# Patient Record
Sex: MALE | Race: WHITE | NOT HISPANIC OR LATINO | Employment: FULL TIME | URBAN - METROPOLITAN AREA
[De-identification: names, ages, dates, MRNs, and addresses within clinical notes are randomized per-mention and may not be internally consistent; named-entity substitution may affect disease eponyms.]

---

## 2017-04-07 ENCOUNTER — VIRTUAL VISIT (OUTPATIENT)
Dept: FAMILY MEDICINE | Facility: OTHER | Age: 47
End: 2017-04-07

## 2017-04-08 NOTE — PROGRESS NOTES
Date:   Clinician: Rogelio Nicole  Clinician NPI: [id number]  Patient: Lalito Weathers  Patient : 1970  Patient Address: [address] [place] [id number]  Patient Phone: [phone]  Visit Protocol: URI  Patient Summary:  Lalito is a 46 year old ( : 1970 ) male who initiated a Zip for suspected Strep throat.      His symptoms started gradually 3-6 days ago and consist of rhinitis, post-nasal drainage, sore throat, nasal congestion, and malaise.   He denies itchy eyes, cough, hoarse voice, myalgias, dysphagia, dyspnea, vomiting, nausea, chest pain, ear pain, petechial or purpuric rash, chills, loss of appetite, and fever. He denies a history of facial surgery.   His minimal nasal secretions are clear. His moderate facial pain or pressure feels worse when bending over or leaning forward and is located on both sides of his head. The facial pain or pressure started after the onset of other URI symptoms.  Lalito has a mild headache. The headache did not start before his other symptoms and is located on both sides of his head.   He has a mildly painful sore throat. The patient denies having white spots on the tonsils similar to a sample strep throat image provided. He has not been exposed to Strep. When asked to feel his neck he denied feeling enlarged lymph nodes. He denies axillary lymphadenopathy.    Lalito denies having COPD or other chronic lung disease.   Pulse: self-reported pulse rate as: 10 beats in 10 seconds.    Weight (in lbs): 270   Lalito does not smoke or use smokeless tobacco.   Additional information as reported by the patient (free text): My throat is soar but it does not hurt to swallow. I do have tonisl stones, I have a headache, sometime my nose is pluged and sometimes its not. I ahve been using cough drops and Ibuprofen... caough drops seem to help clear out my nose.   MEDICATIONS:  Lisinopril and Verapamil  , ALLERGIES:    Patient free text response:   Statin     Clinician Response:  Dear Lalito,  Your [facility] lab test results show that fortunately you DO NOT have a Strep Throat infection. This means that your condition should resolve within a few days. Try the following to help with your pain and discomfort:     Use throat lozenges    Gargle with warm salt water (1/4 teaspoon of salt per 8 ounce glass of water)    Suck on frozen items such as Popsicles or ice cubes    Take ibuprofen (such as Advil or store brand) or acetaminophen (Tylenol or store brand) for discomfort or fever     Follow up with your primary care clinician if your symptoms are not improving in 3-4 days.   I am prescribing fluticasone propionate nasal (Flonase) 50 mcg/spray. Take one or two inhalations in each nostril one time a day. There are no refills with this prescription.   Unless your are allergic to the over-the-counter medication(s) below, I recommend using:   Ibuprofen. Take 1-3 200mg tablets (200-600mg) every 8 hours to help with the discomfort. Make sure to take the ibuprofen with food. Do not exceed 2400mg in 24 hours. This is an over-the-counter medication that does not require a prescription.   Diagnosis: [facility] TICKET STREP  Diagnosis ICD: J02.9  [facility] Results: Strep Test - Negative: no group a streptococcal antigen detected by immunoassay, await culture result  [facility] Secondary Results: Strep Culture - No beta streptococcus isolated  Prescription: fluticasone propionate (Flonase) 50mcg nasal spray 16 gm, 30 days supply. Take one or two inhalations in each nostril one time a day. Refills: 0, Refill as needed: no, Allow substitutions: yes

## 2018-12-18 ENCOUNTER — OFFICE VISIT (OUTPATIENT)
Dept: FAMILY MEDICINE | Facility: CLINIC | Age: 48
End: 2018-12-18
Payer: COMMERCIAL

## 2018-12-18 ENCOUNTER — ANCILLARY PROCEDURE (OUTPATIENT)
Dept: GENERAL RADIOLOGY | Facility: CLINIC | Age: 48
End: 2018-12-18
Attending: NURSE PRACTITIONER
Payer: COMMERCIAL

## 2018-12-18 VITALS
HEART RATE: 82 BPM | BODY MASS INDEX: 38.27 KG/M2 | DIASTOLIC BLOOD PRESSURE: 72 MMHG | HEIGHT: 69 IN | RESPIRATION RATE: 16 BRPM | SYSTOLIC BLOOD PRESSURE: 156 MMHG | OXYGEN SATURATION: 96 % | TEMPERATURE: 101.1 F | WEIGHT: 258.4 LBS

## 2018-12-18 DIAGNOSIS — R50.9 ACUTE FEBRILE ILLNESS: ICD-10-CM

## 2018-12-18 DIAGNOSIS — J18.9 PNEUMONIA OF RIGHT LOWER LOBE DUE TO INFECTIOUS ORGANISM: Primary | ICD-10-CM

## 2018-12-18 LAB
FLUAV+FLUBV AG SPEC QL: NEGATIVE
FLUAV+FLUBV AG SPEC QL: NEGATIVE
SPECIMEN SOURCE: NORMAL

## 2018-12-18 PROCEDURE — 99203 OFFICE O/P NEW LOW 30 MIN: CPT | Performed by: NURSE PRACTITIONER

## 2018-12-18 PROCEDURE — 87804 INFLUENZA ASSAY W/OPTIC: CPT | Performed by: NURSE PRACTITIONER

## 2018-12-18 PROCEDURE — 71046 X-RAY EXAM CHEST 2 VIEWS: CPT | Mod: FY

## 2018-12-18 RX ORDER — DOXYCYCLINE HYCLATE 100 MG
100 TABLET ORAL 2 TIMES DAILY
Qty: 20 TABLET | Refills: 0 | Status: SHIPPED | OUTPATIENT
Start: 2018-12-18 | End: 2018-12-28

## 2018-12-18 RX ORDER — CODEINE PHOSPHATE AND GUAIFENESIN 10; 100 MG/5ML; MG/5ML
1-2 SOLUTION ORAL EVERY 4 HOURS PRN
Qty: 180 ML | Refills: 0 | Status: SHIPPED | OUTPATIENT
Start: 2018-12-18 | End: 2019-07-09

## 2018-12-18 RX ORDER — ASPIRIN 81 MG/1
TABLET ORAL
COMMUNITY
Start: 2008-10-02

## 2018-12-18 RX ORDER — TRIAMTERENE AND HYDROCHLOROTHIAZIDE 37.5; 25 MG/1; MG/1
CAPSULE ORAL
COMMUNITY
Start: 2018-10-08 | End: 2019-08-08 | Stop reason: ALTCHOICE

## 2018-12-18 RX ORDER — VERAPAMIL HYDROCHLORIDE 180 MG/1
180 CAPSULE, EXTENDED RELEASE ORAL
COMMUNITY
Start: 2018-09-28 | End: 2019-08-09

## 2018-12-18 RX ORDER — LISINOPRIL 40 MG/1
40 TABLET ORAL
COMMUNITY
Start: 2018-09-28 | End: 2020-01-07

## 2018-12-18 ASSESSMENT — MIFFLIN-ST. JEOR: SCORE: 2026.43

## 2018-12-18 NOTE — PROGRESS NOTES
"  SUBJECTIVE:   Lalito Weathers is a 48 year old male who presents to clinic today for the following health issues:      ENT Symptoms             Symptoms: cc Present Absent Comment   Fever/Chills  x  Current temp is 101.1, chills, shakes   Fatigue  x  Unable to sleep due to plugged nose, unable to sleep more than 2 hrs at a time   Muscle Aches  x     Eye Irritation   x    Sneezing  x  mild   Nasal Fadi/Drg  x  Uses cpap and it forces drainage down throat keeping him up coughing at night   Sinus Pressure/Pain  x     Loss of smell   x    Dental pain  x     Sore Throat   x    Swollen Glands  x     Ear Pain/Fullness   x    Cough x x  Dry cough    Wheeze  x     Chest Pain  x  mild   Shortness of breath   x    Rash   x    Other         Symptom duration:  4 days    Symptom severity:  severe    Treatments tried:  nyquil, advil-last dose an hour prior to OV (400 mg)   Contacts:  gym instructors out of work       Problem list and histories reviewed & adjusted, as indicated.  Additional history: as documented    There is no problem list on file for this patient.    History reviewed. No pertinent surgical history.    Social History     Tobacco Use     Smoking status: Former Smoker     Smokeless tobacco: Former User   Substance Use Topics     Alcohol use: Yes     Comment: social     History reviewed. No pertinent family history.        Reviewed and updated as needed this visit by clinical staff       Reviewed and updated as needed this visit by Provider         ROS:  Constitutional, HEENT, cardiovascular, pulmonary, gi and gu systems are negative, except as otherwise noted.    OBJECTIVE:     /72   Pulse 82   Temp 101.1  F (38.4  C) (Tympanic)   Resp 16   Ht 1.759 m (5' 9.25\")   Wt 117.2 kg (258 lb 6.4 oz)   SpO2 96%   BMI 37.88 kg/m    Body mass index is 37.88 kg/m .  GENERAL: alert, no distress and fatigued  EYES: Eyes grossly normal to inspection, PERRL and conjunctivae and sclerae normal  HENT: ear canals and " "TM's normal, nose and mouth without ulcers or lesions  NECK: no adenopathy, no asymmetry, masses, or scars and thyroid normal to palpation  RESP: no rales , no rhonchi and expiratory wheezes R lower posterior  CV: regular rates and rhythm, normal S1 S2, no S3 or S4, no murmur, click or rub.  SKIN: no suspicious lesions or rashes  NEURO: Normal strength and tone, mentation intact and speech normal    Diagnostic Test Results:  Results for orders placed or performed in visit on 12/18/18 (from the past 24 hour(s))   Influenza A/B antigen   Result Value Ref Range    Influenza A/B Agn Specimen Nasal     Influenza A Negative NEG^Negative    Influenza B Negative NEG^Negative     CXR - per my read infiltrate to RLL, pending radiology review.    CHEST TWO VIEWS December 18, 2018 10:23 AM      HISTORY: Acute febrile illness.     COMPARISON: None.                                                                      IMPRESSION: Patchy consolidation is present within the right lower  lobe consistent with pneumonia. Lungs are otherwise clear. No evidence  of pleural effusion. Heart size is normal.     LEDA STAUFFER MD    ASSESSMENT/PLAN:     BP Screening:   Last 3 BP Readings:    BP Readings from Last 3 Encounters:   12/18/18 156/72       The following was recommended to the patient:  Referral to alternative/primary care provider  BMI:   Estimated body mass index is 37.88 kg/m  as calculated from the following:    Height as of this encounter: 1.759 m (5' 9.25\").    Weight as of this encounter: 117.2 kg (258 lb 6.4 oz).   Weight management plan: Patient was referred to their PCP to discuss a diet and exercise plan.        ICD-10-CM    1. Pneumonia of right lower lobe due to infectious organism (H) J18.1 doxycycline hyclate (VIBRA-TABS) 100 MG tablet     guaiFENesin-codeine (ROBITUSSIN AC) 100-10 MG/5ML solution   2. Acute febrile illness R50.9 Influenza A/B antigen     XR Chest 2 Views       FUTURE APPOINTMENTS:       - Follow up " in 3 days for symptoms that are not improving, sooner for new or worsening sx.     Patient Instructions     Patient Education     What is Pneumonia?    Pneumonia is a serious lung infection. Many cases of pneumonia are caused by bacteria or viruses. Fungi may also cause pneumonia, but this is less common.  You may also get pneumonia after another illness, such as a cold, flu, or bronchitis. Those most at risk include older adults, smokers, and people with long-term (chronic) health problems or weak immune systems.  Healthy lungs    Air travels in and out of the lungs through tubes called airways.    The tubes branch into smaller passages called bronchioles. These end in tiny sacs called alveoli.    Blood vessels surrounding the alveoli take oxygen into the bloodstream. At the same time, the alveoli remove carbon dioxide (a waste gas) from the blood. The carbon dioxide is then exhaled.  When you have pneumonia    Pneumonia causes the bronchioles and the alveoli to fill with excess mucus and become inflamed.    Your body s response may be to cough. This can help clear out the fluid.    The fluid (or mucus) you cough up may appear green or dark yellow.    The excess mucus may make you feel short of breath.    The inflammation and infection may give you a fever.  What are the symptoms?  Symptoms of pneumonia can come without warning. At first, you may think you have a cold or flu. But symptoms may get worse quickly, turning into pneumonia. Symptoms can be different for bacterial and viral pneumonia. Common symptoms may include the following:    Severe cough with green or yellow mucus that doesn't improve or that gets worse    Fever and chills    Nausea, vomiting or diarrhea    Shortness of breath with normal daily activities    Increased heart rate    Chest pain or discomfort when breathing in or coughing    Headache    Excessive sweating and clammy skin  Date Last Reviewed: 12/1/2016 2000-2018 [employer]  [employer]. [address] [place] [address]. All rights reserved. This information is not intended as a substitute for professional medical care. Always follow your healthcare professional's instructions.               TERESA Henderson [facility]

## 2018-12-18 NOTE — PATIENT INSTRUCTIONS
Patient Education     What is Pneumonia?    Pneumonia is a serious lung infection. Many cases of pneumonia are caused by bacteria or viruses. Fungi may also cause pneumonia, but this is less common.  You may also get pneumonia after another illness, such as a cold, flu, or bronchitis. Those most at risk include older adults, smokers, and people with long-term (chronic) health problems or weak immune systems.  Healthy lungs    Air travels in and out of the lungs through tubes called airways.    The tubes branch into smaller passages called bronchioles. These end in tiny sacs called alveoli.    Blood vessels surrounding the alveoli take oxygen into the bloodstream. At the same time, the alveoli remove carbon dioxide (a waste gas) from the blood. The carbon dioxide is then exhaled.  When you have pneumonia    Pneumonia causes the bronchioles and the alveoli to fill with excess mucus and become inflamed.    Your body s response may be to cough. This can help clear out the fluid.    The fluid (or mucus) you cough up may appear green or dark yellow.    The excess mucus may make you feel short of breath.    The inflammation and infection may give you a fever.  What are the symptoms?  Symptoms of pneumonia can come without warning. At first, you may think you have a cold or flu. But symptoms may get worse quickly, turning into pneumonia. Symptoms can be different for bacterial and viral pneumonia. Common symptoms may include the following:    Severe cough with green or yellow mucus that doesn't improve or that gets worse    Fever and chills    Nausea, vomiting or diarrhea    Shortness of breath with normal daily activities    Increased heart rate    Chest pain or discomfort when breathing in or coughing    Headache    Excessive sweating and clammy skin  Date Last Reviewed: 12/1/2016 2000-2018 [employer]. [address] [place] [address], [place] [address]. All rights reserved. This information is not intended as a  substitute for professional medical care. Always follow your healthcare professional's instructions.

## 2018-12-24 ENCOUNTER — NURSE TRIAGE (OUTPATIENT)
Dept: NURSING | Facility: CLINIC | Age: 48
End: 2018-12-24

## 2018-12-24 NOTE — TELEPHONE ENCOUNTER
"Has some questions.He said he is feeling much improved, no fever, voice is still hoarse, he still coughs but not as bad. He is wondering if he should be \"coughing up\" something from his lungs or if he is not should be going back in to the doctor?  IMPRESSION: Patchy consolidation is present within the right lower  lobe consistent with pneumonia. Lungs are otherwise clear. No evidence  of pleural effusion. Heart size is normal.     LEDA STAUFFER MD     ASSESSMENT/PLAN:      BP Screening:   Last 3 BP Readings:        BP Readings from Last 3 Encounters:   12/18/18 156/72         The following was recommended to the patient:  Referral to alternative/primary care provider  BMI:   Estimated body mass index is 37.88 kg/m  as calculated from the following:    Height as of this encounter: 1.759 m (5' 9.25\").    Weight as of this encounter: 117.2 kg (258 lb 6.4 oz).   Weight management plan: Patient was referred to their PCP to discuss a diet and exercise plan.            ICD-10-CM     1. Pneumonia of right lower lobe due to infectious organism (H) J18.1 doxycycline hyclate (VIBRA-TABS) 100 MG tablet       guaiFENesin-codeine (ROBITUSSIN AC) 100-10 MG/5ML solution   2. Acute febrile illness R50.9 Influenza A/B antigen       XR Chest 2 Views         FUTURE APPOINTMENTS:       - Follow up in 3 days for symptoms that are not improving, sooner for new or worsening sx.      Patient Instructions         Patient Education        What is Pneumonia?    Pneumonia is a serious lung infection. Many cases of pneumonia are caused by bacteria or viruses. Fungi may also cause pneumonia, but this is less common.  You may also get pneumonia after another illness, such as a cold, flu, or bronchitis. Those most at risk include older adults, smokers, and people with long-term (chronic) health problems or weak immune systems.  Healthy lungs    Air travels in and out of the lungs through tubes called airways.    The tubes branch into smaller " "passages called bronchioles. These end in tiny sacs called alveoli.    Blood vessels surrounding the alveoli take oxygen into the bloodstream. At the same time, the alveoli remove carbon dioxide (a waste gas) from the blood. The carbon dioxide is then exhaled.  When you have pneumonia    Pneumonia causes the bronchioles and the alveoli to fill with excess mucus and become inflamed.    Your body s response may be to cough. This can help clear out the fluid.    The fluid (or mucus) you cough up may appear green or dark yellow.    The excess mucus may make you feel short of breath.    The inflammation and infection may give you a fever.  What are the symptoms?  Symptoms of pneumonia can come without warning. At first, you may think you have a cold or flu. But symptoms may get worse quickly, turning into pneumonia. Symptoms can be different for bacterial and viral pneumonia. Common symptoms may include the following:    Severe cough with green or yellow mucus that doesn't improve or that gets worse    Fever and chills    Nausea, vomiting or diarrhea    Shortness of breath with normal daily activities    Increased heart rate    Chest pain or discomfort when breathing in or coughing    Headache    Excessive sweating and clammy skin  Date Last Reviewed: 12/1/2016 2000-2018 [employer]. [address] [place] [address]. All rights reserved. This information is not intended as a substitute for professional medical care. Always follow your healthcare professional's instructions.                      TERESA Henderson [facility]      Instructions     Patient Instructions                After Visit Summary (Printed 12/18/2018)   Additional Documentation     Vitals:    /72    Pulse 82    Temp 101.1  F (38.4  C) (Tympanic)    Resp 16    Ht 1.759 m (5' 9.25\")    Wt 117.2 kg (258 lb 6.4 oz)    SpO2 96%    BMI 37.88 kg/m     BSA 2.39 m          More Vitals  "   Flowsheets:    Vitals Reassessment,    NICU VS,    Anthropometrics       Encounter Info:    Billing Info,    History,    Allergies,    Detailed Report      Reviewed clinic discharge instructions with patient and advised follow up if he feels he not improving.  Kayleen Lamas RN  [facility]

## 2019-01-04 ENCOUNTER — ANCILLARY PROCEDURE (OUTPATIENT)
Dept: GENERAL RADIOLOGY | Facility: CLINIC | Age: 49
End: 2019-01-04
Payer: COMMERCIAL

## 2019-01-04 ENCOUNTER — OFFICE VISIT (OUTPATIENT)
Dept: FAMILY MEDICINE | Facility: CLINIC | Age: 49
End: 2019-01-04
Payer: COMMERCIAL

## 2019-01-04 VITALS
WEIGHT: 262.2 LBS | BODY MASS INDEX: 37.54 KG/M2 | TEMPERATURE: 98.6 F | RESPIRATION RATE: 20 BRPM | HEIGHT: 70 IN | SYSTOLIC BLOOD PRESSURE: 150 MMHG | DIASTOLIC BLOOD PRESSURE: 87 MMHG | OXYGEN SATURATION: 97 % | HEART RATE: 66 BPM

## 2019-01-04 DIAGNOSIS — J18.9 PNEUMONIA OF RIGHT LOWER LOBE DUE TO INFECTIOUS ORGANISM: Primary | ICD-10-CM

## 2019-01-04 DIAGNOSIS — J18.9 PNEUMONIA OF RIGHT LOWER LOBE DUE TO INFECTIOUS ORGANISM: ICD-10-CM

## 2019-01-04 PROCEDURE — 71046 X-RAY EXAM CHEST 2 VIEWS: CPT

## 2019-01-04 PROCEDURE — 99213 OFFICE O/P EST LOW 20 MIN: CPT | Performed by: FAMILY MEDICINE

## 2019-01-04 RX ORDER — CHOLECALCIFEROL (VITAMIN D3) 1250 MCG
50000 CAPSULE ORAL
COMMUNITY
End: 2019-06-19

## 2019-01-04 RX ORDER — CHLORAL HYDRATE 500 MG
1 CAPSULE ORAL DAILY
COMMUNITY

## 2019-01-04 RX ORDER — MULTIPLE VITAMINS W/ MINERALS TAB 9MG-400MCG
1 TAB ORAL DAILY
COMMUNITY

## 2019-01-04 RX ORDER — MULTIVIT WITH MINERALS/LUTEIN
1000 TABLET ORAL DAILY
COMMUNITY

## 2019-01-04 ASSESSMENT — PAIN SCALES - GENERAL: PAINLEVEL: NO PAIN (0)

## 2019-01-04 ASSESSMENT — MIFFLIN-ST. JEOR: SCORE: 2051.61

## 2019-01-04 NOTE — NURSING NOTE
"Chief Complaint   Patient presents with     Cough     Seen 12/18/2018 for pneumonia. Still have some chest congestion.       Initial There were no vitals taken for this visit. Estimated body mass index is 37.88 kg/m  as calculated from the following:    Height as of 12/18/18: 1.759 m (5' 9.25\").    Weight as of 12/18/18: 117.2 kg (258 lb 6.4 oz).    Patient presents to the clinic using No DME    Health Maintenance that is potentially due pending provider review:  Colonoscopy/FIT    Pt will schedule future appt.    Is there anyone who you would like to be able to receive your results? Yes Trang Weathers  If yes have patient fill out CHATO  Helen Bryant CMA    "

## 2019-01-04 NOTE — PROGRESS NOTES
SUBJECTIVE:   Lalito Weathers is a 48 year old male who presents to clinic today for the following health issues:      RESPIRATORY SYMPTOMS  Still coughing       Duration: pneumonia diagnosed 12/18/18- started getting ill 12/15/2018    Description  cough, chills, fatigue/malaise and hoarse voice    Severity: mild to moderate    Accompanying signs and symptoms: None    History (predisposing factors):  none    Precipitating or alleviating factors: None    Therapies tried and outcome:  Finished Doxy          Problem list and histories reviewed & adjusted, as indicated.  Additional history: as documented    There is no problem list on file for this patient.    History reviewed. No pertinent surgical history.    Social History     Tobacco Use     Smoking status: Former Smoker     Smokeless tobacco: Former User   Substance Use Topics     Alcohol use: Yes     Comment: social     Family History   Problem Relation Age of Onset     Unknown/Adopted Mother      Unknown/Adopted Father          Current Outpatient Medications   Medication Sig Dispense Refill     aspirin 81 MG EC tablet        cholecalciferol (VITAMIN D3) 5000 units TABS tablet Take by mouth daily       cholecalciferol (VITAMIN D3) 59895 units (1250 mcg) capsule Take 50,000 Units by mouth every 7 days       fish oil-omega-3 fatty acids 1000 MG capsule Take 1 g by mouth daily       guaiFENesin-codeine (ROBITUSSIN AC) 100-10 MG/5ML solution Take 5-10 mLs by mouth every 4 hours as needed for cough 180 mL 0     lisinopril (PRINIVIL/ZESTRIL) 40 MG tablet Take 40 mg by mouth       Misc Natural Products (TART CHERRY ADVANCED PO) Take 1 tablet by mouth daily       multivitamin w/minerals (MULTI-VITAMIN) tablet Take 1 tablet by mouth daily       triamterene-HCTZ (DYAZIDE) 37.5-25 MG capsule        verapamil ER (VERELAN) 180 MG 24 hr capsule Take 180 mg by mouth       vitamin C (ASCORBIC ACID) 1000 MG TABS Take 1,000 mg by mouth daily       Allergies   Allergen Reactions  "    [place] Flavor        Reviewed and updated as needed this visit by clinical staff  Tobacco  Allergies  Meds  Med Hx  Surg Hx  Fam Hx  Soc Hx      Reviewed and updated as needed this visit by Provider         ROS:  CONSTITUTIONAL: NEGATIVE for fever, chills, change in weight  ENT/MOUTH: NEGATIVE for ear, mouth and throat problems  RESP: NEGATIVE for significant cough or SOB  CV: NEGATIVE for chest pain, palpitations or peripheral edema    OBJECTIVE:     /87 (BP Location: Right arm, Patient Position: Chair, Cuff Size: Adult Large)   Pulse 66   Temp 98.6  F (37  C)   Resp 20   Ht 1.772 m (5' 9.75\")   Wt 118.9 kg (262 lb 3.2 oz)   SpO2 97%   BMI 37.89 kg/m    Body mass index is 37.89 kg/m .  GENERAL: healthy, alert and no distress  NECK: no adenopathy, no asymmetry, masses, or scars and thyroid normal to palpation  RESP: lungs clear to auscultation - no rales, rhonchi or wheezes  CV: regular rate and rhythm, normal S1 S2, no S3 or S4, no murmur, click or rub, no peripheral edema and peripheral pulses strong  ABDOMEN: soft, nontender, no hepatosplenomegaly, no masses and bowel sounds normal  MS: no gross musculoskeletal defects noted, no edema        ASSESSMENT/PLAN:             1. Pneumonia of right lower lobe due to infectious organism (H)  Clearing cxr.    - XR Chest 2 Views; Future    ASSESSMENT/PLAN:no further antibiotics but will consider in one week if not better.       ICD-10-CM    1. Pneumonia of right lower lobe due to infectious organism (H) J18.1 XR Chest 2 Views       There are no Patient Instructions on file for this visit.        Michele Banuelos MD  [facility]  "

## 2019-05-06 ENCOUNTER — TELEPHONE (OUTPATIENT)
Dept: FAMILY MEDICINE | Facility: CLINIC | Age: 49
End: 2019-05-06

## 2019-05-06 DIAGNOSIS — Z12.11 SPECIAL SCREENING FOR MALIGNANT NEOPLASMS, COLON: Primary | ICD-10-CM

## 2019-05-06 NOTE — TELEPHONE ENCOUNTER
Pt returned call decided to have the Procedure done. Please place order  Chel at [facility] scheduling is aware and waiting for order.  Floresita [place] Sec

## 2019-05-06 NOTE — TELEPHONE ENCOUNTER
Panel Management Review      Patient has the following on his problem list: None      Composite cancer screening  Chart review shows that this patient is due/due soon for the following Colonoscopy  Summary:    Patient is due/failing the following:   COLONOSCOPY    Action needed:   Patient needs office visit for a colonoscopy.    Type of outreach:    Phone, left message for patient to call back.     Questions for provider review:    None                                                                                                                                    Archana Clay MA      Chart routed to Care Team .

## 2019-06-13 ENCOUNTER — TELEPHONE (OUTPATIENT)
Dept: FAMILY MEDICINE | Facility: CLINIC | Age: 49
End: 2019-06-13

## 2019-06-13 NOTE — TELEPHONE ENCOUNTER
Reason for Call:  Other     Detailed comments: Pt is having a colonoscopy on 6/21 and want to know what medications he should or should not be doing.    Please advise    Phone Number Patient can be reached at: Cell number on file:    No relevant phone numbers on file.       Best Time: any    Can we leave a detailed message on this number? YES    Call taken on 6/13/2019 at 8:40 AM by Gina Cummings

## 2019-06-14 NOTE — TELEPHONE ENCOUNTER
Message below relayed to pt. He took his ASA this morning so advised him to go off it now and have provider doing colonoscopy advise on restart once procedure complete.    Lizet,   Please clarify meaning: Inform patient feeling medication    Kelsy HERNANDEZ RN

## 2019-06-14 NOTE — TELEPHONE ENCOUNTER
Inform patient feeling medication he should need to hold is his aspirin but hold it 5 to 7 days prior to the colonoscopy.    Lizet Berry CNP

## 2019-06-18 ENCOUNTER — ANESTHESIA EVENT (OUTPATIENT)
Dept: GASTROENTEROLOGY | Facility: CLINIC | Age: 49
End: 2019-06-18
Payer: COMMERCIAL

## 2019-06-18 ASSESSMENT — LIFESTYLE VARIABLES: TOBACCO_USE: 1

## 2019-06-18 NOTE — ANESTHESIA PREPROCEDURE EVALUATION
"Anesthesia Pre-Procedure Evaluation    Patient: Lalito Weathers   MRN: [id number] : 1970          Preoperative Diagnosis: screening    Procedure(s):  COLONOSCOPY    No past medical history on file.  No past surgical history on file.    Anesthesia Evaluation     . Pt has had prior anesthetic.     No history of anesthetic complications          ROS/MED HX    ENT/Pulmonary:     (+)GRACY risk factors obese, tobacco use, Past use , . .    Neurologic:  - neg neurologic ROS     Cardiovascular:     (+) hypertension----. : . . . :. .       METS/Exercise Tolerance:  >4 METS   Hematologic:  - neg hematologic  ROS       Musculoskeletal:  - neg musculoskeletal ROS       GI/Hepatic:  - neg GI/hepatic ROS       Renal/Genitourinary:  - ROS Renal section negative       Endo:     (+) Obesity, .      Psychiatric:  - neg psychiatric ROS       Infectious Disease:  - neg infectious disease ROS       Malignancy:      - no malignancy   Other:    - neg other ROS                      Physical Exam  Normal systems: cardiovascular, pulmonary and dental    Airway   Mallampati: II  TM distance: >3 FB  Neck ROM: full    Dental     Cardiovascular       Pulmonary             No results found for: WBC, HGB, HCT, PLT, CRP, SED, NA, POTASSIUM, CHLORIDE, CO2, BUN, CR, GLC, HERMELINDA, PHOS, MAG, ALBUMIN, PROTTOTAL, ALT, AST, GGT, ALKPHOS, BILITOTAL, BILIDIRECT, LIPASE, AMYLASE, SARTHAK, PTT, INR, FIBR, TSH, T4, T3, HCG, HCGS, CKTOTAL, CKMB, TROPN    Preop Vitals  BP Readings from Last 3 Encounters:   19 150/87   18 156/72    Pulse Readings from Last 3 Encounters:   19 66   18 82      Resp Readings from Last 3 Encounters:   19 20   18 16    SpO2 Readings from Last 3 Encounters:   19 97%   18 96%      Temp Readings from Last 1 Encounters:   19 37  C (98.6  F)    Ht Readings from Last 1 Encounters:   19 1.772 m (5' 9.75\")      Wt Readings from Last 1 Encounters:   19 118.9 kg (262 lb 3.2 oz) " "   Estimated body mass index is 37.89 kg/m  as calculated from the following:    Height as of 1/4/19: 1.772 m (5' 9.75\").    Weight as of 1/4/19: 118.9 kg (262 lb 3.2 oz).       Anesthesia Plan      History & Physical Review  History and physical reviewed and following examination; no interval change.    ASA Status:  3 .    NPO Status:  > 8 hours    Plan for General and MAC with Propofol and Intravenous induction. Maintenance will be TIVA.  Reason for MAC:  Deep or markedly invasive procedure (G8)  PONV prophylaxis:  Ondansetron (or other 5HT-3) and Dexamethasone or Solumedrol       Postoperative Care  Postoperative pain management:  IV analgesics and Oral pain medications.      Consents  Anesthetic plan, risks, benefits and alternatives discussed with:  Patient..                 TERESA Gordon CRNA  "

## 2019-06-21 ENCOUNTER — ANESTHESIA (OUTPATIENT)
Dept: GASTROENTEROLOGY | Facility: CLINIC | Age: 49
End: 2019-06-21
Payer: COMMERCIAL

## 2019-06-21 ENCOUNTER — HOSPITAL ENCOUNTER (OUTPATIENT)
Facility: CLINIC | Age: 49
Discharge: HOME | End: 2019-06-21
Attending: SURGERY | Admitting: SURGERY
Payer: COMMERCIAL

## 2019-06-21 VITALS
WEIGHT: 250 LBS | HEART RATE: 63 BPM | OXYGEN SATURATION: 97 % | BODY MASS INDEX: 37.03 KG/M2 | TEMPERATURE: 98.7 F | SYSTOLIC BLOOD PRESSURE: 142 MMHG | RESPIRATION RATE: 16 BRPM | HEIGHT: 69 IN | DIASTOLIC BLOOD PRESSURE: 71 MMHG

## 2019-06-21 LAB — COLONOSCOPY: NORMAL

## 2019-06-21 PROCEDURE — 25000125 ZZHC RX 250: Performed by: SURGERY

## 2019-06-21 PROCEDURE — 25800030 ZZH RX IP 258 OP 636: Performed by: SURGERY

## 2019-06-21 PROCEDURE — 25000128 H RX IP 250 OP 636: Performed by: NURSE ANESTHETIST, CERTIFIED REGISTERED

## 2019-06-21 PROCEDURE — 37000008 ZZH ANESTHESIA TECHNICAL FEE, 1ST 30 MIN: Performed by: SURGERY

## 2019-06-21 PROCEDURE — 45380 COLONOSCOPY AND BIOPSY: CPT | Performed by: SURGERY

## 2019-06-21 PROCEDURE — 25000125 ZZHC RX 250: Performed by: NURSE ANESTHETIST, CERTIFIED REGISTERED

## 2019-06-21 PROCEDURE — 45385 COLONOSCOPY W/LESION REMOVAL: CPT | Mod: PT | Performed by: SURGERY

## 2019-06-21 PROCEDURE — 88305 TISSUE EXAM BY PATHOLOGIST: CPT | Performed by: SURGERY

## 2019-06-21 PROCEDURE — 88305 TISSUE EXAM BY PATHOLOGIST: CPT | Mod: 26 | Performed by: SURGERY

## 2019-06-21 RX ORDER — PROPOFOL 10 MG/ML
INJECTION, EMULSION INTRAVENOUS PRN
Status: DISCONTINUED | OUTPATIENT
Start: 2019-06-21 | End: 2019-06-21

## 2019-06-21 RX ORDER — LIDOCAINE HYDROCHLORIDE 10 MG/ML
INJECTION, SOLUTION INFILTRATION; PERINEURAL PRN
Status: DISCONTINUED | OUTPATIENT
Start: 2019-06-21 | End: 2019-06-21

## 2019-06-21 RX ORDER — PROPOFOL 10 MG/ML
INJECTION, EMULSION INTRAVENOUS CONTINUOUS PRN
Status: DISCONTINUED | OUTPATIENT
Start: 2019-06-21 | End: 2019-06-21

## 2019-06-21 RX ORDER — SODIUM CHLORIDE, SODIUM LACTATE, POTASSIUM CHLORIDE, CALCIUM CHLORIDE 600; 310; 30; 20 MG/100ML; MG/100ML; MG/100ML; MG/100ML
INJECTION, SOLUTION INTRAVENOUS CONTINUOUS
Status: DISCONTINUED | OUTPATIENT
Start: 2019-06-21 | End: 2019-06-21 | Stop reason: HOSPADM

## 2019-06-21 RX ORDER — LIDOCAINE 40 MG/G
CREAM TOPICAL
Status: DISCONTINUED | OUTPATIENT
Start: 2019-06-21 | End: 2019-06-21 | Stop reason: HOSPADM

## 2019-06-21 RX ORDER — ONDANSETRON 2 MG/ML
4 INJECTION INTRAMUSCULAR; INTRAVENOUS
Status: DISCONTINUED | OUTPATIENT
Start: 2019-06-21 | End: 2019-06-21 | Stop reason: HOSPADM

## 2019-06-21 RX ADMIN — SODIUM CHLORIDE, POTASSIUM CHLORIDE, SODIUM LACTATE AND CALCIUM CHLORIDE: 600; 310; 30; 20 INJECTION, SOLUTION INTRAVENOUS at 06:53

## 2019-06-21 RX ADMIN — PROPOFOL 100 MG: 10 INJECTION, EMULSION INTRAVENOUS at 07:33

## 2019-06-21 RX ADMIN — LIDOCAINE HYDROCHLORIDE 50 MG: 10 INJECTION, SOLUTION INFILTRATION; PERINEURAL at 07:33

## 2019-06-21 RX ADMIN — LIDOCAINE HYDROCHLORIDE 1 ML: 10 INJECTION, SOLUTION EPIDURAL; INFILTRATION; INTRACAUDAL; PERINEURAL at 06:53

## 2019-06-21 RX ADMIN — PROPOFOL 200 MCG/KG/MIN: 10 INJECTION, EMULSION INTRAVENOUS at 07:33

## 2019-06-21 ASSESSMENT — MIFFLIN-ST. JEOR: SCORE: 1984.37

## 2019-06-21 NOTE — ANESTHESIA POSTPROCEDURE EVALUATION
Patient: Lalito Weathers    Procedure(s):  COLONOSCOPY, WITH POLYPECTOMY AND BIOPSY    Diagnosis:screening  Diagnosis Additional Information: No value filed.    Anesthesia Type:  No value filed.    Note:  Anesthesia Post Evaluation    Patient location during evaluation: Bedside  Patient participation: Able to fully participate in evaluation  Level of consciousness: awake  Pain management: adequate  Airway patency: patent  Cardiovascular status: stable  Respiratory status: nasal cannula  Hydration status: stable  PONV: none     Anesthetic complications: None          Last vitals:  Vitals:    06/21/19 0633   BP: 149/88   Resp: 18   Temp: 37.1  C (98.7  F)   SpO2: 96%         Electronically Signed By: TERESA Gruber CRNA  June 21, 2019  8:06 AM

## 2019-06-21 NOTE — H&P
"48 year old year old male here for colonoscopy for screening.    There is no problem list on file for this patient.      No past medical history on file.    No past surgical history on file.    @[facility]@    No current outpatient medications on file.       Allergies   Allergen Reactions     Hmg-Coa-R Inhibitors      Vasquez Flavor        Pt reports that he has quit smoking. He has quit using smokeless tobacco. He reports that he drinks alcohol. He reports that he does not use drugs.    Exam:  /88   Temp 98.7  F (37.1  C) (Oral)   Resp 18   Ht 1.753 m (5' 9\")   Wt 113.4 kg (250 lb)   SpO2 96%   BMI 36.92 kg/m      Awake, Alert OX3  Lungs - CTA bilaterally  CV - RRR, no murmurs, distal pulses intact  Abd - soft, non-distended, non-tender, +BS  Extr - No cyanosis or edema    A/P 48 year old year old male in need of colonoscopy for screening. Risks, benefits, alternatives, and complications were discussed including the possibility of perforation and the patient agreed to proceed    Herman Champagne MD   "

## 2019-06-21 NOTE — BRIEF OP NOTE
[facility]   Brief Operative Note    Pre-operative diagnosis: screening   Post-operative diagnosis single polyp     Procedure: Procedure(s):  COLONOSCOPY, WITH POLYPECTOMY AND BIOPSY   Surgeon(s): Surgeon(s) and Role:     * Herman Champagne MD - Primary   Estimated blood loss: * No values recorded between 6/21/2019 12:00 AM and 6/21/2019  7:55 AM *    Specimens: ID Type Source Tests Collected by Time Destination   A : 10cm polyp Polyp Colon SURGICAL PATHOLOGY EXAM Herman Champagne MD 6/21/2019  7:51 AM       Findings: 1. 4 mm polyp at 10 cm - resected  2. Scattered diverticulosis (minimal)  3. Colon otherwise normal

## 2019-06-26 LAB — COPATH REPORT: NORMAL

## 2019-06-28 PROBLEM — D12.6 TUBULAR ADENOMA OF COLON: Status: ACTIVE | Noted: 2019-06-28

## 2019-07-04 ENCOUNTER — HOSPITAL ENCOUNTER (EMERGENCY)
Facility: HOSPITAL | Age: 49
Discharge: HOME | End: 2019-07-04
Attending: PHYSICIAN ASSISTANT | Admitting: PHYSICIAN ASSISTANT
Payer: COMMERCIAL

## 2019-07-04 VITALS
HEART RATE: 56 BPM | RESPIRATION RATE: 14 BRPM | OXYGEN SATURATION: 98 % | TEMPERATURE: 97.3 F | DIASTOLIC BLOOD PRESSURE: 87 MMHG | SYSTOLIC BLOOD PRESSURE: 147 MMHG

## 2019-07-04 DIAGNOSIS — R25.1 SHAKINESS: ICD-10-CM

## 2019-07-04 DIAGNOSIS — R07.89 CHEST DISCOMFORT: ICD-10-CM

## 2019-07-04 LAB
ALBUMIN SERPL-MCNC: 4.4 G/DL (ref 3.4–5)
ALP SERPL-CCNC: 71 U/L (ref 40–150)
ALT SERPL W P-5'-P-CCNC: 45 U/L (ref 0–70)
ANION GAP SERPL CALCULATED.3IONS-SCNC: 4 MMOL/L (ref 3–14)
AST SERPL W P-5'-P-CCNC: 17 U/L (ref 0–45)
BASOPHILS # BLD AUTO: 0.1 10E9/L (ref 0–0.2)
BASOPHILS NFR BLD AUTO: 0.6 %
BILIRUB SERPL-MCNC: 0.6 MG/DL (ref 0.2–1.3)
BUN SERPL-MCNC: 20 MG/DL (ref 7–30)
CALCIUM SERPL-MCNC: 9.4 MG/DL (ref 8.5–10.1)
CHLORIDE SERPL-SCNC: 107 MMOL/L (ref 94–109)
CO2 SERPL-SCNC: 28 MMOL/L (ref 20–32)
CREAT SERPL-MCNC: 0.96 MG/DL (ref 0.66–1.25)
DIFFERENTIAL METHOD BLD: NORMAL
EOSINOPHIL # BLD AUTO: 0 10E9/L (ref 0–0.7)
EOSINOPHIL NFR BLD AUTO: 0.5 %
ERYTHROCYTE [DISTWIDTH] IN BLOOD BY AUTOMATED COUNT: 12.9 % (ref 10–15)
GFR SERPL CREATININE-BSD FRML MDRD: >90 ML/MIN/{1.73_M2}
GLUCOSE BLDC GLUCOMTR-MCNC: 86 MG/DL (ref 70–99)
GLUCOSE SERPL-MCNC: 92 MG/DL (ref 70–99)
HCT VFR BLD AUTO: 41.9 % (ref 40–53)
HGB BLD-MCNC: 14.2 G/DL (ref 13.3–17.7)
IMM GRANULOCYTES # BLD: 0 10E9/L (ref 0–0.4)
IMM GRANULOCYTES NFR BLD: 0.5 %
LYMPHOCYTES # BLD AUTO: 1.8 10E9/L (ref 0.8–5.3)
LYMPHOCYTES NFR BLD AUTO: 22.7 %
MCH RBC QN AUTO: 30 PG (ref 26.5–33)
MCHC RBC AUTO-ENTMCNC: 33.9 G/DL (ref 31.5–36.5)
MCV RBC AUTO: 88 FL (ref 78–100)
MONOCYTES # BLD AUTO: 0.6 10E9/L (ref 0–1.3)
MONOCYTES NFR BLD AUTO: 7.5 %
NEUTROPHILS # BLD AUTO: 5.5 10E9/L (ref 1.6–8.3)
NEUTROPHILS NFR BLD AUTO: 68.2 %
NRBC # BLD AUTO: 0 10*3/UL
NRBC BLD AUTO-RTO: 0 /100
PLATELET # BLD AUTO: 237 10E9/L (ref 150–450)
POTASSIUM SERPL-SCNC: 4 MMOL/L (ref 3.4–5.3)
PROT SERPL-MCNC: 7.9 G/DL (ref 6.8–8.8)
RBC # BLD AUTO: 4.74 10E12/L (ref 4.4–5.9)
SODIUM SERPL-SCNC: 139 MMOL/L (ref 133–144)
TROPONIN I SERPL-MCNC: <0.015 UG/L (ref 0–0.04)
TSH SERPL DL<=0.005 MIU/L-ACNC: 0.99 MU/L (ref 0.4–4)
WBC # BLD AUTO: 8.1 10E9/L (ref 4–11)

## 2019-07-04 PROCEDURE — 85025 COMPLETE CBC W/AUTO DIFF WBC: CPT | Performed by: FAMILY MEDICINE

## 2019-07-04 PROCEDURE — 99284 EMERGENCY DEPT VISIT MOD MDM: CPT

## 2019-07-04 PROCEDURE — 00000146 ZZHCL STATISTIC GLUCOSE BY METER IP

## 2019-07-04 PROCEDURE — 84443 ASSAY THYROID STIM HORMONE: CPT | Performed by: PHYSICIAN ASSISTANT

## 2019-07-04 PROCEDURE — 84484 ASSAY OF TROPONIN QUANT: CPT | Performed by: PHYSICIAN ASSISTANT

## 2019-07-04 PROCEDURE — 93005 ELECTROCARDIOGRAM TRACING: CPT

## 2019-07-04 PROCEDURE — 36415 COLL VENOUS BLD VENIPUNCTURE: CPT | Performed by: FAMILY MEDICINE

## 2019-07-04 PROCEDURE — 80053 COMPREHEN METABOLIC PANEL: CPT | Performed by: FAMILY MEDICINE

## 2019-07-04 PROCEDURE — 99284 EMERGENCY DEPT VISIT MOD MDM: CPT | Mod: Z6 | Performed by: PHYSICIAN ASSISTANT

## 2019-07-04 ASSESSMENT — ENCOUNTER SYMPTOMS
ABDOMINAL PAIN: 0
MUSCULOSKELETAL NEGATIVE: 1
BLOOD IN STOOL: 0
NEUROLOGICAL NEGATIVE: 1
NAUSEA: 0
CHILLS: 0
SHORTNESS OF BREATH: 0
FEVER: 0
PALPITATIONS: 0
HEMATOLOGIC/LYMPHATIC NEGATIVE: 1

## 2019-07-04 NOTE — ED PROVIDER NOTES
"  History     Chief Complaint   Patient presents with     shakey     c/o whole body feels shakey off and on since monday. notes constant feeling this am. notes pre diabetic and blood sugars 98 and 102. notes \"I worked out monday and tuesday\". denies anxiety issues. notes cough and muscular chest soreness.     HPI  Lalito Weathers is a 48 year old male who presents emergency department with complaints of intermittent shakiness for the last several days with a complaint of mid chest dull sternal ache since yesterday.  He denies lightheadedness, shortness of breath.  Patient had diagnosis of prediabetes 2 years ago but has been working very diligently to decrease his weight his last hemoglobin A1c was 5.5.  Patient had blood glucose checked today prior to arrival and both were around 100.  Patient had a colonoscopy on June 21 with removal of noncancerous polyp.  No subsequent problems with that.  Last normal bowel movement was today.  Patient denies any abdominal pain, bloody stools.  He reports episodes of tingling and sweet sensation in the tongue today and tingling sensation in the tops of the feet, both of which have resolved today.  Patient is very active, lifting weights frequently in order to stay in shape.  Does not smoke.      Allergies:  Allergies   Allergen Reactions     Hmg-Coa-R Inhibitors      Vasquez Flavor        Problem List:    Patient Active Problem List    Diagnosis Date Noted     Tubular adenoma of colon 06/28/2019     Priority: Medium        Past Medical History:    No past medical history on file.    Past Surgical History:    Past Surgical History:   Procedure Laterality Date     COLONOSCOPY N/A 6/21/2019    Procedure: COLONOSCOPY, WITH POLYPECTOMY AND BIOPSY;  Surgeon: Herman Champagne MD;  Location: [facility]       Family History:    Family History   Problem Relation Age of Onset     Unknown/Adopted Mother      Unknown/Adopted Father        Social History:  Marital Status:   [2]  Social History "     Tobacco Use     Smoking status: Former Smoker     Smokeless tobacco: Former User   Substance Use Topics     Alcohol use: Yes     Comment: social     Drug use: No        Medications:      aspirin 81 MG EC tablet   cholecalciferol (VITAMIN D3) 5000 units TABS tablet   fish oil-omega-3 fatty acids 1000 MG capsule   lisinopril (PRINIVIL/ZESTRIL) 40 MG tablet   Misc Natural Products (TART CHERRY ADVANCED PO)   multivitamin w/minerals (MULTI-VITAMIN) tablet   triamterene-HCTZ (DYAZIDE) 37.5-25 MG capsule   verapamil ER (VERELAN) 180 MG 24 hr capsule   vitamin C (ASCORBIC ACID) 1000 MG TABS         Review of Systems   Constitutional: Negative for chills and fever.   Respiratory: Negative for shortness of breath.    Cardiovascular: Positive for chest pain. Negative for palpitations and leg swelling.   Gastrointestinal: Negative for abdominal pain, blood in stool and nausea.   Genitourinary: Negative.    Musculoskeletal: Negative.    Skin: Negative.    Neurological: Negative.    Hematological: Negative.        Physical Exam   BP: 170/93  Heart Rate: 68  Temp: 97.3  F (36.3  C)  Resp: 16  SpO2: 99 %      Physical Exam   Constitutional: He is oriented to person, place, and time. No distress.   HENT:   Head: Atraumatic.   Mouth/Throat: Oropharynx is clear and moist.   Eyes: Pupils are equal, round, and reactive to light. Conjunctivae and EOM are normal. No scleral icterus.   Neck: Normal range of motion.   Cardiovascular: Normal heart sounds and intact distal pulses.   Pulmonary/Chest: Breath sounds normal. No respiratory distress.   Abdominal: Soft. Bowel sounds are normal. There is no tenderness.   Musculoskeletal: He exhibits no edema or tenderness.   Neurological: He is alert and oriented to person, place, and time.   Skin: Skin is warm. No rash noted. He is not diaphoretic.       ED Course        Procedures  Shakiness of uncertain cause.  Labs unremarkable with normal glucose, electrolytes, LFTs, TSH.  Chest pain is  not significantly concerning for MI given normal troponin > 6 hours from onset of pain.  ECG is not remarkable.               EKG Interpretation:      Interpreted by Jakub Carlin  Time reviewed: 1155  Symptoms at time of EKG: chest discomfort   Rhythm: normal sinus   Rate: normal  Axis: normal  Ectopy: none  Conduction: normal  ST Segments/ T Waves: Minimal ST segment elevation in isolated V2 and V6 leads.  Q Waves: none  Comparison to prior: No old EKG available    Clinical Impression: normal EKG           Results for orders placed or performed during the hospital encounter of 07/04/19 (from the past 24 hour(s))   Glucose by meter   Result Value Ref Range    Glucose 86 70 - 99 mg/dL   CBC with platelets differential   Result Value Ref Range    WBC 8.1 4.0 - 11.0 10e9/L    RBC Count 4.74 4.4 - 5.9 10e12/L    Hemoglobin 14.2 13.3 - 17.7 g/dL    Hematocrit 41.9 40.0 - 53.0 %    MCV 88 78 - 100 fl    MCH 30.0 26.5 - 33.0 pg    MCHC 33.9 31.5 - 36.5 g/dL    RDW 12.9 10.0 - 15.0 %    Platelet Count 237 150 - 450 10e9/L    Diff Method Automated Method     % Neutrophils 68.2 %    % Lymphocytes 22.7 %    % Monocytes 7.5 %    % Eosinophils 0.5 %    % Basophils 0.6 %    % Immature Granulocytes 0.5 %    Nucleated RBCs 0 0 /100    Absolute Neutrophil 5.5 1.6 - 8.3 10e9/L    Absolute Lymphocytes 1.8 0.8 - 5.3 10e9/L    Absolute Monocytes 0.6 0.0 - 1.3 10e9/L    Absolute Eosinophils 0.0 0.0 - 0.7 10e9/L    Absolute Basophils 0.1 0.0 - 0.2 10e9/L    Abs Immature Granulocytes 0.0 0 - 0.4 10e9/L    Absolute Nucleated RBC 0.0    Comprehensive metabolic panel   Result Value Ref Range    Sodium 139 133 - 144 mmol/L    Potassium 4.0 3.4 - 5.3 mmol/L    Chloride 107 94 - 109 mmol/L    Carbon Dioxide 28 20 - 32 mmol/L    Anion Gap 4 3 - 14 mmol/L    Glucose 92 70 - 99 mg/dL    Urea Nitrogen 20 7 - 30 mg/dL    Creatinine 0.96 0.66 - 1.25 mg/dL    GFR Estimate >90 >60 mL/min/[1.73_m2]    GFR Estimate If Black >90 >60 mL/min/[1.73_m2]     Calcium 9.4 8.5 - 10.1 mg/dL    Bilirubin Total 0.6 0.2 - 1.3 mg/dL    Albumin 4.4 3.4 - 5.0 g/dL    Protein Total 7.9 6.8 - 8.8 g/dL    Alkaline Phosphatase 71 40 - 150 U/L    ALT 45 0 - 70 U/L    AST 17 0 - 45 U/L   TSH with free T4 reflex   Result Value Ref Range    TSH 0.99 0.40 - 4.00 mU/L   Troponin I   Result Value Ref Range    Troponin I ES <0.015 0.000 - 0.045 ug/L       Medications - No data to display    Assessments & Plan (with Medical Decision Making)     I have reviewed the nursing notes.    I have reviewed the findings, diagnosis, plan and need for follow up with the patient.  Recommended patient f/u with PCP in the next 7-14 days.  Return to ED if significantly worsening symptoms.         Medication List      There are no discharge medications for this visit.         Final diagnoses:   Shakiness   Chest discomfort     FRANCISCO Rojas on 7/4/2019 at 1:41 PM   7/4/2019   [facility]     Jakub Carlin PA  07/04/19 7452

## 2019-07-04 NOTE — ED NOTES
"Pt states he has had intermittent shakiness since Monday. Notes that today he feels shaky constantly today. States he has checked his blood sugar and it has been 95 - 105. Was diagnosed with \"prediabetes\" and lost 70#'s and works out at the gym  Regularly. Notes sensation intermittently of heavy feeling mid chest.   "

## 2019-07-04 NOTE — ED AVS SNAPSHOT
[facility]  [address] [place] [id number]  Phone:  [phone]                                    Lalito Weathers   MRN: [id number]    Department:  [facility]   Date of Visit:  7/4/2019           After Visit Summary Signature Page    I have received my discharge instructions, and my questions have been answered. I have discussed any challenges I see with this plan with the nurse or doctor.    ..........................................................................................................................................  Patient/Patient Representative Signature      ..........................................................................................................................................  Patient Representative Print Name and Relationship to Patient    ..................................................               ................................................  Date                                   Time    ..........................................................................................................................................  Reviewed by Signature/Title    ...................................................              ..............................................  Date                                               Time          22EPIC Rev 08/18

## 2019-07-08 ENCOUNTER — NURSE TRIAGE (OUTPATIENT)
Dept: FAMILY MEDICINE | Facility: CLINIC | Age: 49
End: 2019-07-08

## 2019-07-08 NOTE — TELEPHONE ENCOUNTER
Additional Information    Negative: Bluish (or gray) lips or face    Negative: Severe difficulty breathing (e.g., struggling for each breath, speaks in single words)    Negative: Rapid onset of cough and has hives    Negative: Coughing started suddenly after medicine, an allergic food or bee sting    Negative: Difficulty breathing after exposure to flames, smoke, or fumes    Negative: Sounds like a life-threatening emergency to the triager    Negative: Previous asthma attacks and this feels like asthma attack    Negative: Chest pain present when not coughing    Negative: Difficulty breathing    Negative: Passed out (i.e., fainted, collapsed and was not responding)    Negative: Patient sounds very sick or weak to the triager    Negative: Coughed up > 1 tablespoon (15 ml) blood (Exception: blood-tinged sputum)    Negative: Fever > 103 F (39.4 C)    Negative: Fever > 101 F (38.3 C) and over 60 years of age    Negative: Fever > 100.0 F (37.8 C) and has diabetes mellitus or a weak immune system (e.g., HIV positive, cancer chemotherapy, organ transplant, splenectomy, chronic steroids)    Negative: Fever > 100.0 F (37.8 C) and bedridden (e.g., nursing home patient, stroke, chronic illness, recovering from surgery)    Negative: Increasing ankle swelling    Negative: Wheezing is present    Continuous (nonstop) coughing interferes with work or school and no improvement using cough treatment per Care Advice    Negative: SEVERE coughing spells (e.g., whooping sound after coughing, vomiting after coughing)    Negative: Coughing up brooke-colored (reddish-brown) or blood-tinged sputum    Negative: Fever present > 3 days (72 hours)    Negative: Fever returns after gone for over 24 hours and symptoms worse or not improved    Negative: Using nasal washes and pain medicine > 24 hours and sinus pain persists    Negative: Known COPD or other severe lung disease (i.e., bronchiectasis, cystic fibrosis, lung surgery) and worsening  "symptoms (i.e., increased sputum purulence or amount, increased breathing difficulty)    Answer Assessment - Initial Assessment Questions  1. ONSET: \"When did the cough begin?\"       Past week  2. SEVERITY: \"How bad is the cough today?\"       Not severe, states coughing up clear phlegm in small amounts  3. RESPIRATORY DISTRESS: \"Describe your breathing.\"       Breathing fine  4. FEVER: \"Do you have a fever?\" If so, ask: \"What is your temperature, how was it measured, and when did it start?\"      denies  5. HEMOPTYSIS: \"Are you coughing up any blood?\" If so ask: \"How much?\" (flecks, streaks, tablespoons, etc.)      denies  6. TREATMENT: \"What have you done so far to treat the cough?\" (e.g., meds, fluids, humidifier)      fluids  7. CARDIAC HISTORY: \"Do you have any history of heart disease?\" (e.g., heart attack, congestive heart failure)       Recently went in to ER on 7-4-19 for chest pain, negative for cardiac complications  8. LUNG HISTORY: \"Do you have any history of lung disease?\"  (e.g., pulmonary embolus, asthma, emphysema)      History of pneumonia last winter  9. PE RISK FACTORS: \"Do you have a history of blood clots?\" (or: recent major surgery, recent prolonged travel, bedridden )      Did not report  10. OTHER SYMPTOMS: \"Do you have any other symptoms? (e.g., runny nose, wheezing, chest pain)        denies  11. PREGNANCY: \"Is there any chance you are pregnant?\" \"When was your last menstrual period?\"        na  12. TRAVEL: \"Have you traveled out of the country in the last month?\" (e.g., travel history, exposures)        Did not report this    Protocols used: COUGH-A-OH      Patient states that he still feels shaky inside, no chest pain or shortness of breath, no wheezing, no fever, only states that he is coughing small amounts of clear secretions this past week. Patient seen in ER on 7-4-19, ruled out cardiac complications.Pt told to monitor symptoms, if develops chest pain or shortness of breath or " shakiness worsens and feels can not function then needs to be seen in ER again, otherwise patient told to be seen today, no appointments available, not able to work into PCP schedule, so placed in same day provider, patient verbalizes understanding that if condition worsens to be seen in ER. Patient ok with appointment tomorrow.    TONYA Arauz

## 2019-07-08 NOTE — TELEPHONE ENCOUNTER
Reason for call:  Patient reporting a symptom    Symptom or request: Feels Shaky, inside not visual. Coughing up some phlegm  Pt went the ER 7/4/19 did Cardiac Work up. Pt continues to feel this way. They told him to F/U in a week. Pt is wondering if he should be seen sooner. This started a 7/1/19.      Phone Number patient can be reached at:  Home number on file [phone] (home)    Best Time:  Any Time      Can we leave a detailed message on this number:  YES    Call taken on 7/8/2019 at 10:18 AM by Floresita Sneed

## 2019-07-09 ENCOUNTER — ANCILLARY PROCEDURE (OUTPATIENT)
Dept: GENERAL RADIOLOGY | Facility: CLINIC | Age: 49
End: 2019-07-09
Attending: PHYSICIAN ASSISTANT
Payer: COMMERCIAL

## 2019-07-09 ENCOUNTER — OFFICE VISIT (OUTPATIENT)
Dept: FAMILY MEDICINE | Facility: CLINIC | Age: 49
End: 2019-07-09
Payer: COMMERCIAL

## 2019-07-09 VITALS
WEIGHT: 262 LBS | RESPIRATION RATE: 17 BRPM | DIASTOLIC BLOOD PRESSURE: 86 MMHG | OXYGEN SATURATION: 98 % | HEIGHT: 69 IN | SYSTOLIC BLOOD PRESSURE: 166 MMHG | BODY MASS INDEX: 38.8 KG/M2 | TEMPERATURE: 98.3 F | HEART RATE: 60 BPM

## 2019-07-09 DIAGNOSIS — R07.9 ACUTE CHEST PAIN: ICD-10-CM

## 2019-07-09 DIAGNOSIS — R07.9 ACUTE CHEST PAIN: Primary | ICD-10-CM

## 2019-07-09 DIAGNOSIS — R10.13 EPIGASTRIC PAIN: ICD-10-CM

## 2019-07-09 PROCEDURE — 99214 OFFICE O/P EST MOD 30 MIN: CPT | Performed by: PHYSICIAN ASSISTANT

## 2019-07-09 PROCEDURE — 71046 X-RAY EXAM CHEST 2 VIEWS: CPT

## 2019-07-09 PROCEDURE — 93000 ELECTROCARDIOGRAM COMPLETE: CPT | Performed by: PHYSICIAN ASSISTANT

## 2019-07-09 RX ORDER — OMEPRAZOLE 20 MG/1
20 TABLET, DELAYED RELEASE ORAL DAILY
Qty: 90 TABLET | Refills: 0 | Status: SHIPPED | OUTPATIENT
Start: 2019-07-09 | End: 2019-08-09

## 2019-07-09 ASSESSMENT — MIFFLIN-ST. JEOR: SCORE: 2038.8

## 2019-07-09 ASSESSMENT — ENCOUNTER SYMPTOMS
COUGH: 0
WHEEZING: 0
ABDOMINAL PAIN: 1
BLURRED VISION: 0
HEADACHES: 0
EYE REDNESS: 0
VOMITING: 0
CHILLS: 0
FEVER: 0
DIARRHEA: 0
SHORTNESS OF BREATH: 0
PALPITATIONS: 0
SORE THROAT: 0
EYE DISCHARGE: 0
MYALGIAS: 0
NAUSEA: 0

## 2019-07-09 ASSESSMENT — PAIN SCALES - GENERAL: PAINLEVEL: MILD PAIN (3)

## 2019-07-09 NOTE — PROGRESS NOTES
Subjective     Lalito Weathers is a 48 year old male who presents to clinic today for the following health issues:    HPI   ED/UC Followup:    Facility:  [facility]  Date of visit: 7/4/2019  Reason for visit: chest pain  Current Status: still having some chest discomfort     Having chest pain at base of sternum. Patient reports that he had similar pain several years ago, had a GI cocktail and pain resolved. Does not have regular heartburn symptoms. Chest pain not affected by exertion. No shortness of breath. History of hypertension. Has seen cardiology in the past for PVC's, has had 2 normal stress tests in the past. No other significant cardiac history.       Patient Active Problem List   Diagnosis     Tubular adenoma of colon     Past Surgical History:   Procedure Laterality Date     COLONOSCOPY N/A 6/21/2019    Procedure: COLONOSCOPY, WITH POLYPECTOMY AND BIOPSY;  Surgeon: Herman Champagne MD;  [facility]       Social History     Tobacco Use     Smoking status: Former Smoker     Smokeless tobacco: Former User   Substance Use Topics     Alcohol use: Yes     Comment: social     Family History   Problem Relation Age of Onset     Unknown/Adopted Mother      Unknown/Adopted Father          Current Outpatient Medications   Medication Sig Dispense Refill     aspirin 81 MG EC tablet        cholecalciferol (VITAMIN D3) 5000 units TABS tablet Take by mouth daily       fish oil-omega-3 fatty acids 1000 MG capsule Take 1 g by mouth daily       lisinopril (PRINIVIL/ZESTRIL) 40 MG tablet Take 40 mg by mouth       Misc Natural Products (TART CHERRY ADVANCED PO) Take 1 tablet by mouth daily       multivitamin w/minerals (MULTI-VITAMIN) tablet Take 1 tablet by mouth daily       omeprazole (PRILOSEC OTC) 20 MG EC tablet Take 1 tablet (20 mg) by mouth daily 90 tablet 0     triamterene-HCTZ (DYAZIDE) 37.5-25 MG capsule        verapamil ER (VERELAN) 180 MG 24 hr capsule Take 180 mg by mouth       vitamin C (ASCORBIC ACID) 1000 MG TABS  "Take 1,000 mg by mouth daily       Allergies   Allergen Reactions     Hmg-Coa-R Inhibitors      Vasquez Flavor      BP Readings from Last 3 Encounters:   07/09/19 166/86   07/04/19 147/87   06/21/19 142/71    Wt Readings from Last 3 Encounters:   07/09/19 118.8 kg (262 lb)   06/21/19 113.4 kg (250 lb)   01/04/19 118.9 kg (262 lb 3.2 oz)              Reviewed and updated as needed this visit by Provider  Tobacco  Allergies  Meds  Problems  Med Hx  Surg Hx  Fam Hx       Review of Systems   Constitutional: Negative for chills, fever and malaise/fatigue.   HENT: Negative for congestion, ear pain and sore throat.    Eyes: Negative for blurred vision, discharge and redness.   Respiratory: Negative for cough, shortness of breath and wheezing.    Cardiovascular: Positive for chest pain. Negative for palpitations.   Gastrointestinal: Positive for abdominal pain. Negative for diarrhea, nausea and vomiting.   Musculoskeletal: Negative for joint pain and myalgias.   Skin: Negative for rash.   Neurological: Negative for headaches.           Objective    /86   Pulse 60   Temp 98.3  F (36.8  C) (Tympanic)   Resp 17   Ht 1.753 m (5' 9\")   Wt 118.8 kg (262 lb)   SpO2 98%   BMI 38.69 kg/m    Body mass index is 38.69 kg/m .    Physical Exam   Constitutional: He appears well-developed and well-nourished. No distress.   HENT:   Head: Normocephalic and atraumatic.   Right Ear: Tympanic membrane and ear canal normal.   Left Ear: Tympanic membrane and ear canal normal.   Mouth/Throat: Oropharynx is clear and moist.   Eyes: Pupils are equal, round, and reactive to light. Conjunctivae are normal.   Cardiovascular: Normal rate and regular rhythm.   Pulmonary/Chest: Effort normal and breath sounds normal.   No reproducible chest wall pain with palpation.    Abdominal: Soft. Normal appearance and bowel sounds are normal. There is tenderness in the epigastric area. There is no rigidity, no rebound and no guarding.   Skin: Skin " is warm and dry. No rash noted.   Psychiatric: He has a normal mood and affect. His behavior is normal.         Diagnostic Test Results:  Xray - no acute findings   EKG - sinus bradycardia, no acute findings         Assessment & Plan     1. Acute chest pain  No acute findings on EKG or Chest x-ray. Patient had significant improvement in symptoms following GI cocktail given in clinic. Discussed in detail symptoms that would warrant emergent evaluation in the ED. Patient agrees with plan and will follow up as needed.      - EKG 12-lead complete w/read - Clinics  - XR Chest 2 Views; Future  - lidocaine HCl (XYLOCAINE) 2 % 15 mL, alum & mag hydroxide-simethicone (MYLANTA ES/MAALOX  ES) 15 mL GI Cocktail    2. Epigastric pain  Will start omeprazole 20mg. Can take twice daily x 1 week then once daily x 4-6 weeks. Discussed lifestyle factors that can worsen GERD symptoms including large portion sizes, eating 1-3 hours before bed, and certain foods such as caffeine, alcohol, fatty foods, and peppermint. Follow up with PCP in 4-6 weeks or sooner if needed.      - omeprazole (PRILOSEC OTC) 20 MG EC tablet; Take 1 tablet (20 mg) by mouth daily  Dispense: 90 tablet; Refill: 0       Aura Serna PA-C  [facility]

## 2019-07-09 NOTE — NURSING NOTE
"Chief Complaint   Patient presents with     ER F/U     [place] 7/4/2019       Initial /88 (BP Location: Right arm, Patient Position: Chair, Cuff Size: Adult Large)   Pulse 60   Temp 98.3  F (36.8  C) (Tympanic)   Resp 17   Ht 1.753 m (5' 9\")   Wt 118.8 kg (262 lb)   SpO2 98%   BMI 38.69 kg/m   Estimated body mass index is 38.69 kg/m  as calculated from the following:    Height as of this encounter: 1.753 m (5' 9\").    Weight as of this encounter: 118.8 kg (262 lb).    Patient presents to the clinic using No DME    Health Maintenance that is potentially due pending provider review:  NONE    n/a    Is there anyone who you would like to be able to receive your results? No  If yes have patient fill out [facility]  Helen Bryant CMA    "

## 2019-08-01 ENCOUNTER — OFFICE VISIT (OUTPATIENT)
Dept: FAMILY MEDICINE | Facility: CLINIC | Age: 49
End: 2019-08-01
Payer: COMMERCIAL

## 2019-08-01 ENCOUNTER — TRANSFERRED RECORDS (OUTPATIENT)
Dept: HEALTH INFORMATION MANAGEMENT | Facility: CLINIC | Age: 49
End: 2019-08-01

## 2019-08-01 VITALS
DIASTOLIC BLOOD PRESSURE: 86 MMHG | TEMPERATURE: 97.2 F | BODY MASS INDEX: 38.21 KG/M2 | HEIGHT: 69 IN | SYSTOLIC BLOOD PRESSURE: 164 MMHG | WEIGHT: 258 LBS | OXYGEN SATURATION: 100 % | RESPIRATION RATE: 20 BRPM | HEART RATE: 64 BPM

## 2019-08-01 DIAGNOSIS — R10.13 ABDOMINAL PAIN, EPIGASTRIC: Primary | ICD-10-CM

## 2019-08-01 DIAGNOSIS — K30 INDIGESTION: ICD-10-CM

## 2019-08-01 PROCEDURE — 99213 OFFICE O/P EST LOW 20 MIN: CPT | Performed by: NURSE PRACTITIONER

## 2019-08-01 ASSESSMENT — MIFFLIN-ST. JEOR: SCORE: 2020.66

## 2019-08-01 NOTE — PATIENT INSTRUCTIONS
Stop Prilosec at this time and start taking the Zantac.    Return stool sample as soon as you can and we will see if you are positive for H. Pylori    Follow-up with your primary care provider next week and as needed.    Indications for emergent return to emergency department discussed with patient, who verbalized good understanding and agreement.  Patient understands the limitations of today's evaluation.         Patient Education     GERD (Adult)    The esophagus is a tube that carries food from the mouth to the stomach. A valve (the LES, lower esophageal sphincter) at the lower end of the esophagus prevents stomach acid from flowing upward. When this valve doesn't work properly, stomach contents may repeatedly flow back up (reflux) into the esophagus. This is called gastroesophageal reflux disease (GERD). GERD can irritate the esophagus. It can cause problems with pain, swallowing or breathing. In severe cases, GERD can cause recurrent pneumonia (from aspiration or breathing in particles) or other serious problems.  Symptoms of reflux include burning, pressure or sharp pain in the upper abdomen or mid to lower chest. The pain can spread to the neck, back, or shoulder. There may be belching, an acid taste in the back of the throat, chronic cough, or sore throat, or hoarseness. GERD symptoms often occur during the day after a big meal. They can also occur at night when lying down.   Home care  Lifestyle changes can help reduce symptoms. If needed, your healthcare provider may prescribe medicines. Symptoms often improve with treatment, but if treatment is stopped, the symptoms often return after a few months. So most persons with GERD will need to continue treatment or get treatment on and off.  Lifestyle changes    Limit or avoid fatty, fried, and spicy foods, as well as coffee, chocolate, mint, and foods with high acid content such as tomatoes and citrus fruit and juices (orange, grapefruit, lemon).    Don t eat  "large meals, especially at night. Frequent, smaller meals are best. Don't lie down right after eating. And don t eat anything 3 hours before going to bed.    Don't drink alcohol or smoke. As much as possible, stay away from second hand smoke.    If you are overweight, losing weight will reduce symptoms.     Don't wear tight clothing around your stomach area.    If your symptoms occur during sleep, use a foam wedge to elevate your upper body (not just your head.) Or, place 4\" blocks under the head of your bed. Or use 2 bed risers under your bedframe.  Medicines  If needed, medicines can help relieve the symptoms of GERD and prevent damage to the esophagus. Discuss a medicine plan with your healthcare provider. This may include one or more of the following medicines:    Antacids to help neutralize the normal acids in your stomach.    Acid blockers (Histamine or H2 blockers) to decrease acid production.    Acid inhibitors (proton pump inhibitors PPIs) to decrease acid production in a different way than the blockers. They may work better, but can take a little longer to take effect.  Take an antacid 30 to 60 minutes after eating and at bedtime, but not at the same time as an acid blocker.Try not to take medicines such as ibuprofen and aspirin. If you are taking aspirin for your heart or other medical reasons, talk to your healthcare provider about stopping it.  Follow-up care  Follow up with your healthcare provider or as advised by our staff.  When to seek medical advice  Call your healthcare provider if any of the following occur:    Stomach pain gets worse or moves to the lower right abdomen (appendix area)    Chest pain appears or gets worse, or spreads to the back, neck, shoulder, or arm    An over-the-counter trial of medicine doesn't relieve your symptoms    Weight loss that can't be explained    Trouble or pain swallowing    Frequent vomiting (can t keep down liquids)    Blood in the stool or vomit (red or black " in color)    Feeling weak or dizzy    Fever of 100.4 F (38 C) or higher, or as directed by your healthcare provider  Date Last Reviewed: 3/1/2018    9032-6103 [employer]. [address] [place] [address], [place] [address]. All rights reserved. This information is not intended as a substitute for professional medical care. Always follow your healthcare professional's instructions.           Patient Education     What Is GERD?     With GERD, the weak LES allows food and fluids to travel back, or reflux, into the esophagus.      If you often have a painful burning feeling in your chest after you eat, you may have gastroesophageal reflux disease (GERD). Heartburn that keeps coming back is a classic symptom of GERD. But you may have other symptoms as well. A GERD diagnosis is made only after a complete evaluation by your healthcare provider.  Note: Chest pain may also be caused by heart problems. Be sure to have all chest pain evaluated by a healthcare provider.   When you have a reflux problem  After you eat, food travels from your mouth down the esophagus to your stomach. Along the way, food passes through a one-way valve called the lower esophageal sphincter (LES). The LES sits at the opening to your stomach. Normally the LES opens when you swallow. It lets food enter the stomach, then closes quickly. With GERD, the LES doesn t work normally. It lets food and stomach acid flow back (reflux) into the esophagus.  Some common symptoms    Frequent heartburn or burping    Sour-tasting fluid backing up into your mouth    Symptoms that get worse after you eat, bend over, or lie down    Trouble swallowing or pain when swallowing    A dry, long-term (chronic) cough    Upset stomach (nausea) or vomiting  Relieving your discomfort  You and your healthcare provider can work together to find the treatment options that best ease your symptoms. These may include lifestyle changes, medicine, and possibly surgery.  Many people  find their GERD symptoms decrease when they eat small frequent meals instead of 3 large ones. Reducing the amount of fatty foods in your diet will also help.   The following foods tend to cause problems for people diagnosed with GERD:    Tomatoes and tomato products    Alcohol    Coffee    Peppermint    Greasy or spicy foods  Talk with your provider if you don t understand how to make the dietary changes needed to control your GERD symptoms. Your provider can refer you to a nutritionist.  Date Last Reviewed: 7/1/2016 2000-2018 [employer]. [address] [place] [id number]. All rights reserved. This information is not intended as a substitute for professional medical care. Always follow your healthcare professional's instructions.           Patient Education     Lifestyle Changes for Controlling GERD  When you have GERD, stomach acid feels as if it s backing up toward your mouth. Whether or not you take medicine to control your GERD, your symptoms can often be improved with lifestyle changes. Talk to your healthcare provider about the following suggestions. These suggestions may help you get relief from your symptoms.      Raise your head  Reflux is more likely to strike when you re lying down flat, because stomach fluid can flow backward more easily. Raising the head of your bed 4 to 6 inches can help. To do this:    Slide blocks or books under the legs at the head of your bed. Or, place a wedge under the mattress. Many [employer] can make a suitable wedge for you. The wedge should run from your waist to the top of your head.    Don t just prop your head on several pillows. This increases pressure on your stomach. It can make GERD worse.  Watch your eating habits  Certain foods may increase the acid in your stomach or relax the lower esophageal sphincter. This makes GERD more likely. It s best to avoid the following if they cause you symptoms:    Coffee, tea, and carbonated drinks (with  and without caffeine)    Fatty, fried, or spicy food    Mint, chocolate, onions, and tomatoes    Peppermint    Any other foods that seem to irritate your stomach or cause you pain  Relieve the pressure  Tips include the following:    Eat smaller meals, even if you have to eat more often.    Don t lie down right after you eat. Wait a few hours for your stomach to empty.    Avoid tight belts and tight-fitting clothes.    Lose excess weight.  Tobacco and alcohol  Avoid smoking tobacco and drinking alcohol. They can make GERD symptoms worse.  Date Last Reviewed: 7/1/2016 2000-2018 [employer]. [address] [place] [address], [place] [address]. All rights reserved. This information is not intended as a substitute for professional medical care. Always follow your healthcare professional's instructions.           Patient Education     H. Pylori Infection with Peptic Ulcer    A peptic ulcer is an open sore in the lining of the stomach. It may also form in the lining of the first part of the small intestine (duodenum). Symptoms of a peptic ulcer include stomach pain and upset. Nausea, vomiting, bloating, or bleeding may sometimes occur. In many cases, bacteria called H. pylori are thought to be involved in the development of a peptic ulcer.  Many people have H. pylori in their bodies. Most of the time, it causes no problems. In some people, though, the H. pylori infection causes irritation of the stomach lining. This may make the lining more likely to be damaged by normal stomach acids. H. pylori may also increase the amount of acid in the stomach. It is not clear why this infection leads to problems in some people and not in others.  Tests may be done to check for H. pylori infection. These include a blood test, a breath test, and a stool test. In some cases, a test called endoscopy may be done. During this test, a thin, lighted tube is put into the mouth and down the throat. The healthcare provider can look at the  esophagus, stomach, and duodenum through this tube. During this test, a tiny sample of stomach lining (biopsy) may be taken and tested for H. pylori.  Home care    Medicines are used to treat H. pylori infection. Two or more medicines are usually taken together for about 2 weeks.    Take all prescribed medicines as directed. Take all of the medicines until they are gone or you are told to stop. This is very important. If you do not finish the medicines, the infection may remain and may be harder to treat.    Ask your healthcare provider what side effects the medicines might cause. These can include stomach cramps, diarrhea, or constipation.    After the medicine is finished, you may have another test to see if H. pylori infection is still present.    Avoid alcohol during treatment.  Follow-up care  Follow up with your healthcare provider as directed. Be sure to return to be retested for H. pylori after treatment.  When to seek medical advice  Call your healthcare provider for any of the following:    Stomach pain that worsens or moves to the right lower part of the abdomen    Chest pain appears or worsens, or spreads to the back, neck, shoulder, or arm    Vomiting    Blood in stool or vomit    Feeling weak or dizzy  Date Last Reviewed: 2/1/2018 2000-2018 [employer]. [address] [place] [address], [place] [address]. All rights reserved. This information is not intended as a substitute for professional medical care. Always follow your healthcare professional's instructions.

## 2019-08-01 NOTE — PROGRESS NOTES
Subjective     Lalito Weathers is a 48 year old male who presents to clinic today for the following health issues:    HPI   Epigastric pain       Duration: over 1 month ago     Description (location/character/radiation): Epigastric pain- off and on     Intensity:  moderate    Accompanying signs and symptoms: epigastric pain, dull pain, no gas or bloating, no nausea or vomiting, no other abdominal pain, no shortness of breath.     History (similar episodes/previous evaluation): seen in ED on 7/4/19 then in clinic on 7/9/19. Had 2 ekgs completed and blood work done.     Precipitating or alleviating factors: None    Therapies tried and outcome: omeprazole          Patient Active Problem List   Diagnosis     Tubular adenoma of colon     Past Surgical History:   Procedure Laterality Date     COLONOSCOPY N/A 6/21/2019    Procedure: COLONOSCOPY, WITH POLYPECTOMY AND BIOPSY;  Surgeon: Herman Champagne MD;  Location: [facility]       Social History     Tobacco Use     Smoking status: Former Smoker     Smokeless tobacco: Former User   Substance Use Topics     Alcohol use: Yes     Comment: social     Family History   Problem Relation Age of Onset     Unknown/Adopted Mother      Unknown/Adopted Father          Current Outpatient Medications   Medication Sig Dispense Refill     aspirin 81 MG EC tablet        cholecalciferol (VITAMIN D3) 5000 units TABS tablet Take by mouth daily       fish oil-omega-3 fatty acids 1000 MG capsule Take 1 g by mouth daily       lisinopril (PRINIVIL/ZESTRIL) 40 MG tablet Take 40 mg by mouth       Misc Natural Products (TART CHERRY ADVANCED PO) Take 1 tablet by mouth daily       multivitamin w/minerals (MULTI-VITAMIN) tablet Take 1 tablet by mouth daily       omeprazole (PRILOSEC OTC) 20 MG EC tablet Take 1 tablet (20 mg) by mouth daily 90 tablet 0     ranitidine (ZANTAC) 150 MG capsule Take 1 capsule (150 mg) by mouth 2 times daily 60 capsule 1     triamterene-HCTZ (DYAZIDE) 37.5-25 MG capsule         "verapamil ER (VERELAN) 180 MG 24 hr capsule Take 180 mg by mouth       vitamin C (ASCORBIC ACID) 1000 MG TABS Take 1,000 mg by mouth daily       Allergies   Allergen Reactions     Hmg-Coa-R Inhibitors      Vasquez Flavor          Reviewed and updated as needed this visit by Provider  Tobacco  Allergies  Meds  Problems  Med Hx  Surg Hx  Fam Hx         Review of Systems   ROS COMP: Constitutional, HEENT, cardiovascular, pulmonary, GI, , musculoskeletal, neuro, skin, endocrine and psych systems are negative, except as otherwise noted.      Objective    BP (!) 164/86   Pulse 64   Temp 97.2  F (36.2  C) (Tympanic)   Resp 20   Ht 1.753 m (5' 9\")   Wt 117 kg (258 lb)   SpO2 100%   BMI 38.10 kg/m    Body mass index is 38.1 kg/m .  Physical Exam   GENERAL: healthy, alert and no distress, nontoxic in appearance  EYES: Eyes grossly normal to inspection, PERRL and conjunctivae and sclerae normal  HENT: ear canals and TM's normal, nose and mouth without ulcers or lesions  NECK: no adenopathy, supple with full ROM  RESP: lungs clear to auscultation - no rales, rhonchi or wheezes  CV: regular rate and rhythm, normal S1 S2, no S3 or S4, no murmur, click or rub, no peripheral edema   ABDOMEN: soft, nontender except for mild epigastric pain with palpation, no hepatosplenomegaly, no masses and bowel sounds normal  MS: no gross musculoskeletal defects noted, no edema  No rash    Diagnostic Test Results:  Labs reviewed in Epic  No results found for this or any previous visit (from the past 24 hour(s)).        Assessment & Plan   Problem List Items Addressed This Visit     None      Visit Diagnoses     Abdominal pain, epigastric    -  Primary    Relevant Medications    ranitidine (ZANTAC) 150 MG capsule    Other Relevant Orders    H Pylori antigen stool    Indigestion        Relevant Medications    ranitidine (ZANTAC) 150 MG capsule    Other Relevant Orders    H Pylori antigen stool             BMI:   Estimated body mass " "index is 38.1 kg/m  as calculated from the following:    Height as of this encounter: 1.753 m (5' 9\").    Weight as of this encounter: 117 kg (258 lb).   Weight management plan: Patient was referred to their PCP to discuss a diet and exercise plan.        Patient Instructions     Stop Prilosec at this time and start taking the Zantac.    Return stool sample as soon as you can and we will see if you are positive for H. Pylori    Follow-up with your primary care provider next week and as needed.    Indications for emergent return to emergency department discussed with patient, who verbalized good understanding and agreement.  Patient understands the limitations of today's evaluation.         Patient Education     GERD (Adult)    The esophagus is a tube that carries food from the mouth to the stomach. A valve (the LES, lower esophageal sphincter) at the lower end of the esophagus prevents stomach acid from flowing upward. When this valve doesn't work properly, stomach contents may repeatedly flow back up (reflux) into the esophagus. This is called gastroesophageal reflux disease (GERD). GERD can irritate the esophagus. It can cause problems with pain, swallowing or breathing. In severe cases, GERD can cause recurrent pneumonia (from aspiration or breathing in particles) or other serious problems.  Symptoms of reflux include burning, pressure or sharp pain in the upper abdomen or mid to lower chest. The pain can spread to the neck, back, or shoulder. There may be belching, an acid taste in the back of the throat, chronic cough, or sore throat, or hoarseness. GERD symptoms often occur during the day after a big meal. They can also occur at night when lying down.   Home care  Lifestyle changes can help reduce symptoms. If needed, your healthcare provider may prescribe medicines. Symptoms often improve with treatment, but if treatment is stopped, the symptoms often return after a few months. So most persons with GERD will " "need to continue treatment or get treatment on and off.  Lifestyle changes    Limit or avoid fatty, fried, and spicy foods, as well as coffee, chocolate, mint, and foods with high acid content such as tomatoes and citrus fruit and juices (orange, grapefruit, lemon).    Don t eat large meals, especially at night. Frequent, smaller meals are best. Don't lie down right after eating. And don t eat anything 3 hours before going to bed.    Don't drink alcohol or smoke. As much as possible, stay away from second hand smoke.    If you are overweight, losing weight will reduce symptoms.     Don't wear tight clothing around your stomach area.    If your symptoms occur during sleep, use a foam wedge to elevate your upper body (not just your head.) Or, place 4\" blocks under the head of your bed. Or use 2 bed risers under your bedframe.  Medicines  If needed, medicines can help relieve the symptoms of GERD and prevent damage to the esophagus. Discuss a medicine plan with your healthcare provider. This may include one or more of the following medicines:    Antacids to help neutralize the normal acids in your stomach.    Acid blockers (Histamine or H2 blockers) to decrease acid production.    Acid inhibitors (proton pump inhibitors PPIs) to decrease acid production in a different way than the blockers. They may work better, but can take a little longer to take effect.  Take an antacid 30 to 60 minutes after eating and at bedtime, but not at the same time as an acid blocker.Try not to take medicines such as ibuprofen and aspirin. If you are taking aspirin for your heart or other medical reasons, talk to your healthcare provider about stopping it.  Follow-up care  Follow up with your healthcare provider or as advised by our staff.  When to seek medical advice  Call your healthcare provider if any of the following occur:    Stomach pain gets worse or moves to the lower right abdomen (appendix area)    Chest pain appears or gets " worse, or spreads to the back, neck, shoulder, or arm    An over-the-counter trial of medicine doesn't relieve your symptoms    Weight loss that can't be explained    Trouble or pain swallowing    Frequent vomiting (can t keep down liquids)    Blood in the stool or vomit (red or black in color)    Feeling weak or dizzy    Fever of 100.4 F (38 C) or higher, or as directed by your healthcare provider  Date Last Reviewed: 3/1/2018    2656-0626 [employer]. [address] [place] [address]. All rights reserved. This information is not intended as a substitute for professional medical care. Always follow your healthcare professional's instructions.           Patient Education     What Is GERD?     With GERD, the weak LES allows food and fluids to travel back, or reflux, into the esophagus.      If you often have a painful burning feeling in your chest after you eat, you may have gastroesophageal reflux disease (GERD). Heartburn that keeps coming back is a classic symptom of GERD. But you may have other symptoms as well. A GERD diagnosis is made only after a complete evaluation by your healthcare provider.  Note: Chest pain may also be caused by heart problems. Be sure to have all chest pain evaluated by a healthcare provider.   When you have a reflux problem  After you eat, food travels from your mouth down the esophagus to your stomach. Along the way, food passes through a one-way valve called the lower esophageal sphincter (LES). The LES sits at the opening to your stomach. Normally the LES opens when you swallow. It lets food enter the stomach, then closes quickly. With GERD, the LES doesn t work normally. It lets food and stomach acid flow back (reflux) into the esophagus.  Some common symptoms    Frequent heartburn or burping    Sour-tasting fluid backing up into your mouth    Symptoms that get worse after you eat, bend over, or lie down    Trouble swallowing or pain when swallowing    A dry,  long-term (chronic) cough    Upset stomach (nausea) or vomiting  Relieving your discomfort  You and your healthcare provider can work together to find the treatment options that best ease your symptoms. These may include lifestyle changes, medicine, and possibly surgery.  Many people find their GERD symptoms decrease when they eat small frequent meals instead of 3 large ones. Reducing the amount of fatty foods in your diet will also help.   The following foods tend to cause problems for people diagnosed with GERD:    Tomatoes and tomato products    Alcohol    Coffee    Peppermint    Greasy or spicy foods  Talk with your provider if you don t understand how to make the dietary changes needed to control your GERD symptoms. Your provider can refer you to a nutritionist.  Date Last Reviewed: 7/1/2016 2000-2018 [employer]. [address] [place] [address], [place] [address]. All rights reserved. This information is not intended as a substitute for professional medical care. Always follow your healthcare professional's instructions.           Patient Education     Lifestyle Changes for Controlling GERD  When you have GERD, stomach acid feels as if it s backing up toward your mouth. Whether or not you take medicine to control your GERD, your symptoms can often be improved with lifestyle changes. Talk to your healthcare provider about the following suggestions. These suggestions may help you get relief from your symptoms.      Raise your head  Reflux is more likely to strike when you re lying down flat, because stomach fluid can flow backward more easily. Raising the head of your bed 4 to 6 inches can help. To do this:    Slide blocks or books under the legs at the head of your bed. Or, place a wedge under the mattress. Many [employer] can make a suitable wedge for you. The wedge should run from your waist to the top of your head.    Don t just prop your head on several pillows. This increases pressure on your  stomach. It can make GERD worse.  Watch your eating habits  Certain foods may increase the acid in your stomach or relax the lower esophageal sphincter. This makes GERD more likely. It s best to avoid the following if they cause you symptoms:    Coffee, tea, and carbonated drinks (with and without caffeine)    Fatty, fried, or spicy food    Mint, chocolate, onions, and tomatoes    Peppermint    Any other foods that seem to irritate your stomach or cause you pain  Relieve the pressure  Tips include the following:    Eat smaller meals, even if you have to eat more often.    Don t lie down right after you eat. Wait a few hours for your stomach to empty.    Avoid tight belts and tight-fitting clothes.    Lose excess weight.  Tobacco and alcohol  Avoid smoking tobacco and drinking alcohol. They can make GERD symptoms worse.  Date Last Reviewed: 7/1/2016 2000-2018 [employer]. [address] [place] [address]. All rights reserved. This information is not intended as a substitute for professional medical care. Always follow your healthcare professional's instructions.           Patient Education     H. Pylori Infection with Peptic Ulcer    A peptic ulcer is an open sore in the lining of the stomach. It may also form in the lining of the first part of the small intestine (duodenum). Symptoms of a peptic ulcer include stomach pain and upset. Nausea, vomiting, bloating, or bleeding may sometimes occur. In many cases, bacteria called H. pylori are thought to be involved in the development of a peptic ulcer.  Many people have H. pylori in their bodies. Most of the time, it causes no problems. In some people, though, the H. pylori infection causes irritation of the stomach lining. This may make the lining more likely to be damaged by normal stomach acids. H. pylori may also increase the amount of acid in the stomach. It is not clear why this infection leads to problems in some people and not in  others.  Tests may be done to check for H. pylori infection. These include a blood test, a breath test, and a stool test. In some cases, a test called endoscopy may be done. During this test, a thin, lighted tube is put into the mouth and down the throat. The healthcare provider can look at the esophagus, stomach, and duodenum through this tube. During this test, a tiny sample of stomach lining (biopsy) may be taken and tested for H. pylori.  Home care    Medicines are used to treat H. pylori infection. Two or more medicines are usually taken together for about 2 weeks.    Take all prescribed medicines as directed. Take all of the medicines until they are gone or you are told to stop. This is very important. If you do not finish the medicines, the infection may remain and may be harder to treat.    Ask your healthcare provider what side effects the medicines might cause. These can include stomach cramps, diarrhea, or constipation.    After the medicine is finished, you may have another test to see if H. pylori infection is still present.    Avoid alcohol during treatment.  Follow-up care  Follow up with your healthcare provider as directed. Be sure to return to be retested for H. pylori after treatment.  When to seek medical advice  Call your healthcare provider for any of the following:    Stomach pain that worsens or moves to the right lower part of the abdomen    Chest pain appears or worsens, or spreads to the back, neck, shoulder, or arm    Vomiting    Blood in stool or vomit    Feeling weak or dizzy  Date Last Reviewed: 2/1/2018 2000-2018 [employer]. [address] [place] [address], [place] [address]. All rights reserved. This information is not intended as a substitute for professional medical care. Always follow your healthcare professional's instructions.             Return in about 2 weeks (around 8/15/2019) for Follow up with your primary care provider.    [facility]  [facility]

## 2019-08-02 ENCOUNTER — TRANSFERRED RECORDS (OUTPATIENT)
Dept: HEALTH INFORMATION MANAGEMENT | Facility: CLINIC | Age: 49
End: 2019-08-02

## 2019-08-02 DIAGNOSIS — R10.13 ABDOMINAL PAIN, EPIGASTRIC: ICD-10-CM

## 2019-08-02 DIAGNOSIS — K30 INDIGESTION: ICD-10-CM

## 2019-08-02 LAB
ALT SERPL-CCNC: 36 U/L (ref 0–55)
AST SERPL-CCNC: 23 U/L (ref 10–40)
CREAT SERPL-MCNC: 1.04 MG/DL (ref 0.73–1.18)
GFR SERPL CREATININE-BSD FRML MDRD: >60 ML/MIN/1.73M2
GLUCOSE SERPL-MCNC: 200 MG/DL (ref 70–100)
POTASSIUM SERPL-SCNC: 4.1 MMOL/L (ref 3.5–5.1)

## 2019-08-02 PROCEDURE — 87338 HPYLORI STOOL AG IA: CPT | Performed by: NURSE PRACTITIONER

## 2019-08-03 LAB
H PYLORI AG STL QL IA: NORMAL
SPECIMEN SOURCE: NORMAL

## 2019-08-04 ENCOUNTER — TRANSFERRED RECORDS (OUTPATIENT)
Dept: HEALTH INFORMATION MANAGEMENT | Facility: CLINIC | Age: 49
End: 2019-08-04

## 2019-08-04 LAB
EJECTION FRACTION: 55
EJECTION FRACTION: 55

## 2019-08-07 ENCOUNTER — TELEPHONE (OUTPATIENT)
Dept: FAMILY MEDICINE | Facility: CLINIC | Age: 49
End: 2019-08-07

## 2019-08-07 DIAGNOSIS — I10 BENIGN ESSENTIAL HYPERTENSION: Primary | ICD-10-CM

## 2019-08-07 NOTE — TELEPHONE ENCOUNTER
Reason for Call:  Other    Detailed comments: Renzo says he was at [facility] on Tom[place] 8/3 and had an echocardiogram and strep test and they were suppose to send this to [facility]. I told him I didn't see that it had come yesterday so he is going to call them today and ask them to fax to [facility].     Phone Number Patient can be reached at: Home number on file [phone] (home)    Best Time: anytime    Can we leave a detailed message on this number? YES    Call taken on 8/7/2019 at 8:00 AM by Anali Walden

## 2019-08-08 ENCOUNTER — TELEPHONE (OUTPATIENT)
Dept: FAMILY MEDICINE | Facility: CLINIC | Age: 49
End: 2019-08-08

## 2019-08-08 NOTE — TELEPHONE ENCOUNTER
The 50/25 strength of Triamterene- hydrochlorothiazide is no longer made- please send different medication or different strength    Thank You!  Uyen Oleary  [facility]  P: [phone] F:[phone]

## 2019-08-08 NOTE — TELEPHONE ENCOUNTER
Reviewed stress test with patient of note he did have some hypertension when he was doing his stress test.  In review of his hypertension does does have elevated blood pressures with his current medications that he is on he is on 40 of lisinopril and Dyazide 37.5/ 25.  In review will increase his Dyazide to 50/25.      Patient is having endoscopy done and will have follow-up on Friday with Teresa Berry CNP

## 2019-08-08 NOTE — TELEPHONE ENCOUNTER
Unable to get hold of the patient patient is to call the clinic back please inform patient that I have sent in his increased Dyazide 50/25mg to t the [facility].  Is to start the medication and monitor his blood pressure while on.    [facility] Berry CNP

## 2019-08-09 ENCOUNTER — OFFICE VISIT (OUTPATIENT)
Dept: FAMILY MEDICINE | Facility: CLINIC | Age: 49
End: 2019-08-09
Payer: COMMERCIAL

## 2019-08-09 VITALS
HEIGHT: 69 IN | TEMPERATURE: 97.6 F | BODY MASS INDEX: 37.92 KG/M2 | WEIGHT: 256 LBS | SYSTOLIC BLOOD PRESSURE: 145 MMHG | DIASTOLIC BLOOD PRESSURE: 82 MMHG | HEART RATE: 60 BPM | RESPIRATION RATE: 16 BRPM

## 2019-08-09 DIAGNOSIS — E66.01 MORBID OBESITY (H): ICD-10-CM

## 2019-08-09 DIAGNOSIS — R10.9 ABDOMINAL DISCOMFORT: ICD-10-CM

## 2019-08-09 DIAGNOSIS — I10 ESSENTIAL HYPERTENSION: ICD-10-CM

## 2019-08-09 DIAGNOSIS — R19.06 EPIGASTRIC FULLNESS: Primary | ICD-10-CM

## 2019-08-09 PROCEDURE — 99214 OFFICE O/P EST MOD 30 MIN: CPT | Performed by: PHYSICIAN ASSISTANT

## 2019-08-09 RX ORDER — CETIRIZINE HYDROCHLORIDE 10 MG/1
10 TABLET ORAL DAILY
COMMUNITY
End: 2020-08-05

## 2019-08-09 RX ORDER — TRIAMTERENE AND HYDROCHLOROTHIAZIDE 37.5; 25 MG/1; MG/1
1 CAPSULE ORAL DAILY
Qty: 30 CAPSULE | Refills: 0 | Status: SHIPPED | OUTPATIENT
Start: 2019-08-09 | End: 2019-09-09

## 2019-08-09 RX ORDER — VERAPAMIL HYDROCHLORIDE 240 MG/1
240 CAPSULE, EXTENDED RELEASE ORAL DAILY
Qty: 30 CAPSULE | Refills: 0 | Status: SHIPPED | OUTPATIENT
Start: 2019-08-09 | End: 2019-08-16

## 2019-08-09 RX ORDER — OMEPRAZOLE 20 MG/1
20 TABLET, DELAYED RELEASE ORAL DAILY PRN
Refills: 0 | COMMUNITY
Start: 2019-08-09 | End: 2019-09-09

## 2019-08-09 ASSESSMENT — MIFFLIN-ST. JEOR: SCORE: 2011.59

## 2019-08-09 NOTE — PROGRESS NOTES
"Subjective     Lalito Weathers is a 48 year old male who presents to clinic today for the following health issues:    HPI   ED/UC Followup:    Facility:  [facility]  Date of visit: 8/2/2019  Reason for visit: Epigastric Pain, Chest pain  Current Status: had an upper endoscopy on Wednesday.  The GI specialist took him off all antacids.  Right below his sternum, still feels something is going on.  Does feel confident that it isn't his heart. Has results from his upper GI on his phone.  Wondering about maybe having a hiatal hernia.  Was asked if he feels that food ever becomes stuck, at the spur of the moment said no, but then started thinking about it afterwards-can feel water, oatmeal.  Doesn't actually get stuck, but can feel a difference.  Does work out at the gym a lot.     2 recent ED visits.  7/4 - Intermittently shaky and feeling off, dull chest discomfort   7/9 - started omeprazole  8/1 - started ranitidine    Has had normal scope and H pylori  Tender to push on xiphoid  Working on wt loss, lost 70 pounds few yrs ago.  Works very hard on this.    Hasn't started the new dose of dyazide, pharmacy had to order it and should be in by this afternoon.  Still taking dyazide 37.5-25.  Pharmacy unable to get new dose.    BP Readings from Last 3 Encounters:   08/09/19 (!) 145/82   08/01/19 (!) 164/86   07/09/19 166/86    Wt Readings from Last 3 Encounters:   08/09/19 116.1 kg (256 lb)   08/01/19 117 kg (258 lb)   07/09/19 118.8 kg (262 lb)         Reviewed and updated as needed this visit by Provider  Tobacco  Allergies  Meds  Problems  Med Hx  Surg Hx  Fam Hx         Review of Systems   ROS COMP: Constitutional, HEENT, cardiovascular, pulmonary, GI, , musculoskeletal systems are negative, except as otherwise noted.      Objective    BP (!) 145/82 (BP Location: Right arm, Patient Position: Chair, Cuff Size: Adult Large)   Pulse 60   Temp 97.6  F (36.4  C) (Tympanic)   Resp 16   Ht 1.753 m (5' 9\")   " "Wt 116.1 kg (256 lb)   BMI 37.80 kg/m    Body mass index is 37.8 kg/m .  Physical Exam   GENERAL: healthy, alert and no distress  NECK: no adenopathy, no asymmetry, masses, or scars and thyroid normal to palpation  RESP: lungs clear to auscultation - no rales, rhonchi or wheezes  CV: regular rate and rhythm, normal S1 S2, no S3 or S4, no murmur, click or rub  ABDOMEN: soft, nontender except over xiphoid, no hepatosplenomegaly, no masses and bowel sounds normal          Assessment & Plan       ICD-10-CM    1. Epigastric fullness R19.06 omeprazole (PRILOSEC OTC) 20 MG EC tablet   2. Morbid obesity (H) E66.01    3. Essential hypertension I10 verapamil ER (VERELAN) 240 MG 24 hr capsule     triamterene-HCTZ (DYAZIDE) 37.5-25 MG capsule     order for DME   4. Abdominal discomfort R10.9         BMI:   Estimated body mass index is 37.8 kg/m  as calculated from the following:    Height as of this encounter: 1.753 m (5' 9\").    Weight as of this encounter: 116.1 kg (256 lb).   Weight management plan: Discussed healthy diet and exercise guidelines    Patient Instructions   Feel something in stomach after eating -  Could do swallowing tests if bothers a lot   Weight loss can help    Heartburn -   Omeprazole on as needed basis  Avoiding spicy, fatty, mints, alcohol    Tender around xiphoid -  Stop pushing on this area and see if just improves    BP -  Back to the triamterene-Hydrochlorothiazide dose that pharmacy can get  Increasing verapamil   Home BP cuff ordered  Call or send me a couple BP readings for refills      Weight -   Consider weight specialist or topiramate     MEDICATION STARTED AT THIS APPOINTMENT    Topiramate (Topamax) is a medication that is used most often to treat migraine headaches or for seizures. It has also been found to help with weight loss. Although it's not currently FDA approved for weight loss, it has been used safely for a number of years to help people who are carrying extra weight.      Just how " topiramate helps with weight loss has not been exactly determined. However it seems to work on areas of the brain to quiet down signals related to eating.       Topiramate may make you:                        >feel less interest in eating in between meals                       >think less about food and eating                       >find it easier to push the plate away                       >find giving up pop easier                                    >have an easier time eating less     For some of our patients, the pills work right away. They feel and think quite differently about food. Other patients don't feel much of a change but find in fact they have lost weight! Like all weight loss medications, topiramate works best when you help it work.  This means:                                           1) Have less tempting high calorie (fattening) food around the house or office                        2) Have lower calorie food (fruits, vegetables,low fat meats and dairy) for snacks                                   3) Eat out only one time or less each week.                       4) Eat your meals at a table with the TV or computer off.     Side-effects. Topiramate is generally well tolerated. The main side-effects we see are:                       Tingling in hands,feet, or face (usually not very troublesome)                       Mental confusion and word finding trouble (about 10% of patients have this.)                                          Feeling sleepy or a bit dopey- this goes away very soon after starting.             Return in about 4 weeks (around 9/6/2019) for BP update .    Teresa Catherine PA-C  [facility]

## 2019-08-09 NOTE — PATIENT INSTRUCTIONS
Feel something in stomach after eating -  Could do swallowing tests if bothers a lot   Weight loss can help    Heartburn -   Omeprazole on as needed basis  Avoiding spicy, fatty, mints, alcohol    Tender around xiphoid -  Stop pushing on this area and see if just improves    BP -  Back to the triamterene-Hydrochlorothiazide dose that pharmacy can get  Increasing verapamil   Home BP cuff ordered  Call or send me a couple BP readings for refills      Weight -   Consider weight specialist or topiramate     MEDICATION STARTED AT THIS APPOINTMENT    Topiramate (Topamax) is a medication that is used most often to treat migraine headaches or for seizures. It has also been found to help with weight loss. Although it's not currently FDA approved for weight loss, it has been used safely for a number of years to help people who are carrying extra weight.      Just how topiramate helps with weight loss has not been exactly determined. However it seems to work on areas of the brain to quiet down signals related to eating.       Topiramate may make you:                        >feel less interest in eating in between meals                       >think less about food and eating                       >find it easier to push the plate away                       >find giving up pop easier                                    >have an easier time eating less     For some of our patients, the pills work right away. They feel and think quite differently about food. Other patients don't feel much of a change but find in fact they have lost weight! Like all weight loss medications, topiramate works best when you help it work.  This means:                                           1) Have less tempting high calorie (fattening) food around the house or office                        2) Have lower calorie food (fruits, vegetables,low fat meats and dairy) for snacks                                   3) Eat out only one time or less each week.                        4) Eat your meals at a table with the TV or computer off.     Side-effects. Topiramate is generally well tolerated. The main side-effects we see are:                       Tingling in hands,feet, or face (usually not very troublesome)                       Mental confusion and word finding trouble (about 10% of patients have this.)                                          Feeling sleepy or a bit dopey- this goes away very soon after starting.

## 2019-08-09 NOTE — TELEPHONE ENCOUNTER
It appears that he was not taking this medication when he saw cardiology this week.  He is seeing Teresa today, maybe we can clarify.    Thanks,     TERESA Tee CNP

## 2019-08-09 NOTE — TELEPHONE ENCOUNTER
Michelle Arreola,  Would you please review and advise on this in PCP absence? Thank you.    TONYA Arauz

## 2019-08-09 NOTE — NURSING NOTE
"Chief Complaint   Patient presents with     ER F/U       Initial BP (!) 145/82 (BP Location: Right arm, Patient Position: Chair, Cuff Size: Adult Large)   Pulse 60   Temp 97.6  F (36.4  C) (Tympanic)   Resp 16   Ht 1.753 m (5' 9\")   Wt 116.1 kg (256 lb)   BMI 37.80 kg/m   Estimated body mass index is 37.8 kg/m  as calculated from the following:    Height as of this encounter: 1.753 m (5' 9\").    Weight as of this encounter: 116.1 kg (256 lb).    Patient presents to the clinic using No DME    Health Maintenance that is potentially due pending provider review:  NONE        Is there anyone who you would like to be able to receive your results? No  If yes have patient fill out CHATO      "

## 2019-08-16 ENCOUNTER — OFFICE VISIT (OUTPATIENT)
Dept: FAMILY MEDICINE | Facility: CLINIC | Age: 49
End: 2019-08-16
Payer: COMMERCIAL

## 2019-08-16 VITALS
SYSTOLIC BLOOD PRESSURE: 132 MMHG | OXYGEN SATURATION: 98 % | BODY MASS INDEX: 37.86 KG/M2 | WEIGHT: 255.6 LBS | HEART RATE: 60 BPM | HEIGHT: 69 IN | DIASTOLIC BLOOD PRESSURE: 80 MMHG | TEMPERATURE: 97.8 F | RESPIRATION RATE: 16 BRPM

## 2019-08-16 DIAGNOSIS — R07.89 ATYPICAL CHEST PAIN: Primary | ICD-10-CM

## 2019-08-16 DIAGNOSIS — I10 ESSENTIAL HYPERTENSION: ICD-10-CM

## 2019-08-16 DIAGNOSIS — R05.8 PRODUCTIVE COUGH: ICD-10-CM

## 2019-08-16 PROCEDURE — 99214 OFFICE O/P EST MOD 30 MIN: CPT | Performed by: PHYSICIAN ASSISTANT

## 2019-08-16 RX ORDER — VERAPAMIL HYDROCHLORIDE 360 MG/1
360 CAPSULE, DELAYED RELEASE PELLETS ORAL DAILY
Qty: 30 CAPSULE | Refills: 0 | Status: SHIPPED | OUTPATIENT
Start: 2019-08-16 | End: 2019-09-17

## 2019-08-16 ASSESSMENT — MIFFLIN-ST. JEOR: SCORE: 2009.77

## 2019-08-16 NOTE — PATIENT INSTRUCTIONS
See if lungs continue to stay better  Suspect bit of mucus with coughing is more related to allergies - options of 2nd zyrtec per day, flonase or nasacort allergy spray daily for 2 wks, or trying saline nasal rinses  See if pain in chest resolves - if not resolving, will look into it further    BP -  Goal under 130/80  Increased verapamil today  Update me within the month

## 2019-08-16 NOTE — PROGRESS NOTES
"Subjective     Lalito Weathers is a 48 year old male who presents to clinic today for the following health issues:    HPI   Chief Complaint   Patient presents with     Patient Request     pt. is here today for follow up pt. was seen 8/9/2019 pt. states lungs feel better. pt. brought in some BP readings with to go over.      Lungs hurt on Mon.  Haven't hurt since.  Has been having productive cough when he coughs.  No blood.  Clear mucus.  Mostly in the morning.  Coughs just to clear.  Stuffy nose comes and goes.  Takes zyrtec.    Slight pain in R chest if takes deep breath.  Intermittent and improves after single breath.  Unsure if different if he pushes on chest in that area.    Pain in lower chest and sternum resolved.  No symptoms with exertion.  He is active in gym and has no symptoms during this time.    HTN - he brings in log of BPs running upper 130s/70s for past week.    BP Readings from Last 3 Encounters:   08/16/19 132/80   08/09/19 (!) 145/82   08/01/19 (!) 164/86    Wt Readings from Last 3 Encounters:   08/16/19 115.9 kg (255 lb 9.6 oz)   08/09/19 116.1 kg (256 lb)   08/01/19 117 kg (258 lb)        Reviewed and updated as needed this visit by Provider  Tobacco  Allergies  Meds  Problems  Med Hx  Surg Hx  Fam Hx         Review of Systems   ROS COMP: Constitutional, HEENT, cardiovascular, pulmonary, GI, , musculoskeletal, neuro, skin, endocrine and psych systems are negative, except as otherwise noted.      Objective    /80   Pulse 60   Temp 97.8  F (36.6  C) (Tympanic)   Resp 16   Ht 1.753 m (5' 9\")   Wt 115.9 kg (255 lb 9.6 oz)   SpO2 98%   BMI 37.75 kg/m    Body mass index is 37.75 kg/m .  Physical Exam   GENERAL: healthy, alert and no distress  EYES: Eyes grossly normal to inspection, PERRL and conjunctivae and sclerae normal  HENT: ear canals and TM's normal, nose and mouth without ulcers or lesions  NECK: no adenopathy, no asymmetry, masses, or scars and thyroid normal to " palpation  RESP: lungs clear to auscultation - no rales, rhonchi or wheezes  CV: regular rate and rhythm, normal S1 S2, no S3 or S4, no murmur, click or rub, no peripheral edema and peripheral pulses strong    Diagnostic Test Results:  Labs reviewed in Epic        Assessment & Plan       ICD-10-CM    1. Atypical chest pain R07.89    2. Productive cough R05    3. Essential hypertension I10 verapamil ER (VERELAN) 360 MG 24 hr capsule     Patient Instructions   See if lungs continue to stay better  Suspect bit of mucus with coughing is more related to allergies - options of 2nd zyrtec per day, flonase or nasacort allergy spray daily for 2 wks, or trying saline nasal rinses  See if pain in chest resolves - if not resolving, will look into it further    BP -  Goal under 130/80  Increased verapamil today  Update me within the month       Return in about 2 weeks (around 8/30/2019), or if symptoms worsen or fail to improve.    Teresa Catherine PA-C  [facility]

## 2019-09-09 ENCOUNTER — OFFICE VISIT (OUTPATIENT)
Dept: FAMILY MEDICINE | Facility: CLINIC | Age: 49
End: 2019-09-09
Payer: COMMERCIAL

## 2019-09-09 ENCOUNTER — ANCILLARY PROCEDURE (OUTPATIENT)
Dept: GENERAL RADIOLOGY | Facility: CLINIC | Age: 49
End: 2019-09-09
Attending: NURSE PRACTITIONER
Payer: COMMERCIAL

## 2019-09-09 VITALS
TEMPERATURE: 98.4 F | HEART RATE: 66 BPM | SYSTOLIC BLOOD PRESSURE: 182 MMHG | BODY MASS INDEX: 38.51 KG/M2 | DIASTOLIC BLOOD PRESSURE: 96 MMHG | OXYGEN SATURATION: 99 % | HEIGHT: 69 IN | RESPIRATION RATE: 18 BRPM | WEIGHT: 260 LBS

## 2019-09-09 DIAGNOSIS — R05.9 COUGH: ICD-10-CM

## 2019-09-09 DIAGNOSIS — K21.00 GASTROESOPHAGEAL REFLUX DISEASE WITH ESOPHAGITIS: ICD-10-CM

## 2019-09-09 DIAGNOSIS — R06.02 SOB (SHORTNESS OF BREATH): Primary | ICD-10-CM

## 2019-09-09 DIAGNOSIS — I10 ESSENTIAL HYPERTENSION: ICD-10-CM

## 2019-09-09 PROCEDURE — 71046 X-RAY EXAM CHEST 2 VIEWS: CPT

## 2019-09-09 PROCEDURE — 99214 OFFICE O/P EST MOD 30 MIN: CPT | Performed by: NURSE PRACTITIONER

## 2019-09-09 RX ORDER — PANTOPRAZOLE SODIUM 20 MG/1
40 TABLET, DELAYED RELEASE ORAL DAILY
Qty: 30 TABLET | Refills: 0 | Status: SHIPPED | OUTPATIENT
Start: 2019-09-09 | End: 2019-09-24

## 2019-09-09 RX ORDER — ALBUTEROL SULFATE 90 UG/1
2 AEROSOL, METERED RESPIRATORY (INHALATION) EVERY 6 HOURS PRN
Qty: 6.7 G | Refills: 0 | Status: SHIPPED | OUTPATIENT
Start: 2019-09-09 | End: 2020-08-05

## 2019-09-09 RX ORDER — TRIAMTERENE AND HYDROCHLOROTHIAZIDE 37.5; 25 MG/1; MG/1
2 CAPSULE ORAL DAILY
Qty: 60 CAPSULE | Refills: 0 | Status: SHIPPED | OUTPATIENT
Start: 2019-09-09 | End: 2019-10-21

## 2019-09-09 ASSESSMENT — MIFFLIN-ST. JEOR: SCORE: 2029.73

## 2019-09-09 NOTE — PROGRESS NOTES
Subjective     Lalito Weathers is a 48 year old male who presents to clinic today for the following health issues:    HPI     Patient states that his lungs feel like they are on fire. This has been going on for a couple months. He states that he has had several tests done and it is not his heart. He does have some difficulty breathing. He has also had a dry cough.       Patient Active Problem List   Diagnosis     Tubular adenoma of colon     Essential hypertension     Palpitations     PVCs (premature ventricular contractions)     Vitamin D deficiency     Obesity (BMI 35.0-39.9) with comorbidity (H)     Past Surgical History:   Procedure Laterality Date     COLONOSCOPY N/A 6/21/2019    Procedure: COLONOSCOPY, WITH POLYPECTOMY AND BIOPSY;  Surgeon: Herman Champagne MD;  Location: [facility]       Social History     Tobacco Use     Smoking status: Former Smoker     Smokeless tobacco: Former User   Substance Use Topics     Alcohol use: Yes     Comment: social     Family History   Problem Relation Age of Onset     Unknown/Adopted Mother      Unknown/Adopted Father          Current Outpatient Medications   Medication Sig Dispense Refill     aspirin 81 MG EC tablet        cetirizine (ZYRTEC) 10 MG tablet Take 10 mg by mouth daily       cholecalciferol (VITAMIN D3) 5000 units TABS tablet Take by mouth daily       fish oil-omega-3 fatty acids 1000 MG capsule Take 1 g by mouth daily       lisinopril (PRINIVIL/ZESTRIL) 40 MG tablet Take 40 mg by mouth       Misc Natural Products (TART CHERRY ADVANCED PO) Take 1 tablet by mouth daily       multivitamin w/minerals (MULTI-VITAMIN) tablet Take 1 tablet by mouth daily       triamterene-HCTZ (DYAZIDE) 37.5-25 MG capsule Take 1 capsule by mouth daily 30 capsule 0     verapamil ER (VERELAN) 360 MG 24 hr capsule Take 1 capsule (360 mg) by mouth daily 30 capsule 0     vitamin C (ASCORBIC ACID) 1000 MG TABS Take 1,000 mg by mouth daily       Allergies   Allergen Reactions     Hmg-Coa-R  "Inhibitors      Vasquez Flavor      Recent Labs   Lab Test 08/02/19 07/04/19  1152   ALT 36 45   CR 1.04 0.96   GFRESTIMATED >60 >90   GFRESTBLACK >60 >90   POTASSIUM 4.1 4.0   TSH  --  0.99      BP Readings from Last 3 Encounters:   09/09/19 (!) 182/96   08/16/19 132/80   08/09/19 (!) 145/82    Wt Readings from Last 3 Encounters:   09/09/19 117.9 kg (260 lb)   08/16/19 115.9 kg (255 lb 9.6 oz)   08/09/19 116.1 kg (256 lb)                 Reviewed and updated as needed this visit by Provider         Review of Systems   ROS COMP: Constitutional, HEENT, cardiovascular, pulmonary, gi and gu systems are negative, except as otherwise noted.      Objective    BP (!) 182/96 (BP Location: Right arm, Cuff Size: Adult Large)   Pulse 66   Temp 98.4  F (36.9  C) (Tympanic)   Resp 18   Ht 1.753 m (5' 9\")   Wt 117.9 kg (260 lb)   SpO2 99%   BMI 38.40 kg/m    Body mass index is 38.4 kg/m .  Physical Exam   GENERAL: healthy, alert and no distress  EYES: Eyes grossly normal to inspection, PERRL and conjunctivae and sclerae normal  HENT: ear canals and TM's normal, nose and mouth without ulcers or lesions  NECK: no adenopathy, no asymmetry, masses, or scars and thyroid normal to palpation  RESP: lungs clear to auscultation - no rales, rhonchi or wheezes  CV: regular rate and rhythm, normal S1 S2, no S3 or S4, no murmur, click or rub, no peripheral edema and peripheral pulses strong  ABDOMEN: soft, nontender, no hepatosplenomegaly, no masses and bowel sounds normal  MS: no gross musculoskeletal defects noted, no edema  SKIN: no suspicious lesions or rashes  NEURO: Normal strength and tone, mentation intact and speech normal  PSYCH: mentation appears normal, affect normal/bright          Assessment & Plan     (R06.02) SOB (shortness of breath)  (primary encounter diagnosis)  Comment: Patient concern for respiratory problems have been going on and off for the last few months we will start with an inhaler patient will also pulmonary " "function test  Plan: General PFT Lab (Please always keep checked),         Pulmonary Function Test, albuterol (PROAIR         HFA/PROVENTIL HFA/VENTOLIN HFA) 108 (90 Base)         MCG/ACT inhaler      (K21.0) Gastroesophageal reflux disease with esophagitis  Comment: Concerned that his symptoms could be related acid reflux patient has never had a full trial of medications.  Recommend patient try Protonix for 2 weeks to see if it helps solve some of his symptoms  Plan: pantoprazole (PROTONIX) 20 MG EC tablet      (I10) Essential hypertension  Comment: We will need to increase his medication recent labs within normal limits.  2-4 agents and uncontrolled patient will have screening for renal stenosis patient to continue to monitor blood pressure.  Did recheck in office mild improvement with 170/91.  Patient to monitor remains above 140/80 after starting increased dose of medications patient to contact me.  recommend home monitoring her nurse only visits for blood pressure checks are can be checked at the pharmacy.  Plan: US Renal Complete w Doppler Complete,          triamterene-HCTZ (DYAZIDE) 37.5-25 MG capsule    (R05) Cough  Comment: X-ray within normal limits patient continues to have upper respiratory symptoms such as COPD versus asthma will obtain pulmonary function tests  Plan: XR Chest 2 Views       BMI:   Estimated body mass index is 38.4 kg/m  as calculated from the following:    Height as of this encounter: 1.753 m (5' 9\").    Weight as of this encounter: 117.9 kg (260 lb).   Weight management plan: Discussed healthy diet and exercise guidelines        See Patient Instructions    Return in about 1 week (around 9/16/2019), or if symptoms worsen or fail to improve.    TERESA Romero [facility]        "

## 2019-09-09 NOTE — PATIENT INSTRUCTIONS
Patient Education     How Acid Reflux Affects Your Throat    Do you have to clear your throat or cough often? Are you hoarse? Do you have trouble swallowing? If you have these or other throat symptoms, you may have acid reflux. This occurs when stomach acid flows back up and irritates your throat.  Why you have throat symptoms  There are muscles (esophageal sphincters) at both ends of the tube that carries food to your stomach (the esophagus). These muscles relax to let food pass. Then they tighten to keep stomach acid down. When the lower esophageal sphincter (LES) doesn t tighten enough, acid can flow back (reflux) from your stomach into your esophagus. This may cause heartburn. In some cases the upper esophageal sphincter (UES) also doesn t work well. Then acid can travel higher and enter your throat (pharynx). In many cases, this causes throat symptoms.  Common throat symptoms    Need to clear your throat often    Feeling like you re choking    Long-term (chronic) cough    Hoarseness    Trouble swallowing    Feel like you have a lump in your throat    Sour or acid taste    Sore throat that keeps coming back   Date Last Reviewed: 7/1/2016 2000-2018 [employer]. [address] [place], [place] [address]. All rights reserved. This information is not intended as a substitute for professional medical care. Always follow your healthcare professional's instructions.           Patient Education     Esophagitis     With esophagitis, the lining of the esophagus is inflamed.   Do you often have burning pain in your chest? You may have esophagitis. This is when the lining of the esophagus becomes red and swollen (inflamed). The esophagus is the tube that connects your throat to your stomach. This sheet tells you more about esophagitis. It also explains your treatment options.  Main types of esophagitis  Reflux esophagitis. This is the more common type. It is caused by GERD (gastroesophageal reflux  disease). Stomach contents with stomach acid flow back up into the esophagus. This happens over and over. It leads to inflammation. Risk factors can include:    Being overweight    Asthma    Smoking    Pregnancy    Frequent vomiting    Certain medicines (such as aspirin and other anti-inflammatories)    Hiatal hernia  Infectious esophagitis. This is caused by an infection. You are more at risk for this if you have a weakened immune system and poor nutrition. Antibiotic use can also be a factor. The infection is often due to the following:    A type of fungus (typically candida)    A virus, such as herpes simplex virus 1 (HSV-1) or cytomegalovirus (CMV)  Eosinophilic esophagitis. Foods or other things around you can give you an allergic reaction. This triggers an immune response and leads to esophagitis.  Pill-induced esophagitis. Certain types of medicines can cause inflammation and ulcers in the esophagus. These include doxycycline, aspirin, NSAIDs, alendronate, potassium, quinidine, iron.  Symptoms of esophagitis  The following symptoms can occur with esophagitis:    Pain when swallowing, or trouble swallowing    Pain behind your breastbone (heartburn)    Acid regurgitation    Chronic sore throat    Gum Inflammation    Cavities    Bad breath    Nausea    Pain in your upper belly (abdomen)    Bleeding (indicated by bright red vomit or black, tarry stool)  These symptoms occur more often with reflux esophagitis:    Coughing, wheezing, or asthma    Hoarseness  Diagnosis of esophagitis  Your healthcare provider will ask about your health history and symptoms. You ll also be examined. Sometimes certain tests are needed. These may include:    Upper endoscopy. A thin, flexible tube with a tiny light and camera is used. It is inserted through the mouth down into the esophagus. This lets the provider look for damage. A small sample of tissue (biopsy) may also be removed. The sample is sent to a lab for testing.    Upper GI  X-ray with barium. An X-ray is done after you drink a substance called barium. Barium may make problems in the esophagus easier to see on an x-ray.    Esophageal pH. A soft, thin tube is passed into the esophagus through the nose or mouth for 24 hours. It measures the acid level in the esophagus.    Esophageal manometry. A soft, thin tube is passed into the esophagus through the nose or mouth. It measures muscle contractions in the esophagus.  Treatment of esophagitis  Medicines. Different medicines can help treat esophagitis. The medicine used will depend on the type of esophagitis you have. Talk with your healthcare provider.  Lifestyle changes. Making the following changes can help reduce irritation and ease your symptoms:    Avoid spicy foods (pepper, chili powder, gama). Also avoid hard foods (nuts, crackers, raw vegetables) and acidic foods and drinks (tomatoes, citrus fruits and juices). Other problem foods include chocolate, peppermint, nutmeg, and foods high in fat.    Until you can swallow without pain, follow a combined liquid and soft diet. Try foods such as cooked cereals, mashed potatoes, and soups.    Take small bites and chew your food thoroughly.    Avoid large meals and heavy evening meals. Don't lie down within 2 to 3 hours of eating.    Get to or stay at a healthy weight.    Avoid alcohol, caffeine, and smoking or tobacco products.    Brush and floss your teeth    Raise your upper body by 4 to 6 inches when lying in bed. This can be done using a foam wedge. Or put blocks under the legs at the head of your bed.  Surgery. This may be needed for severe reflux esophagitis. Other noninvasive procedures to treat GERD and esophagitis are being studied. Your provider can tell you more.  Why treatment Is important  Without treatment, esophagitis can get worse. This is especially true with severe reflux esophagitis. For instance, continued symptoms can cause scarring of the esophagus. Over time, this can  cause a narrowing the esophagus (stricture). This can make it hard to pass food down to the stomach. As symptoms go on they can also cause changes in the lining of the esophagus. These changes can put you at a slightly higher risk of cancer of the esophagus.   Date Last Reviewed: 7/1/2016 2000-2018 [employer]. [address] [place] [address], [place] [address]. All rights reserved. This information is not intended as a substitute for professional medical care. Always follow your healthcare professional's instructions.           Patient Education     Uncontrolled High Blood Pressure (Established)    Your blood pressure was unusually high today. This can occur if you ve missed doses of your blood pressure medicine. Or it can happen if you are taking other medicines. These include some asthma inhalers, decongestants, diet pills, and street drugs like cocaine and amphetamine.  Other causes include:    Weight gain    More salt in your diet    Smoking    Caffeine  Your blood pressure can also rise if you are emotionally upset or in intense pain. It may go back to normal after a period of rest.  Blood pressure measurements are given as 2 numbers. Systolic blood pressure is the upper number. This is the pressure when the heart contracts. Diastolic blood pressure is the lower number. This is the pressure when the heart relaxes between beats. You will see your blood pressure readings written together. For example, a person with a systolic pressure of 118 and a diastolic pressure of 78 will have 118/78 written in the medical record. To be high blood pressure, the numbers must be higher when tested over a period of time.  Blood pressure is categorized as normal, elevated, or stage 1 or stage 2 high blood pressure:    Normal blood pressure is systolic of less than 120 and diastolic of less than 80 (120/80)    Elevated blood pressure is systolic of 120 to 129 and diastolic less than 80    Stage 1 high blood pressure is  systolic is 130 to 139 or diastolic between 80 to 89    Stage 2 high blood pressure is when systolic is 140 or higher or the diastolic is 90 or higher  Uncontrolled high blood pressure can cause serious health problems. It raises your risk for heart attack, stroke, and heart failure. In general, if you have high blood pressure, keeping your blood pressure below 130/80 mmHg may help prevent these problems. Your healthcare provider may prescribe medicine to help control blood pressure if lifestyle changes are not enough.  Home care  It s important to take steps to lower your blood pressure. If you are taking blood pressure medicine, the guidelines below may help you need less or no medicines in the future.    Start a weight-loss program if you are overweight.    Cut back on the amount of salt in your diet:  ? Avoid high-salt foods like olives, pickles, smoked meats, and salted potato chips.  ? Don t add salt to your food at the table.  ? Use only small amounts of salt when cooking.    Start an exercise program. Talk with your healthcare provider about what exercise program is best for you. It doesn t have to be difficult. Even brisk walking for 20 minutes 3 times a week is a good form of exercise.    Avoid medicines that stimulates the heart. This includes many over-the-counter cold and sinus decongestant pills and sprays, as well as diet pills. Check the warnings about high blood pressure on the label. Before purchasing any over-the-counter medicines or supplements, always ask the pharmacist about the product's potential interaction with your high blood pressure and your medicines.    Stimulants such as amphetamine or cocaine could be lethal for someone with high blood pressure. Never take these.    Limit how much caffeine you drink. Or switch to noncaffeinated beverages.    Stop smoking. If you are a long-time smoker, this can be hard. Enroll in a stop-smoking program to make it more likely that you will succeed.  Talk with your provider about ways to quit.    Learn how to handle stress better. This is an important part of any program to lower blood pressure. Learn ways to relax. These include meditation, yoga, and biofeedback.    If medicines were prescribed, take them exactly as directed. Missing doses may cause your blood pressure to get out of control.    If you miss a dose or doses of your medicines, check with your healthcare provider or pharmacist about what to do.    Consider buying an automatic blood pressure machine. Your provider may recommend a certain type. You can get one of these at most pharmacies. Measure your blood pressure twice a day, in the morning, and in the late afternoon. Keep a written record of your home blood pressure readings and take the record to your medical appointments.  Here are some additional guidelines on home blood pressure monitoring from the American Heart Association.    Don't smoke or drink coffee for 30 minutes    Go to the bathroom before the test.    Relax for 5 minutes before taking the measurement.    Sit correctly. Be sure your back is supported. Don't sit on a couch or soft chair. Uncross your feet and place them flat on the floor. Place your arm on a solid, flat surface like a table with the upper arm at heart level. Make certain the middle of the cuff is directly above the bend of the elbow. Check the monitor's instruction manual for an illustration.    Take multiple readings. When you measure, take 2 or 3 readings one minute apart and record all of the results.    Take your blood pressure at the same time every day, or as your healthcare provider recommends.    Record the date, time, and blood pressure reading.    Take the record with you to your next appointment. If your blood pressure monitor has a built-in memory, simply take the monitor with you to your next appointment.    Call your provider if you have several high readings. Don't be frightened by a single high  reading, but if you get several high readings, check in with your healthcare provider.    Note: When blood pressure reaches a systolic (top number) of 180 or higher or a diastolic (bottom number) of 110 or higher, emergency medical treatment is required. Call your healthcare provider immediately.  Follow-up care  Regular visits to your own healthcare provider for blood pressure and medicine checks are an important part of your care. Make a follow-up appointment as directed. Bring the record of your home blood pressure readings to the appointment.  When to seek medical advice  Call your healthcare provider right away if any of these occur:    Blood pressure reaches a systolic (top number) of 180 or higher or diastolic (bottom number) of 110 or higher, emergency medical treatment is required.    Chest, arm, shoulder, neck, or upper back pain    Shortness of breath    Severe headache    Throbbing or rushing sound in the ears    Nosebleed    Extreme drowsiness, confusion, or fainting    Dizziness or dizziness with spinning sensation (vertigo)    Weakness in an arm or leg or on one side of the face    Trouble speaking or seeing   Date Last Reviewed: 1/1/2017 2000-2018 [employer]. [address] [place] [address]. All rights reserved. This information is not intended as a substitute for professional medical care. Always follow your healthcare professional's instructions.

## 2019-09-11 ENCOUNTER — HOSPITAL ENCOUNTER (OUTPATIENT)
Dept: RESPIRATORY THERAPY | Facility: CLINIC | Age: 49
End: 2019-09-11
Attending: INTERNAL MEDICINE
Payer: COMMERCIAL

## 2019-09-11 ENCOUNTER — HOSPITAL ENCOUNTER (OUTPATIENT)
Dept: ULTRASOUND IMAGING | Facility: CLINIC | Age: 49
Discharge: HOME | End: 2019-09-11
Attending: NURSE PRACTITIONER | Admitting: NURSE PRACTITIONER
Payer: COMMERCIAL

## 2019-09-11 DIAGNOSIS — I10 ESSENTIAL HYPERTENSION: ICD-10-CM

## 2019-09-11 DIAGNOSIS — R06.02 SOB (SHORTNESS OF BREATH): ICD-10-CM

## 2019-09-11 PROCEDURE — 94060 EVALUATION OF WHEEZING: CPT

## 2019-09-11 PROCEDURE — 93975 VASCULAR STUDY: CPT | Mod: TC

## 2019-09-11 PROCEDURE — 25000125 ZZHC RX 250: Performed by: NURSE PRACTITIONER

## 2019-09-11 PROCEDURE — 94729 DIFFUSING CAPACITY: CPT | Mod: 26 | Performed by: INTERNAL MEDICINE

## 2019-09-11 PROCEDURE — 94060 EVALUATION OF WHEEZING: CPT | Mod: 26 | Performed by: INTERNAL MEDICINE

## 2019-09-11 PROCEDURE — 94726 PLETHYSMOGRAPHY LUNG VOLUMES: CPT | Mod: 26 | Performed by: INTERNAL MEDICINE

## 2019-09-11 PROCEDURE — 94726 PLETHYSMOGRAPHY LUNG VOLUMES: CPT

## 2019-09-11 PROCEDURE — 94729 DIFFUSING CAPACITY: CPT

## 2019-09-11 RX ORDER — ALBUTEROL SULFATE 0.83 MG/ML
2.5 SOLUTION RESPIRATORY (INHALATION) ONCE
Status: COMPLETED | OUTPATIENT
Start: 2019-09-11 | End: 2019-09-11

## 2019-09-11 RX ADMIN — ALBUTEROL SULFATE 2.5 MG: 2.5 SOLUTION RESPIRATORY (INHALATION) at 10:10

## 2019-09-13 ENCOUNTER — TELEPHONE (OUTPATIENT)
Dept: FAMILY MEDICINE | Facility: CLINIC | Age: 49
End: 2019-09-13

## 2019-09-13 DIAGNOSIS — R06.02 SOB (SHORTNESS OF BREATH): Primary | ICD-10-CM

## 2019-09-13 LAB
DLCOUNC-%PRED-PRE: 136 %
DLCOUNC-PRE: 38.35 ML/MIN/MMHG
DLCOUNC-PRED: 28.03 ML/MIN/MMHG
ERV-%PRED-PRE: 97 %
ERV-PRE: 0.67 L
ERV-PRED: 0.69 L
EXPTIME-PRE: 6.97 SEC
FEF2575-%PRED-POST: 120 %
FEF2575-%PRED-PRE: 115 %
FEF2575-POST: 4.16 L/SEC
FEF2575-PRE: 3.97 L/SEC
FEF2575-PRED: 3.45 L/SEC
FEFMAX-%PRED-PRE: 111 %
FEFMAX-PRE: 10.57 L/SEC
FEFMAX-PRED: 9.49 L/SEC
FEV1-%PRED-PRE: 103 %
FEV1-PRE: 3.87 L
FEV1FEV6-PRE: 82 %
FEV1FEV6-PRED: 80 %
FEV1FVC-PRE: 81 %
FEV1FVC-PRED: 79 %
FEV1SVC-PRE: 81 %
FEV1SVC-PRED: 76 %
FIFMAX-PRE: 7.17 L/SEC
FRCPLETH-%PRED-PRE: 102 %
FRCPLETH-PRE: 3.52 L
FRCPLETH-PRED: 3.43 L
FVC-%PRED-PRE: 100 %
FVC-PRE: 4.75 L
FVC-PRED: 4.73 L
IC-%PRED-PRE: 95 %
IC-PRE: 4.06 L
IC-PRED: 4.27 L
RVPLETH-%PRED-PRE: 130 %
RVPLETH-PRE: 2.8 L
RVPLETH-PRED: 2.15 L
TLCPLETH-%PRED-PRE: 111 %
TLCPLETH-PRE: 7.59 L
TLCPLETH-PRED: 6.82 L
VA-%PRED-PRE: 102 %
VA-PRE: 6.47 L
VC-%PRED-PRE: 96 %
VC-PRE: 4.79 L
VC-PRED: 4.96 L

## 2019-09-13 NOTE — TELEPHONE ENCOUNTER
Your labs within normal limits patient feels better after starting acid reflux medication.  If symptoms persist recommend patient get CT of the chest.      Patient will schedule will return if symptoms worsen prior to getting CT.    Lizet Berry CNP

## 2019-09-17 ENCOUNTER — NURSE TRIAGE (OUTPATIENT)
Dept: NURSING | Facility: CLINIC | Age: 49
End: 2019-09-17

## 2019-09-17 ENCOUNTER — TELEPHONE (OUTPATIENT)
Dept: FAMILY MEDICINE | Facility: CLINIC | Age: 49
End: 2019-09-17

## 2019-09-17 DIAGNOSIS — I10 ESSENTIAL HYPERTENSION: ICD-10-CM

## 2019-09-17 NOTE — TELEPHONE ENCOUNTER
"Patient states he was told by Pharmacy that he needs to follow up with his PCP re a medication refill on \"Verapamil HCl 360 mg.\"  States he is leaving town on \"Thursday morning\" (9/19/19) and requests PCP to review and refill medication.    Per Epic chart this RN notes an Open Refill 9/17/19 in progress.  Advised Patient to follow up with PCP 9/18/19.     Protocol-  Medication Request- Adult  Care advice reviewed.   Disposition-  Call PCP within 24 hours.  This RN will send a Telephone message to PCP per Patient request.  Message sent to Pool  .  Caller states understanding of the recommended disposition.   States he has the [facility] number and will call.  Advised to call back if further questions or concerns.     SENIA SmithN RN  [facility]     Reason for Disposition    Caller has NON-URGENT medication question about med that PCP prescribed and triager unable to answer question    Protocols used: MEDICATION QUESTION CALL-A-AH    "

## 2019-09-17 NOTE — TELEPHONE ENCOUNTER
"Clinic Action Needed:  Yes    Reason for Call:  Please call the Patient at [phone].   Patient states it is Okay to leave a message.    Patient states he was told by Pharmacy that he needs to follow up with his PCP re a medication refill on \"Verapamil HCl 360 mg.\"  States he is leaving town on \"Thursday morning\" (9/19/19) and requests PCP to review and refill medication.    Per Epic chart this RN notes an Open Refill 9/17/19 in progress.  Advised Patient to follow up with PCP 9/18/19.     Protocol-  Medication Request- Adult  Care advice reviewed.   Disposition-  Call PCP within 24 hours.  This RN will send a Telephone message to PCP per Patient request.  Caller states understanding of the recommended disposition.   States he has the [facility] number and will call.  Advised to call back if further questions or concerns.     Routed to:  JEROME Cordova  / [facility] /   [place]  .      THERESA Smith RN  [facility]             "

## 2019-09-18 RX ORDER — VERAPAMIL HYDROCHLORIDE 360 MG/1
CAPSULE, DELAYED RELEASE PELLETS ORAL
Qty: 30 CAPSULE | Refills: 0 | Status: SHIPPED | OUTPATIENT
Start: 2019-09-18 | End: 2019-10-18

## 2019-09-18 NOTE — TELEPHONE ENCOUNTER
"Requested Prescriptions   Pending Prescriptions Disp Refills     verapamil ER (VERELAN) 360 MG 24 hr capsule [Pharmacy Med Name: VERAPAMIL HCL ER 360MG CP24] 30 capsule 0     Sig: TAKE ONE CAPSULE BY MOUTH ONCE DAILY       Calcium Channel Blockers Protocol  Failed - 9/17/2019  4:53 PM        Failed - Blood pressure under 140/90 in past 12 months     BP Readings from Last 3 Encounters:   09/09/19 (!) 182/96   08/16/19 132/80   08/09/19 (!) 145/82                 Passed - Normal ALT in past 12 months     Recent Labs   Lab Test 08/02/19   ALT 36             Passed - Recent (12 mo) or future (30 days) visit within the authorizing provider's specialty     Patient had office visit in the last 12 months or has a visit in the next 30 days with authorizing provider or within the authorizing provider's specialty.  See \"Patient Info\" tab in inbasket, or \"Choose Columns\" in Meds & Orders section of the refill encounter.              Passed - Medication is active on med list        Passed - Patient is age 18 or older        Passed - Normal serum creatinine on file in past 12 months     Recent Labs   Lab Test 08/02/19   CR 1.04             Last Written Prescription Date:  8/16/19  Last Fill Quantity: 30,  # refills: 0   Last office visit: 9/9/2019 with prescribing provider:     Future Office Visit:      "

## 2019-09-24 DIAGNOSIS — K21.00 GASTROESOPHAGEAL REFLUX DISEASE WITH ESOPHAGITIS: ICD-10-CM

## 2019-09-25 RX ORDER — PANTOPRAZOLE SODIUM 20 MG/1
TABLET, DELAYED RELEASE ORAL
Qty: 30 TABLET | Refills: 5 | Status: SHIPPED | OUTPATIENT
Start: 2019-09-25 | End: 2020-08-05

## 2019-09-25 NOTE — TELEPHONE ENCOUNTER
"PANTOPRAZOLE SODIUM 20MG TBEC  Last Written Prescription Date:  9/9/2019  Last Fill Quantity: 30,  # refills: 0   Last office visit: 9/9/2019 with prescribing provider:  DOUGLAS   Future Office Visit:    Requested Prescriptions   Pending Prescriptions Disp Refills     pantoprazole (PROTONIX) 20 MG EC tablet [Pharmacy Med Name: PANTOPRAZOLE SODIUM 20MG TBEC] 30 tablet 0     Sig: TAKE TWO TABLETS BY MOUTH ONCE DAILY       PPI Protocol Passed - 9/24/2019  6:53 PM        Passed - Not on Clopidogrel (unless Pantoprazole ordered)        Passed - No diagnosis of osteoporosis on record        Passed - Recent (12 mo) or future (30 days) visit within the authorizing provider's specialty     Patient had office visit in the last 12 months or has a visit in the next 30 days with authorizing provider or within the authorizing provider's specialty.  See \"Patient Info\" tab in inbasket, or \"Choose Columns\" in Meds & Orders section of the refill encounter.              Passed - Medication is active on med list        Passed - Patient is age 18 or older          "

## 2019-10-02 ENCOUNTER — HEALTH MAINTENANCE LETTER (OUTPATIENT)
Age: 49
End: 2019-10-02

## 2019-10-17 ENCOUNTER — HOSPITAL ENCOUNTER (OUTPATIENT)
Dept: CT IMAGING | Facility: CLINIC | Age: 49
Discharge: HOME | End: 2019-10-17
Attending: NURSE PRACTITIONER | Admitting: NURSE PRACTITIONER
Payer: COMMERCIAL

## 2019-10-17 ENCOUNTER — TELEPHONE (OUTPATIENT)
Dept: FAMILY MEDICINE | Facility: CLINIC | Age: 49
End: 2019-10-17

## 2019-10-17 DIAGNOSIS — R06.02 SOB (SHORTNESS OF BREATH): ICD-10-CM

## 2019-10-17 PROCEDURE — 71250 CT THORAX DX C-: CPT

## 2019-10-17 NOTE — TELEPHONE ENCOUNTER
Reason for Call:  Patient was seen by Lizet Berry on 09/09/19.  A CT scan was ordered at that time.  Patient did not get that done because his symptoms went away.  He is now having symptoms again and is asking if he should still get the CT scan done?  He is now having back pain also.  Please call patient and advise.      Phone Number Patient can be reached at: Home number on file [phone] (home)    Best Time: Any    Can we leave a detailed message on this number? YES    Call taken on 10/17/2019 at 8:59 AM by Shruti Harding

## 2019-10-17 NOTE — TELEPHONE ENCOUNTER
Pt called. States he is was given acid reflux medication but then stopped it as he was feeling better. States he was symptoms free then earlier this week he started having sternal pain and now middle back pain. States he is breathing fine but uncomfortable due to pain. States BP is wnl. A CT scan was ordered but pt never set up. States symptoms are the same as they have been before. F/u appointment made with Lizet, Pt advised to make appointment for CT scan. Advised if symptoms worsen at all he should go to the emergency room. Cecilia Anderson RN

## 2019-10-18 ENCOUNTER — TELEPHONE (OUTPATIENT)
Dept: FAMILY MEDICINE | Facility: CLINIC | Age: 49
End: 2019-10-18

## 2019-10-18 DIAGNOSIS — I25.10 CORONARY ARTERY DISEASE DUE TO LIPID RICH PLAQUE: Primary | ICD-10-CM

## 2019-10-18 DIAGNOSIS — I25.83 CORONARY ARTERY DISEASE DUE TO LIPID RICH PLAQUE: Primary | ICD-10-CM

## 2019-10-18 DIAGNOSIS — I10 ESSENTIAL HYPERTENSION: ICD-10-CM

## 2019-10-18 RX ORDER — VERAPAMIL HYDROCHLORIDE 360 MG/1
CAPSULE, DELAYED RELEASE PELLETS ORAL
Qty: 30 CAPSULE | Refills: 0 | Status: SHIPPED | OUTPATIENT
Start: 2019-10-18 | End: 2019-11-18

## 2019-10-18 NOTE — TELEPHONE ENCOUNTER
"Requested Prescriptions   Pending Prescriptions Disp Refills     verapamil ER (VERELAN) 360 MG 24 hr capsule [Pharmacy Med Name: VERAPAMIL HCL ER 360MG CP24] 30 capsule 0     Sig: TAKE ONE CAPSULE BY MOUTH ONCE DAILY   Last Written Prescription Date:  9/18/19  Last Fill Quantity: 30 cap,  # refills: 0   Last office visit: 9/9/2019 with prescribing provider:  Lizet Berry     Future Office Visit:   Next 5 appointments (look out 90 days)    Oct 23, 2019  7:40 AM CDT  Office Visit with TERESA Romero CNP  [facility] [address] [place] [id number]  [phone]             Calcium Channel Blockers Protocol  Failed - 10/18/2019 10:22 AM        Failed - Blood pressure under 140/90 in past 12 months     BP Readings from Last 3 Encounters:   09/09/19 (!) 182/96   08/16/19 132/80   08/09/19 (!) 145/82                 Passed - Normal ALT in past 12 months     Recent Labs   Lab Test 08/02/19   ALT 36             Passed - Recent (12 mo) or future (30 days) visit within the authorizing provider's specialty     Patient has had an office visit with the authorizing provider or a provider within the authorizing providers department within the previous 12 mos or has a future within next 30 days. See \"Patient Info\" tab in inbasket, or \"Choose Columns\" in Meds & Orders section of the refill encounter.              Passed - Medication is active on med list        Passed - Patient is age 18 or older        Passed - Normal serum creatinine on file in past 12 months     Recent Labs   Lab Test 08/02/19   CR 1.04               "

## 2019-10-21 ENCOUNTER — MYC MEDICAL ADVICE (OUTPATIENT)
Dept: FAMILY MEDICINE | Facility: CLINIC | Age: 49
End: 2019-10-21

## 2019-10-21 DIAGNOSIS — E78.5 HYPERLIPIDEMIA LDL GOAL <100: Primary | ICD-10-CM

## 2019-10-21 DIAGNOSIS — I10 ESSENTIAL HYPERTENSION: ICD-10-CM

## 2019-10-21 RX ORDER — TRIAMTERENE AND HYDROCHLOROTHIAZIDE 37.5; 25 MG/1; MG/1
CAPSULE ORAL
Qty: 60 CAPSULE | Refills: 0 | Status: SHIPPED | OUTPATIENT
Start: 2019-10-21 | End: 2019-11-19

## 2019-10-21 NOTE — TELEPHONE ENCOUNTER
"Requested Prescriptions   Pending Prescriptions Disp Refills     triamterene-HCTZ (DYAZIDE) 37.5-25 MG capsule [Pharmacy Med Name: TRIAMTERENE-HCTZ 37.5-25MG CAPS] 60 capsule 0     Sig: TAKE TWO CAPSULES BY MOUTH ONCE DAILY       Diuretics (Including Combos) Protocol Failed - 10/21/2019  9:20 AM        Failed - Blood pressure under 140/90 in past 12 months     BP Readings from Last 3 Encounters:   09/09/19 (!) 182/96   08/16/19 132/80   08/09/19 (!) 145/82                 Passed - Recent (12 mo) or future (30 days) visit within the authorizing provider's specialty     Patient has had an office visit with the authorizing provider or a provider within the authorizing providers department within the previous 12 mos or has a future within next 30 days. See \"Patient Info\" tab in inbasket, or \"Choose Columns\" in Meds & Orders section of the refill encounter.              Passed - Medication is active on med list        Passed - Patient is age 18 or older        Passed - Normal serum creatinine on file in past 12 months     Recent Labs   Lab Test 08/02/19   CR 1.04              Passed - Normal serum potassium on file in past 12 months     Recent Labs   Lab Test 08/02/19   POTASSIUM 4.1                    Passed - Normal serum sodium on file in past 12 months     Recent Labs   Lab Test 07/04/19  1152                 Last Written Prescription Date:  9/9/19  Last Fill Quantity: 60,  # refills: 0   Last office visit: 9/9/2019 with prescribing provider:     Future Office Visit:   Next 5 appointments (look out 90 days)    Oct 23, 2019  7:40 AM CDT  [facility] [address] [place] [id number] [phone]           "

## 2019-10-23 ENCOUNTER — OFFICE VISIT (OUTPATIENT)
Dept: FAMILY MEDICINE | Facility: CLINIC | Age: 49
End: 2019-10-23
Payer: COMMERCIAL

## 2019-10-23 VITALS
WEIGHT: 254 LBS | HEIGHT: 69 IN | SYSTOLIC BLOOD PRESSURE: 128 MMHG | HEART RATE: 78 BPM | DIASTOLIC BLOOD PRESSURE: 78 MMHG | TEMPERATURE: 98.7 F | BODY MASS INDEX: 37.62 KG/M2 | OXYGEN SATURATION: 97 % | RESPIRATION RATE: 16 BRPM

## 2019-10-23 DIAGNOSIS — Z13.6 SCREENING FOR AAA (ABDOMINAL AORTIC ANEURYSM): ICD-10-CM

## 2019-10-23 DIAGNOSIS — I25.83 CORONARY ARTERY DISEASE DUE TO LIPID RICH PLAQUE: Primary | ICD-10-CM

## 2019-10-23 DIAGNOSIS — F43.22 ADJUSTMENT DISORDER WITH ANXIOUS MOOD: ICD-10-CM

## 2019-10-23 DIAGNOSIS — I25.10 CORONARY ARTERY DISEASE DUE TO LIPID RICH PLAQUE: Primary | ICD-10-CM

## 2019-10-23 DIAGNOSIS — Z13.1 SCREENING FOR DIABETES MELLITUS: ICD-10-CM

## 2019-10-23 LAB
ALBUMIN SERPL-MCNC: 4.5 G/DL (ref 3.4–5)
ALP SERPL-CCNC: 72 U/L (ref 40–150)
ALT SERPL W P-5'-P-CCNC: 69 U/L (ref 0–70)
ANION GAP SERPL CALCULATED.3IONS-SCNC: 5 MMOL/L (ref 3–14)
AST SERPL W P-5'-P-CCNC: 24 U/L (ref 0–45)
BILIRUB SERPL-MCNC: 0.6 MG/DL (ref 0.2–1.3)
BUN SERPL-MCNC: 24 MG/DL (ref 7–30)
CALCIUM SERPL-MCNC: 9.2 MG/DL (ref 8.5–10.1)
CHLORIDE SERPL-SCNC: 104 MMOL/L (ref 94–109)
CHOLEST SERPL-MCNC: 208 MG/DL
CO2 SERPL-SCNC: 27 MMOL/L (ref 20–32)
CREAT SERPL-MCNC: 1 MG/DL (ref 0.66–1.25)
GFR SERPL CREATININE-BSD FRML MDRD: 87 ML/MIN/{1.73_M2}
GLUCOSE SERPL-MCNC: 93 MG/DL (ref 70–99)
HBA1C MFR BLD: 5.1 % (ref 0–5.6)
HDLC SERPL-MCNC: 49 MG/DL
LDLC SERPL CALC-MCNC: 128 MG/DL
NONHDLC SERPL-MCNC: 159 MG/DL
POTASSIUM SERPL-SCNC: 3.8 MMOL/L (ref 3.4–5.3)
PROT SERPL-MCNC: 7.9 G/DL (ref 6.8–8.8)
SODIUM SERPL-SCNC: 136 MMOL/L (ref 133–144)
TRIGL SERPL-MCNC: 155 MG/DL

## 2019-10-23 PROCEDURE — 83036 HEMOGLOBIN GLYCOSYLATED A1C: CPT | Performed by: NURSE PRACTITIONER

## 2019-10-23 PROCEDURE — 80061 LIPID PANEL: CPT | Performed by: NURSE PRACTITIONER

## 2019-10-23 PROCEDURE — 80053 COMPREHEN METABOLIC PANEL: CPT | Performed by: NURSE PRACTITIONER

## 2019-10-23 PROCEDURE — 36415 COLL VENOUS BLD VENIPUNCTURE: CPT | Performed by: NURSE PRACTITIONER

## 2019-10-23 PROCEDURE — 99214 OFFICE O/P EST MOD 30 MIN: CPT | Performed by: NURSE PRACTITIONER

## 2019-10-23 RX ORDER — FLUOXETINE 10 MG/1
CAPSULE ORAL
Qty: 53 CAPSULE | Refills: 0 | Status: SHIPPED | OUTPATIENT
Start: 2019-10-23 | End: 2020-08-05

## 2019-10-23 RX ORDER — ROSUVASTATIN CALCIUM 10 MG/1
10 TABLET, COATED ORAL DAILY
Qty: 30 TABLET | Refills: 0 | Status: SHIPPED | OUTPATIENT
Start: 2019-10-23 | End: 2019-11-22

## 2019-10-23 ASSESSMENT — MIFFLIN-ST. JEOR: SCORE: 1997.52

## 2019-10-23 ASSESSMENT — PAIN SCALES - GENERAL: PAINLEVEL: MILD PAIN (2)

## 2019-10-23 NOTE — PATIENT INSTRUCTIONS
Patient Education     Understanding Coronary Artery Disease (CAD)    To understand coronary artery disease (CAD), you need to know how your heart works. Your heart is a muscle that pumps blood throughout your body. To work right, your heart needs a steady supply of oxygen. It gets this oxygen from blood supplied by the coronary arteries.   Healthy artery. When a coronary artery is healthy and has no blockages, blood flows through easily. Healthy arteries can easily supply the oxygen-rich blood your heart needs.       Damaged artery. Coronary artery disease begins when damage to the artery lining leads to the buildup of fat-like substances and cholesterol along the artery wall. This is called plaque. This damage could be caused by things like high blood pressure or smoking. This plaque buildup begins to narrow the arteries carrying blood to the heart. This is called atherosclerosis.    Narrowed artery. As more plaque builds up, your artery has trouble supplying blood to your heart muscle when it needs it most, such as during exercise. You may not feel any symptoms when this happens. Or you may feel angina--pressure, tightness, achiness, or pain in your chest, jaw, neck, back, or arm.       Blocked artery. A piece of plaque may break off and completely block the artery. Or a blood clot may plug the narrowed artery. When this happens, blood flow is blocked from reaching the heart. Without oxygen-rich blood, part of the heart muscle becomes damaged and stops working. You may feel crushing pressure or pain in or around your chest. This is a heart attack (acute myocardial infarction, or AMI) and is a medical emergency.   Date Last Reviewed: 3/28/2016    7602-1122 [employer]. [address] [place] [address], [place] [address]. All rights reserved. This information is not intended as a substitute for professional medical care. Always follow your healthcare professional's instructions.

## 2019-10-23 NOTE — PROGRESS NOTES
Subjective     Lalito Weathers is a 49 year old male who presents to clinic today for the following health issues:    HPI   CHEST PAIN     Onset: x 4 months    Description:   Location:  entire chest - center  Character: dull pain, sometimes it feels hot, hurts to press on it  Radiation: none  Duration: intermittent     Intensity: moderate    Progression of Symptoms:  waxing and waning    Accompanying Signs & Symptoms:  Shortness of breath: no  Sweating: no  Nausea/vomiting: no  Lightheadedness: no  Palpitations: no  Fever/Chills: no  Cough: no  Heartburn: no    History:   Family history of heart disease unknown  Tobacco use: no    Precipitating factors:   Worse with exertion: no  Worse with deep breaths :  no  Related to food: no    Alleviating factors:  unknown       Therapies Tried and outcome:         Patient Active Problem List   Diagnosis     Tubular adenoma of colon     Essential hypertension     Palpitations     PVCs (premature ventricular contractions)     Vitamin D deficiency     Obesity (BMI 35.0-39.9) with comorbidity (H)     Past Surgical History:   Procedure Laterality Date     COLONOSCOPY N/A 6/21/2019    Procedure: COLONOSCOPY, WITH POLYPECTOMY AND BIOPSY;  Surgeon: Herman Champagne MD;  Location: [facility]       Social History     Tobacco Use     Smoking status: Former Smoker     Smokeless tobacco: Former User   Substance Use Topics     Alcohol use: Yes     Comment: social     Family History   Problem Relation Age of Onset     Unknown/Adopted Mother      Unknown/Adopted Father          Current Outpatient Medications   Medication Sig Dispense Refill     albuterol (PROAIR HFA/PROVENTIL HFA/VENTOLIN HFA) 108 (90 Base) MCG/ACT inhaler Inhale 2 puffs into the lungs every 6 hours as needed for shortness of breath / dyspnea or wheezing 6.7 g 0     aspirin 81 MG EC tablet        cetirizine (ZYRTEC) 10 MG tablet Take 10 mg by mouth daily       cholecalciferol (VITAMIN D3) 5000 units TABS tablet Take by mouth daily        fish oil-omega-3 fatty acids 1000 MG capsule Take 1 g by mouth daily       FLUoxetine (PROZAC) 10 MG capsule Take 1 capsule (10 mg) by mouth daily for 7 days, THEN 2 capsules (20 mg) daily for 23 days. 53 capsule 0     lisinopril (PRINIVIL/ZESTRIL) 40 MG tablet Take 40 mg by mouth       multivitamin w/minerals (MULTI-VITAMIN) tablet Take 1 tablet by mouth daily       rosuvastatin (CRESTOR) 10 MG tablet Take 1 tablet (10 mg) by mouth daily 30 tablet 0     triamterene-HCTZ (DYAZIDE) 37.5-25 MG capsule TAKE TWO CAPSULES BY MOUTH ONCE DAILY 60 capsule 0     verapamil ER (VERELAN) 360 MG 24 hr capsule TAKE ONE CAPSULE BY MOUTH ONCE DAILY 30 capsule 0     vitamin C (ASCORBIC ACID) 1000 MG TABS Take 1,000 mg by mouth daily       Misc Natural Products (TART CHERRY ADVANCED PO) Take 1 tablet by mouth daily       pantoprazole (PROTONIX) 20 MG EC tablet TAKE TWO TABLETS BY MOUTH ONCE DAILY (Patient not taking: Reported on 10/23/2019) 30 tablet 5     Allergies   Allergen Reactions     Hmg-Coa-R Inhibitors      [place] Flavor      Zantac [Ranitidine]      Recent Labs   Lab Test 10/23/19  0839 08/02/19 07/04/19  1152   A1C 5.1  --   --    *  --   --    HDL 49  --   --    TRIG 155*  --   --    ALT 69 36 45   CR 1.00 1.04 0.96   GFRESTIMATED 87 >60 >90   GFRESTBLACK >90 >60 >90   POTASSIUM 3.8 4.1 4.0   TSH  --   --  0.99      BP Readings from Last 3 Encounters:   10/23/19 128/78   09/09/19 (!) 182/96   08/16/19 132/80    Wt Readings from Last 3 Encounters:   10/23/19 115.2 kg (254 lb)   09/09/19 117.9 kg (260 lb)   08/16/19 115.9 kg (255 lb 9.6 oz)                  Reviewed and updated as needed this visit by Provider         Review of Systems   ROS COMP: Constitutional, HEENT, cardiovascular, pulmonary, gi and gu systems are negative, except as otherwise noted.      Objective    /78 (BP Location: Right arm, Patient Position: Sitting, Cuff Size: Adult Large)   Pulse 78   Temp 98.7  F (37.1  C) (Tympanic)   Resp  "16    1.753 m (5' 9\")   Wt 115.2 kg (254 lb)   SpO2 97%   BMI 37.51 kg/m    Body mass index is 37.51 kg/m .  Physical Exam   GENERAL: healthy, alert and no distress  EYES: Eyes grossly normal to inspection, PERRL and conjunctivae and sclerae normal  HENT: ear canals and TM's normal, nose and mouth without ulcers or lesions  NECK: no adenopathy, no asymmetry, masses, or scars and thyroid normal to palpation  RESP: lungs clear to auscultation - no rales, rhonchi or wheezes  CV: regular rate and rhythm, normal S1 S2, no S3 or S4, no murmur, click or rub, no peripheral edema and peripheral pulses strong  ABDOMEN: soft, nontender, no hepatosplenomegaly, no masses and bowel sounds normal  MS: no gross musculoskeletal defects noted, no edema  SKIN: no suspicious lesions or rashes  NEURO: Normal strength and tone, mentation intact and speech normal  PSYCH: mentation appears normal, affect normal/bright    Results for orders placed or performed in visit on 10/23/19   Lipid panel reflex to direct LDL Fasting   Result Value Ref Range    Cholesterol 208 (H) <200 mg/dL    Triglycerides 155 (H) <150 mg/dL    HDL Cholesterol 49 >39 mg/dL    LDL Cholesterol Calculated 128 (H) <100 mg/dL    Non HDL Cholesterol 159 (H) <130 mg/dL   Hemoglobin A1c   Result Value Ref Range    Hemoglobin A1C 5.1 0 - 5.6 %   Comprehensive metabolic panel   Result Value Ref Range    Sodium 136 133 - 144 mmol/L    Potassium 3.8 3.4 - 5.3 mmol/L    Chloride 104 94 - 109 mmol/L    Carbon Dioxide 27 20 - 32 mmol/L    Anion Gap 5 3 - 14 mmol/L    Glucose 93 70 - 99 mg/dL    Urea Nitrogen 24 7 - 30 mg/dL    Creatinine 1.00 0.66 - 1.25 mg/dL    GFR Estimate 87 >60 mL/min/[1.73_m2]    GFR Estimate If Black >90 >60 mL/min/[1.73_m2]    Calcium 9.2 8.5 - 10.1 mg/dL    Bilirubin Total 0.6 0.2 - 1.3 mg/dL    Albumin 4.5 3.4 - 5.0 g/dL    Protein Total 7.9 6.8 - 8.8 g/dL    Alkaline Phosphatase 72 40 - 150 U/L    ALT 69 0 - 70 U/L    AST 24 0 - 45 U/L         " "    Assessment & Plan     (I25.10,  I25.83) Coronary artery disease due to lipid rich plaque  (primary encounter diagnosis)  Comment: Patient noted to have moderate coronary artery disease on CT scan of the chest.  Cholesterol levels elevated.  Patient hesitant to start statin.  States in the past has not tolerated has not had major reactions to it but just has not tolerated with upset stomach.  Did review with patient we will start him on low-dose Crestor will obtain a ultrasound of the carotids.  Will have follow-up with cardiology patient Plan: Lipid panel reflex to direct LDL Fasting,         Comprehensive metabolic panel, US Carotid         Bilateral, rosuvastatin (CRESTOR) 10 MG tablet      (Z13.1) Screening for diabetes mellitus  Comment: hemoglobin A1c within normal limits no concerns for diabetes  Plan: Hemoglobin A1c      (Z13.6) Screening for AAA (abdominal aortic aneurysm)  Comment: Mild distended aorta noted on stress test will screen for AAA  Plan: US Abdominal Aorta Imaging      (F43.22) Adjustment disorder with anxious mood  Comment: Patient is noticed increased anxiety and depression could be a source of causing his some of his intermittent chest pain.  We will start patient on Prozac.  Patient will also continue to monitor more muscular in nature will refrain from heavy weightlifting to see if that helps with his intermittent pain  Plan: FLUoxetine (PROZAC) 10 MG capsule         BMI:   Estimated body mass index is 37.51 kg/m  as calculated from the following:    Height as of this encounter: 1.753 m (5' 9\").    Weight as of this encounter: 115.2 kg (254 lb).   Weight management plan: Discussed healthy diet and exercise guidelines        See Patient Instructions    Return in about 2 weeks (around 11/6/2019) for Cardiology.    TERESA Romero [facility]          "

## 2019-10-30 ENCOUNTER — MYC MEDICAL ADVICE (OUTPATIENT)
Dept: FAMILY MEDICINE | Facility: CLINIC | Age: 49
End: 2019-10-30

## 2019-10-30 ENCOUNTER — HOSPITAL ENCOUNTER (OUTPATIENT)
Dept: ULTRASOUND IMAGING | Facility: CLINIC | Age: 49
Discharge: HOME | End: 2019-10-30
Attending: NURSE PRACTITIONER | Admitting: NURSE PRACTITIONER
Payer: COMMERCIAL

## 2019-10-30 ENCOUNTER — HOSPITAL ENCOUNTER (OUTPATIENT)
Dept: ULTRASOUND IMAGING | Facility: CLINIC | Age: 49
End: 2019-10-30
Attending: NURSE PRACTITIONER
Payer: COMMERCIAL

## 2019-10-30 DIAGNOSIS — I25.83 CORONARY ARTERY DISEASE DUE TO LIPID RICH PLAQUE: ICD-10-CM

## 2019-10-30 DIAGNOSIS — Z13.6 SCREENING FOR AAA (ABDOMINAL AORTIC ANEURYSM): ICD-10-CM

## 2019-10-30 DIAGNOSIS — I25.10 CORONARY ARTERY DISEASE DUE TO LIPID RICH PLAQUE: ICD-10-CM

## 2019-10-30 PROCEDURE — 93880 EXTRACRANIAL BILAT STUDY: CPT

## 2019-10-30 PROCEDURE — 76775 US EXAM ABDO BACK WALL LIM: CPT

## 2019-10-31 NOTE — TELEPHONE ENCOUNTER
Patient started on cholesterol medications and referred to cardiology based on findings of moderate coronary artery disease on CT scan of the chest.    Lizet Berry CNP

## 2019-11-04 ENCOUNTER — OFFICE VISIT (OUTPATIENT)
Dept: CARDIOLOGY | Facility: CLINIC | Age: 49
End: 2019-11-04
Attending: NURSE PRACTITIONER
Payer: COMMERCIAL

## 2019-11-04 VITALS
WEIGHT: 262 LBS | DIASTOLIC BLOOD PRESSURE: 83 MMHG | SYSTOLIC BLOOD PRESSURE: 160 MMHG | HEART RATE: 60 BPM | OXYGEN SATURATION: 99 % | BODY MASS INDEX: 38.69 KG/M2

## 2019-11-04 DIAGNOSIS — I10 ESSENTIAL HYPERTENSION: Primary | ICD-10-CM

## 2019-11-04 PROCEDURE — 99204 OFFICE O/P NEW MOD 45 MIN: CPT | Performed by: INTERNAL MEDICINE

## 2019-11-04 NOTE — PROGRESS NOTES
Cardiology Consultation     Assessment & Plan     1.  Negative Stress Echocardiogram August 2019 ("[employer]) - see below  2.  HTN  3.  DM  4.  Hyperlipidemia  5.  Obesity  6.  Carotid US - mild plaque, < 50% bilaterally  7.  AAA screening Oct 2019, No AAA  8.  CT of chest with moderate coronary atherosclerosis October 2019    Recommendations:    Would continue with his present medical therapy including his multidrug regimen for blood pressure control as well as statin which she is tolerating.  Previously he had difficulty with Lipitor with memory issues.    We went over his recent testing which has been normal.  I suspect his symptoms are related to chest pain from bench pressing.  I have advised him to cut back on his weight lifting for 2 weeks to allow his muscles to heal.    Discussed goals from chol due to risk factors.     Reviewed blood pressure diary at home and his blood pressure is normotensive.  We will go with home blood pressure readings.    Return to clinic in 1 years time        Jo-Ann Eisenberg MD          HPI:    Patient is a very pleasant 49-year-old male who presents to establish local cardiac care.  He states that over the last 2 or 3 months he has had intermittent chest discomfort.  This is led to visits to the emergency room, the work-up has been negative thus far.  He has had troponins trended and these of all been normal.  He states his EKG did not demonstrate any acute pathology or pattern.  He states that he has had constant substernal chest ache pain.  No associated symptoms.  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  There is some reproducible component to his pain.  He has cut back his routine and with this his symptoms have significantly improved.  During 1 of his ER visits he did have a recommendation to have a stress test performed, the findings are as noted below.  This was performed at [facility].  He is also had a  work-up with a carotid ultrasound and AAA screening which were also within normal limits.  Due to his symptoms of chest pain he did have a CT of the chest performed which demonstrated coronary calcifications however no acute pathology that could explain his symptoms.  He has been very vigilant to reduce his risk factors and over the last year and a half has lost 70 pounds due to watching his diet and exercising on a daily basis.  Given his risk factors he presents to establish local care.  He is on a multidrug regimen for his blood pressure and this has been well controlled on outpatient monitoring.  He brings with him a blood pressure diary with good blood pressure control.  He also has been tolerating his Crestor which was recently started.      Stress Echo August 2019:     1. Patient exercised 10 minutes 50 seconds on a Shay protocol.     Above normal functional aerobic capacity.     2. No chest pain with exercise. Limited by dyspnea.    3. Hypertensive response to exercise, 234/84 mmHg.     4. Negative ECG test for diagnostic ST depression.     5. Normal Stress Echo test, no evidence for ischemia.       Left Ventricular Ejection Fraction: 55 %     Primary Care Physician   Lizet Berry    Patient Active Problem List   Diagnosis     Tubular adenoma of colon     Essential hypertension     Palpitations     PVCs (premature ventricular contractions)     Vitamin D deficiency     Obesity (BMI 35.0-39.9) with comorbidity (H)       Past Medical History   I have reviewed this patient's medical history and updated it with pertinent information if needed.   No past medical history on file.    Past Surgical History   I have reviewed this patient's surgical history and updated it with pertinent information if needed.  Past Surgical History:   Procedure Laterality Date     COLONOSCOPY N/A 6/21/2019    Procedure: COLONOSCOPY, WITH POLYPECTOMY AND BIOPSY;  Surgeon: Herman Champagne MD;  Location: [facility]       Prior to Admission  Medications   Cannot display prior to admission medications because the patient has not been admitted in this contact.     [unfilled]  [unfilled]  Allergies   Allergies   Allergen Reactions     Hmg-Coa-R Inhibitors      [place] Flavor      Zantac [Ranitidine]        Social History    reports that he has quit smoking. He has quit using smokeless tobacco. He reports current alcohol use. He reports that he does not use drugs.    Family History   Family History   Problem Relation Age of Onset     Unknown/Adopted Mother      Unknown/Adopted Father        Review of Systems   The comprehensive 10 point Review of Systems is negative other than noted in the HPI or here.     Physical Exam   Vital Signs with Ranges     Wt Readings from Last 4 Encounters:   10/23/19 115.2 kg (254 lb)   09/09/19 117.9 kg (260 lb)   08/16/19 115.9 kg (255 lb 9.6 oz)   08/09/19 116.1 kg (256 lb)     [unfilled]      Vitals:  Blood pressure (!) 160/83, pulse 60, weight 118.8 kg (262 lb), SpO2 99 %.    Constitutional:   awake, alert, cooperative, no apparent distress, and appears stated age     Neck:   Supple, symmetrical, trachea midline, no adenopathy, thyroid symmetric, not enlarged and no tenderness, skin normal     Lungs:   No increased work of breathing, good air exchange, clear to auscultation bilaterally, no crackles or wheezing     Cardiovascular:   Normal apical impulse, regular rate and rhythm, normal S1 and S2, no S3 or S4, and no murmur noted     Abdomen:   No scars, normal bowel sounds, soft, non-distended, non-tender, no masses palpated, no hepatosplenomegally     Musculoskeletal:   There is no redness, warmth, or swelling of the joints.  Full range of motion noted.  Motor strength is 5 out of 5 all extremities bilaterally.  Tone is normal.     Neurologic:   Awake, alert, oriented to name, place and time.  Cranial nerves II-XII are grossly intact.  Motor is 5 out of 5 bilaterally.  Cerebellar finger to nose, heel to shin  intact.  Sensory is intact.  Babinski down going, Romberg negative, and gait is normal.       @LABRCNTIPR(tropi:5,troponinies:5)@    @LABRCNTIPR(wbc:3,hgb:3,mcv:3,plt:3,inr:3,na:3,potassium:3,chloride:3,co2:3,bun:3,cr:3,gfrestimated:3,gfrestblack:3,aniongap:3,nighat:3,glc:3,albumin:2,prottotal:2,bilitotal:2,alkphos:2,alt:2,ast:2,lipase:2,tropi:3)@  Recent Labs   Lab Test 10/23/19  0839   CHOL 208*   HDL 49   *   TRIG 155*     @LABRCNTIP(wbc:3,hgb:3,hct:3,mcv:3,plt:3,iron:3,ironsat:3,reticabsct:3,retp:3,feb:3,serina:3,b12:3,folic:3,epoe:3,morph:3)@  @LABRCNTIP(PH:3,PHV:3,PO2:3,PO2V:3,sat:3,PCO2:3,PCO2V:3,HCO3:3,HCO3V:3)@  @LABRCNTIP(NTBNPI:3,NTBNP:3)@  @LABRCNTIP(DD:1)@  @LABRCNTIP(sed:3,crp:3)@  @LABRCNTIP(PLT:3)@  @LABRCNTIP(TSH:3)@  @LABRCNTIP(color:1,appearance:1,urineg,urinebili:1,urineketone:1,s,ubld:1,urineph:1,protein:1,urobilinogen:1,nitrite:1,leukest:1,rbcu:1,wbcu:1)@    Imaging:  No results found for this or any previous visit (from the past 48 hour(s)).    Echo:  No results found for this or any previous visit (from the past 4320 hour(s)).

## 2019-11-04 NOTE — LETTER
11/4/2019    Lizet Berry, APRN CNP  [address] [facility] [address]    RE: Lalitocintia Weathers       Dear Colleague,    I had the pleasure of seeing Lalito KELLY Jasiel in the [facility].      Cardiology Consultation     Assessment & Plan     1.  Negative Stress Echocardiogram August 2019 ([facility]) - see below  2.  HTN  3.  DM  4.  Hyperlipidemia  5.  Obesity  6.  Carotid US - mild plaque, < 50% bilaterally  7.  AAA screening Oct 2019, No AAA  8.  CT of chest with moderate coronary atherosclerosis October 2019    Recommendations:    Would continue with his present medical therapy including his multidrug regimen for blood pressure control as well as statin which she is tolerating.  Previously he had difficulty with Lipitor with memory issues.    We went over his recent testing which has been normal.  I suspect his symptoms are related to chest pain from bench pressing.  I have advised him to cut back on his weight lifting for 2 weeks to allow his muscles to heal.    Discussed goals from chol due to risk factors.     Reviewed blood pressure diary at home and his blood pressure is normotensive.  We will go with home blood pressure readings.    Return to clinic in 1 years time        Jo-Ann Eisenberg MD          HPI:    Patient is a very pleasant 49-year-old male who presents to establish local cardiac care.  He states that over the last 2 or 3 months he has had intermittent chest discomfort.  This is led to visits to the emergency room, the work-up has been negative thus far.  He has had troponins trended and these of all been normal.  He states his EKG did not demonstrate any acute pathology or pattern.  He states that he has had constant substernal chest ache pain.  No associated symptoms.  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  There is some reproducible component to his pain.  He has cut back his routine and with this his  symptoms have significantly improved.  During 1 of his ER visits he did have a recommendation to have a stress test performed, the findings are as noted below.  This was performed at [facility].  He is also had a work-up with a carotid ultrasound and AAA screening which were also within normal limits.  Due to his symptoms of chest pain he did have a CT of the chest performed which demonstrated coronary calcifications however no acute pathology that could explain his symptoms.  He has been very vigilant to reduce his risk factors and over the last year and a half has lost 70 pounds due to watching his diet and exercising on a daily basis.  Given his risk factors he presents to establish local care.  He is on a multidrug regimen for his blood pressure and this has been well controlled on outpatient monitoring.  He brings with him a blood pressure diary with good blood pressure control.  He also has been tolerating his Crestor which was recently started.      Stress Echo August 2019:     1. Patient exercised 10 minutes 50 seconds on a Shay protocol.     Above normal functional aerobic capacity.     2. No chest pain with exercise. Limited by dyspnea.    3. Hypertensive response to exercise, 234/84 mmHg.     4. Negative ECG test for diagnostic ST depression.     5. Normal Stress Echo test, no evidence for ischemia.       Left Ventricular Ejection Fraction: 55 %     Primary Care Physician   Lizet Berry    Patient Active Problem List   Diagnosis     Tubular adenoma of colon     Essential hypertension     Palpitations     PVCs (premature ventricular contractions)     Vitamin D deficiency     Obesity (BMI 35.0-39.9) with comorbidity (H)       Past Medical History   I have reviewed this patient's medical history and updated it with pertinent information if needed.   No past medical history on file.    Past Surgical History   I have reviewed this patient's surgical history and updated it with pertinent information if  needed.  Past Surgical History:   Procedure Laterality Date     COLONOSCOPY N/A 6/21/2019    Procedure: COLONOSCOPY, WITH POLYPECTOMY AND BIOPSY;  Surgeon: Herman Champagne MD;  Location: [facility]       Prior to Admission Medications   Cannot display prior to admission medications because the patient has not been admitted in this contact.     [unfilled]  [unfilled]  Allergies   Allergies   Allergen Reactions     Hmg-Coa-R Inhibitors      Vasquez Flavor      Zantac [Ranitidine]        Social History    reports that he has quit smoking. He has quit using smokeless tobacco. He reports current alcohol use. He reports that he does not use drugs.    Family History   Family History   Problem Relation Age of Onset     Unknown/Adopted Mother      Unknown/Adopted Father        Review of Systems   The comprehensive 10 point Review of Systems is negative other than noted in the HPI or here.     Physical Exam   Vital Signs with Ranges     Wt Readings from Last 4 Encounters:   10/23/19 115.2 kg (254 lb)   09/09/19 117.9 kg (260 lb)   08/16/19 115.9 kg (255 lb 9.6 oz)   08/09/19 116.1 kg (256 lb)     [unfilled]      Vitals:  Blood pressure (!) 160/83, pulse 60, weight 118.8 kg (262 lb), SpO2 99 %.    Constitutional:   awake, alert, cooperative, no apparent distress, and appears stated age     Neck:   Supple, symmetrical, trachea midline, no adenopathy, thyroid symmetric, not enlarged and no tenderness, skin normal     Lungs:   No increased work of breathing, good air exchange, clear to auscultation bilaterally, no crackles or wheezing     Cardiovascular:   Normal apical impulse, regular rate and rhythm, normal S1 and S2, no S3 or S4, and no murmur noted     Abdomen:   No scars, normal bowel sounds, soft, non-distended, non-tender, no masses palpated, no hepatosplenomegally     Musculoskeletal:   There is no redness, warmth, or swelling of the joints.  Full range of motion noted.  Motor strength is 5 out of 5 all extremities  bilaterally.  Tone is normal.     Neurologic:   Awake, alert, oriented to name, place and time.  Cranial nerves II-XII are grossly intact.  Motor is 5 out of 5 bilaterally.  Cerebellar finger to nose, heel to shin intact.  Sensory is intact.  Babinski down going, Romberg negative, and gait is normal.       @LABRCNTIPR(tropi:5,troponinies:5)@    @LABRCNTIPR(wbc:3,hgb:3,mcv:3,plt:3,inr:3,na:3,potassium:3,chloride:3,co2:3,bun:3,cr:3,gfrestimated:3,gfrestblack:3,aniongap:3,nighat:3,glc:3,albumin:2,prottotal:2,bilitotal:2,alkphos:2,alt:2,ast:2,lipase:2,tropi:3)@  Recent Labs   Lab Test 10/23/19  0839   CHOL 208*   HDL 49   *   TRIG 155*     @LABRCNTIP(wbc:3,hgb:3,hct:3,mcv:3,plt:3,iron:3,ironsat:3,reticabsct:3,retp:3,feb:3,serina:3,b12:3,folic:3,epoe:3,morph:3)@  @LABRCNTIP(PH:3,PHV:3,PO2:3,PO2V:3,sat:3,PCO2:3,PCO2V:3,HCO3:3,HCO3V:3)@  @LABRCNTIP(NTBNPI:3,NTBNP:3)@  @LABRCNTIP(DD:1)@  @LABRCNTIP(sed:3,crp:3)@  @LABRCNTIP(PLT:3)@  @LABRCNTIP(TSH:3)@  @LABRCNTIP(color:1,appearance:1,urineg,urinebili:1,urineketone:1,s,ubld:1,urineph:1,protein:1,urobilinogen:1,nitrite:1,leukest:1,rbcu:1,wbcu:1)@    Imaging:  No results found for this or any previous visit (from the past 48 hour(s)).    Echo:  No results found for this or any previous visit (from the past 4320 hour(s)).             Thank you for allowing me to participate in the care of your patient.      Sincerely,     Jo-Ann Eisenberg MD     [facility]    cc:   Lizet Berry, TERESA CNP  [address] [place], [place] [id number]

## 2019-11-04 NOTE — LETTER
11/4/2019    Lizet Berry, APRN CNP  [address] [facility] [address]    RE: Lalitocintia Weathers       Dear Colleague,    I had the pleasure of seeing Lalito KELLY Jasiel in the [facility].      Cardiology Consultation     Assessment & Plan     1.  Negative Stress Echocardiogram August 2019 ([facility]) - see below  2.  HTN  3.  DM  4.  Hyperlipidemia  5.  Obesity  6.  Carotid US - mild plaque, < 50% bilaterally  7.  AAA screening Oct 2019, No AAA  8.  CT of chest with moderate coronary atherosclerosis October 2019    Recommendations:    Would continue with his present medical therapy including his multidrug regimen for blood pressure control as well as statin which she is tolerating.  Previously he had difficulty with Lipitor with memory issues.    We went over his recent testing which has been normal.  I suspect his symptoms are related to chest pain from bench pressing.  I have advised him to cut back on his weight lifting for 2 weeks to allow his muscles to heal.    Discussed goals from chol due to risk factors.     Reviewed blood pressure diary at home and his blood pressure is normotensive.  We will go with home blood pressure readings.    Return to clinic in 1 years time        Jo-Ann Eisenberg MD          HPI:    Patient is a very pleasant 49-year-old male who presents to establish local cardiac care.  He states that over the last 2 or 3 months he has had intermittent chest discomfort.  This is led to visits to the emergency room, the work-up has been negative thus far.  He has had troponins trended and these of all been normal.  He states his EKG did not demonstrate any acute pathology or pattern.  He states that he has had constant substernal chest ache pain.  No associated symptoms.  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  There is some reproducible component to his pain.  He has cut back his routine and with this his  symptoms have significantly improved.  During 1 of his ER visits he did have a recommendation to have a stress test performed, the findings are as noted below.  This was performed at [facility].  He is also had a work-up with a carotid ultrasound and AAA screening which were also within normal limits.  Due to his symptoms of chest pain he did have a CT of the chest performed which demonstrated coronary calcifications however no acute pathology that could explain his symptoms.  He has been very vigilant to reduce his risk factors and over the last year and a half has lost 70 pounds due to watching his diet and exercising on a daily basis.  Given his risk factors he presents to establish local care.  He is on a multidrug regimen for his blood pressure and this has been well controlled on outpatient monitoring.  He brings with him a blood pressure diary with good blood pressure control.  He also has been tolerating his Crestor which was recently started.      Stress Echo August 2019:     1. Patient exercised 10 minutes 50 seconds on a Shay protocol.     Above normal functional aerobic capacity.     2. No chest pain with exercise. Limited by dyspnea.    3. Hypertensive response to exercise, 234/84 mmHg.     4. Negative ECG test for diagnostic ST depression.     5. Normal Stress Echo test, no evidence for ischemia.       Left Ventricular Ejection Fraction: 55 %     Primary Care Physician   Lizet Berry    Patient Active Problem List   Diagnosis     Tubular adenoma of colon     Essential hypertension     Palpitations     PVCs (premature ventricular contractions)     Vitamin D deficiency     Obesity (BMI 35.0-39.9) with comorbidity (H)       Past Medical History   I have reviewed this patient's medical history and updated it with pertinent information if needed.   No past medical history on file.    Past Surgical History   I have reviewed this patient's surgical history and updated it with pertinent information if  needed.  Past Surgical History:   Procedure Laterality Date     COLONOSCOPY N/A 6/21/2019    Procedure: COLONOSCOPY, WITH POLYPECTOMY AND BIOPSY;  Surgeon: Herman Champagne MD;  Location: [facility]       Prior to Admission Medications   Cannot display prior to admission medications because the patient has not been admitted in this contact.     [unfilled]  [unfilled]  Allergies   Allergies   Allergen Reactions     Hmg-Coa-R Inhibitors      Vasquez Flavor      Zantac [Ranitidine]        Social History    reports that he has quit smoking. He has quit using smokeless tobacco. He reports current alcohol use. He reports that he does not use drugs.    Family History   Family History   Problem Relation Age of Onset     Unknown/Adopted Mother      Unknown/Adopted Father        Review of Systems   The comprehensive 10 point Review of Systems is negative other than noted in the HPI or here.     Physical Exam   Vital Signs with Ranges     Wt Readings from Last 4 Encounters:   10/23/19 115.2 kg (254 lb)   09/09/19 117.9 kg (260 lb)   08/16/19 115.9 kg (255 lb 9.6 oz)   08/09/19 116.1 kg (256 lb)     [unfilled]      Vitals:  Blood pressure (!) 160/83, pulse 60, weight 118.8 kg (262 lb), SpO2 99 %.    Constitutional:   awake, alert, cooperative, no apparent distress, and appears stated age     Neck:   Supple, symmetrical, trachea midline, no adenopathy, thyroid symmetric, not enlarged and no tenderness, skin normal     Lungs:   No increased work of breathing, good air exchange, clear to auscultation bilaterally, no crackles or wheezing     Cardiovascular:   Normal apical impulse, regular rate and rhythm, normal S1 and S2, no S3 or S4, and no murmur noted     Abdomen:   No scars, normal bowel sounds, soft, non-distended, non-tender, no masses palpated, no hepatosplenomegally     Musculoskeletal:   There is no redness, warmth, or swelling of the joints.  Full range of motion noted.  Motor strength is 5 out of 5 all extremities  bilaterally.  Tone is normal.     Neurologic:   Awake, alert, oriented to name, place and time.  Cranial nerves II-XII are grossly intact.  Motor is 5 out of 5 bilaterally.  Cerebellar finger to nose, heel to shin intact.  Sensory is intact.  Babinski down going, Romberg negative, and gait is normal.       @LABRCNTIPR(tropi:5,troponinies:5)@    @LABRCNTIPR(wbc:3,hgb:3,mcv:3,plt:3,inr:3,na:3,potassium:3,chloride:3,co2:3,bun:3,cr:3,gfrestimated:3,gfrestblack:3,aniongap:3,nighat:3,glc:3,albumin:2,prottotal:2,bilitotal:2,alkphos:2,alt:2,ast:2,lipase:2,tropi:3)@  Recent Labs   Lab Test 10/23/19  0839   CHOL 208*   HDL 49   *   TRIG 155*     @LABRCNTIP(wbc:3,hgb:3,hct:3,mcv:3,plt:3,iron:3,ironsat:3,reticabsct:3,retp:3,feb:3,serina:3,b12:3,folic:3,epoe:3,morph:3)@  @LABRCNTIP(PH:3,PHV:3,PO2:3,PO2V:3,sat:3,PCO2:3,PCO2V:3,HCO3:3,HCO3V:3)@  @LABRCNTIP(NTBNPI:3,NTBNP:3)@  @LABRCNTIP(DD:1)@  @LABRCNTIP(sed:3,crp:3)@  @LABRCNTIP(PLT:3)@  @LABRCNTIP(TSH:3)@  @LABRCNTIP(color:1,appearance:1,urineg,urinebili:1,urineketone:1,s,ubld:1,urineph:1,protein:1,urobilinogen:1,nitrite:1,leukest:1,rbcu:1,wbcu:1)@    Imaging:  No results found for this or any previous visit (from the past 48 hour(s)).    Echo:  No results found for this or any previous visit (from the past 4320 hour(s)).             Thank you for allowing me to participate in the care of your patient.    Sincerely,     Jo-Ann Eisenberg MD     [facility]

## 2019-11-18 DIAGNOSIS — I10 ESSENTIAL HYPERTENSION: ICD-10-CM

## 2019-11-18 RX ORDER — VERAPAMIL HYDROCHLORIDE 360 MG/1
CAPSULE, DELAYED RELEASE PELLETS ORAL
Qty: 30 CAPSULE | Refills: 0 | Status: SHIPPED | OUTPATIENT
Start: 2019-11-18 | End: 2019-12-16

## 2019-11-18 NOTE — TELEPHONE ENCOUNTER
"Requested Prescriptions   Pending Prescriptions Disp Refills     verapamil ER (VERELAN) 360 MG 24 hr capsule [Pharmacy Med Name: VERAPAMIL HCL ER 360MG CP24] 30 capsule 0     Sig: TAKE ONE CAPSULE BY MOUTH ONCE DAILY       Calcium Channel Blockers Protocol  Failed - 11/18/2019  9:12 AM        Failed - Blood pressure under 140/90 in past 12 months     BP Readings from Last 3 Encounters:   11/04/19 (!) 160/83   10/23/19 128/78   09/09/19 (!) 182/96                 Passed - Normal ALT in past 12 months     Recent Labs   Lab Test 10/23/19  0839   ALT 69             Passed - Recent (12 mo) or future (30 days) visit within the authorizing provider's specialty     Patient has had an office visit with the authorizing provider or a provider within the authorizing providers department within the previous 12 mos or has a future within next 30 days. See \"Patient Info\" tab in inbasket, or \"Choose Columns\" in Meds & Orders section of the refill encounter.              Passed - Medication is active on med list        Passed - Patient is age 18 or older        Passed - Normal serum creatinine on file in past 12 months     Recent Labs   Lab Test 10/23/19  0839   CR 1.00             Last Written Prescription Date:  10/18/19  Last Fill Quantity: 30,  # refills: 0   Last office visit: 10/23/2019 with prescribing provider:     Future Office Visit:      "

## 2019-11-19 DIAGNOSIS — I10 ESSENTIAL HYPERTENSION: ICD-10-CM

## 2019-11-20 RX ORDER — TRIAMTERENE AND HYDROCHLOROTHIAZIDE 37.5; 25 MG/1; MG/1
CAPSULE ORAL
Qty: 60 CAPSULE | Refills: 0 | Status: SHIPPED | OUTPATIENT
Start: 2019-11-20 | End: 2019-12-19

## 2019-11-20 NOTE — TELEPHONE ENCOUNTER
"TRIAMTERENE-HCTZ 37.5-25MG CAPS  Last Written Prescription Date:  10/21/2019  Last Fill Quantity: 60,  # refills: 0   Last office visit: 10/23/2019 with prescribing provider:  DOUGLAS   Future Office Visit:    Requested Prescriptions   Pending Prescriptions Disp Refills     triamterene-HCTZ (DYAZIDE) 37.5-25 MG capsule [Pharmacy Med Name: TRIAMTERENE-HCTZ 37.5-25MG CAPS] 60 capsule 0     Sig: TAKE TWO CAPSULES BY MOUTH ONCE DAILY **NEEDS RECHECK APPOINTMENT FOR FURTHER REFILLS**       Diuretics (Including Combos) Protocol Failed - 11/19/2019  7:06 PM        Failed - Blood pressure under 140/90 in past 12 months     BP Readings from Last 3 Encounters:   11/04/19 (!) 160/83   10/23/19 128/78   09/09/19 (!) 182/96                 Passed - Recent (12 mo) or future (30 days) visit within the authorizing provider's specialty     Patient has had an office visit with the authorizing provider or a provider within the authorizing providers department within the previous 12 mos or has a future within next 30 days. See \"Patient Info\" tab in inbasket, or \"Choose Columns\" in Meds & Orders section of the refill encounter.              Passed - Medication is active on med list        Passed - Patient is age 18 or older        Passed - Normal serum creatinine on file in past 12 months     Recent Labs   Lab Test 10/23/19  0839   CR 1.00              Passed - Normal serum potassium on file in past 12 months     Recent Labs   Lab Test 10/23/19  0839   POTASSIUM 3.8                    Passed - Normal serum sodium on file in past 12 months     Recent Labs   Lab Test 10/23/19  0839                   "

## 2019-11-22 DIAGNOSIS — I25.83 CORONARY ARTERY DISEASE DUE TO LIPID RICH PLAQUE: ICD-10-CM

## 2019-11-22 DIAGNOSIS — I25.10 CORONARY ARTERY DISEASE DUE TO LIPID RICH PLAQUE: ICD-10-CM

## 2019-11-22 RX ORDER — ROSUVASTATIN CALCIUM 10 MG/1
TABLET, COATED ORAL
Qty: 90 TABLET | Refills: 1 | Status: SHIPPED | OUTPATIENT
Start: 2019-11-22 | End: 2020-05-11

## 2019-11-22 NOTE — TELEPHONE ENCOUNTER
"ROSUVASTATIN CALCIUM 10MG TABS  Last Written Prescription Date:  10/23/2019  Last Fill Quantity: 30,  # refills: 0   Last office visit: 10/23/2019 with prescribing provider:  DOUGLAS   Future Office Visit:    Requested Prescriptions   Pending Prescriptions Disp Refills     rosuvastatin (CRESTOR) 10 MG tablet [Pharmacy Med Name: ROSUVASTATIN CALCIUM 10MG TABS] 30 tablet 0     Sig: TAKE ONE TABLET BY MOUTH ONCE DAILY       Statins Protocol Passed - 11/22/2019  4:14 AM        Passed - LDL on file in past 12 months     Recent Labs   Lab Test 10/23/19  0839   *             Passed - No abnormal creatine kinase in past 12 months     No lab results found.             Passed - Recent (12 mo) or future (30 days) visit within the authorizing provider's specialty     Patient has had an office visit with the authorizing provider or a provider within the authorizing providers department within the previous 12 mos or has a future within next 30 days. See \"Patient Info\" tab in inbasket, or \"Choose Columns\" in Meds & Orders section of the refill encounter.              Passed - Medication is active on med list        Passed - Patient is age 18 or older          "

## 2019-12-16 DIAGNOSIS — I10 ESSENTIAL HYPERTENSION: ICD-10-CM

## 2019-12-17 RX ORDER — VERAPAMIL HYDROCHLORIDE 360 MG/1
CAPSULE, DELAYED RELEASE PELLETS ORAL
Qty: 30 CAPSULE | Refills: 0 | Status: SHIPPED | OUTPATIENT
Start: 2019-12-17 | End: 2020-01-07

## 2019-12-17 NOTE — TELEPHONE ENCOUNTER
Routing refill request to provider for review/approval because:  Swati given x1 and patient did not follow up, please advise  BP not at goal  BP Readings from Last 6 Encounters:   11/04/19 (!) 160/83   10/23/19 128/78   09/09/19 (!) 182/96   08/16/19 132/80   08/09/19 (!) 145/82   08/01/19 (!) 164/86     Kelsy HERNANDEZ RN

## 2019-12-17 NOTE — TELEPHONE ENCOUNTER
"Requested Prescriptions   Pending Prescriptions Disp Refills     verapamil ER (VERELAN) 360 MG 24 hr capsule [Pharmacy Med Name: VERAPAMIL HCL ER 360MG CP24] 30 capsule 0     Sig: TAKE ONE CAPSULE BY MOUTH ONCE DAILY       Calcium Channel Blockers Protocol  Failed - 12/16/2019  6:40 PM        Failed - Blood pressure under 140/90 in past 12 months     BP Readings from Last 3 Encounters:   11/04/19 (!) 160/83   10/23/19 128/78   09/09/19 (!) 182/96                 Passed - Normal ALT in past 12 months     Recent Labs   Lab Test 10/23/19  0839   ALT 69             Passed - Recent (12 mo) or future (30 days) visit within the authorizing provider's specialty     Patient has had an office visit with the authorizing provider or a provider within the authorizing providers department within the previous 12 mos or has a future within next 30 days. See \"Patient Info\" tab in inbasket, or \"Choose Columns\" in Meds & Orders section of the refill encounter.              Passed - Medication is active on med list        Passed - Patient is age 18 or older        Passed - Normal serum creatinine on file in past 12 months     Recent Labs   Lab Test 10/23/19  0839   CR 1.00             Last Written Prescription Date:  11/18/19  Last Fill Quantity: 30,  # refills: 0   Last office visit: 10/23/2019 with prescribing provider:     Future Office Visit:      "

## 2019-12-19 DIAGNOSIS — I10 ESSENTIAL HYPERTENSION: ICD-10-CM

## 2019-12-19 RX ORDER — TRIAMTERENE AND HYDROCHLOROTHIAZIDE 37.5; 25 MG/1; MG/1
CAPSULE ORAL
Qty: 60 CAPSULE | Refills: 0 | Status: SHIPPED | OUTPATIENT
Start: 2019-12-19 | End: 2020-01-13

## 2019-12-19 NOTE — TELEPHONE ENCOUNTER
"Requested Prescriptions   Pending Prescriptions Disp Refills     triamterene-HCTZ (DYAZIDE) 37.5-25 MG capsule [Pharmacy Med Name: TRIAMTERENE-HCTZ 37.5-25MG CAPS] 60 capsule 0     Sig: TAKE TWO CAPSULES BY MOUTH ONCE DAILY, (NEED TO BE SEEN IN CLINIC FOR FURTHER REFILLS)       Diuretics (Including Combos) Protocol Failed - 12/19/2019  9:01 AM        Failed - Blood pressure under 140/90 in past 12 months     BP Readings from Last 3 Encounters:   11/04/19 (!) 160/83   10/23/19 128/78   09/09/19 (!) 182/96                 Passed - Recent (12 mo) or future (30 days) visit within the authorizing provider's specialty     Patient has had an office visit with the authorizing provider or a provider within the authorizing providers department within the previous 12 mos or has a future within next 30 days. See \"Patient Info\" tab in inbasket, or \"Choose Columns\" in Meds & Orders section of the refill encounter.              Passed - Medication is active on med list        Passed - Patient is age 18 or older        Passed - Normal serum creatinine on file in past 12 months     Recent Labs   Lab Test 10/23/19  0839   CR 1.00              Passed - Normal serum potassium on file in past 12 months     Recent Labs   Lab Test 10/23/19  0839   POTASSIUM 3.8                    Passed - Normal serum sodium on file in past 12 months     Recent Labs   Lab Test 10/23/19  0839                 Last Written Prescription Date:  11/20/19  Last Fill Quantity: 60,  # refills: 0   Last office visit: 10/23/2019 with prescribing provider:  LEO   Future Office Visit:      "

## 2020-01-07 DIAGNOSIS — I10 ESSENTIAL HYPERTENSION: ICD-10-CM

## 2020-01-07 RX ORDER — LISINOPRIL 40 MG/1
40 TABLET ORAL DAILY
Qty: 90 TABLET | Refills: 1 | Status: SHIPPED | OUTPATIENT
Start: 2020-01-07 | End: 2020-06-29

## 2020-01-07 RX ORDER — VERAPAMIL HYDROCHLORIDE 360 MG/1
CAPSULE, DELAYED RELEASE PELLETS ORAL
Qty: 90 CAPSULE | Refills: 1 | Status: SHIPPED | OUTPATIENT
Start: 2020-01-07 | End: 2020-01-13

## 2020-01-07 NOTE — TELEPHONE ENCOUNTER
"Requested Prescriptions   Pending Prescriptions Disp Refills     verapamil ER (VERELAN) 360 MG 24 hr capsule [Pharmacy Med Name: VERAPAMIL HCL ER 360MG CP24]       Sig: TAKE ONE CAPSULE BY MOUTH ONCE DAILY       Calcium Channel Blockers Protocol  Failed - 1/7/2020 12:03 PM        Failed - Blood pressure under 140/90 in past 12 months     BP Readings from Last 3 Encounters:   11/04/19 (!) 160/83   10/23/19 128/78   09/09/19 (!) 182/96                 Passed - Normal ALT in past 12 months     Recent Labs   Lab Test 10/23/19  0839   ALT 69             Passed - Recent (12 mo) or future (30 days) visit within the authorizing provider's specialty     Patient has had an office visit with the authorizing provider or a provider within the authorizing providers department within the previous 12 mos or has a future within next 30 days. See \"Patient Info\" tab in inbasket, or \"Choose Columns\" in Meds & Orders section of the refill encounter.              Passed - Medication is active on med list        Passed - Patient is age 18 or older        Passed - Normal serum creatinine on file in past 12 months     Recent Labs   Lab Test 10/23/19  0839   CR 1.00             lisinopril (PRINIVIL/ZESTRIL) 40 MG tablet       Sig: Take 1 tablet (40 mg) by mouth       There is no refill protocol information for this order          VERAPAMIL   Last Written Prescription Date:  12/17/19  Last Fill Quantity: 30,  # refills: 0   Last office visit: 10/23/2019 with prescribing provider:     Future Office Visit:      LISINOPRIL  Last Written Prescription Date:  ?  Last Fill Quantity: ?,  # refills: ?   Last office visit: 10/23/2019 with prescribing provider:     Future Office Visit:      "

## 2020-01-11 DIAGNOSIS — I10 ESSENTIAL HYPERTENSION: ICD-10-CM

## 2020-01-13 RX ORDER — TRIAMTERENE AND HYDROCHLOROTHIAZIDE 37.5; 25 MG/1; MG/1
CAPSULE ORAL
Qty: 60 CAPSULE | Refills: 0 | Status: SHIPPED | OUTPATIENT
Start: 2020-01-13 | End: 2020-02-12

## 2020-01-13 RX ORDER — VERAPAMIL HYDROCHLORIDE 360 MG/1
CAPSULE, DELAYED RELEASE PELLETS ORAL
Qty: 30 CAPSULE | Refills: 0 | Status: SHIPPED | OUTPATIENT
Start: 2020-01-13 | End: 2020-05-04

## 2020-01-13 NOTE — TELEPHONE ENCOUNTER
Routing refill requests to provider for review/approval because:  BPs not at goal. Swati refills have already been given.   BP Readings from Last 6 Encounters:   11/04/19 (!) 160/83   10/23/19 128/78   09/09/19 (!) 182/96   08/16/19 132/80   08/09/19 (!) 145/82   08/01/19 (!) 164/86     Kelsy HERNANDEZ RN

## 2020-01-13 NOTE — TELEPHONE ENCOUNTER
"Requested Prescriptions   Pending Prescriptions Disp Refills     verapamil ER (VERELAN) 360 MG 24 hr capsule [Pharmacy Med Name: VERAPAMIL HCL ER 360MG CP24] 30 capsule 0     Sig: TAKE ONE CAPSULE BY MOUTH ONCE DAILY       Calcium Channel Blockers Protocol  Failed - 1/11/2020  5:04 AM        Failed - Blood pressure under 140/90 in past 12 months     BP Readings from Last 3 Encounters:   11/04/19 (!) 160/83   10/23/19 128/78   09/09/19 (!) 182/96                 Passed - Normal ALT in past 12 months     Recent Labs   Lab Test 10/23/19  0839   ALT 69             Passed - Recent (12 mo) or future (30 days) visit within the authorizing provider's specialty     Patient has had an office visit with the authorizing provider or a provider within the authorizing providers department within the previous 12 mos or has a future within next 30 days. See \"Patient Info\" tab in inbasket, or \"Choose Columns\" in Meds & Orders section of the refill encounter.      Look like this is a dupilcation from 1/7/2020          Passed - Medication is active on med list        Passed - Patient is age 18 or older        Passed - Normal serum creatinine on file in past 12 months     Recent Labs   Lab Test 10/23/19  0839   CR 1.00             triamterene-HCTZ (DYAZIDE) 37.5-25 MG capsule [Pharmacy Med Name: TRIAMTERENE-HCTZ 37.5-25MG CAPS] 60 capsule 0     Sig: TAKE TWO CAPSULES BY MOUTH ONCE DAILY, (NEED TO BE SEEN IN CLINIC FOR FURTHER REFILLS)       Diuretics (Including Combos) Protocol Failed - 1/11/2020  5:04 AM        Failed - Blood pressure under 140/90 in past 12 months     BP Readings from Last 3 Encounters:   11/04/19 (!) 160/83   10/23/19 128/78   09/09/19 (!) 182/96                 Passed - Recent (12 mo) or future (30 days) visit within the authorizing provider's specialty     Patient has had an office visit with the authorizing provider or a provider within the authorizing providers department within the previous 12 mos or has a " "future within next 30 days. See \"Patient Info\" tab in inbasket, or \"Choose Columns\" in Meds & Orders section of the refill encounter.              Passed - Medication is active on med list        Passed - Patient is age 18 or older        Passed - Normal serum creatinine on file in past 12 months     Recent Labs   Lab Test 10/23/19  0839   CR 1.00              Passed - Normal serum potassium on file in past 12 months     Recent Labs   Lab Test 10/23/19  0839   POTASSIUM 3.8                    Passed - Normal serum sodium on file in past 12 months     Recent Labs   Lab Test 10/23/19  0839                 Last Written Prescription Date:  12/19/19  Last Fill Quantity: 60,  # refills: 0   Last office visit: 10/23/2019 with prescribing provider:     Future Office Visit:      "

## 2020-02-12 DIAGNOSIS — I10 ESSENTIAL HYPERTENSION: ICD-10-CM

## 2020-02-12 RX ORDER — TRIAMTERENE AND HYDROCHLOROTHIAZIDE 37.5; 25 MG/1; MG/1
2 CAPSULE ORAL DAILY
Qty: 60 CAPSULE | Refills: 0 | Status: SHIPPED | OUTPATIENT
Start: 2020-02-12 | End: 2020-03-11

## 2020-02-12 NOTE — TELEPHONE ENCOUNTER
"Requested Prescriptions   Pending Prescriptions Disp Refills     triamterene-HCTZ (DYAZIDE) 37.5-25 MG capsule [Pharmacy Med Name: TRIAMTERENE-HCTZ 37.5-25MG CAPS] 60 capsule 0     Sig: TAKE TWO CAPSULES BY MOUTH ONCE DAILY (NEED TO BE SEEN IN CLINIC FOR FURTHER REFILLS)       Diuretics (Including Combos) Protocol Failed - 2/12/2020  5:02 AM        Failed - Blood pressure under 140/90 in past 12 months     BP Readings from Last 3 Encounters:   11/04/19 (!) 160/83   10/23/19 128/78   09/09/19 (!) 182/96                 Passed - Recent (12 mo) or future (30 days) visit within the authorizing provider's specialty     Patient has had an office visit with the authorizing provider or a provider within the authorizing providers department within the previous 12 mos or has a future within next 30 days. See \"Patient Info\" tab in inbasket, or \"Choose Columns\" in Meds & Orders section of the refill encounter.              Passed - Medication is active on med list        Passed - Patient is age 18 or older        Passed - Normal serum creatinine on file in past 12 months     Recent Labs   Lab Test 10/23/19  0839   CR 1.00              Passed - Normal serum potassium on file in past 12 months     Recent Labs   Lab Test 10/23/19  0839   POTASSIUM 3.8                    Passed - Normal serum sodium on file in past 12 months     Recent Labs   Lab Test 10/23/19  0839                 Last Written Prescription Date:  1/13/20  Last Fill Quantity: 60,  # refills: 1   Last office visit: 10/23/2019 with prescribing provider:     Future Office Visit:      "

## 2020-02-13 DIAGNOSIS — I10 ESSENTIAL HYPERTENSION: ICD-10-CM

## 2020-02-13 RX ORDER — TRIAMTERENE AND HYDROCHLOROTHIAZIDE 37.5; 25 MG/1; MG/1
CAPSULE ORAL
Qty: 60 CAPSULE | Refills: 0 | OUTPATIENT
Start: 2020-02-13

## 2020-02-14 DIAGNOSIS — I10 ESSENTIAL HYPERTENSION: ICD-10-CM

## 2020-02-14 RX ORDER — TRIAMTERENE AND HYDROCHLOROTHIAZIDE 37.5; 25 MG/1; MG/1
CAPSULE ORAL
Qty: 60 CAPSULE | Refills: 0 | OUTPATIENT
Start: 2020-02-14

## 2020-03-11 DIAGNOSIS — I10 ESSENTIAL HYPERTENSION: ICD-10-CM

## 2020-03-11 RX ORDER — TRIAMTERENE AND HYDROCHLOROTHIAZIDE 37.5; 25 MG/1; MG/1
CAPSULE ORAL
Qty: 60 CAPSULE | Refills: 0 | Status: SHIPPED | OUTPATIENT
Start: 2020-03-11 | End: 2020-04-14

## 2020-03-11 NOTE — TELEPHONE ENCOUNTER
"Requested Prescriptions   Pending Prescriptions Disp Refills     triamterene-HCTZ (DYAZIDE) 37.5-25 MG capsule [Pharmacy Med Name: TRIAMTERENE-HCTZ 37.5-25MG CAPS] 60 capsule 0     Sig: TAKE TWO CAPSULES BY MOUTH ONCE DAILY, DUE FOR APPOINTMENT FEB 2020, NO FURTHER  REFILLS       Diuretics (Including Combos) Protocol Failed - 3/11/2020  5:02 AM        Failed - Blood pressure under 140/90 in past 12 months     BP Readings from Last 3 Encounters:   11/04/19 (!) 160/83   10/23/19 128/78   09/09/19 (!) 182/96                 Passed - Recent (12 mo) or future (30 days) visit within the authorizing provider's specialty     Patient has had an office visit with the authorizing provider or a provider within the authorizing providers department within the previous 12 mos or has a future within next 30 days. See \"Patient Info\" tab in inbasket, or \"Choose Columns\" in Meds & Orders section of the refill encounter.              Passed - Medication is active on med list        Passed - Patient is age 18 or older        Passed - Normal serum creatinine on file in past 12 months     Recent Labs   Lab Test 10/23/19  0839   CR 1.00              Passed - Normal serum potassium on file in past 12 months     Recent Labs   Lab Test 10/23/19  0839   POTASSIUM 3.8                    Passed - Normal serum sodium on file in past 12 months     Recent Labs   Lab Test 10/23/19  0839                    Last Written Prescription Date:  2/12/20  Last Fill Quantity: 60,  # refills: 0   Last office visit: 10/23/2019 with prescribing provider:     Future Office Visit:      "

## 2020-03-24 ENCOUNTER — TELEPHONE (OUTPATIENT)
Dept: FAMILY MEDICINE | Facility: CLINIC | Age: 50
End: 2020-03-24

## 2020-03-24 ENCOUNTER — VIRTUAL VISIT (OUTPATIENT)
Dept: FAMILY MEDICINE | Facility: CLINIC | Age: 50
End: 2020-03-24
Payer: COMMERCIAL

## 2020-03-24 DIAGNOSIS — M54.50 ACUTE LEFT-SIDED LOW BACK PAIN WITHOUT SCIATICA: Primary | ICD-10-CM

## 2020-03-24 PROCEDURE — 99213 OFFICE O/P EST LOW 20 MIN: CPT | Mod: TEL | Performed by: PHYSICIAN ASSISTANT

## 2020-03-24 RX ORDER — CYCLOBENZAPRINE HCL 10 MG
10 TABLET ORAL 3 TIMES DAILY PRN
Qty: 30 TABLET | Refills: 0 | Status: SHIPPED | OUTPATIENT
Start: 2020-03-24 | End: 2020-08-05

## 2020-03-24 NOTE — PROGRESS NOTES
"Lalito Weathers is a 49 year old male who is being evaluated via a billable telephone visit.      The patient has been notified of following:     \"This telephone visit will be conducted via a call between you and your physician/provider. We have found that certain health care needs can be provided without the need for a physical exam.  This service lets us provide the care you need with a short phone conversation.  If a prescription is necessary we can send it directly to your pharmacy.  If lab work is needed we can place an order for that and you can then stop by our lab to have the test done at a later time.    If during the course of the call the physician/provider feels a telephone visit is not appropriate, you will not be charged for this service.\"     Lalito Weathers complains of    Chief Complaint   Patient presents with     Back Pain     I have reviewed and updated the patient's Past Medical History, Social History, Family History and Medication List.    ALLERGIES  Hmg-coa-r inhibitors; Vasquez flavor; and Zantac [ranitidine]    Pt with 3 weeks of L low back pain. Pain is constant, more of an ache. Not made or worse by anything. Improved slightly with laying down in certain positions. Pain does not radiate.   Tylenol has not worked.   Notes some possible increased urinary frequency. No burning with urinating. No hematuria. No incontinence, bowel or bladder. No fevers, chills, nausea, vomiting, diarrhea, constipation.   Pain is slightly worse over 3 weeks. Currently 4/10.   No new medications.     Assessment/Plan:  1. Acute left-sided low back pain without sciatica  Pt presents with acute low back pain x 3 weeks.  Given low NIDHI, there is no indication for an XR or other imaging study at this time.   There is no evidence of bowel or bladder dysfunction including urinary retention or incontinence to be concerned for cauda equina syndrome at this time.   Mostly likely this is a low back strain vs spasm. "   Recommend rest, applying ice or heat as needed, home stretching and exercise program.   Also use Ibuprofen and Flexeril for pain.   RTC in 4 weeks if symptoms not improved or resolved. Pt concerned abut urinary infection. He lacks symptoms of this today. If he were to develop symptoms, he was instructed to call back and we can order a UA with a lab appointment.   - cyclobenzaprine (FLEXERIL) 10 MG tablet; Take 1 tablet (10 mg) by mouth 3 times daily as needed for muscle spasms  Dispense: 30 tablet; Refill: 0    Phone call duration:  12 minutes    Jakub Berkowitz PA-C

## 2020-03-24 NOTE — TELEPHONE ENCOUNTER
Reason for call:  Patient reporting a symptom    Symptom or request: middle to lower back pain more on the side vs spine area. This started two weeks ago. Per pt seems to be more painful now. Pt is wondering could he have a kidney infection.  Please Advise    Phone Number patient can be reached at:  Home number on file [phone]    Best Time:  Any Time      Can we leave a detailed message on this number:  YES    Call taken on 3/24/2020 at 1:18 PM by Floresita Sneed

## 2020-04-10 ENCOUNTER — MYC MEDICAL ADVICE (OUTPATIENT)
Dept: FAMILY MEDICINE | Facility: CLINIC | Age: 50
End: 2020-04-10

## 2020-04-12 DIAGNOSIS — I10 ESSENTIAL HYPERTENSION: ICD-10-CM

## 2020-04-13 NOTE — TELEPHONE ENCOUNTER
"Requested Prescriptions   Pending Prescriptions Disp Refills     triamterene-HCTZ (DYAZIDE) 37.5-25 MG capsule [Pharmacy Med Name: TRIAMTERENE-HCTZ 37.5-25MG CAPS] 60 capsule 0     Sig: TAKE TWO CAPSULES BY MOUTH ONCE DAILY **MUST BE SEEN FOR FURTHER REFILLS**       Diuretics (Including Combos) Protocol Failed - 4/12/2020  5:03 AM        Failed - Blood pressure under 140/90 in past 12 months     BP Readings from Last 3 Encounters:   11/04/19 (!) 160/83   10/23/19 128/78   09/09/19 (!) 182/96                 Passed - Recent (12 mo) or future (30 days) visit within the authorizing provider's specialty     Patient has had an office visit with the authorizing provider or a provider within the authorizing providers department within the previous 12 mos or has a future within next 30 days. See \"Patient Info\" tab in inbasket, or \"Choose Columns\" in Meds & Orders section of the refill encounter.              Passed - Medication is active on med list        Passed - Patient is age 18 or older        Passed - Normal serum creatinine on file in past 12 months     Recent Labs   Lab Test 10/23/19  0839   CR 1.00              Passed - Normal serum potassium on file in past 12 months     Recent Labs   Lab Test 10/23/19  0839   POTASSIUM 3.8                    Passed - Normal serum sodium on file in past 12 months     Recent Labs   Lab Test 10/23/19  0839                    Last Written Prescription Date:  3/11/20  Last Fill Quantity: 60,  # refills: 0   Last office visit: 3/24/2020 with prescribing provider:      Future Office Visit:      "

## 2020-04-14 RX ORDER — TRIAMTERENE AND HYDROCHLOROTHIAZIDE 37.5; 25 MG/1; MG/1
2 CAPSULE ORAL DAILY
Qty: 60 CAPSULE | Refills: 0 | Status: SHIPPED | OUTPATIENT
Start: 2020-04-14 | End: 2020-05-11

## 2020-05-04 DIAGNOSIS — I10 ESSENTIAL HYPERTENSION: ICD-10-CM

## 2020-05-04 RX ORDER — VERAPAMIL HYDROCHLORIDE 360 MG/1
CAPSULE, DELAYED RELEASE PELLETS ORAL
Qty: 30 CAPSULE | Refills: 0 | Status: SHIPPED | OUTPATIENT
Start: 2020-05-04 | End: 2020-06-08

## 2020-05-05 DIAGNOSIS — I10 ESSENTIAL HYPERTENSION: ICD-10-CM

## 2020-05-05 RX ORDER — VERAPAMIL HYDROCHLORIDE 360 MG/1
CAPSULE, DELAYED RELEASE PELLETS ORAL
Qty: 30 CAPSULE | Refills: 0 | OUTPATIENT
Start: 2020-05-05

## 2020-05-10 DIAGNOSIS — I25.83 CORONARY ARTERY DISEASE DUE TO LIPID RICH PLAQUE: ICD-10-CM

## 2020-05-10 DIAGNOSIS — I10 ESSENTIAL HYPERTENSION: ICD-10-CM

## 2020-05-10 DIAGNOSIS — I25.10 CORONARY ARTERY DISEASE DUE TO LIPID RICH PLAQUE: ICD-10-CM

## 2020-05-11 RX ORDER — ROSUVASTATIN CALCIUM 10 MG/1
TABLET, COATED ORAL
Qty: 90 TABLET | Refills: 1 | Status: SHIPPED | OUTPATIENT
Start: 2020-05-11 | End: 2020-05-16

## 2020-05-11 RX ORDER — TRIAMTERENE AND HYDROCHLOROTHIAZIDE 37.5; 25 MG/1; MG/1
CAPSULE ORAL
Qty: 60 CAPSULE | Refills: 0 | Status: SHIPPED | OUTPATIENT
Start: 2020-05-11 | End: 2020-06-08

## 2020-05-16 ENCOUNTER — MYC REFILL (OUTPATIENT)
Dept: FAMILY MEDICINE | Facility: CLINIC | Age: 50
End: 2020-05-16

## 2020-05-16 DIAGNOSIS — I25.10 CORONARY ARTERY DISEASE DUE TO LIPID RICH PLAQUE: ICD-10-CM

## 2020-05-16 DIAGNOSIS — I25.83 CORONARY ARTERY DISEASE DUE TO LIPID RICH PLAQUE: ICD-10-CM

## 2020-05-18 RX ORDER — ROSUVASTATIN CALCIUM 10 MG/1
10 TABLET, COATED ORAL DAILY
Qty: 90 TABLET | Refills: 1 | Status: SHIPPED | OUTPATIENT
Start: 2020-05-18 | End: 2020-08-05

## 2020-06-06 DIAGNOSIS — I10 ESSENTIAL HYPERTENSION: ICD-10-CM

## 2020-06-08 RX ORDER — VERAPAMIL HYDROCHLORIDE 360 MG/1
CAPSULE, DELAYED RELEASE PELLETS ORAL
Qty: 30 CAPSULE | Refills: 0 | Status: SHIPPED | OUTPATIENT
Start: 2020-06-08 | End: 2020-07-06

## 2020-06-08 RX ORDER — TRIAMTERENE AND HYDROCHLOROTHIAZIDE 37.5; 25 MG/1; MG/1
CAPSULE ORAL
Qty: 60 CAPSULE | Refills: 0 | Status: SHIPPED | OUTPATIENT
Start: 2020-06-08 | End: 2020-07-06

## 2020-06-27 DIAGNOSIS — I10 ESSENTIAL HYPERTENSION: ICD-10-CM

## 2020-06-29 ENCOUNTER — VIRTUAL VISIT (OUTPATIENT)
Dept: FAMILY MEDICINE | Facility: OTHER | Age: 50
End: 2020-06-29

## 2020-06-29 DIAGNOSIS — Z20.822 ENCOUNTER FOR LABORATORY TESTING FOR COVID-19 VIRUS: Primary | ICD-10-CM

## 2020-06-29 PROCEDURE — U0003 INFECTIOUS AGENT DETECTION BY NUCLEIC ACID (DNA OR RNA); SEVERE ACUTE RESPIRATORY SYNDROME CORONAVIRUS 2 (SARS-COV-2) (CORONAVIRUS DISEASE [COVID-19]), AMPLIFIED PROBE TECHNIQUE, MAKING USE OF HIGH THROUGHPUT TECHNOLOGIES AS DESCRIBED BY CMS-2020-01-R: HCPCS | Performed by: FAMILY MEDICINE

## 2020-06-29 PROCEDURE — 99207 ZZC NO CHARGE NURSE ONLY: CPT

## 2020-06-29 RX ORDER — LISINOPRIL 40 MG/1
TABLET ORAL
Qty: 30 TABLET | Refills: 0 | Status: SHIPPED | OUTPATIENT
Start: 2020-06-29 | End: 2020-07-27

## 2020-06-29 NOTE — PROGRESS NOTES
"Date: 2020 07:53:14  Clinician: Lalita Bruce  Clinician NPI: [id number]  Patient: Lalito Weathers  Patient : 1970  Patient Address: [address] [place] [address]. [place] [id number]  Patient Phone: [phone]  Visit Protocol: URI  Patient Summary:  Lalito is a 49 year old ( : 1970 ) male who initiated a Visit for COVID-19 (Coronavirus) evaluation and screening. When asked the question \"Please sign me up to receive news, health information and promotions. \", Lalito responded \"No\".    Lalito states his symptoms started gradually 3-4 days ago. After his symptoms started, they improved and then got worse again.   His symptoms consist of ageusia, diarrhea, malaise, a headache, anosmia, a cough, and nasal congestion.   Symptom details     Nasal secretions: The color of his mucus is clear.    Cough: Lalito coughs a few times an hour and his cough is not more bothersome at night. Phlegm comes into his throat when he coughs. He believes his cough is caused by post-nasal drip. The color of the phlegm is clear.     Headache: He states the headache is moderate (4-6 on a 10 point pain scale).      Lalito denies having wheezing, nausea, teeth pain, vomiting, rhinitis, ear pain, chills, sore throat, enlarged lymph nodes, myalgias, facial pain or pressure, and fever. He also denies having recent facial or sinus surgery in the past 60 days, taking antibiotic medication in the past month, and having a sinus infection within the past year. He is not experiencing dyspnea.   Precipitating events  He has not recently been exposed to someone with influenza. Lalito has been in close contact with the following high risk individuals: adults 65 or older.   Pertinent COVID-19 (Coronavirus) information  In the past 14 days, Lalito has not worked in a congregate living setting.   He does not work or volunteer as healthcare worker or a  and does not work or volunteer in a healthcare facility.   Lalito also has not " lived in a congregate living setting in the past 14 days. He does not live with a healthcare worker.   Lalito has not had a close contact with a laboratory-confirmed COVID-19 patient within 14 days of symptom onset.   Pertinent medical history  Lalito does not need a return to work/school note.   Weight: 250 lbs   Lalito does not smoke or use smokeless tobacco.   Weight: 250 lbs    MEDICATIONS: rosuvastatin oral, lisinopril oral, triamterene-hydrochlorothiazide oral, verapamil oral, ALLERGIES: NKDA  Clinician Response:  Dear Lalito,   Your symptoms show that you may have coronavirus (COVID-19). This illness can cause fever, cough and trouble breathing. Many people get a mild case and get better on their own. Some people can get very sick.  What should I do?  We would like to test you for this virus.   1. Please call [phone] to schedule your visit. Explain that you were referred by [facility] to have a COVID-19 test. Be ready to share your [facility] visit ID number.  The following will serve as your written order for this COVID Test, ordered by me, for the indication of suspected COVID [Z20.828]: The test will be ordered in [facility], our electronic health record, after you are scheduled. It will show as ordered and authorized by Maxi Raza MD.  Order: COVID-19 (Coronavirus) PCR for SYMPTOMATIC testing from [facility].      2. When it's time for your COVID test:  Stay at least 6 feet away from others. (If someone will drive you to your test, stay in the backseat, as far away from the  as you can.)   Cover your mouth and nose with a mask, tissue or washcloth.  Go straight to the testing site. Don't make any stops on the way there or back.      3.Starting now: Stay home and away from others (self-isolate) until:   You've had no fever---and no medicine that reduces fever---for 3 full days (72 hours). And...   Your other symptoms have gotten better. For example, your cough or breathing has improved. And...   At least 10 days  "have passed since your symptoms started.       During this time, don't leave the house except for testing or medical care.   Stay in your own room, even for meals. Use your own bathroom if you can.   Stay away from others in your home. No hugging, kissing or shaking hands. No visitors.  Don't go to work, school or anywhere else.    Clean \"high touch\" surfaces often (doorknobs, counters, handles, etc.). Use a household cleaning spray or wipes. You'll find a full list of  on the EPA website: www.epa.gov/pesticide-registration/list-n-disinfectants-use-against-sars-cov-2.   Cover your mouth and nose with a mask, tissue or washcloth to avoid spreading germs.  Wash your hands and face often. Use soap and water.  Caregivers in these groups are at risk for severe illness due to COVID-19:  o People 65 years and older  o People who live in a nursing home or long-term care facility  o People with chronic disease (lung, heart, cancer, diabetes, kidney, liver, immunologic)  o People who have a weakened immune system, including those who:   Are in cancer treatment  Take medicine that weakens the immune system, such as corticosteroids  Had a bone marrow or organ transplant  Have an immune deficiency  Have poorly controlled HIV or AIDS  Are obese (body mass index of 40 or higher)  Smoke regularly   o Caregivers should wear gloves while washing dishes, handling laundry and cleaning bedrooms and bathrooms.  o Use caution when washing and drying laundry: Don't shake dirty laundry, and use the warmest water setting that you can.  o For more tips, go to www.cdc.gov/coronavirus/2019-ncov/downloads/10Things.pdf.      How can I take care of myself?   Get lots of rest. Drink extra fluids (unless a doctor has told you not to).   Take Tylenol (acetaminophen) for fever or pain. If you have liver or kidney problems, ask your family doctor if it's okay to take Tylenol.   Adults can take either:    650 mg (two 325 mg pills) every 4 to 6 " hours, or...   1,000 mg (two 500 mg pills) every 8 hours as needed.    Note: Don't take more than 3,000 mg in one day. Acetaminophen is found in many medicines (both prescribed and over-the-counter medicines). Read all labels to be sure you don't take too much.   For children, check the Tylenol bottle for the right dose. The dose is based on the child's age or weight.    If you have other health problems (like cancer, heart failure, an organ transplant or severe kidney disease): Call your specialty clinic if you don't feel better in the next 2 days.       Know when to call 911. Emergency warning signs include:    Trouble breathing or shortness of breath Pain or pressure in the chest that doesn't go away Feeling confused like you haven't felt before, or not being able to wake up Bluish-colored lips or face.  Where can I get more information?   [facility] -- About COVID-19: [email or web address]   CDC -- What to Do If You're Sick: www.cdc.gov/coronavirus/2019-ncov/about/steps-when-sick.html   CDC -- Ending Home Isolation: www.cdc.gov/coronavirus/2019-ncov/hcp/disposition-in-home-patients.html   CDC -- Caring for Someone: www.cdc.gov/coronavirus/2019-ncov/if-you-are-sick/care-for-someone.html   [facility] -- Interim Guidance for Hospital Discharge to Home: www.health.[facility].[place]/diseases/coronavirus/hcp/hospdischarge.pdf   [facility] clinical trials (COVID-19 research studies): [email or web address]/umn-clinical-trials    Below are the COVID-19 hotlines at the [place] Department of [facility] ([facility]). Interpreters are available.    For health questions: Call 815-259-5132 or 1-960.827.3852 (7 a.m. to 7 p.m.) For questions about schools and childcare: Call 561-203-5911 or 1-882.194.2838 (7 a.m. to 7 p.m.)    Diagnosis: Cough  Diagnosis ICD: R05

## 2020-06-29 NOTE — LETTER
June 30, 2020        Lalito Weathers  [id number] [facility] [id number]    This letter provides a written record that you were tested for COVID-19 on 6/29/20.     Your result was positive. This means you have COVID-19 (coronavirus).    This means that we found the virus that causes COVID-19 in your sample.    How can I protect others?If you have symptoms, stay home and away from others (self-isolate) until:You ve had no fever--and no medicine that reduces fever--for 3 full days (72 hours). And      Your other symptoms have gotten better. For example, your cough or breathing has improved. And     At least 10 days have passed since your symptoms started.    If you don t have symptoms: Stay home and away from others (self-isolate) until at least 10 days have passed since your first positive COVID-19 test.    During this time:    Stay in your own room, including for meals. Use your own bathroom if you can.    Stay away from others in your home. No hugging, kissing or shaking hands. No visitors.     Don t go to work, school or anywhere else.     Clean  high touch  surfaces often (doorknobs, counters, handles, etc.). Use a household cleaning spray or wipes. You ll find a full list on the EPA website at www.epa.gov/pesticide-registration/list-n-disinfectants-use-against-sars-cov-2.     Cover your mouth and nose with a mask, tissue or washcloth to avoid spreading germs.    Wash your hands and face often with soap and water.    Caregivers in these groups are at risk for severe illness due to COVID-19:  o People 65 years and older  o People who live in a nursing home or long-term care facility  o People with chronic disease (lung, heart, cancer, diabetes, kidney, liver, immunologic)  o People who have a weakened immune system, including those who:  - Are in cancer treatment  - Take medicine that weakens the immune system, such as corticosteroids  - Had a bone marrow or organ transplant  - Have an immune  deficiency  - Have poorly controlled HIV or AIDS  - Are obese (body mass index of 40 or higher)  - Smoke regularly    Caregivers should wear gloves while washing dishes, handling laundry and cleaning bedrooms and bathrooms.    Wash and dry laundry with special caution. Don t shake dirty laundry, and use the warmest water setting you can.    If you have a weakened immune system, ask your doctor about other actions you should take.    For more tips, go to www.cdc.gov/coronavirus/2019-ncov/downloads/10Things.pdf.    You should not go back to work until you meet the guidelines above for ending your home isolation. You should meet these along with any other guidelines that your employer has.    Employers: This document serves as formal notice of your employee s medical guidelines for going back to work. They must meet the above guidelines before going back to work in person.    How can I take care of myself?    1. Get lots of rest. Drink extra fluids (unless a doctor has told you not to).    2. Take Tylenol (acetaminophen) for fever or pain. If you have liver or kidney problems, ask your family doctor if it s okay to take Tylenol.     Take either:     650 mg (two 325 mg pills) every 4 to 6 hours, or     1,000 mg (two 500 mg pills) every 8 hours as needed.     Note: Don t take more than 3,000 mg in one day. Acetaminophen is found in many medicines (both prescribed and over-the-counter medicines). Read all labels to be sure you don t take too much.    For children, check the Tylenol bottle for the right dose (based on their age or weight).    3. If you have other health problems (like cancer, heart failure, an organ transplant or severe kidney disease): Call your specialty clinic if you don t feel better in the next 2 days.    4. Know when to call 911: Emergency warning signs include:    Trouble breathing or shortness of breath    Pain or pressure in the chest that doesn t go away    Feeling confused like you haven t felt  before, or not being able to wake up    Bluish-colored lips or face    5. Sign up for [employer]. We know it s scary to hear that you have COVID-19. We want to track your symptoms to make sure you re okay over the next 2 weeks. Please look for an email from [employer]--this is a free, online program that we ll use to keep in touch. To sign up, follow the link in the email. Learn more at [email or web address].      Where can I get more information?    [facility]: [email or web address]    Coronavirus Basics: www.health.[facility].[place]/diseases/coronavirus/basics.html    What to Do If You re Sick: www.cdc.gov/coronavirus/2019-ncov/about/steps-when-sick.html    Ending Home Isolation: www.cdc.gov/coronavirus/2019-ncov/hcp/disposition-in-home-patients.html     Caring for Someone with COVID-19: www.cdc.gov/coronavirus/2019-ncov/if-you-are-sick/care-for-someone.html     [facility] clinical trials (COVID-19 research studies): [email or web address].[email or web address]-clinical-trials

## 2020-06-30 ENCOUNTER — TELEPHONE (OUTPATIENT)
Dept: NURSING | Facility: CLINIC | Age: 50
End: 2020-06-30

## 2020-06-30 LAB
SARS-COV-2 RNA SPEC QL NAA+PROBE: ABNORMAL
SPECIMEN SOURCE: ABNORMAL

## 2020-06-30 NOTE — TELEPHONE ENCOUNTER
Coronavirus (COVID-19) Notification    Patient  Lalito Weathers    Reason for call  Notify of Positive Coronavirus (COVID-19) lab results, assess symptoms,  review [facility] recommendations    Lab Result    Lab test:  2019-nCoV rRt-PCR or SARS-CoV-2 PCR    Oropharyngeal AND/OR nasopharyngeal swabs is POSITIVE for 2019-nCoV RNA/SARS-COV-2 PCR (COVID-19 virus)    RN Recommendations/Instructions per [facility] Coronavirus COVID-19 recommendations    Brief introduction script  Hi, My name is Melania and I am calling on behalf of [employer] [place].  We were notified that your Coronavirus test (COVID-19) for was POSITIVE for the virus.  I have some information to relay to you but first I wanted to mention that the [place] Dept of Health will be contacting you shortly [it's possible MD already called Patient] to talk to you more about how you are feeling and other people you have had contact with who might now also have the virus.  Also, [facility] is Partnering with the [facility] for Covid-19 research, you may be contacted directly by research staff.    Assessment (Inquire about Patient's current symptoms)  Mild headache, no other symptoms at this time.    If at time of call, Patients symptoms hare worsened, the Patient should contact 911 or have someone drive them to Emergency Dept promptly:      If Patient calling 911, inform 911 personal that you have tested positive for the Coronavirus (COVID-19).  Place mask on and await 911 to arrive.    If Emergency Dept, If possible, please have another adult drive you to the Emergency Dept but you need to wear mask when in contact with other people.      Review information with Patient    Your result was positive. This means you have COVID-19 (coronavirus).  We have sent you a letter that reviews the information that I'll be reviewing with you now.    How can I protect others?    If you have symptoms: stay home and away from others (self-isolate)  until:    You've had no fever--and no medicine that reduces fever--for 3 full days (72 hours). And      Your other symptoms have gotten better. For example, your cough or breathing has improved. And     At least 10 days have passed since your symptoms started.    If you don't have symptoms: Stay home and away from others (self-isolate) until at least 10 days have passed since your first positive COVID-19 test. (Date test collected)    During this time:    Stay in your own room, including for meals. Use your own bathroom if you can.    Stay away from others in your home. No hugging, kissing or shaking hands. No visitors.     Don't go to work, school or anywhere else.     Clean  high touch  surfaces often (doorknobs, counters, handles, etc.). Use a household cleaning spray or wipes. You'll find a full list on the EPA website at www.epa.gov/pesticide-registration/list-n-disinfectants-use-against-sars-cov-2.     Cover your mouth and nose with a mask, tissue or washcloth to avoid spreading germs.    Wash your hands and face often with soap and water.    Caregivers in these groups are at risk for severe illness due to COVID-19:  o People 65 years and older  o People who live in a nursing home or long-term care facility  o People with chronic disease (lung, heart, cancer, diabetes, kidney, liver, immunologic)  o People who have a weakened immune system, including those who:  - Are in cancer treatment  - Take medicine that weakens the immune system, such as corticosteroids  - Had a bone marrow or organ transplant  - Have an immune deficiency  - Have poorly controlled HIV or AIDS  - Are obese (body mass index of 40 or higher)  - Smoke regularly    Caregivers should wear gloves while washing dishes, handling laundry and cleaning bedrooms and bathrooms.    Wash and dry laundry with special caution. Don't shake dirty laundry, and use the warmest water setting you can.    If you have a weakened immune system, ask your doctor  about other actions you should take.    For more tips, go to www.cdc.gov/coronavirus/2019-ncov/downloads/10Things.pdf.    You should not go back to work until you meet the guidelines above for ending your home isolation. You should meet these along with any other guidelines that your employer has.    Employers: This document serves as formal notice of your employee's medical guidelines for going back to work. They must meet the above guidelines before going back to work in person.    How can I take care of myself?    1. Get lots of rest. Drink extra fluids (unless a doctor has told you not to).    2. Take Tylenol (acetaminophen) for fever or pain. If you have liver or kidney problems, ask your family doctor if it's okay to take Tylenol.     Take either:     650 mg (two 325 mg pills) every 4 to 6 hours, or     1,000 mg (two 500 mg pills) every 8 hours as needed.     Note: Don't take more than 3,000 mg in one day. Acetaminophen is found in many medicines (both prescribed and over-the-counter medicines). Read all labels to be sure you don't take too much.    For children, check the Tylenol bottle for the right dose (based on their age or weight).    3. If you have other health problems (like cancer, heart failure, an organ transplant or severe kidney disease): Call your specialty clinic if you don't feel better in the next 2 days.    4. Know when to call 911: Emergency warning signs include:    Trouble breathing or shortness of breath    Pain or pressure in the chest that doesn't go away    Feeling confused like you haven't felt before, or not being able to wake up    Bluish-colored lips or face    5. Sign up for [employer]. We know it's scary to hear that you have COVID-19. We want to track your symptoms to make sure you're okay over the next 2 weeks. Please look for an email from [employer]--this is a free, online program that we'll use to keep in touch. To sign up, follow the link in the email. Learn more at  [email or web address].    Where can I get more information?    [facility]: [email or web address]    Coronavirus Basics: www.health.[facility].mn.us/diseases/coronavirus/basics.html    What to Do If You're Sick: www.cdc.gov/coronavirus/2019-ncov/about/steps-when-sick.html    Ending Home Isolation: www.cdc.gov/coronavirus/2019-ncov/hcp/disposition-in-home-patients.html     Caring for Someone with COVID-19: www.cdc.gov/coronavirus/2019-ncov/if-you-are-sick/care-for-someone.html     [facility] clinical trials (COVID-19 research studies): [email or web address].[email or web address]-clinical-trials     Positive COVID-19 letter sent (Yes/No):  Yes    Melania Ortega RN  Covid-19 Results Team  [phone]

## 2020-07-04 DIAGNOSIS — U07.1 2019 NOVEL CORONAVIRUS DISEASE (COVID-19): Primary | ICD-10-CM

## 2020-07-04 DIAGNOSIS — I10 ESSENTIAL HYPERTENSION: ICD-10-CM

## 2020-07-06 RX ORDER — VERAPAMIL HYDROCHLORIDE 360 MG/1
CAPSULE, DELAYED RELEASE PELLETS ORAL
Qty: 30 CAPSULE | Refills: 0 | Status: SHIPPED | OUTPATIENT
Start: 2020-07-06 | End: 2020-08-03

## 2020-07-06 RX ORDER — TRIAMTERENE AND HYDROCHLOROTHIAZIDE 37.5; 25 MG/1; MG/1
CAPSULE ORAL
Qty: 60 CAPSULE | Refills: 0 | Status: SHIPPED | OUTPATIENT
Start: 2020-07-06 | End: 2020-08-03

## 2020-07-06 NOTE — TELEPHONE ENCOUNTER
Spoke with pt who recently tested pos for COVID. Sx improved, quarantined.  Will extend refills x1 month until able to schedule BP visit post COVID.   Pt has not been referred to [facility], offered and accepted. Referral placed.  KIMBERLEY Mendoza RN

## 2020-07-25 DIAGNOSIS — I10 ESSENTIAL HYPERTENSION: ICD-10-CM

## 2020-07-27 RX ORDER — LISINOPRIL 40 MG/1
TABLET ORAL
Qty: 30 TABLET | Refills: 3 | Status: SHIPPED | OUTPATIENT
Start: 2020-07-27 | End: 2020-08-05

## 2020-07-27 NOTE — TELEPHONE ENCOUNTER
"Requested Prescriptions   Pending Prescriptions Disp Refills     lisinopril (ZESTRIL) 40 MG tablet [Pharmacy Med Name: LISINOPRIL 40MG TABS] 30 tablet 0     Sig: TAKE ONE TABLET BY MOUTH ONCE DAILY       ACE Inhibitors (Including Combos) Protocol Failed - 7/25/2020  5:05 AM        Failed - Blood pressure under 140/90 in past 12 months     BP Readings from Last 3 Encounters:   11/04/19 (!) 160/83   10/23/19 128/78   09/09/19 (!) 182/96                 Passed - Recent (12 mo) or future (30 days) visit within the authorizing provider's specialty     Patient has had an office visit with the authorizing provider or a provider within the authorizing providers department within the previous 12 mos or has a future within next 30 days. See \"Patient Info\" tab in inbasket, or \"Choose Columns\" in Meds & Orders section of the refill encounter.              Passed - Medication is active on med list        Passed - Patient is age 18 or older        Passed - Normal serum creatinine on file in past 12 months     Recent Labs   Lab Test 10/23/19  0839   CR 1.00       Ok to refill medication if creatinine is low          Passed - Normal serum potassium on file in past 12 months     Recent Labs   Lab Test 10/23/19  0839   POTASSIUM 3.8                  "

## 2020-08-02 DIAGNOSIS — I10 ESSENTIAL HYPERTENSION: ICD-10-CM

## 2020-08-03 RX ORDER — TRIAMTERENE AND HYDROCHLOROTHIAZIDE 37.5; 25 MG/1; MG/1
CAPSULE ORAL
Qty: 28 CAPSULE | Refills: 0 | Status: SHIPPED | OUTPATIENT
Start: 2020-08-03 | End: 2020-08-05

## 2020-08-03 RX ORDER — VERAPAMIL HYDROCHLORIDE 360 MG/1
CAPSULE, DELAYED RELEASE PELLETS ORAL
Qty: 14 CAPSULE | Refills: 0 | Status: SHIPPED | OUTPATIENT
Start: 2020-08-03 | End: 2020-08-05

## 2020-08-03 NOTE — TELEPHONE ENCOUNTER
Notify patient I have filled his prescription for 2 weeks will need an appointment for further refills labs are due and office visit due.  Please schedule     Lizet Berry CNP

## 2020-08-03 NOTE — TELEPHONE ENCOUNTER
Routing refill requests to provider for review/approval because:  Swati given x1 and patient did not follow up, please advise  BP not at goal    BP Readings from Last 6 Encounters:   11/04/19 (!) 160/83   10/23/19 128/78   09/09/19 (!) 182/96   08/16/19 132/80   08/09/19 (!) 145/82   08/01/19 (!) 164/86     Kelsy HERNANDZE RN, BSN

## 2020-08-05 ENCOUNTER — OFFICE VISIT (OUTPATIENT)
Dept: FAMILY MEDICINE | Facility: CLINIC | Age: 50
End: 2020-08-05
Payer: COMMERCIAL

## 2020-08-05 VITALS
WEIGHT: 278 LBS | SYSTOLIC BLOOD PRESSURE: 132 MMHG | HEART RATE: 88 BPM | TEMPERATURE: 97.6 F | DIASTOLIC BLOOD PRESSURE: 80 MMHG | HEIGHT: 69 IN | RESPIRATION RATE: 18 BRPM | BODY MASS INDEX: 41.18 KG/M2

## 2020-08-05 DIAGNOSIS — I10 ESSENTIAL HYPERTENSION: ICD-10-CM

## 2020-08-05 DIAGNOSIS — E66.01 MORBID OBESITY (H): ICD-10-CM

## 2020-08-05 DIAGNOSIS — E11.69 TYPE 2 DIABETES MELLITUS WITH OTHER SPECIFIED COMPLICATION, WITHOUT LONG-TERM CURRENT USE OF INSULIN (H): ICD-10-CM

## 2020-08-05 DIAGNOSIS — L23.7 CONTACT DERMATITIS DUE TO POISON IVY: ICD-10-CM

## 2020-08-05 DIAGNOSIS — I25.83 CORONARY ARTERY DISEASE DUE TO LIPID RICH PLAQUE: ICD-10-CM

## 2020-08-05 DIAGNOSIS — I25.10 CORONARY ARTERY DISEASE DUE TO LIPID RICH PLAQUE: ICD-10-CM

## 2020-08-05 DIAGNOSIS — E55.9 VITAMIN D DEFICIENCY: ICD-10-CM

## 2020-08-05 DIAGNOSIS — E78.5 HYPERLIPIDEMIA LDL GOAL <100: Primary | ICD-10-CM

## 2020-08-05 DIAGNOSIS — I10 BENIGN ESSENTIAL HYPERTENSION: ICD-10-CM

## 2020-08-05 DIAGNOSIS — E11.8 TYPE 2 DIABETES MELLITUS WITH UNSPECIFIED COMPLICATIONS (H): ICD-10-CM

## 2020-08-05 LAB
ALBUMIN SERPL-MCNC: 4.3 G/DL (ref 3.4–5)
ALP SERPL-CCNC: 71 U/L (ref 40–150)
ALT SERPL W P-5'-P-CCNC: 59 U/L (ref 0–70)
ANION GAP SERPL CALCULATED.3IONS-SCNC: 3 MMOL/L (ref 3–14)
AST SERPL W P-5'-P-CCNC: 24 U/L (ref 0–45)
BILIRUB SERPL-MCNC: 0.6 MG/DL (ref 0.2–1.3)
BUN SERPL-MCNC: 18 MG/DL (ref 7–30)
CALCIUM SERPL-MCNC: 9.7 MG/DL (ref 8.5–10.1)
CHLORIDE SERPL-SCNC: 107 MMOL/L (ref 94–109)
CHOLEST SERPL-MCNC: 130 MG/DL
CO2 SERPL-SCNC: 29 MMOL/L (ref 20–32)
CREAT SERPL-MCNC: 0.95 MG/DL (ref 0.66–1.25)
ERYTHROCYTE [DISTWIDTH] IN BLOOD BY AUTOMATED COUNT: 13.2 % (ref 10–15)
GFR SERPL CREATININE-BSD FRML MDRD: >90 ML/MIN/{1.73_M2}
GLUCOSE SERPL-MCNC: 92 MG/DL (ref 70–99)
HBA1C MFR BLD: 5.2 % (ref 0–5.6)
HCT VFR BLD AUTO: 40.6 % (ref 40–53)
HDLC SERPL-MCNC: 48 MG/DL
HGB BLD-MCNC: 13.4 G/DL (ref 13.3–17.7)
LDLC SERPL CALC-MCNC: 51 MG/DL
MCH RBC QN AUTO: 29.9 PG (ref 26.5–33)
MCHC RBC AUTO-ENTMCNC: 33 G/DL (ref 31.5–36.5)
MCV RBC AUTO: 91 FL (ref 78–100)
NONHDLC SERPL-MCNC: 82 MG/DL
PLATELET # BLD AUTO: 222 10E9/L (ref 150–450)
POTASSIUM SERPL-SCNC: 4 MMOL/L (ref 3.4–5.3)
PROT SERPL-MCNC: 7.8 G/DL (ref 6.8–8.8)
RBC # BLD AUTO: 4.48 10E12/L (ref 4.4–5.9)
SODIUM SERPL-SCNC: 139 MMOL/L (ref 133–144)
TRIGL SERPL-MCNC: 156 MG/DL
WBC # BLD AUTO: 7.1 10E9/L (ref 4–11)

## 2020-08-05 PROCEDURE — 80053 COMPREHEN METABOLIC PANEL: CPT | Performed by: NURSE PRACTITIONER

## 2020-08-05 PROCEDURE — 83036 HEMOGLOBIN GLYCOSYLATED A1C: CPT | Performed by: NURSE PRACTITIONER

## 2020-08-05 PROCEDURE — 82306 VITAMIN D 25 HYDROXY: CPT | Performed by: NURSE PRACTITIONER

## 2020-08-05 PROCEDURE — 80061 LIPID PANEL: CPT | Performed by: NURSE PRACTITIONER

## 2020-08-05 PROCEDURE — 99214 OFFICE O/P EST MOD 30 MIN: CPT | Performed by: NURSE PRACTITIONER

## 2020-08-05 PROCEDURE — 36415 COLL VENOUS BLD VENIPUNCTURE: CPT | Performed by: NURSE PRACTITIONER

## 2020-08-05 PROCEDURE — 85027 COMPLETE CBC AUTOMATED: CPT | Performed by: NURSE PRACTITIONER

## 2020-08-05 RX ORDER — PREDNISONE 20 MG/1
TABLET ORAL
Qty: 10 TABLET | Refills: 0 | Status: SHIPPED | OUTPATIENT
Start: 2020-08-05 | End: 2020-10-20

## 2020-08-05 RX ORDER — LISINOPRIL 40 MG/1
40 TABLET ORAL DAILY
Qty: 90 TABLET | Refills: 3 | Status: SHIPPED | OUTPATIENT
Start: 2020-08-05 | End: 2021-07-27

## 2020-08-05 RX ORDER — VERAPAMIL HYDROCHLORIDE 360 MG/1
CAPSULE, DELAYED RELEASE PELLETS ORAL
Qty: 90 CAPSULE | Refills: 3 | Status: SHIPPED | OUTPATIENT
Start: 2020-08-05 | End: 2021-07-27

## 2020-08-05 RX ORDER — TRIAMTERENE AND HYDROCHLOROTHIAZIDE 37.5; 25 MG/1; MG/1
CAPSULE ORAL
Qty: 180 CAPSULE | Refills: 3 | Status: SHIPPED | OUTPATIENT
Start: 2020-08-05 | End: 2021-03-04

## 2020-08-05 RX ORDER — ROSUVASTATIN CALCIUM 10 MG/1
10 TABLET, COATED ORAL DAILY
Qty: 90 TABLET | Refills: 3 | Status: SHIPPED | OUTPATIENT
Start: 2020-08-05 | End: 2021-07-27

## 2020-08-05 ASSESSMENT — PATIENT HEALTH QUESTIONNAIRE - PHQ9
SUM OF ALL RESPONSES TO PHQ QUESTIONS 1-9: 0
SUM OF ALL RESPONSES TO PHQ QUESTIONS 1-9: 0

## 2020-08-05 ASSESSMENT — ANXIETY QUESTIONNAIRES
2. NOT BEING ABLE TO STOP OR CONTROL WORRYING: NOT AT ALL
GAD7 TOTAL SCORE: 0
6. BECOMING EASILY ANNOYED OR IRRITABLE: NOT AT ALL
1. FEELING NERVOUS, ANXIOUS, OR ON EDGE: NOT AT ALL
7. FEELING AFRAID AS IF SOMETHING AWFUL MIGHT HAPPEN: NOT AT ALL
4. TROUBLE RELAXING: NOT AT ALL
GAD7 TOTAL SCORE: 0
5. BEING SO RESTLESS THAT IT IS HARD TO SIT STILL: NOT AT ALL
GAD7 TOTAL SCORE: 0
7. FEELING AFRAID AS IF SOMETHING AWFUL MIGHT HAPPEN: NOT AT ALL
3. WORRYING TOO MUCH ABOUT DIFFERENT THINGS: NOT AT ALL

## 2020-08-05 ASSESSMENT — MIFFLIN-ST. JEOR: SCORE: 2106.38

## 2020-08-05 NOTE — PROGRESS NOTES
Subjective     Lalito Weathers is a 49 year old male who presents to clinic today for the following health issues:    HPI     Hyperlipidemia Follow-Up      Are you regularly taking any medication or supplement to lower your cholesterol?   Yes- crestor    Are you having muscle aches or other side effects that you think could be caused by your cholesterol lowering medication?  No    Hypertension Follow-up      Do you check your blood pressure regularly outside of the clinic? No     Are you following a low salt diet? No    Are your blood pressures ever more than 140 on the top number (systolic) OR more   than 90 on the bottom number (diastolic), for example 140/90? No      How many servings of fruits and vegetables do you eat daily?  2-3    On average, how many sweetened beverages do you drink each day (Examples: soda, juice, sweet tea, etc.  Do NOT count diet or artificially sweetened beverages)?   0    How many days per week do you exercise enough to make your heart beat faster? 6    How many minutes a day do you exercise enough to make your heart beat faster? 60 or more    How many days per week do you miss taking your medication? 0        Patient Active Problem List   Diagnosis     Tubular adenoma of colon     Essential hypertension     Palpitations     PVCs (premature ventricular contractions)     Vitamin D deficiency     Obesity (BMI 35.0-39.9) with comorbidity (H)     Past Surgical History:   Procedure Laterality Date     COLONOSCOPY N/A 6/21/2019    Procedure: COLONOSCOPY, WITH POLYPECTOMY AND BIOPSY;  Surgeon: Herman Champagne MD;  Location: [facility]       Social History     Tobacco Use     Smoking status: Former Smoker     Smokeless tobacco: Former User   Substance Use Topics     Alcohol use: Yes     Comment: social     Family History   Problem Relation Age of Onset     Unknown/Adopted Mother      Unknown/Adopted Father          Current Outpatient Medications   Medication Sig Dispense Refill     aspirin 81 MG EC  "tablet        cholecalciferol (VITAMIN D3) 5000 units TABS tablet Take by mouth daily       fish oil-omega-3 fatty acids 1000 MG capsule Take 1 g by mouth daily       lisinopril (ZESTRIL) 40 MG tablet TAKE ONE TABLET BY MOUTH ONCE DAILY 30 tablet 3     multivitamin w/minerals (MULTI-VITAMIN) tablet Take 1 tablet by mouth daily       rosuvastatin (CRESTOR) 10 MG tablet Take 1 tablet (10 mg) by mouth daily 90 tablet 1     triamterene-HCTZ (DYAZIDE) 37.5-25 MG capsule TAKE TWO CAPSULES BY MOUTH ONCE DAILY DUE FOR BLOOD PRESSURE CHECK FOR REFILLS 28 capsule 0     verapamil ER (VERELAN) 360 MG 24 hr capsule TAKE ONE CAPSULE BY MOUTH ONCE DAILY DUE FOR BLOOD PRESSURE CHECK FOR FURTHER REFILLS 14 capsule 0     vitamin C (ASCORBIC ACID) 1000 MG TABS Take 1,000 mg by mouth daily       Allergies   Allergen Reactions     Hmg-Coa-R Inhibitors      Ganado Flavor      Zantac [Ranitidine]      Recent Labs   Lab Test 10/23/19  0839 08/02/19 07/04/19  1152   A1C 5.1  --   --    *  --   --    HDL 49  --   --    TRIG 155*  --   --    ALT 69 36 45   CR 1.00 1.04 0.96   GFRESTIMATED 87 >60 >90   GFRESTBLACK >90 >60 >90   POTASSIUM 3.8 4.1 4.0   TSH  --   --  0.99      BP Readings from Last 3 Encounters:   08/05/20 132/80   11/04/19 (!) 160/83   10/23/19 128/78    Wt Readings from Last 3 Encounters:   08/05/20 126.1 kg (278 lb)   11/04/19 118.8 kg (262 lb)   10/23/19 115.2 kg (254 lb)                    Reviewed and updated as needed this visit by Provider         Review of Systems   Constitutional, HEENT, cardiovascular, pulmonary, gi and gu systems are negative, except as otherwise noted.      Objective    /80 (BP Location: Right arm, Cuff Size: Adult Large)   Pulse 88   Temp 97.6  F (36.4  C) (Tympanic)   Resp 18   Ht 1.753 m (5' 9\")   Wt 126.1 kg (278 lb)   BMI 41.05 kg/m    Body mass index is 41.05 kg/m .  Physical Exam   GENERAL: healthy, alert and no distress  EYES: Eyes grossly normal to inspection, PERRL and " conjunctivae and sclerae normal  HENT: ear canals and TM's normal, nose and mouth without ulcers or lesions  NECK: no adenopathy, no asymmetry, masses, or scars and thyroid normal to palpation  RESP: lungs clear to auscultation - no rales, rhonchi or wheezes  CV: regular rate and rhythm, normal S1 S2, no S3 or S4, no murmur, click or rub, no peripheral edema and peripheral pulses strong  ABDOMEN: soft, nontender, no hepatosplenomegaly, no masses and bowel sounds normal  MS: no gross musculoskeletal defects noted, no edema  SKIN: no suspicious lesions or rashes  NEURO: Normal strength and tone, mentation intact and speech normal  PSYCH: mentation appears normal, affect normal/bright    Results for orders placed or performed in visit on 08/05/20   Lipid panel reflex to direct LDL Fasting     Status: Abnormal   Result Value Ref Range    Cholesterol 130 <200 mg/dL    Triglycerides 156 (H) <150 mg/dL    HDL Cholesterol 48 >39 mg/dL    LDL Cholesterol Calculated 51 <100 mg/dL    Non HDL Cholesterol 82 <130 mg/dL   CBC with platelets     Status: None   Result Value Ref Range    WBC 7.1 4.0 - 11.0 10e9/L    RBC Count 4.48 4.4 - 5.9 10e12/L    Hemoglobin 13.4 13.3 - 17.7 g/dL    Hematocrit 40.6 40.0 - 53.0 %    MCV 91 78 - 100 fl    MCH 29.9 26.5 - 33.0 pg    MCHC 33.0 31.5 - 36.5 g/dL    RDW 13.2 10.0 - 15.0 %    Platelet Count 222 150 - 450 10e9/L   Comprehensive metabolic panel     Status: None   Result Value Ref Range    Sodium 139 133 - 144 mmol/L    Potassium 4.0 3.4 - 5.3 mmol/L    Chloride 107 94 - 109 mmol/L    Carbon Dioxide 29 20 - 32 mmol/L    Anion Gap 3 3 - 14 mmol/L    Glucose 92 70 - 99 mg/dL    Urea Nitrogen 18 7 - 30 mg/dL    Creatinine 0.95 0.66 - 1.25 mg/dL    GFR Estimate >90 >60 mL/min/[1.73_m2]    GFR Estimate If Black >90 >60 mL/min/[1.73_m2]    Calcium 9.7 8.5 - 10.1 mg/dL    Bilirubin Total 0.6 0.2 - 1.3 mg/dL    Albumin 4.3 3.4 - 5.0 g/dL    Protein Total 7.8 6.8 - 8.8 g/dL    Alkaline Phosphatase  "71 40 - 150 U/L    ALT 59 0 - 70 U/L    AST 24 0 - 45 U/L   Hemoglobin A1c     Status: None   Result Value Ref Range    Hemoglobin A1C 5.2 0 - 5.6 %   Vitamin D Deficiency     Status: None   Result Value Ref Range    Vitamin D Deficiency screening 52 20 - 75 ug/L         Assessment & Plan     (E78.5) Hyperlipidemia LDL goal <100  (primary encounter diagnosis)  Comment:  Controlled no change in treatment plan  Plan: Lipid panel reflex to direct LDL Fasting      (I10) Benign essential hypertension  Comment:  Controlled no change in treatment plan  Plan: CBC with platelets, Comprehensive metabolic         panel      (E11.69) Type 2 diabetes mellitus with other specified complication, without long-term current use of insulin (H)  Comment:  Controlled no change in treatment plan  Plan: Hemoglobin A1c      (L23.7) Contact dermatitis due to poison ivy  Comment: Patient noted to have contact dermatitis will treat with a short course of prednisone patient to monitor blood sugars while on the prednisone  Plan: predniSONE (DELTASONE) 20 MG tablet      (I10) Essential hypertension  Comment:  Controlled no change in treatment plan  Plan: lisinopril (ZESTRIL) 40 MG tablet, verapamil ER        (VERELAN) 360 MG 24 hr capsule,         triamterene-HCTZ (DYAZIDE) 37.5-25 MG capsule      (I25.10,  I25.83) Coronary artery disease due to lipid rich plaque  Comment:  Controlled no change in treatment plan  Plan: rosuvastatin (CRESTOR) 10 MG tablet      (E55.9) Vitamin D deficiency  Comment:  Controlled no change in treatment plan  Plan: Vitamin D Deficiency      (E11.8) Type 2 diabetes mellitus with unspecified complications (H)  Comment:   Plan:    (E66.01) Morbid obesity (H)  Comment: Continue to work on diet and exercise  Plan:      BMI:   Estimated body mass index is 41.05 kg/m  as calculated from the following:    Height as of this encounter: 1.753 m (5' 9\").    Weight as of this encounter: 126.1 kg (278 lb).   Weight management " plan: Discussed healthy diet and exercise guidelines        See Patient Instructions    No follow-ups on file.    TERESA Romero [facility]

## 2020-08-06 LAB — DEPRECATED CALCIDIOL+CALCIFEROL SERPL-MC: 52 UG/L (ref 20–75)

## 2020-08-06 ASSESSMENT — ANXIETY QUESTIONNAIRES: GAD7 TOTAL SCORE: 0

## 2020-08-08 PROBLEM — E11.8 TYPE 2 DIABETES MELLITUS WITH UNSPECIFIED COMPLICATIONS (H): Status: ACTIVE | Noted: 2020-08-08

## 2020-09-08 ENCOUNTER — MYC MEDICAL ADVICE (OUTPATIENT)
Dept: FAMILY MEDICINE | Facility: CLINIC | Age: 50
End: 2020-09-08

## 2020-09-08 DIAGNOSIS — L23.7 POISON IVY: Primary | ICD-10-CM

## 2020-09-08 RX ORDER — PREDNISONE 20 MG/1
TABLET ORAL
Qty: 10 TABLET | Refills: 0 | Status: SHIPPED | OUTPATIENT
Start: 2020-09-08 | End: 2020-10-20

## 2020-10-20 ENCOUNTER — OFFICE VISIT (OUTPATIENT)
Dept: FAMILY MEDICINE | Facility: CLINIC | Age: 50
End: 2020-10-20
Payer: COMMERCIAL

## 2020-10-20 VITALS
BODY MASS INDEX: 41.03 KG/M2 | HEIGHT: 69 IN | HEART RATE: 68 BPM | SYSTOLIC BLOOD PRESSURE: 138 MMHG | TEMPERATURE: 97.7 F | RESPIRATION RATE: 18 BRPM | WEIGHT: 277 LBS | DIASTOLIC BLOOD PRESSURE: 80 MMHG

## 2020-10-20 DIAGNOSIS — S46.912A SHOULDER STRAIN, LEFT, INITIAL ENCOUNTER: ICD-10-CM

## 2020-10-20 DIAGNOSIS — E11.9 TYPE 2 DIABETES MELLITUS WITHOUT COMPLICATION, WITHOUT LONG-TERM CURRENT USE OF INSULIN (H): ICD-10-CM

## 2020-10-20 DIAGNOSIS — R59.1 LYMPHADENOPATHY: ICD-10-CM

## 2020-10-20 DIAGNOSIS — Z23 NEED FOR TDAP VACCINATION: Primary | ICD-10-CM

## 2020-10-20 LAB
CREAT UR-MCNC: 54 MG/DL
MICROALBUMIN UR-MCNC: 6 MG/L
MICROALBUMIN/CREAT UR: 10.3 MG/G CR (ref 0–17)

## 2020-10-20 PROCEDURE — 99214 OFFICE O/P EST MOD 30 MIN: CPT | Mod: 25 | Performed by: NURSE PRACTITIONER

## 2020-10-20 PROCEDURE — 82043 UR ALBUMIN QUANTITATIVE: CPT | Performed by: NURSE PRACTITIONER

## 2020-10-20 PROCEDURE — 90471 IMMUNIZATION ADMIN: CPT | Performed by: NURSE PRACTITIONER

## 2020-10-20 PROCEDURE — 90715 TDAP VACCINE 7 YRS/> IM: CPT | Performed by: NURSE PRACTITIONER

## 2020-10-20 ASSESSMENT — MIFFLIN-ST. JEOR: SCORE: 2096.84

## 2020-10-20 NOTE — PROGRESS NOTES
"Subjective     Lalito Weathers is a 50 year old male who presents to clinic today for the following health issues:    HPI         Concern - underarm pain  Onset: a couple weeks  Description: Patient has had a dull ache under his left arm for about two weeks.  Intensity: moderate  Progression of Symptoms:  waxing and waning  Accompanying Signs & Symptoms: possible swelling, warmth  Previous history of similar problem: none  Precipitating factors:        Worsened by: gets worse as the days goes on  Alleviating factors:        Improved by: nothing  Therapies tried and outcome:  none       Review of Systems   Constitutional, HEENT, cardiovascular, pulmonary, gi and gu systems are negative, except as otherwise noted.      Objective    /80 (BP Location: Right arm, Cuff Size: Adult Large)   Pulse 68   Temp 97.7  F (36.5  C) (Tympanic)   Resp 18   Ht 1.753 m (5' 9\")   Wt 125.6 kg (277 lb)   BMI 40.91 kg/m    Body mass index is 40.91 kg/m .  Physical Exam   GENERAL: healthy, alert and no distress  EYES: Eyes grossly normal to inspection, PERRL and conjunctivae and sclerae normal  HENT: ear canals and TM's normal, nose and mouth without ulcers or lesions  NECK: no adenopathy, no asymmetry, masses, or scars and thyroid normal to palpation  RESP: lungs clear to auscultation - no rales, rhonchi or wheezes  CV: regular rate and rhythm, normal S1 S2, no S3 or S4, no murmur, click or rub, no peripheral edema and peripheral pulses strong  ABDOMEN: soft, nontender, no hepatosplenomegaly, no masses and bowel sounds normal  MS: no gross musculoskeletal defects noted, no edema  SKIN: no suspicious lesions or rashes  NEURO: Normal strength and tone, mentation intact and speech normal  PSYCH: mentation appears normal, affect normal/bright      Palpation: Mild tenderness to the AC joint extending to under the arm questionable palpable inflamed lymph node   non-tender SC joint, clavicle, AC joint, acromion, subacromial space, " proximal bicep tendon and upper trapezius muscle   Range of Motion        Active:all normal        Passive: all normal  Strength: rotator cuff strength full  Special tests: Negative Neer's test, Negative Avina, Negative Cross-Arm adduction, Negative Anterior Apprehension, Negative Posterior Apprehension, Negative Sulcus Sign, Negative Peraza's      Results for orders placed or performed in visit on 10/20/20   Albumin Random Urine Quantitative with Creat Ratio     Status: None   Result Value Ref Range    Creatinine Urine 54 mg/dL    Albumin Urine mg/L 6 mg/L    Albumin Urine mg/g Cr 10.30 0 - 17 mg/g Cr       US AXILLARY LEFT 10/22/2020 8:54 AM     HISTORY: Lymphadenopathy. The patient has left axillary pain.     COMPARISON: None.     FINDINGS: Directed sonography to the left axilla shows no mass,  enlarged lymph node, fluid collection, or other focal finding.                                                                      IMPRESSION: Negative left axillary ultrasound.       Assessment & Plan     Type 2 diabetes mellitus without complication, without long-term current use of insulin (H)   Controlled no change in treatment plan    - Albumin Random Urine Quantitative with Creat Ratio      Lymphadenopathy  Concerning for mild inflammation under arm unable to rule out enlarged lymph node recommend ultrasound  - US Axillary Left; Future    Shoulder strain, left, initial encounter  Symptoms also representative of a shoulder strain ultrasound was negative.  Recommend resting icing the area and ibuprofen if not improving in 2 weeks patient should follow-up    Need for Tdap vaccination    - ADMIN 1st VACCINE          See Patient Instructions    No follow-ups on file.    TERESA Romero [facility]

## 2020-10-20 NOTE — NURSING NOTE
Prior to immunization administration, verified patients identity using patient s name and date of birth. Please see Immunization Activity for additional information.     Screening Questionnaire for Adult Immunization    Are you sick today?   No   Do you have allergies to medications, food, a vaccine component or latex?   No   Have you ever had a serious reaction after receiving a vaccination?   No   Do you have a long-term health problem with heart, lung, kidney, or metabolic disease (e.g., diabetes), asthma, a blood disorder, no spleen, complement component deficiency, a cochlear implant, or a spinal fluid leak?  Are you on long-term aspirin therapy?   No   Do you have cancer, leukemia, HIV/AIDS, or any other immune system problem?   No   Do you have a parent, brother, or sister with an immune system problem?   No   In the past 3 months, have you taken medications that affect  your immune system, such as prednisone, other steroids, or anticancer drugs; drugs for the treatment of rheumatoid arthritis, Crohn s disease, or psoriasis; or have you had radiation treatments?   No   Have you had a seizure, or a brain or other nervous system problem?   No   During the past year, have you received a transfusion of blood or blood    products, or been given immune (gamma) globulin or antiviral drug?   No   For women: Are you pregnant or is there a chance you could become       pregnant during the next month?   No   Have you received any vaccinations in the past 4 weeks?   No     Immunization questionnaire answers were all negative.        Per orders of JEROME Berry, injection of Tdap given by Archana Clay CMA. Patient instructed to remain in clinic for 15 minutes afterwards, and to report any adverse reaction to me immediately.       Screening performed by Archana Clay CMA on 10/20/2020 at 7:50 AM.

## 2020-10-20 NOTE — PATIENT INSTRUCTIONS
Please contact [phone] to schedule your diagnostic procedure     Patient Education     Lymphadenopathy  Lymphadenopathy is swelling of the lymph nodes. Lymph nodes are small, bean-shaped glands around the body.  What are lymph nodes?  Lymph nodes are part of your immune system. These glands are found in your neck, over your clavicle, armpits, groin, chest, and abdomen. They act as filters for lymph fluid as it flows through your body. Lymph fluid contains white blood cells (lymphocytes) that help the body fight infection and disease.   Why lymph nodes swell  Lymphadenopathy is very common. The glands often enlarge during a viral or bacterial infection. It can happen during a cold, the flu, or strep throat. The nodes may swell in just one area of the body, such as the neck (localized). Or nodes may swell all over the body (generalized). The neck (cervical) lymph nodes are the most common site of lymphadenopathy.  What causes lymphadenopathy?  Dead cells and fluid build up in the lymph nodes as they help fight infection or disease. This causes them to swell in size. Enlarged lymph nodes are often near the source of infection. This can help to find the cause of an infection. For example, swollen lymph nodes around the jaw may be because of an infection in the teeth or mouth. But lymphadenopathy may also be generalized. This is common in some viral illnesses such as infectious mononucleosis, HIV or chickenpox (varicella).  Lymphadenopathy can also be caused by:    Infection of a lymph node or small group of nodes (lymphadenitis)    Cancer    Reactions to medicines such as antibiotics and certain blood pressure, gout, and seizure medicines    Other health conditions, such as lupus or sarcoidosis  Symptoms of lymphadenopathy  Lymphadenopathy can cause symptoms such as:    Lumps under the jaw, on the sides or back of the neck, in the armpits, in the groin, or in the chest or belly (abdomen)    Pain or tenderness in  any of these areas    Redness or warmth in any of these areas  You may also have symptoms from an infection causing the swollen glands. These symptoms may include fever, sore throat, body aches, or cough.  Diagnosing lymphadenopathy  Your healthcare provider will ask about your health history and symptoms. He or she will give you a physical exam and check the areas where lymph nodes are enlarged. Your healthcare provider will check the size. texture, and location of the nodes, and ask how long they have been swollen and if they are painful. Diagnostic tests and referral to specialists may be recommended. They may include:    Blood tests. These are done to check for signs of infection and other problems.    Urine test. This is also done to check for infection and other problems.    Chest X-ray, ultrasound, CT scan, or MRI scans. These tests can show enlarged lymph nodes or other problems.    Lymph node biopsy. The cause of enlarged lymph nodes may be checked with a biopsy. Small samples of lymph node tissue are taken and checked in a lab for signs of infection, cancer and other causes. You may be referred to other specialistsfor their opinion as well.  Treatment for lymphadenopathy  The treatment of enlarged lymph nodes depends on the cause. Enlarged lymph nodes are often harmless and go away without any treatment. Treatment is most often done on the cause of the enlarged nodes and may include:    Antibiotic or antiviral medicine to treat a bacterial or viral infection    Incision and drainage of a lymph node for lymphadenitis    Other medicines or procedures to treat the cause of the enlarged nodes  You may need a follow-up exam in 3 to 4 weeks to recheck enlarged nodes.  When to call your healthcare provider  Call your healthcare provider if your symptoms worsen, you develop new symptoms, you have a fever of 100.4 F (38 C) or higher, lymph nodes that are still swollen after 3 to 4 weeks, or as directed by your  healthcare provider.  Date Last Reviewed: 5/1/2017 2000-2019 [employer]. [address] [place] [address], [place] [address]. All rights reserved. This information is not intended as a substitute for professional medical care. Always follow your healthcare professional's instructions.           Patient Education     Shoulder Sprain  A sprain is a stretching or tearing of the ligaments that hold a joint together. A sprain may take up to 8 weeks to fully heal, depending on how severe it is. Moderate to severe shoulder sprains are treated with a sling or shoulder immobilizer. Minor sprains can be treated without any special support.  Home care  The following guidelines will help you care for your injury at home:    If a sling was given to you, leave it in place for the time advised by your healthcare provider. If you aren t sure how long to wear it, ask for advice. If the sling becomes loose, adjust it so that your forearm is level with the ground. Your shoulder should feel well supported.    Put an ice pack on the injured area for 20 minutes every 1 to 2 hours the first day. You can make your own ice pack by putting ice cubes in a plastic bag. A bag of frozen peas or something similar works well too. Wrap the bag in a thin towel. Continue with ice packs 3 to 4 times a day for the next 2 to 3 days. Then use the pack as needed to ease pain and swelling.    You may use acetaminophen or ibuprofen to control pain, unless another pain medicine was prescribed. If you have chronic liver or kidney disease, talk with your healthcare provider before using these medicines. Also talk with your provider if you ve had a stomach ulcer or gastrointestinal bleeding.    Shoulder joints become stiff if left in a sling for too long. You should start range of motion exercises about 7 to 10 days after the injury. Talk with your provider to find out what type of exercises to do and how soon to start.  Follow-up care  Follow up with  your healthcare provider, or as advised.  Any X-rays you had today don t show any broken bones, breaks, or fractures. Sometimes fractures don t show up on the first X-ray. Bruises and sprains can sometimes hurt as much as a fracture. These injuries can take time to heal completely. If your symptoms don t improve or they get worse, talk with your provider. You may need a repeat X-ray or other treatments.  When to seek medical advice  Call your healthcare provider right away if any of these occur:    Shoulder pain or swelling in your arm that gets worse    Fingers become cold, blue, numb, or tingly    Large amount of bruising of the shoulder or upper arm    Fever or chills  Date Last Reviewed: 8/1/2016 2000-2019 [employer]. [address] [place] [address], [place] [address]. All rights reserved. This information is not intended as a substitute for professional medical care. Always follow your healthcare professional's instructions.

## 2020-10-22 ENCOUNTER — HOSPITAL ENCOUNTER (OUTPATIENT)
Dept: ULTRASOUND IMAGING | Facility: CLINIC | Age: 50
Discharge: HOME | End: 2020-10-22
Attending: NURSE PRACTITIONER | Admitting: NURSE PRACTITIONER
Payer: COMMERCIAL

## 2020-10-22 DIAGNOSIS — R59.1 LYMPHADENOPATHY: ICD-10-CM

## 2020-10-22 PROCEDURE — 76882 US LMTD JT/FCL EVL NVASC XTR: CPT | Mod: LT

## 2020-11-10 ENCOUNTER — HOSPITAL ENCOUNTER (OUTPATIENT)
Dept: MRI IMAGING | Facility: CLINIC | Age: 50
Discharge: HOME | End: 2020-11-10
Attending: NURSE PRACTITIONER | Admitting: NURSE PRACTITIONER
Payer: COMMERCIAL

## 2020-11-10 ENCOUNTER — OFFICE VISIT (OUTPATIENT)
Dept: FAMILY MEDICINE | Facility: CLINIC | Age: 50
End: 2020-11-10
Payer: COMMERCIAL

## 2020-11-10 VITALS
DIASTOLIC BLOOD PRESSURE: 80 MMHG | HEIGHT: 69 IN | SYSTOLIC BLOOD PRESSURE: 130 MMHG | HEART RATE: 72 BPM | TEMPERATURE: 97.8 F | BODY MASS INDEX: 41.62 KG/M2 | RESPIRATION RATE: 18 BRPM | WEIGHT: 281 LBS

## 2020-11-10 DIAGNOSIS — M79.622 AXILLARY PAIN, LEFT: ICD-10-CM

## 2020-11-10 DIAGNOSIS — M62.838 MUSCLE SPASM: Primary | ICD-10-CM

## 2020-11-10 PROCEDURE — 71550 MRI CHEST W/O DYE: CPT

## 2020-11-10 PROCEDURE — 71550 MRI CHEST W/O DYE: CPT | Mod: 26 | Performed by: RADIOLOGY

## 2020-11-10 PROCEDURE — 99214 OFFICE O/P EST MOD 30 MIN: CPT | Performed by: NURSE PRACTITIONER

## 2020-11-10 RX ORDER — CYCLOBENZAPRINE HCL 5 MG
5-10 TABLET ORAL 3 TIMES DAILY PRN
Qty: 60 TABLET | Refills: 0 | Status: SHIPPED | OUTPATIENT
Start: 2020-11-10 | End: 2021-03-24

## 2020-11-10 ASSESSMENT — MIFFLIN-ST. JEOR: SCORE: 2114.99

## 2020-11-10 NOTE — PROGRESS NOTES
"Subjective     Lalito Weathers is a 50 year old male who presents to clinic today for the following health issues:    HPI         Patient is here to follow up with his left under arm pain. He states that it is about the same. No new symptoms.      Review of Systems   Constitutional, HEENT, cardiovascular, pulmonary, GI, , musculoskeletal, neuro, skin, endocrine and psych systems are negative, except as otherwise noted.      Objective    /80 (BP Location: Right arm, Cuff Size: Adult Large)   Pulse 72   Temp 97.8  F (36.6  C) (Tympanic)   Resp 18   Ht 1.753 m (5' 9\")   Wt 127.5 kg (281 lb)   BMI 41.50 kg/m    Body mass index is 41.5 kg/m .  Physical Exam   GENERAL: healthy, alert and no distress  EYES: Eyes grossly normal to inspection, PERRL and conjunctivae and sclerae normal  HENT: ear canals and TM's normal, nose and mouth without ulcers or lesions  NECK: no adenopathy, no asymmetry, masses, or scars and thyroid normal to palpation  RESP: lungs clear to auscultation - no rales, rhonchi or wheezes  CV: regular rate and rhythm, normal S1 S2, no S3 or S4, no murmur, click or rub, no peripheral edema and peripheral pulses strong  ABDOMEN: soft, nontender, no hepatosplenomegaly, no masses and bowel sounds normal  MS: no gross musculoskeletal defects noted, no edema  SKIN: no suspicious lesions or rashes  NEURO: Normal strength and tone, mentation intact and speech normal  PSYCH: mentation appears normal, affect normal/bright    Results for orders placed or performed during the hospital encounter of 11/10/20   MR Chest w/o Contrast     Status: None    Narrative    Exam: MR CHEST W/O CONTRAST    History: Left axillary pain that extends in to the pectoral muscle    Techniques: Multiplanar multisequence imaging through the left  anterior chest wall was obtained without administration of  intra-articular or intravenous gadolinium contrast  using routine  protocol.    Comparison: CT chest " 10/17/2019.    Findings:    No evidence of left pectoralis major muscle strain or tearing. No  evidence of axillary lymphadenopathy.    No fracture, marrow contusion or infiltrative change. Mild marrow  signal alteration with multiple T1 hypointensities in the visualized  bones, most compatible with red marrow.    Internal derangement of the joint not well assessed owing to chosen  field of view. A physiologic amount of joint fluid is present in the  left shoulder with small focal fluid in the expected location of  subscapular recess. Mild acromioclavicular joint degenerative change.  Muscle bulk of the rotator cuff grossly preserved.      Impression    Impression:  1. No muscle strain, tear of the left pectoralis major.  2. No axillary lymphadenopathy.  3. Of note, shoulder joint not well assessed owing to chosen field of  view.    MONET MIGDALIA           Assessment & Plan     Muscle spasm  Symptoms still most representative of a muscle strain.  Did obtain MRI which was negative if patient symptoms persist would recommend physical therapy patient will contact me if he would like to start physical therapy  - cyclobenzaprine (FLEXERIL) 5 MG tablet; Take 1-2 tablets (5-10 mg) by mouth 3 times daily as needed for muscle spasms    Axillary pain, left    - cyclobenzaprine (FLEXERIL) 5 MG tablet; Take 1-2 tablets (5-10 mg) by mouth 3 times daily as needed for muscle spasms  - MR Chest w/o Contrast; Future        See Patient Instructions    Return in about 1 week (around 11/17/2020), or if symptoms worsen or fail to improve.    TERESA Romero [facility]

## 2020-12-01 ENCOUNTER — MYC MEDICAL ADVICE (OUTPATIENT)
Dept: FAMILY MEDICINE | Facility: CLINIC | Age: 50
End: 2020-12-01

## 2020-12-01 ENCOUNTER — VIRTUAL VISIT (OUTPATIENT)
Dept: FAMILY MEDICINE | Facility: CLINIC | Age: 50
End: 2020-12-01
Payer: COMMERCIAL

## 2020-12-01 DIAGNOSIS — M10.071 ACUTE IDIOPATHIC GOUT INVOLVING TOE OF RIGHT FOOT: Primary | ICD-10-CM

## 2020-12-01 PROCEDURE — 99213 OFFICE O/P EST LOW 20 MIN: CPT | Mod: 95 | Performed by: NURSE PRACTITIONER

## 2020-12-01 RX ORDER — PREDNISONE 20 MG/1
40 TABLET ORAL DAILY
Qty: 10 TABLET | Refills: 0 | Status: SHIPPED | OUTPATIENT
Start: 2020-12-01 | End: 2020-12-06

## 2020-12-01 NOTE — PROGRESS NOTES
"Lalito Weathers is a 50 year old male who is being evaluated via a billable video visit.      The patient has been notified of following:     \"This video visit will be conducted via a call between you and your physician/provider. We have found that certain health care needs can be provided without the need for an in-person physical exam.  This service lets us provide the care you need with a video conversation.  If a prescription is necessary we can send it directly to your pharmacy.  If lab work is needed we can place an order for that and you can then stop by our lab to have the test done at a later time.    Video visits are billed at different rates depending on your insurance coverage.  Please reach out to your insurance provider with any questions.    If during the course of the call the physician/provider feels a video visit is not appropriate, you will not be charged for this service.\"    Patient has given verbal consent for Video visit? Yes  How would you like to obtain your AVS? MyChart  If you are dropped from the video visit, the video invite should be resent to: Text to cell phone: [phone]  Will anyone else be joining your video visit? No      Subjective     Lalito Weathers is a 50 year old male who presents today via video visit for the following health issues:    HPI     Gout/ Single Inflamed Joint  Onset/Duration: 3 days  Description:   Location: big toe - right  Joint Swelling: YES  Redness: YES  Pain: YES  Intensity: severe  Progression of Symptoms: same  Accompanying Signs & Symptoms:  Fevers: no  History:   Trauma to the area: no  Previous history of gout: YES- per patient but never took medication  Recent illness: no  Alcohol use: YES- 12 pack weekly  Diuretic use: YES  Precipitating or alleviating factors: None  Therapies tried and outcome: IBU    Happened maybe a year ago, let it run its course. Started taking sachin root and cherry tart, took for 6 months and then stopped taking it. "         Video Start Time: 2:13 PM    Review of Systems   Constitutional, HEENT, cardiovascular, pulmonary, gi and gu systems are negative, except as otherwise noted.      Objective           Vitals:  No vitals were obtained today due to virtual visit.    Physical Exam     GENERAL: Healthy, alert and no distress  EYES: Eyes grossly normal to inspection.  No discharge or erythema, or obvious scleral/conjunctival abnormalities.  RESP: No audible wheeze, cough, or visible cyanosis.  No visible retractions or increased work of breathing.    MS: Swelling and mild erythema of right great toe   SKIN: Visible skin clear. No significant rash, abnormal pigmentation or lesions.  NEURO: Cranial nerves grossly intact.  Mentation and speech appropriate for age.  PSYCH: Mentation appears normal, affect normal/bright, judgement and insight intact, normal speech and appearance well-groomed.      Diagnostic Test Results:  Pending          Assessment & Plan     1. Acute idiopathic gout involving toe of right foot  Acute flare, symptoms x 3 days. Taking ibuprofen, not much relief. Will treat with 5 day course of prednisone and continue ibuprofen. Schedule a lab only appointment in the next day or two to check uric acid level. Return to clinic if symptoms not improving or worsening.   - predniSONE (DELTASONE) 20 MG tablet; Take 2 tablets (40 mg) by mouth daily for 5 days  Dispense: 10 tablet; Refill: 0  - Uric acid; Future         See Patient Instructions    Return in about 1 week (around 12/8/2020), or if symptoms worsen or fail to improve.    TERESA Nicolas CNP  [facility]      Video-Visit Details    Type of service:  Video Visit    Video End Time:2:24 PM    Originating Location (pt. Location): Home    Distant Location (provider location):  [facility]     Platform used for Video Visit: Yony

## 2020-12-01 NOTE — PATIENT INSTRUCTIONS
1. Acute idiopathic gout involving toe of right foot  Acute flare, symptoms x 3 days. Taking ibuprofen, not much relief. Will treat with 5 day course of prednisone and continue ibuprofen. Schedule a lab only appointment in the next day or two to check uric acid level. Return to clinic if symptoms not improving or worsening.   - predniSONE (DELTASONE) 20 MG tablet; Take 2 tablets (40 mg) by mouth daily for 5 days  Dispense: 10 tablet; Refill: 0  - Uric acid; Future  Patient Education     What is Gout?  Gout is a disease that affects the joints. Left untreated, it can lead to painful foot and joint deformities and even kidney problems. But, by treating gout early, you can relieve pain and help prevent future problems. Gout can usually be treated with medicine and proper diet. In severe cases, surgery may be needed.  What causes gout?  Gout is caused by an excess of uric acid (a waste product made by the body). Uric acid is excreted by the kidneys. If the uric acid level in your blood rises too high, the uric acid may form crystals that collect in the joints, bringing on a gout attack. If you have many gout attacks, crystals may form large deposits called tophi. Tophi can damage joints and cause deformity.  Who is at risk for gout?  Men are more likely to have gout than women. But women can also be affected, mostly after menopause. Some health problems, such as obesity and high cholesterol, make gout more likely. And some medicines, such as diuretics ( water pills ), can trigger a gout attack. People who drink a lot of alcohol are at high risk for gout. Certain foods can also trigger a gout attack.  Substances that may trigger a gout attack  To help prevent a gout attack, avoid these foods:    Alcohol (particularly beer, but also red wine and spirits)    Certain meats (red meat, processed meat, turkey)    Organ meats (kidney, liver, sweetbread)    Shellfish (lobster, crab, shrimp, scallop, mussel)    Certain fish  "(anchovy, sardine, herring, mackerel)  Treatment    Lifestyle changes, including weight loss, exercise, and quitting tobacco use    Reducing consumption of the food groups above as well as high fructose corn syrup, found in many foods including sodas and energy drinks    Changing non-essential medicines that may contribute to gout (such as thiazide diuretics--\"water pills\")    Medicines to reduce the amount of uric acid in the blood, such as allopurinol, probencid, febuxostat, and lesinurad.    Medicines to treat acute gout attacks, including NSAIDs (nonsteroidal anti-inflammatory medicines), steroids, and colchicine    [employer] last reviewed this educational content on 4/1/2018 2000-2020 [employer]. [address] [place] [address], [place] [address]. All rights reserved. This information is not intended as a substitute for professional medical care. Always follow your healthcare professional's instructions.           Patient Education     Gout Diet  Gout is a painful condition caused by an excess of uric acid, a waste product made by the body. Uric acid forms crystals that collect in the joints. The immune response to these crystals brings on symptoms of joint pain and swelling. This is called a gout attack. Often, medications and diet changes are combined to manage gout. Below are some guidelines for changing your diet to help you manage gout and prevent attacks. Your healthcare provider will help you determine the best eating plan for you.  Eating to manage gout  Weight loss for those who are overweight may help reduce gout attacks.  Eat less of these foods  Eating too many foods containing purines may raise the levels of uric acid in your body. This raises your risk for a gout attack. Try to limit these foods and drinks:    Alcohol, such as beer and red wine. You may be told to avoid alcohol completely.    Soft drinks that contain sugar or high fructose corn syrup    Certain fish, including anchovies, " sardines, fish eggs, and herring    Shellfish    Certain meats, such as red meat, hot dogs, luncheon meats, and turkey    Organ meats, such as liver, kidneys, and sweetbreads    Legumes, such as dried beans and peas    Other high fat foods such as gravy, whole milk, and high fat cheeses    Vegetables such as asparagus, cauliflower, spinach, and mushrooms used to be thought to contribute to an increased risk for a gout attack, but recent studies show that high purine vegetables don't increase the risk for a gout attack.  Eat more of these foods  Other foods may be helpful for people with gout. Add some of these foods to your diet:    Cherries contain chemicals that may lower uric acid.    Omega fatty acids. These are found in some fatty fish such as salmon, certain oils (flax, olive, or nut), and nuts themselves. Omega fatty acids may help prevent inflammation due to gout.    Dairy products that are low-fat or fat-free, such as cheese and yogurt    Complex carbohydrate foods, including whole grains, brown rice, oats, and beans    Coffee, in moderation    Water, approximately 64 ounces per day  Follow-up care  Follow up with your healthcare provider as advised.  When to seek medical advice  Call your healthcare provider right away if any of these occur:    Return of gout symptoms, usually at night:    Severe pain, swelling, and heat in a joint, especially the base of the big toe    Affected joint is hard to move    Skin of the affected joint is purple or red    Fever of 100.4 F (38 C) or higher    Pain that doesn't get better even with prescribed medicine   [employer] last reviewed this educational content on 6/1/2018 2000-2020 [employer]. [address] [place] [id number]. All rights reserved. This information is not intended as a substitute for professional medical care. Always follow your healthcare professional's instructions.

## 2020-12-07 ENCOUNTER — TELEPHONE (OUTPATIENT)
Dept: FAMILY MEDICINE | Facility: CLINIC | Age: 50
End: 2020-12-07

## 2020-12-07 DIAGNOSIS — M10.071 ACUTE IDIOPATHIC GOUT INVOLVING TOE OF RIGHT FOOT: ICD-10-CM

## 2020-12-07 LAB — URATE SERPL-MCNC: 7.3 MG/DL (ref 3.5–7.2)

## 2020-12-07 PROCEDURE — 36415 COLL VENOUS BLD VENIPUNCTURE: CPT | Performed by: NURSE PRACTITIONER

## 2020-12-07 PROCEDURE — 84550 ASSAY OF BLOOD/URIC ACID: CPT | Performed by: NURSE PRACTITIONER

## 2020-12-07 NOTE — TELEPHONE ENCOUNTER
If gout is not improving after prednisone should have an in office appointment for further evaluation.    Lizet Berry CNP

## 2020-12-07 NOTE — TELEPHONE ENCOUNTER
Reason for Call:  Other     Detailed comments: pt had a virtual appt on 12/1 for gout.  Stating he was on prednisone for 5 days but his gout has returned wondering about his next step?  Please advise    Phone Number Patient can be reached at: Home number on file [phone] (home)    Best Time: any    Can we leave a detailed message on this number? YES    Call taken on 12/7/2020 at 11:13 AM by Gina Cummings

## 2020-12-08 ENCOUNTER — OFFICE VISIT (OUTPATIENT)
Dept: FAMILY MEDICINE | Facility: CLINIC | Age: 50
End: 2020-12-08
Payer: COMMERCIAL

## 2020-12-08 VITALS
SYSTOLIC BLOOD PRESSURE: 130 MMHG | HEART RATE: 80 BPM | BODY MASS INDEX: 41.77 KG/M2 | WEIGHT: 282 LBS | TEMPERATURE: 98.4 F | DIASTOLIC BLOOD PRESSURE: 82 MMHG | HEIGHT: 69 IN

## 2020-12-08 DIAGNOSIS — M10.9 ACUTE GOUT INVOLVING TOE OF RIGHT FOOT, UNSPECIFIED CAUSE: Primary | ICD-10-CM

## 2020-12-08 PROCEDURE — 99213 OFFICE O/P EST LOW 20 MIN: CPT | Performed by: NURSE PRACTITIONER

## 2020-12-08 RX ORDER — PREDNISONE 20 MG/1
TABLET ORAL
Qty: 20 TABLET | Refills: 0 | Status: SHIPPED | OUTPATIENT
Start: 2020-12-08 | End: 2021-03-24

## 2020-12-08 RX ORDER — INDOMETHACIN 25 MG/1
25-50 CAPSULE ORAL 2 TIMES DAILY WITH MEALS
Qty: 60 CAPSULE | Refills: 0 | Status: SHIPPED | OUTPATIENT
Start: 2020-12-08 | End: 2021-03-04

## 2020-12-08 ASSESSMENT — MIFFLIN-ST. JEOR: SCORE: 2119.52

## 2020-12-08 NOTE — PATIENT INSTRUCTIONS
Patient Education     Gout    Gout is an inflammation of a joint caused by an inflammatory response to gout crystals in the joint fluid. This occurs when there is excess uric acid. Uric acid is a normal waste product in the body. It builds up in the body when the kidneys can't filter enough of it from the blood. This may occur with age. It is also associated with kidney disease. Gout occurs more often in people with obesity, diabetes, high blood pressure, or high levels of fats in the blood. It may run in families. Gout tends to come and go. A flare up of gout is called an attack. Drinking alcohol or eating certain foods (such as shellfish or foods with additives such as high-fructose corn syrup) may increase uric acid levels in the blood and cause a gout attack.   During a gout attack, the affected joint may become hot, red, swollen, and painful. If you have had one attack of gout, you are likely to have another. An attack of gout can be treated with medicine. If these attacks become frequent, a daily medicine may be prescribed to help the kidneys remove uric acid from the body.   Home care  During a gout attack:    Contact your healthcare provider for advice and therapy options at the early stages of a gout flare. Treating the flare right away can prevent it from getting worse.    Rest painful joints. If gout affects the joints of your foot or leg, you may want to use crutches for the first few days to keep from bearing weight on the affected joint.    When sitting or lying down, raise the painful joint to a level higher than your heart.    Apply an ice pack (ice cubes in a plastic bag wrapped in a thin towel) over the injured area for 20 minutes every 1 to 2 hours the first day for pain relief. Continue this 3 to 4 times a day for swelling and pain.    Avoid alcohol and foods listed below (see Preventing attacks) during a gout attack. Drink extra fluid to help flush the uric acid through your kidneys.    If you  were prescribed a medicine to treat gout, take it as your healthcare provider has instructed. Don't skip doses.    Take anti-inflammatory medicine as directed by your healthcare provider.    If pain medicines have been prescribed, take them exactly as directed.    Preventing attacks    Limit or stop  alcohol use. Excess alcohol intake can cause a gout attack.    Limit these foods and beverages:  ? Organ meats, such as kidneys and liver  ? Certain seafoods (anchovies, sardines, shrimp, scallops, herring, mackerel)  ? Wild game, meat extracts and meat gravies  ? Foods and beverages sweetened with high-fructose corn syrup, such as sodas    Eat a healthy diet including low-fat and nonfat dairy, whole grains, and vegetables.    If you are overweight, talk to your healthcare provider about a weight reduction plan. Avoid fasting or extreme low calorie diets (less than 900 calories per day). This will increase uric acid levels in the body.    If you have diabetes or high blood pressure, work with your doctor to manage these conditions.    Protect the joint from injury. Wear good fitting socks and shoes. Injury can trigger a gout attack.    Follow-up care  Follow up with your healthcare provider, or as advised.   When to seek medical advice  Call your healthcare provider if you have any of the following:    Fever over 100.4 F (38. C) with worsening joint pain    Increasing redness around the joint    Pain developing in another joint    Repeated vomiting, abdominal pain, or blood in the vomit or stool (black or red color)  [employer] last reviewed this educational content on 6/1/2019 2000-2020 [employer]. [address] [place] [address], [place] [address]. All rights reserved. This information is not intended as a substitute for professional medical care. Always follow your healthcare professional's instructions.           Patient Education     Treating Gout Attacks     Raising the joint above the level of your heart  can help reduce gout symptoms.     Gout is a disease that affects the joints. It is caused by excess uric acid in your blood that may lead to crystals forming in your joints. Left untreated, it can lead to painful foot and joint deformities and even kidney problems. But, by treating gout early, you can relieve pain and help prevent future problems. Gout can usually be treated with medicine and proper diet. In severe cases, surgery may be needed.  Gout attacks are painful and often happen more than once. Taking medicines may reduce pain and prevent attacks in the future. There are also some things you can do at home to relieve symptoms.  Medicines for gout  Your healthcare provider may prescribe a daily medicine to reduce levels of uric acid. Reducing your uric acid levels may help prevent gout attacks. Allopurinol is one commonly used medicine taken daily to reduce uric acid levels. Other daily medicines used to reduce uric acid levels include febuxostat, lesinurad, and probencid. Other medicines can help relieve pain and swelling during an acute attack. Medicines such as NSAIDs (nonsteroidal anti-inflammatory medicines), steroids, and colchicine may be prescribed for intermittent use to relieve an acute gout attack. Be sure to take your medicine as directed.  What you can do  Below are some things you can do at home to relieve gout symptoms. Your healthcare provider may have other tips.    Rest the painful joint as much as you can.    Raise the painful joint so it is at a level higher than your heart.    Use ice for 10 minutes every 1 to 2 hours as possible.  How can I prevent gout?  With a little effort, you may be able to prevent gout attacks in the future. Here are some things you can do:    Don't eat foods high in purines  ? Certain meats (red meat, processed meat, turkey)  ? Organ meats (kidney, liver, sweetbread)  ? Shellfish (lobster, crab, shrimp, scallop, mussel)  ? Certain fish (anchovy, sardine, herring,  mackerel)    Take any medicines prescribed by your healthcare provider.    Lose weight if you need to.    Reduce high fructose corn syrup in meals and drinks.    Reduce or cut out alcohol, particularly beer, but also red wine and spirits.    Control blood pressure, diabetes, and cholesterol.    Drink plenty of water to help flush uric acid from your body.  [employer] last reviewed this educational content on 4/1/2018 2000-2020 [employer]. [address] [place] [address], [place] [address]. All rights reserved. This information is not intended as a substitute for professional medical care. Always follow your healthcare professional's instructions.           Patient Education     Gout Diet  Gout is a painful condition caused by an excess of uric acid, a waste product made by the body. Uric acid forms crystals that collect in the joints. The immune response to these crystals brings on symptoms of joint pain and swelling. This is called a gout attack. Often, medications and diet changes are combined to manage gout. Below are some guidelines for changing your diet to help you manage gout and prevent attacks. Your healthcare provider will help you determine the best eating plan for you.  Eating to manage gout  Weight loss for those who are overweight may help reduce gout attacks.  Eat less of these foods  Eating too many foods containing purines may raise the levels of uric acid in your body. This raises your risk for a gout attack. Try to limit these foods and drinks:    Alcohol, such as beer and red wine. You may be told to avoid alcohol completely.    Soft drinks that contain sugar or high fructose corn syrup    Certain fish, including anchovies, sardines, fish eggs, and herring    Shellfish    Certain meats, such as red meat, hot dogs, luncheon meats, and turkey    Organ meats, such as liver, kidneys, and sweetbreads    Legumes, such as dried beans and peas    Other high fat foods such as gravy, whole milk, and  high fat cheeses    Vegetables such as asparagus, cauliflower, spinach, and mushrooms used to be thought to contribute to an increased risk for a gout attack, but recent studies show that high purine vegetables don't increase the risk for a gout attack.  Eat more of these foods  Other foods may be helpful for people with gout. Add some of these foods to your diet:    Cherries contain chemicals that may lower uric acid.    Omega fatty acids. These are found in some fatty fish such as salmon, certain oils (flax, olive, or nut), and nuts themselves. Omega fatty acids may help prevent inflammation due to gout.    Dairy products that are low-fat or fat-free, such as cheese and yogurt    Complex carbohydrate foods, including whole grains, brown rice, oats, and beans    Coffee, in moderation    Water, approximately 64 ounces per day  Follow-up care  Follow up with your healthcare provider as advised.  When to seek medical advice  Call your healthcare provider right away if any of these occur:    Return of gout symptoms, usually at night:    Severe pain, swelling, and heat in a joint, especially the base of the big toe    Affected joint is hard to move    Skin of the affected joint is purple or red    Fever of 100.4 F (38 C) or higher    Pain that doesn't get better even with prescribed medicine   [employer] last reviewed this educational content on 6/1/2018 2000-2020 [employer]. [address] [place] [address], [place] [address]. All rights reserved. This information is not intended as a substitute for professional medical care. Always follow your healthcare professional's instructions.

## 2020-12-08 NOTE — PROGRESS NOTES
"Subjective     Lalito Weathers is a 50 year old male who presents to clinic today for the following health issues:    HPI         Patient is here to follow up with the gout in his right foot. He states that it was better while he was taking prednisone but came back when he was finished with it.      Review of Systems   Constitutional, HEENT, cardiovascular, pulmonary, GI, , musculoskeletal, neuro, skin, endocrine and psych systems are negative, except as otherwise noted.      Objective    /82 (BP Location: Right arm, Cuff Size: Adult Large)   Pulse 80   Temp 98.4  F (36.9  C) (Tympanic)   Ht 1.753 m (5' 9\")   Wt 127.9 kg (282 lb)   BMI 41.64 kg/m    Body mass index is 41.64 kg/m .  Physical Exam   GENERAL: healthy, alert and no distress  EYES: Eyes grossly normal to inspection, PERRL and conjunctivae and sclerae normal  HENT: ear canals and TM's normal, nose and mouth without ulcers or lesions  NECK: no adenopathy, no asymmetry, masses, or scars and thyroid normal to palpation  RESP: lungs clear to auscultation - no rales, rhonchi or wheezes  CV: regular rate and rhythm, normal S1 S2, no S3 or S4, no murmur, click or rub, no peripheral edema and peripheral pulses strong  MS: no gross musculoskeletal defects noted, no edema  SKIN: no suspicious lesions or rashes  NEURO: Normal strength and tone, mentation intact and speech normal  PSYCH: mentation appears normal, affect normal/bright    Orders Only on 12/07/2020   Component Date Value Ref Range Status     Uric Acid 12/07/2020 7.3* 3.5 - 7.2 mg/dL Final           Assessment & Plan     Acute gout involving toe of right foot, unspecified cause  We will treat current gout flareup with a tapering dose of prednisone as patient's gout did not fully resolve last treatment.  Patient will monitor blood pressures and blood sugars while on the prednisone will also start on Indocin will taper down on the Indocin once symptoms improve.  We will continue to monitor if " he has recurrent flareups neck steps will be to start him on allopurinol and also discontinue his hydrochlorothiazide.  Based on this 1 flareup will not make major medication changes but will continue to monitor and he will contact me if he has a reoccurring flareup  - predniSONE (DELTASONE) 20 MG tablet; Take 3 tabs by mouth daily x 1 days, then 2 tabs daily x 4 days, then 1 tab daily x 4 days, then 1/2 tab daily x 3 days.  - indomethacin (INDOCIN) 25 MG capsule; Take 1-2 capsules (25-50 mg) by mouth 2 times daily (with meals)      Return in about 1 week (around 12/15/2020), or if symptoms worsen or fail to improve.    TERESA Romero CNP  [facility]

## 2021-01-15 ENCOUNTER — HEALTH MAINTENANCE LETTER (OUTPATIENT)
Age: 51
End: 2021-01-15

## 2021-03-04 ENCOUNTER — OFFICE VISIT (OUTPATIENT)
Dept: FAMILY MEDICINE | Facility: CLINIC | Age: 51
End: 2021-03-04
Payer: COMMERCIAL

## 2021-03-04 VITALS
HEART RATE: 84 BPM | TEMPERATURE: 97.4 F | WEIGHT: 285 LBS | SYSTOLIC BLOOD PRESSURE: 146 MMHG | DIASTOLIC BLOOD PRESSURE: 74 MMHG | BODY MASS INDEX: 42.21 KG/M2 | HEIGHT: 69 IN

## 2021-03-04 DIAGNOSIS — E11.8 TYPE 2 DIABETES MELLITUS WITH UNSPECIFIED COMPLICATIONS (H): Primary | ICD-10-CM

## 2021-03-04 DIAGNOSIS — I10 ESSENTIAL HYPERTENSION: ICD-10-CM

## 2021-03-04 DIAGNOSIS — E66.01 MORBID OBESITY (H): ICD-10-CM

## 2021-03-04 DIAGNOSIS — M10.9 ACUTE GOUT INVOLVING TOE OF RIGHT FOOT, UNSPECIFIED CAUSE: ICD-10-CM

## 2021-03-04 LAB
HBA1C MFR BLD: 5.1 % (ref 0–5.6)
URATE SERPL-MCNC: 7.2 MG/DL (ref 3.5–7.2)

## 2021-03-04 PROCEDURE — 36415 COLL VENOUS BLD VENIPUNCTURE: CPT | Performed by: NURSE PRACTITIONER

## 2021-03-04 PROCEDURE — 99214 OFFICE O/P EST MOD 30 MIN: CPT | Performed by: NURSE PRACTITIONER

## 2021-03-04 PROCEDURE — 83036 HEMOGLOBIN GLYCOSYLATED A1C: CPT | Performed by: NURSE PRACTITIONER

## 2021-03-04 PROCEDURE — 84550 ASSAY OF BLOOD/URIC ACID: CPT | Performed by: NURSE PRACTITIONER

## 2021-03-04 PROCEDURE — 80048 BASIC METABOLIC PNL TOTAL CA: CPT | Performed by: NURSE PRACTITIONER

## 2021-03-04 RX ORDER — CHLORTHALIDONE 25 MG/1
25 TABLET ORAL DAILY
Qty: 90 TABLET | Refills: 1 | Status: SHIPPED | OUTPATIENT
Start: 2021-03-04 | End: 2021-03-05

## 2021-03-04 RX ORDER — INDOMETHACIN 25 MG/1
25-50 CAPSULE ORAL 2 TIMES DAILY WITH MEALS
Qty: 60 CAPSULE | Refills: 0 | Status: SHIPPED | OUTPATIENT
Start: 2021-03-04 | End: 2021-03-24

## 2021-03-04 RX ORDER — PREDNISONE 20 MG/1
TABLET ORAL
Qty: 10 TABLET | Refills: 0 | Status: SHIPPED | OUTPATIENT
Start: 2021-03-04 | End: 2021-03-24

## 2021-03-04 ASSESSMENT — MIFFLIN-ST. JEOR: SCORE: 2133.13

## 2021-03-04 NOTE — PATIENT INSTRUCTIONS
Patient Education     Gout    Gout is an inflammation of a joint caused by an inflammatory response to gout crystals in the joint fluid. This occurs when there is excess uric acid. Uric acid is a normal waste product in the body. It builds up in the body when the kidneys can't filter enough of it from the blood. This may occur with age. It is also associated with kidney disease. Gout occurs more often in people with obesity, diabetes, high blood pressure, or high levels of fats in the blood. It may run in families. Gout tends to come and go. A flare up of gout is called an attack. Drinking alcohol or eating certain foods (such as shellfish or foods with additives such as high-fructose corn syrup) may increase uric acid levels in the blood and cause a gout attack.   During a gout attack, the affected joint may become hot, red, swollen, and painful. If you have had one attack of gout, you are likely to have another. An attack of gout can be treated with medicine. If these attacks become frequent, a daily medicine may be prescribed to help the kidneys remove uric acid from the body.   Home care  During a gout attack:    Contact your healthcare provider for advice and therapy options at the early stages of a gout flare. Treating the flare right away can prevent it from getting worse.    Rest painful joints. If gout affects the joints of your foot or leg, you may want to use crutches for the first few days to keep from bearing weight on the affected joint.    When sitting or lying down, raise the painful joint to a level higher than your heart.    Apply an ice pack (ice cubes in a plastic bag wrapped in a thin towel) over the injured area for 20 minutes every 1 to 2 hours the first day for pain relief. Continue this 3 to 4 times a day for swelling and pain.    Avoid alcohol and foods listed below (see Preventing attacks) during a gout attack. Drink extra fluid to help flush the uric acid through your kidneys.    If you  were prescribed a medicine to treat gout, take it as your healthcare provider has instructed. Don't skip doses.    Take anti-inflammatory medicine as directed by your healthcare provider.    If pain medicines have been prescribed, take them exactly as directed.    Preventing attacks    Limit or stop  alcohol use. Excess alcohol intake can cause a gout attack.    Limit these foods and beverages:  ? Organ meats, such as kidneys and liver  ? Certain seafoods (anchovies, sardines, shrimp, scallops, herring, mackerel)  ? Wild game, meat extracts and meat gravies  ? Foods and beverages sweetened with high-fructose corn syrup, such as sodas    Eat a healthy diet including low-fat and nonfat dairy, whole grains, and vegetables.    If you are overweight, talk to your healthcare provider about a weight reduction plan. Avoid fasting or extreme low calorie diets (less than 900 calories per day). This will increase uric acid levels in the body.    If you have diabetes or high blood pressure, work with your doctor to manage these conditions.    Protect the joint from injury. Wear good fitting socks and shoes. Injury can trigger a gout attack.    Follow-up care  Follow up with your healthcare provider, or as advised.   When to seek medical advice  Call your healthcare provider if you have any of the following:    Fever over 100.4 F (38. C) with worsening joint pain    Increasing redness around the joint    Pain developing in another joint    Repeated vomiting, abdominal pain, or blood in the vomit or stool (black or red color)  [employer] last reviewed this educational content on 6/1/2019 2000-2020 [employer]. All rights reserved. This information is not intended as a substitute for professional medical care. Always follow your healthcare professional's instructions.           Patient Education     Gout Diet  Gout is a painful condition caused by an excess of uric acid, a waste product made by the body. Uric acid  forms crystals that collect in the joints. The immune response to these crystals brings on symptoms of joint pain and swelling. This is called a gout attack. Often, medications and diet changes are combined to manage gout. Below are some guidelines for changing your diet to help you manage gout and prevent attacks. Your healthcare provider will help you determine the best eating plan for you.  Eating to manage gout  Weight loss for those who are overweight may help reduce gout attacks.  Eat less of these foods  Eating too many foods containing purines may raise the levels of uric acid in your body. This raises your risk for a gout attack. Try to limit these foods and drinks:    Alcohol, such as beer and red wine. You may be told to avoid alcohol completely.    Soft drinks that contain sugar or high fructose corn syrup    Certain fish, including anchovies, sardines, fish eggs, and herring    Shellfish    Certain meats, such as red meat, hot dogs, luncheon meats, and turkey    Organ meats, such as liver, kidneys, and sweetbreads    Legumes, such as dried beans and peas    Other high fat foods such as gravy, whole milk, and high fat cheeses    Vegetables such as asparagus, cauliflower, spinach, and mushrooms used to be thought to contribute to an increased risk for a gout attack, but recent studies show that high purine vegetables don't increase the risk for a gout attack.  Eat more of these foods  Other foods may be helpful for people with gout. Add some of these foods to your diet:    Cherries contain chemicals that may lower uric acid.    Omega fatty acids. These are found in some fatty fish such as salmon, certain oils (flax, olive, or nut), and nuts themselves. Omega fatty acids may help prevent inflammation due to gout.    Dairy products that are low-fat or fat-free, such as cheese and yogurt    Complex carbohydrate foods, including whole grains, brown rice, oats, and beans    Coffee, in moderation    Water,  approximately 64 ounces per day  Follow-up care  Follow up with your healthcare provider as advised.  When to seek medical advice  Call your healthcare provider right away if any of these occur:    Return of gout symptoms, usually at night:    Severe pain, swelling, and heat in a joint, especially the base of the big toe    Affected joint is hard to move    Skin of the affected joint is purple or red    Fever of 100.4 F (38 C) or higher    Pain that doesn't get better even with prescribed medicine   [employer] last reviewed this educational content on 6/1/2018 2000-2020 [employer]. All rights reserved. This information is not intended as a substitute for professional medical care. Always follow your healthcare professional's instructions.

## 2021-03-04 NOTE — PROGRESS NOTES
"    Assessment & Plan     Type 2 diabetes mellitus with unspecified complications (H)  - Hemoglobin A1c    Morbid obesity (H)    Acute gout involving toe of right foot, unspecified cause  - Uric acid  - predniSONE (DELTASONE) 20 MG tablet; Take one tablet twice a day for 5 days.  - indomethacin (INDOCIN) 25 MG capsule; Take 1-2 capsules (25-50 mg) by mouth 2 times daily (with meals)    Essential hypertension  Will discontinue hydrochlorothiazide due to gout flareups and try spironolactone patient should check a basic metabolic panel   - spironolactone (ALDACTONE) 25 MG tablet; Take 1 tablet (25 mg) by mouth daily  - Basic metabolic panel     BMI:   Estimated body mass index is 42.09 kg/m  as calculated from the following:    Height as of this encounter: 1.753 m (5' 9\").    Weight as of this encounter: 129.3 kg (285 lb).   Weight management plan: Discussed healthy diet and exercise guidelines    See Patient Instructions    No follow-ups on file.    TERESA Romero CNP  [facility]    Sridhar Starkey is a 50 year old who presents for the following health issues     HPI     Foot Pain  Onset/Duration: a couple days  Description  Location: a couple days  Joint Swelling: YES  Redness: YES  Pain: YES  Warmth: YES  Intensity:  moderate  Progression of Symptoms:  worsening  Accompanying signs and symptoms:   Fevers: no  Numbness/tingling/weakness: no  History  Trauma to the area: no  Recent illness:  no  Previous similar problem: YES- gout  Previous evaluation:  no  Precipitating or alleviating factors:  Aggravating factors include: none  Therapies tried and outcome: indomethacin        Review of Systems   Constitutional, HEENT, cardiovascular, pulmonary, gi and gu systems are negative, except as otherwise noted.      Objective    BP (!) 146/74 (BP Location: Right arm, Cuff Size: Adult Large)   Pulse 84   Temp 97.4  F (36.3  C) (Tympanic)   Ht 1.753 m (5' 9\")   Wt 129.3 kg (285 lb)   " BMI 42.09 kg/m    There is no height or weight on file to calculate BMI.  Physical Exam   GENERAL: healthy, alert and no distress  EYES: Eyes grossly normal to inspection, PERRL and conjunctivae and sclerae normal  NECK: no adenopathy, no asymmetry, masses, or scars and thyroid normal to palpation  RESP: lungs clear to auscultation - no rales, rhonchi or wheezes  CV: regular rate and rhythm, normal S1 S2, no S3 or S4, no murmur, click or rub, no peripheral edema and peripheral pulses strong  ABDOMEN: soft, nontender, no hepatosplenomegaly, no masses and bowel sounds normal  MS: no gross musculoskeletal defects noted, no edema  SKIN: no suspicious lesions or rashes  NEURO: Normal strength and tone, mentation intact and speech normal  PSYCH: mentation appears normal, affect normal/bright    Results for orders placed or performed in visit on 03/04/21   Hemoglobin A1c     Status: None   Result Value Ref Range    Hemoglobin A1C 5.1 0 - 5.6 %   Uric acid     Status: None   Result Value Ref Range    Uric Acid 7.2 3.5 - 7.2 mg/dL   Basic metabolic panel     Status: Abnormal   Result Value Ref Range    Sodium 141 133 - 144 mmol/L    Potassium 3.8 3.4 - 5.3 mmol/L    Chloride 107 94 - 109 mmol/L    Carbon Dioxide 27 20 - 32 mmol/L    Anion Gap 7 3 - 14 mmol/L    Glucose 103 (H) 70 - 99 mg/dL    Urea Nitrogen 20 7 - 30 mg/dL    Creatinine 1.09 0.66 - 1.25 mg/dL    GFR Estimate 77 >60 mL/min/[1.73_m2]    GFR Estimate If Black 90 >60 mL/min/[1.73_m2]    Calcium 9.2 8.5 - 10.1 mg/dL

## 2021-03-05 LAB
ANION GAP SERPL CALCULATED.3IONS-SCNC: 7 MMOL/L (ref 3–14)
BUN SERPL-MCNC: 20 MG/DL (ref 7–30)
CALCIUM SERPL-MCNC: 9.2 MG/DL (ref 8.5–10.1)
CHLORIDE SERPL-SCNC: 107 MMOL/L (ref 94–109)
CO2 SERPL-SCNC: 27 MMOL/L (ref 20–32)
CREAT SERPL-MCNC: 1.09 MG/DL (ref 0.66–1.25)
GFR SERPL CREATININE-BSD FRML MDRD: 77 ML/MIN/{1.73_M2}
GLUCOSE SERPL-MCNC: 103 MG/DL (ref 70–99)
POTASSIUM SERPL-SCNC: 3.8 MMOL/L (ref 3.4–5.3)
SODIUM SERPL-SCNC: 141 MMOL/L (ref 133–144)

## 2021-03-05 RX ORDER — SPIRONOLACTONE 25 MG/1
25 TABLET ORAL DAILY
Qty: 90 TABLET | Refills: 0 | Status: SHIPPED | OUTPATIENT
Start: 2021-03-05 | End: 2021-03-16

## 2021-03-16 ENCOUNTER — MYC MEDICAL ADVICE (OUTPATIENT)
Dept: FAMILY MEDICINE | Facility: CLINIC | Age: 51
End: 2021-03-16

## 2021-03-16 DIAGNOSIS — I10 ESSENTIAL HYPERTENSION: ICD-10-CM

## 2021-03-16 RX ORDER — TRIAMTERENE AND HYDROCHLOROTHIAZIDE 37.5; 25 MG/1; MG/1
2 CAPSULE ORAL DAILY
Qty: 180 CAPSULE | Refills: 3 | Status: SHIPPED | OUTPATIENT
Start: 2021-03-16 | End: 2022-01-12

## 2021-03-22 ENCOUNTER — TELEPHONE (OUTPATIENT)
Dept: FAMILY MEDICINE | Facility: CLINIC | Age: 51
End: 2021-03-22

## 2021-03-22 ENCOUNTER — ALLIED HEALTH/NURSE VISIT (OUTPATIENT)
Dept: FAMILY MEDICINE | Facility: CLINIC | Age: 51
End: 2021-03-22
Payer: COMMERCIAL

## 2021-03-22 VITALS — SYSTOLIC BLOOD PRESSURE: 128 MMHG | DIASTOLIC BLOOD PRESSURE: 82 MMHG

## 2021-03-22 DIAGNOSIS — I10 ESSENTIAL HYPERTENSION: Primary | ICD-10-CM

## 2021-03-22 PROCEDURE — 99207 PR NO CHARGE NURSE ONLY: CPT | Performed by: NURSE PRACTITIONER

## 2021-03-22 NOTE — TELEPHONE ENCOUNTER
Patient Quality Outreach      Summary:    Patient has the following on his problem list/HM:   Hypertension   Last three blood pressure readings:  BP Readings from Last 3 Encounters:   03/04/21 (!) 146/74   12/08/20 130/82   11/10/20 130/80     Blood pressure: Failed    HTN Guidelines:  ? 139/89     Patient is due/failing the following:   Hypertension follow-up visit    Type of outreach:    Phone, left message for patient/parent to call back.    Questions for provider review:    None                                                                                                                                     Archana Clay MA      Chart routed to Care Team.

## 2021-03-22 NOTE — TELEPHONE ENCOUNTER
Patient called back. He checked his blood pressure at home and it has been normal. His most recent reading was 128/82.    Archana Clay MA

## 2021-03-24 ENCOUNTER — TELEPHONE (OUTPATIENT)
Dept: FAMILY MEDICINE | Facility: CLINIC | Age: 51
End: 2021-03-24

## 2021-03-24 ENCOUNTER — OFFICE VISIT (OUTPATIENT)
Dept: FAMILY MEDICINE | Facility: CLINIC | Age: 51
End: 2021-03-24
Payer: COMMERCIAL

## 2021-03-24 VITALS
SYSTOLIC BLOOD PRESSURE: 181 MMHG | DIASTOLIC BLOOD PRESSURE: 96 MMHG | RESPIRATION RATE: 14 BRPM | TEMPERATURE: 98.5 F | OXYGEN SATURATION: 94 % | HEART RATE: 81 BPM

## 2021-03-24 DIAGNOSIS — T50.905A ADVERSE EFFECT OF DRUG, INITIAL ENCOUNTER: Primary | ICD-10-CM

## 2021-03-24 DIAGNOSIS — R06.02 SHORTNESS OF BREATH: ICD-10-CM

## 2021-03-24 LAB — POTASSIUM SERPL-SCNC: 3.6 MMOL/L (ref 3.4–5.3)

## 2021-03-24 PROCEDURE — 36415 COLL VENOUS BLD VENIPUNCTURE: CPT | Performed by: FAMILY MEDICINE

## 2021-03-24 PROCEDURE — 84132 ASSAY OF SERUM POTASSIUM: CPT | Performed by: FAMILY MEDICINE

## 2021-03-24 PROCEDURE — 99213 OFFICE O/P EST LOW 20 MIN: CPT | Performed by: FAMILY MEDICINE

## 2021-03-24 RX ORDER — ALBUTEROL SULFATE 90 UG/1
2 AEROSOL, METERED RESPIRATORY (INHALATION) EVERY 6 HOURS
Qty: 1 G | Refills: 0 | Status: SHIPPED | OUTPATIENT
Start: 2021-03-24 | End: 2021-04-14

## 2021-03-24 ASSESSMENT — ENCOUNTER SYMPTOMS
ALLERGIC/IMMUNOLOGIC NEGATIVE: 1
CHEST TIGHTNESS: 0
SHORTNESS OF BREATH: 1
ACTIVITY CHANGE: 0
COUGH: 1
FATIGUE: 0
MUSCULOSKELETAL NEGATIVE: 1
ENDOCRINE NEGATIVE: 1
EYES NEGATIVE: 1
PSYCHIATRIC NEGATIVE: 1
SORE THROAT: 0
SINUS PAIN: 0
NEUROLOGICAL NEGATIVE: 1
HEMATOLOGIC/LYMPHATIC NEGATIVE: 1
GASTROINTESTINAL NEGATIVE: 1
PALPITATIONS: 0
SINUS PRESSURE: 0

## 2021-03-24 NOTE — TELEPHONE ENCOUNTER
Pt called back.  He reports chest congestion x 1 week.  He has slightly occasional productive cough too.  He reports slightly increased work of breathing with exertion.    Pt reports that there seems to be a correlation between his spironolactone and the congestion too.    Pt will keep appt as arranged.    Pt had Covid 9 months ago.  He denies known recent Covid exposures.    Luz Marina Mancuso RN

## 2021-03-24 NOTE — TELEPHONE ENCOUNTER
"Patient has F2F appt with Dr. Segundo at 1540 for \"Chest Congestion, no fever already had covid\". Per Dr. Segundo - please call patient to triage to determine appropriate appointment type. Patient's last known dx of  COVID in epic was 06/2020, if showing current sx offer virtual appt.     Montse Meade on 3/24/2021 at 8:37 AM    "

## 2021-03-24 NOTE — TELEPHONE ENCOUNTER
Left non-detailed message for patient to return a call to the clinic RN.     KIMBERLEY Mancuso RN

## 2021-03-24 NOTE — PROGRESS NOTES
Assessment & Plan     Adverse effect of drug, initial encounter  Labs ordered ,patient will be notified of results.  - Potassium    Shortness of breath  I think it is a coincidence of his chest symptoms , I recommend albuterol as needed.  - albuterol (PROAIR HFA/PROVENTIL HFA/VENTOLIN HFA) 108 (90 Base) MCG/ACT inhaler; Inhale 2 puffs into the lungs every 6 hours         FUTURE APPOINTMENTS:       - Follow-up visit in one month or sooner.    Return in about 4 weeks (around 4/21/2021).    Keren Segundo MD  [facility] is a 50 year [facility] who presents for the following health issues    HPI   50 yr old male here for possible medication reaction. He had a gout attack a few weeks ago and he reports that his regular primary discontinued triamterene which he takes for blood pressure control. He was switched to spironolactone- he started having chest congestion shortly after starting the medication. He called in to report this and he was asked to stop the medication. He reports that once he stopped the medication the chest congestion got better. The symptoms returned a few days ago,.he reports mild shortness of breath, he has a mild cough. Denies any fevers or chills. No wheezing. He is not a smoker.  Acute Illness   Acute illness concerns: Chest congestion   Onset: x 1 week     Fever: no    Chills/Sweats: no    Headache (location?): no    Sinus Pressure:no    Conjunctivitis:  no    Ear Pain: no    Rhinorrhea: no    Congestion: YES    Sore Throat: no     Cough: YES    Wheeze: no    Decreased Appetite: no    Nausea: no    Vomiting: no    Diarrhea:  no    Dysuria/Freq.: no    Fatigue/Achiness: no    Sick/Strep Exposure: no    Reports SOB with Exertion      Therapies Tried and outcome: see below.     States that he had gout - he was on triamterene and it was switched to spironolactone and then he started to feel congested in her chest - he states his wife is also feeling  this way but was also placed on this same medication as well. They both stopped taking the spironolactone and and back on their original medications - his is triamterene approx 1 week ago. States it got better at first and then it got worse again today. States he had pneomonia a year ago. He is unsure of her original medication - not sure if this is just coincidence.           Review of Systems   Constitutional: Negative for activity change and fatigue.   HENT: Positive for congestion. Negative for sinus pressure, sinus pain, sneezing and sore throat.    Eyes: Negative.    Respiratory: Positive for cough and shortness of breath. Negative for chest tightness.    Cardiovascular: Negative for chest pain, palpitations and peripheral edema.   Gastrointestinal: Negative.    Endocrine: Negative.    Genitourinary: Negative.    Musculoskeletal: Negative.    Skin: Negative.    Allergic/Immunologic: Negative.    Neurological: Negative.    Hematological: Negative.    Psychiatric/Behavioral: Negative.             Objective    BP (!) 181/96 (BP Location: Right arm, Patient Position: Sitting, Cuff Size: Adult Large)   Pulse 81   Temp 98.5  F (36.9  C) (Tympanic)   Resp 14   SpO2 94%   There is no height or weight on file to calculate BMI.  Physical Exam  Constitutional:       General: He is not in acute distress.     Appearance: Normal appearance. He is obese. He is not toxic-appearing.   HENT:      Head: Normocephalic and atraumatic.      Nose: Nose normal.      Mouth/Throat:      Mouth: Mucous membranes are dry.   Neck:      Musculoskeletal: Normal range of motion.   Cardiovascular:      Rate and Rhythm: Normal rate and regular rhythm.   Pulmonary:      Effort: Pulmonary effort is normal. No respiratory distress.      Breath sounds: Normal breath sounds. No stridor. No wheezing, rhonchi or rales.   Musculoskeletal: Normal range of motion.   Skin:     General: Skin is warm and dry.   Neurological:      General: No focal  deficit present.      Mental Status: He is alert.   Psychiatric:         Mood and Affect: Mood normal.         Behavior: Behavior normal.            Pending labs

## 2021-03-24 NOTE — LETTER
March 26, 2021      Lalitocintia Weathers  [id number] [facility] [id number]        Dear ,    We are writing to inform you of your test results.    Your test results fall within the expected range(s).    Resulted Orders   Potassium   Result Value Ref Range    Potassium 3.6 3.4 - 5.3 mmol/L      Comment:      Specimen slightly hemolyzed, potassium may be falsely elevated       If you have any questions or concerns, please call the clinic at the number listed above.       Sincerely,      Keren Segundo MD

## 2021-03-24 NOTE — PATIENT INSTRUCTIONS
Patient Education     Bronchospasm (Adult)    Bronchospasm occurs when the airways (bronchial tubes) go into spasm and contract. This makes it hard to breathe and causes wheezing (a high-pitched whistling sound). Bronchospasm can also cause frequent coughing without wheezing.  Bronchospasm is due to irritation, inflammation, or allergic reaction of the airways. People with asthma get bronchospasm. However, not everyone with bronchospasm has asthma.  Being exposed to harmful fumes, a recent case of bronchitis, exercise, or a flare-up of chronic obstructive pulmonary disease (COPD) may cause the airways to spasm. An episode of bronchospasm may last 7 to 14 days. Medicine may be prescribed to relax the airways and prevent wheezing. Antibiotics will be prescribed only if your healthcare provider thinks there is a bacterial infection. Antibiotics do not help a viral infection.  Home care    Drink lots of water or other fluids (at least 10 glasses a day) during an attack. This will loosen lung secretions and make it easier to breathe. If you have heart or kidney disease, check with your doctor before you drink extra fluids.    Take prescribed medicine exactly at the times advised. If you take an inhaled medicine to help with breathing, don't use it more than once every 4 hours, unless told to do so. If prescribed an antibiotic or prednisone, take all of the medicine, even if you are feeling better after a few days.    Don't smoke. Also avoid being exposed to secondhand smoke.    If you were given an inhaler, use it exactly as directed. If you need to use it more often than prescribed, your condition may be getting worse. Contact your healthcare provider.  Follow-up care  Follow up with your healthcare provider, or as advised.  If you are age 65 or older, have a chronic lung disease or condition that affects your immune system, or you smoke, ask your healthcare provider about getting a pneumococcal vaccine, as well as a  yearly flu shot (influenza vaccine).  When to seek medical advice  Call your healthcare provider right away if any of these occur:    You need to use your inhalers more often than usual    Fever of 100.4 F (38 C) or higher, or as directed by your healthcare provider    Cough that brings up lots of dark-colored sputum (mucus)    You don't get better within 24 hours  Call 911  Call 911 if any of these occur:    Coughing up bloody sputum (mucus)    Chest pain with each breath    Increased wheezing or shortness of breath  [employer] last reviewed this educational content on 6/1/2018 2000-2020 [employer]. All rights reserved. This information is not intended as a substitute for professional medical care. Always follow your healthcare professional's instructions.

## 2021-03-25 NOTE — RESULT ENCOUNTER NOTE
Please inform patient that test result was within normal parameters.   Thank you.     Keren Segundo M.D.

## 2021-04-11 DIAGNOSIS — R06.02 SHORTNESS OF BREATH: ICD-10-CM

## 2021-04-14 RX ORDER — ALBUTEROL SULFATE 90 UG/1
AEROSOL, METERED RESPIRATORY (INHALATION)
Qty: 8.5 G | Refills: 0 | Status: SHIPPED | OUTPATIENT
Start: 2021-04-14 | End: 2022-01-12

## 2021-05-17 ENCOUNTER — TELEPHONE (OUTPATIENT)
Dept: FAMILY MEDICINE | Facility: CLINIC | Age: 51
End: 2021-05-17

## 2021-05-17 NOTE — TELEPHONE ENCOUNTER
Patient Quality Outreach      Summary:    Patient has the following on his problem list/HM:   Diabetes    Last A1C:  Lab Results   Component Value Date    A1C 5.1 03/04/2021    A1C 5.2 08/05/2020       Last LDL:    Lab Results   Component Value Date    LDL 51 08/05/2020       Is the patient on a Statin? Yes          Is the patient on Aspirin? Yes    Medications     HMG CoA Reductase Inhibitors     rosuvastatin (CRESTOR) 10 MG tablet       Salicylates     aspirin 81 MG EC tablet             Last three blood pressure readings:  BP Readings from Last 3 Encounters:   03/24/21 (!) 181/96   03/22/21 128/82   03/04/21 (!) 146/74            Tobacco Use      Smoking status: Former Smoker      Smokeless tobacco: Former User          Patient is due/failing the following:   BP Check    Type of outreach:    Phone, left message for patient/parent to call back.    Questions for provider review:    None                                                                                                                                     Archana Clay MA      Chart routed to Care Team.

## 2021-05-30 DIAGNOSIS — I10 ESSENTIAL HYPERTENSION: ICD-10-CM

## 2021-06-01 RX ORDER — SPIRONOLACTONE 25 MG/1
TABLET ORAL
Qty: 90 TABLET | Refills: 0 | OUTPATIENT
Start: 2021-06-01

## 2021-07-10 ENCOUNTER — HEALTH MAINTENANCE LETTER (OUTPATIENT)
Age: 51
End: 2021-07-10

## 2021-07-12 ENCOUNTER — TELEPHONE (OUTPATIENT)
Dept: FAMILY MEDICINE | Facility: CLINIC | Age: 51
End: 2021-07-12

## 2021-07-12 NOTE — TELEPHONE ENCOUNTER
Patient Quality Outreach      Summary:    Patient has the following on his problem list/HM:   Diabetes    Last A1C:  Lab Results   Component Value Date    A1C 5.1 03/04/2021    A1C 5.2 08/05/2020       Last LDL:    Lab Results   Component Value Date    LDL 51 08/05/2020       Is the patient on a Statin? Yes          Is the patient on Aspirin? Yes    Medications     HMG CoA Reductase Inhibitors     rosuvastatin (CRESTOR) 10 MG tablet       Salicylates     aspirin 81 MG EC tablet             Last three blood pressure readings:  BP Readings from Last 3 Encounters:   03/24/21 (!) 181/96   03/22/21 128/82   03/04/21 (!) 146/74            Tobacco Use      Smoking status: Former Smoker      Smokeless tobacco: Former User          Patient is due/failing the following:   A1C and BP Check    Type of outreach:    Phone, left message for patient/parent to call back.    Questions for provider review:    None                                                                                                                                     Archana Clay MA      Chart routed to Care Team.

## 2021-07-21 ENCOUNTER — OFFICE VISIT (OUTPATIENT)
Dept: FAMILY MEDICINE | Facility: CLINIC | Age: 51
End: 2021-07-21
Payer: COMMERCIAL

## 2021-07-21 VITALS
WEIGHT: 287.8 LBS | OXYGEN SATURATION: 98 % | DIASTOLIC BLOOD PRESSURE: 88 MMHG | SYSTOLIC BLOOD PRESSURE: 134 MMHG | BODY MASS INDEX: 42.5 KG/M2 | TEMPERATURE: 97.4 F | HEART RATE: 67 BPM | RESPIRATION RATE: 20 BRPM

## 2021-07-21 DIAGNOSIS — N41.0 ACUTE BACTERIAL PROSTATITIS: Primary | ICD-10-CM

## 2021-07-21 DIAGNOSIS — I10 HYPERTENSION GOAL BP (BLOOD PRESSURE) < 140/90: ICD-10-CM

## 2021-07-21 DIAGNOSIS — R30.0 DYSURIA: ICD-10-CM

## 2021-07-21 LAB
ALBUMIN UR-MCNC: NEGATIVE MG/DL
APPEARANCE UR: CLEAR
BILIRUB UR QL STRIP: NEGATIVE
COLOR UR AUTO: YELLOW
GLUCOSE UR STRIP-MCNC: NEGATIVE MG/DL
HGB UR QL STRIP: NEGATIVE
KETONES UR STRIP-MCNC: NEGATIVE MG/DL
LEUKOCYTE ESTERASE UR QL STRIP: NEGATIVE
NITRATE UR QL: NEGATIVE
PH UR STRIP: 7 [PH] (ref 5–7)
SP GR UR STRIP: 1.01 (ref 1–1.03)
UROBILINOGEN UR STRIP-ACNC: 0.2 E.U./DL

## 2021-07-21 PROCEDURE — 81003 URINALYSIS AUTO W/O SCOPE: CPT | Performed by: FAMILY MEDICINE

## 2021-07-21 PROCEDURE — 99214 OFFICE O/P EST MOD 30 MIN: CPT | Performed by: FAMILY MEDICINE

## 2021-07-21 RX ORDER — LEVOFLOXACIN 500 MG/1
500 TABLET, FILM COATED ORAL DAILY
Qty: 7 TABLET | Refills: 0 | Status: SHIPPED | OUTPATIENT
Start: 2021-07-21 | End: 2021-07-28

## 2021-07-21 NOTE — PROGRESS NOTES
Assessment & Plan     Lalito was seen today for uti.    Diagnoses and all orders for this visit:    Acute bacterial prostatitis  -     levofloxacin (LEVAQUIN) 500 MG tablet; Take 1 tablet (500 mg) by mouth daily for 7 days  -    If persistent, will refer to Urology.    Adult Urology Referral; Future    Dysuria  -     UA without Microscopic - lab collect    Hypertension goal BP (blood pressure) < 140/90, initially elevated.        -      Repeat BP at the end of the visit was within goal. Continue to monitor.       Patient education and Handout with home care instructions given. See AVS for details.      Return in about 1 week (around 7/28/2021), or if symptoms worsen or fail to improve.    Ana Pope MD  [facility] ROLAN Muller is a 50 year old who presents for the following health issues     HPI     Genitourinary - Male  Onset/Duration: x3 days   Description:   Dysuria (painful urination): YES  Hematuria (blood in urine): no  Frequency: YES  Waking at night to urinate: no  Hesitancy (delay in urine): YES- some  Retention (unable to empty): no  Decrease in urinary flow: no  Incontinence: no  Progression of Symptoms:  Slight worsened today  Accompanying Signs & Symptoms:  Fever: no  Back/Flank pain: YES  Urethral discharge: no  Testicle lumps/masses/pain: no  Nausea and/or vomiting: no  Abdominal pain: no  History:   History of frequent UTI s: no  History of kidney stones: no  History of hernias: no  Personal or Family history of Prostate problems: YES- unsure of history- he was adopted   Sexually active: YES  Precipitating or alleviating factors: None  Therapies tried and outcome: none      Blood pressure is elevated in the clinic today. States that he is in pain due to the above, 7/10 pain. Pain work him up this morning.   Has a known history of hypertension; currently on Lisinopril     Review of Systems   Constitutional, HEENT, cardiovascular, pulmonary, gi and gu systems are  negative, except as otherwise noted.      Objective    /88   Pulse 67   Temp 97.4  F (36.3  C) (Tympanic)   Resp 20   Wt 130.5 kg (287 lb 12.8 oz)   SpO2 98%   BMI 42.50 kg/m    Body mass index is 42.5 kg/m .  Physical Exam   GENERAL: healthy, alert and no distress  ABDOMEN: soft, nontender, no hepatosplenomegaly, no masses and bowel sounds normal  RECTAL (male): normal sphincter tone, no rectal masses and prostate boggy, tenderness present    DATA  Reviewed and discussed with patient prior to discharge.  Results for orders placed or performed in visit on 07/21/21   UA without Microscopic - lab collect     Status: Normal   Result Value Ref Range    Color Urine Yellow Colorless, Straw, Light Yellow, Yellow    Appearance Urine Clear Clear    Glucose Urine Negative Negative mg/dL    Bilirubin Urine Negative Negative    Ketones Urine Negative Negative mg/dL    Specific Gravity Urine 1.015 1.003 - 1.035    Blood Urine Negative Negative    pH Urine 7.0 5.0 - 7.0    Protein Albumin Urine Negative Negative mg/dL    Urobilinogen Urine 0.2 0.2, 1.0 E.U./dL    Nitrite Urine Negative Negative    Leukocyte Esterase Urine Negative Negative

## 2021-07-21 NOTE — PATIENT INSTRUCTIONS
Patient Education     Dysuria  Dysuria is when you have pain during urination. It is often described as a burning feeling. Learn more about this problem and how it can be treated.     Painful urination (dysuria) is often caused by a problem in the urinary tract.   What causes dysuria?  Possible causes include:    Infection with a bacteria or virus such as a urinary tract infection (UTI) or a sexually transmitted infection (STI)    Sensitivity or allergy to chemicals such as those found in lotions and other products    Prostate or bladder problems    Radiation therapy to the pelvic area  How is dysuria diagnosed?  Your healthcare provider will examine you. He or she will ask about your symptoms and health. After talking with you and doing a physical exam, your healthcare provider may know what is causing your dysuria. You will often have to give a urine sample. Tests of your urine (urinalysis) are done. A urinalysis may include:    Looking at the urine sample (visual exam)    Checking for substances (chemical exam)    Looking at a small amount of the urine under a microscope (microscopic exam)  Some parts of the urinalysis may be done in the provider's office and some in a lab. The urine sample may also be checked for bacteria and yeast (urine culture). Your healthcare provider will tell you more about these tests if they are needed.  How is dysuria treated?  Treatment depends on the cause. If you have a bacterial infection, you may need antibiotics. You may be given medicines to make it easier for you to urinate and help ease pain. Your healthcare provider can tell you more about your treatment options. If not treated, symptoms may get worse.  When to call your healthcare provider  Call the healthcare provider right away if you have any of the following:    Fever of  100.4  F ( 38 C) or higher, or as directed by your provider    No improvement after 3 days of treatment    Trouble urinating because of pain    New  or increased discharge from the vagina or penis    Rash or joint pain    Increased back or belly pain    Enlarged painful lymph nodes (lumps) in the groin  [employer] last reviewed this educational content on 4/1/2019 2000-2021 [employer]. All rights reserved. This information is not intended as a substitute for professional medical care. Always follow your healthcare professional's instructions.           Patient Education     Bacterial Prostatitis  The prostate gland is part of the male reproductive system. The gland sits just below the bladder. It surrounds the urethra. This is the tube that carries urine and semen out of the body. Bacterial prostatitis is an infection of the prostate. It makes the prostate painful and swollen. The problem often occurs suddenly. It can make you very sick. But it can be treated.      With a healthy prostate, urine flows easily through the urethra.       With a swollen prostate, the urethra narrows. It s harder for urine to go through.      Causes and symptoms  Bacterial prostatitis is due to infection with germs (bacteria). This infection makes the prostate swell. This squeezes the urethra, narrowing or blocking it. Symptoms may be severe. They can include:     Fever and chills    Low back pain    Having to urinate often    Pain with urination    A less forceful urine stream    Needing to strain to urinate    Inability to urinate  Treatment  The infection is treated with antibiotics. Take all of the medicine until it's gone, even if you start to feel better. If you don t, the infection may come back. And it may be harder to treat. Your healthcare provider may also suggest other care. This may include resting and drinking more fluid. Follow any instructions you are given.   Chronic bacterial prostatitis  Prostatitis can turn into a long-term (chronic) problem. In this case, the prostate stays swollen and inflamed despite treatment with antibiotics. One possible  cause is repeated infections. Symptoms include pain and burning with urination, and needing to urinate often. It may also cause lower belly or back pain. If you have this problem, your healthcare provider will talk with you about a treatment plan. Treatment is often taking antibiotics for 6 to 12 weeks.   [employer] last reviewed this educational content on 11/1/2019 2000-2021 [employer]. All rights reserved. This information is not intended as a substitute for professional medical care. Always follow your healthcare professional's instructions.

## 2021-07-23 ENCOUNTER — TELEPHONE (OUTPATIENT)
Dept: FAMILY MEDICINE | Facility: CLINIC | Age: 51
End: 2021-07-23

## 2021-07-23 NOTE — TELEPHONE ENCOUNTER
Reason for Call:  Other     Detailed comments: Patient was seen and given an antibiotic levofloxacin and he has been taking this medication as prescribed and noticed today that he has all the symptoms back that he was seen for and would like to discuss further treatment options    Phone Number Patient can be reached at: Home number on file [phone] (home)    Best Time:     Can we leave a detailed message on this number? YES    Call taken on 7/23/2021 at 11:28 AM by Destini Flowers

## 2021-07-23 NOTE — TELEPHONE ENCOUNTER
Patient notified of all below information and instructions, he voiced understanding and agreement.  Charlee Mcneal RN  [facility]

## 2021-07-23 NOTE — TELEPHONE ENCOUNTER
Spoke with patient he reports the first two days on levaquin he felt better, but today(day 3) his symptoms are back as when he first came in, mostly the back pain and bladder pain when it is full.    1. He is asking should he continue for the 7 day course?      If he is to continue this abx, what can he take for the pain        and discomfort?     RN advised tylenol or ibuprofen (with food)  2. what dose can he take of ibuprofen?    Please advise,  Charlee Mcneal RN  [facility]

## 2021-07-23 NOTE — TELEPHONE ENCOUNTER
Noted.   Continue to complete antibiotics. May take Ibuprofen as needed for pain relief. It will take about a week or so for the symptoms to resolve.   If symptoms worsen or if he develops a fever- recommend going to Urgent care or ER over the weekend for further evaluation.

## 2021-07-23 NOTE — TELEPHONE ENCOUNTER
VORB, patient take advil liquid-gel 600 mg tid.  Dr. Pope/Charlee Mcneal RN  [facility]

## 2021-07-26 NOTE — TELEPHONE ENCOUNTER
Called and left message for patient to call clinic back. He needs to schedule his diabetes check with labs.    Archana Clay MA

## 2021-07-27 DIAGNOSIS — I25.83 CORONARY ARTERY DISEASE DUE TO LIPID RICH PLAQUE: ICD-10-CM

## 2021-07-27 DIAGNOSIS — I10 ESSENTIAL HYPERTENSION: ICD-10-CM

## 2021-07-27 DIAGNOSIS — I25.10 CORONARY ARTERY DISEASE DUE TO LIPID RICH PLAQUE: ICD-10-CM

## 2021-07-27 RX ORDER — VERAPAMIL HYDROCHLORIDE 360 MG/1
CAPSULE, DELAYED RELEASE PELLETS ORAL
Qty: 90 CAPSULE | Refills: 3 | Status: SHIPPED | OUTPATIENT
Start: 2021-07-27 | End: 2021-08-03

## 2021-07-27 RX ORDER — ROSUVASTATIN CALCIUM 10 MG/1
TABLET, COATED ORAL
Qty: 90 TABLET | Refills: 3 | Status: SHIPPED | OUTPATIENT
Start: 2021-07-27 | End: 2021-08-03

## 2021-07-27 RX ORDER — LISINOPRIL 40 MG/1
TABLET ORAL
Qty: 90 TABLET | Refills: 3 | Status: SHIPPED | OUTPATIENT
Start: 2021-07-27 | End: 2021-08-03

## 2021-07-31 DIAGNOSIS — I25.10 CORONARY ARTERY DISEASE DUE TO LIPID RICH PLAQUE: ICD-10-CM

## 2021-07-31 DIAGNOSIS — I10 ESSENTIAL HYPERTENSION: ICD-10-CM

## 2021-07-31 DIAGNOSIS — I25.83 CORONARY ARTERY DISEASE DUE TO LIPID RICH PLAQUE: ICD-10-CM

## 2021-08-03 RX ORDER — ROSUVASTATIN CALCIUM 10 MG/1
TABLET, COATED ORAL
Qty: 90 TABLET | Refills: 3 | Status: SHIPPED | OUTPATIENT
Start: 2021-08-03 | End: 2022-10-31

## 2021-08-03 RX ORDER — LISINOPRIL 40 MG/1
TABLET ORAL
Qty: 90 TABLET | Refills: 3 | Status: SHIPPED | OUTPATIENT
Start: 2021-08-03 | End: 2022-10-31

## 2021-08-03 RX ORDER — VERAPAMIL HYDROCHLORIDE 360 MG/1
CAPSULE, DELAYED RELEASE PELLETS ORAL
Qty: 90 CAPSULE | Refills: 3 | Status: SHIPPED | OUTPATIENT
Start: 2021-08-03 | End: 2022-10-31

## 2021-09-04 ENCOUNTER — HEALTH MAINTENANCE LETTER (OUTPATIENT)
Age: 51
End: 2021-09-04

## 2021-10-14 ENCOUNTER — OFFICE VISIT (OUTPATIENT)
Dept: FAMILY MEDICINE | Facility: CLINIC | Age: 51
End: 2021-10-14
Payer: COMMERCIAL

## 2021-10-14 VITALS
TEMPERATURE: 97.9 F | HEIGHT: 69 IN | SYSTOLIC BLOOD PRESSURE: 138 MMHG | RESPIRATION RATE: 16 BRPM | DIASTOLIC BLOOD PRESSURE: 74 MMHG | HEART RATE: 80 BPM | BODY MASS INDEX: 42.65 KG/M2 | WEIGHT: 288 LBS

## 2021-10-14 DIAGNOSIS — J42 CHRONIC BRONCHITIS, UNSPECIFIED CHRONIC BRONCHITIS TYPE (H): ICD-10-CM

## 2021-10-14 DIAGNOSIS — K21.00 GASTROESOPHAGEAL REFLUX DISEASE WITH ESOPHAGITIS WITHOUT HEMORRHAGE: ICD-10-CM

## 2021-10-14 DIAGNOSIS — R07.9 CHEST PAIN, UNSPECIFIED TYPE: Primary | ICD-10-CM

## 2021-10-14 DIAGNOSIS — E11.8 TYPE 2 DIABETES MELLITUS WITH UNSPECIFIED COMPLICATIONS (H): ICD-10-CM

## 2021-10-14 PROBLEM — J44.9 COPD (CHRONIC OBSTRUCTIVE PULMONARY DISEASE) (H): Status: ACTIVE | Noted: 2021-10-14

## 2021-10-14 LAB
CHOLEST SERPL-MCNC: 131 MG/DL
CREAT UR-MCNC: 101 MG/DL
FASTING STATUS PATIENT QL REPORTED: NORMAL
HBA1C MFR BLD: 5.6 % (ref 0–5.6)
HDLC SERPL-MCNC: 49 MG/DL
LDLC SERPL CALC-MCNC: 57 MG/DL
MICROALBUMIN UR-MCNC: 14 MG/L
MICROALBUMIN/CREAT UR: 13.86 MG/G CR (ref 0–17)
NONHDLC SERPL-MCNC: 82 MG/DL
TRIGL SERPL-MCNC: 124 MG/DL

## 2021-10-14 PROCEDURE — 80061 LIPID PANEL: CPT | Performed by: NURSE PRACTITIONER

## 2021-10-14 PROCEDURE — 99215 OFFICE O/P EST HI 40 MIN: CPT | Performed by: NURSE PRACTITIONER

## 2021-10-14 PROCEDURE — 93000 ELECTROCARDIOGRAM COMPLETE: CPT | Performed by: NURSE PRACTITIONER

## 2021-10-14 PROCEDURE — 99207 PR FOOT EXAM NO CHARGE: CPT | Performed by: NURSE PRACTITIONER

## 2021-10-14 PROCEDURE — 83036 HEMOGLOBIN GLYCOSYLATED A1C: CPT | Performed by: NURSE PRACTITIONER

## 2021-10-14 PROCEDURE — 82043 UR ALBUMIN QUANTITATIVE: CPT | Performed by: NURSE PRACTITIONER

## 2021-10-14 PROCEDURE — 36415 COLL VENOUS BLD VENIPUNCTURE: CPT | Performed by: NURSE PRACTITIONER

## 2021-10-14 RX ORDER — PANTOPRAZOLE SODIUM 20 MG/1
20 TABLET, DELAYED RELEASE ORAL DAILY
Qty: 90 TABLET | Refills: 0 | Status: SHIPPED | OUTPATIENT
Start: 2021-10-14 | End: 2022-01-10

## 2021-10-14 ASSESSMENT — MIFFLIN-ST. JEOR: SCORE: 2141.74

## 2021-10-14 NOTE — PATIENT INSTRUCTIONS
Patient Education     GERD (Adult)    The esophagus is a tube that carries food from the mouth to the stomach. A valve (the LES, lower esophageal sphincter) at the lower end of the esophagus prevents stomach acid from flowing upward. When this valve doesn't work properly, stomach contents may repeatedly flow back up (reflux) into the esophagus. This is called gastroesophageal reflux disease (GERD). GERD can irritate the esophagus. It can cause problems with pain, swallowing or breathing. In severe cases, GERD can cause recurrent pneumonia (from aspiration or breathing in particles) or other serious problems.  Symptoms of reflux include burning, pressure or sharp pain in the upper abdomen or mid to lower chest. The pain can spread to the neck, back, or shoulder. There may be belching, an acid taste in the back of the throat, chronic cough, or sore throat, or hoarseness. GERD symptoms often occur during the day after a big meal. They can also occur at night when lying down.   Home care  Lifestyle changes can help reduce symptoms. If needed, your healthcare provider may prescribe medicines. Symptoms often improve with treatment, but if treatment is stopped, the symptoms often return after a few months. So most persons with GERD will need to continue treatment or get treatment on and off.  Lifestyle changes    Limit or avoid fatty, fried, and spicy foods, as well as coffee, chocolate, mint, and foods with high acid content such as tomatoes and citrus fruit and juices (orange, grapefruit, lemon).    Don t eat large meals, especially at night. Frequent, smaller meals are best. Don't lie down right after eating. And don t eat anything 3 hours before going to bed.    Don't drink alcohol or smoke. As much as possible, stay away from second hand smoke.    If you are overweight, losing weight will reduce symptoms.     Don't wear tight clothing around your stomach area.    If your symptoms occur during sleep, use a foam wedge  "to elevate your upper body (not just your head.) Or, place 4\" blocks under the head of your bed. Or use 2 bed risers under your bedframe.  Medicines  If needed, medicines can help relieve the symptoms of GERD and prevent damage to the esophagus. Discuss a medicine plan with your healthcare provider. This may include one or more of the following medicines:    Antacids to help neutralize the normal acids in your stomach.    Acid blockers (Histamine or H2 blockers) to decrease acid production.    Acid inhibitors (proton pump inhibitors PPIs) to decrease acid production in a different way than the blockers. They may work better, but can take a little longer to take effect.  Take an antacid 30 to 60 minutes after eating and at bedtime, but not at the same time as an acid blocker.  Try not to take medicines such as ibuprofen and aspirin. If you are taking aspirin for your heart or other medical reasons, talk to your healthcare provider about stopping it.  Follow-up care  Follow up with your healthcare provider or as advised by our staff.  When to seek medical advice  Call your healthcare provider if any of the following occur:    Stomach pain gets worse or moves to the lower right abdomen (appendix area)    Chest pain appears or gets worse, or spreads to the back, neck, shoulder, or arm    An over-the-counter trial of medicine doesn't relieve your symptoms    Weight loss that can't be explained    Trouble or pain swallowing    Frequent vomiting (can t keep down liquids)    Blood in the stool or vomit (red or black in color)    Feeling weak or dizzy    Fever of 100.4 F (38 C) or higher, or as directed by your healthcare provider  [employer] last reviewed this educational content on 3/1/2018    0328-8144 [employer]. All rights reserved. This information is not intended as a substitute for professional medical care. Always follow your healthcare professional's instructions.           Patient Education "     Medicines for GERD  Gastroesophageal reflux disease (GERD) can be treated with medicine. This may be done with a medicine you can buy over the counter. Or with a medicine that your healthcare provider has to prescribe. In some cases, both types may be used. Your provider will tell you what is best for your symptoms.   Antacids  Antacids work to weaken the acid in your stomach. They can give you quick relief. You can buy many of them with no prescription. Antacids can be high in sodium. This may be a problem if you have high blood pressure. Some antacids also have aluminum. This should be avoided if you have long-term (chronic) kidney disease. So check with your provider first. Take antacids only when you need to, as advised by your provider.   Side effects: Constipation, diarrhea. If you take too much medicine, it can cause calcium to build up.    H-2 blockers  These cause the stomach to make less acid. They are often used on demand as symptoms occur. And they are used daily to keep symptoms away. Your provider may prescribe them if antacids don t work for you. You can buy some of them over the counter. These come in a lower dosage.   Side effects: Confusion in older adults.   Proton-pump inhibitors  These also cause the stomach to make less acid. They reduce stomach acid more than H-2 blockers. They may be used for a short time, or longer to treat certain conditions. You can buy some of them over the counter. Or your provider may prescribe them. They help control GERD symptoms.   Side effects: Belly pain, diarrhea, upset stomach. Possible other side effects linked to long-term use and high doses.   Prokinetics  These medicines affect the movement of the digestive tract. They may be advised if your stomach is emptying too slowly. But in most cases they are not advised for treating GERD.   Side effects:Tiredness, depression, anxiety, problems with physical movement, belly cramps, constipation, diarrhea, a jittery  feeling.   Medicines to stay away from  Don t take aspirin without your healthcare provider s approval. And don t take a nonsteroidal anti-inflammatory drug (NSAID), such as ibuprofen. These reduce the protective lining of your stomach. This can lead to more GERD symptoms. Check with your provider or pharmacist before taking a new medicine.   [employer] last reviewed this educational content on 6/1/2019 2000-2021 [employer]. All rights reserved. This information is not intended as a substitute for professional medical care. Always follow your healthcare professional's instructions.           Patient Education     What Is GERD?  If you often have a painful burning feeling in your chest after you eat, you may have gastroesophageal reflux disease (GERD). Heartburn that keeps coming back is a classic symptom of GERD. But you may have other symptoms as well. A GERD diagnosis is made only after a complete evaluation by your healthcare provider.   Note: Chest pain may also be caused by heart problems. Be sure to have all chest pain evaluated by a healthcare provider.   When you have a reflux problem  After you eat, food travels from your mouth down the esophagus to your stomach. Along the way, food passes through a one-way valve called the lower esophageal sphincter (LES). The LES sits at the opening to your stomach. Normally the LES opens when you swallow. It lets food enter the stomach, then closes quickly. With GERD, the LES doesn t work normally. It lets food and stomach acid flow back (reflux) into the esophagus.      With GERD, the weak LES allows food and fluids to travel back, or reflux, into the esophagus.    Some common symptoms    Frequent heartburn    Frequent burping    Sour-or acid-tasting fluid backing up into your mouth    Symptoms that get worse after you eat, bend over, or lie down    Trouble swallowing or pain when swallowing    A dry, long-term (chronic) cough    Upset stomach (nausea)  or vomiting    Relieving your discomfort  You and your healthcare provider can work together to find the treatment options that best ease your symptoms. These may include lifestyle changes, medicine, and possibly surgery.   Many people find their GERD symptoms decrease when they eat small frequent meals instead of 3 large ones. Reducing the amount of fatty foods in your diet will also help.    The following foods tend to cause problems for people diagnosed with GERD:     Tomatoes and tomato products    Alcohol    Coffee    Peppermint    Greasy or spicy foods    Acidic foods such as citrus  To ease your symptoms, raise the head of your bed 4 to 6 inches. Don't eat anything within 2 to 3 hours of laying down.   Talk with your provider if you don t understand how to make the dietary changes needed to control your GERD symptoms. Your provider can refer you to a nutritionist.   [employer] last reviewed this educational content on 6/1/2019 2000-2021 [employer]. All rights reserved. This information is not intended as a substitute for professional medical care. Always follow your healthcare professional's instructions.

## 2021-10-14 NOTE — PROGRESS NOTES
"    Assessment & Plan     (R07.9) Chest pain, unspecified type  (primary encounter diagnosis)  Comment: Ekg unchanged   Plan: EKG 12-lead complete w/read - Clinics      (E11.8) Type 2 diabetes mellitus with unspecified complications (H)  Comment:  Controlled no change in treatment plan  Plan: Hemoglobin A1c, Lipid panel reflex to direct         LDL Fasting, Albumin Random Urine Quantitative         with Creat Ratio, FOOT EXAM      (J42) Chronic bronchitis, unspecified chronic bronchitis type (H)  Comment:  Controlled no change in treatment plan  Plan:     (K21.00) Gastroesophageal reflux disease with esophagitis without hemorrhage  Comment: Patient given Maalox and lidocaine with improvement of symptoms.  Most representative of GERD will have him start Protonix if symptoms do not subside patient to follow-up   Plan: pantoprazole (PROTONIX) 20 MG EC tablet          60 minutes spent on the date of the encounter doing chart review, review of outside records, review of test results, interpretation of tests, patient visit and documentation   {Provider  Link to [facility] Help Grid :15       BMI:   Estimated body mass index is 42.53 kg/m  as calculated from the following:    Height as of this encounter: 1.753 m (5' 9\").    Weight as of this encounter: 130.6 kg (288 lb).   Weight management plan: Discussed healthy diet and exercise guidelines    See Patient Instructions    No follow-ups on file.    TERESA Romero CNP  [facility]    Sridhar Muller is a 51 year old who presents for the following health issues     HPI     Chest Pain  Onset/Duration: 1.5 weeks  Description:   Location: center of chest  Character: achey and burning  Radiation: epigastrium  Duration: constant   Intensity: moderate  Progression of Symptoms: waxing and waning  Accompanying Signs & Symptoms:  Shortness of breath: no  Sweating: no  Nausea/vomiting: no  Lightheadedness: no  Palpitations: no  Fever/Chills: no  Cough: " "no           Heartburn: YES  History:   Family history of heart disease: unknown  Tobacco use: no  Previous similar symptoms: no   Precipitating factors:   Worse with exertion: no  Worse with deep breaths: no           Related to eating: possibly           Better with burping: no  Alleviating factors: nothing  Therapies tried and outcome: pepcid        Review of Systems   Constitutional, HEENT, cardiovascular, pulmonary, gi and gu systems are negative, except as otherwise noted.      Objective    BP (!) 152/78 (BP Location: Right arm, Cuff Size: Adult Large)   Pulse 80   Temp 97.9  F (36.6  C) (Tympanic)   Resp 16   Ht 1.753 m (5' 9\")   Wt 130.6 kg (288 lb)   BMI 42.53 kg/m    Body mass index is 42.53 kg/m .  Physical Exam   GENERAL: healthy, alert and no distress  EYES: Eyes grossly normal to inspection, PERRL and conjunctivae and sclerae normal  HENT: ear canals and TM's normal, nose and mouth without ulcers or lesions  NECK: no adenopathy, no asymmetry, masses, or scars and thyroid normal to palpation  RESP: lungs clear to auscultation - no rales, rhonchi or wheezes  CV: regular rate and rhythm, normal S1 S2, no S3 or S4, no murmur, click or rub, no peripheral edema and peripheral pulses strong  ABDOMEN: soft, nontender, no hepatosplenomegaly, no masses and bowel sounds normal  MS: no gross musculoskeletal defects noted, no edema  SKIN: no suspicious lesions or rashes  NEURO: Normal strength and tone, mentation intact and speech normal  PSYCH: mentation appears normal, affect normal/bright    Results for orders placed or performed in visit on 10/14/21   Hemoglobin A1c     Status: Normal   Result Value Ref Range    Hemoglobin A1C 5.6 0.0 - 5.6 %               "

## 2021-10-18 ENCOUNTER — TELEPHONE (OUTPATIENT)
Dept: FAMILY MEDICINE | Facility: CLINIC | Age: 51
End: 2021-10-18

## 2021-10-18 NOTE — TELEPHONE ENCOUNTER
Spoke with Renzo, he has only had one dose because the pharmacy did not have it in stock. He will take this for 5-7 more days and see how it is working for him. If no improvement he will send a TeeBeeDee message. He is also wondering when you would consider a scope if this medication does not work?

## 2021-10-18 NOTE — TELEPHONE ENCOUNTER
Reason for call:  Patient reporting a symptom    Symptom or request: Pt saw JEROME Berry last week for acid reflux and took his first Pantoprazole last night and it did not help his acid reflux.  Please call patient and advise.   [facility]     Duration (how long have symptoms been present): ongoing    Have you been treated for this before? Yes    Additional comments:     Phone Number patient can be reached at:  Home number on file [phone] (home)    Best Time:  any    Can we leave a detailed message on this number:  YES    Call taken on 10/18/2021 at 1:36 PM by Mirela Marina

## 2021-10-20 ENCOUNTER — MYC MEDICAL ADVICE (OUTPATIENT)
Dept: FAMILY MEDICINE | Facility: CLINIC | Age: 51
End: 2021-10-20

## 2021-10-25 ENCOUNTER — ANCILLARY PROCEDURE (OUTPATIENT)
Dept: GENERAL RADIOLOGY | Facility: CLINIC | Age: 51
End: 2021-10-25
Attending: NURSE PRACTITIONER
Payer: COMMERCIAL

## 2021-10-25 ENCOUNTER — OFFICE VISIT (OUTPATIENT)
Dept: FAMILY MEDICINE | Facility: CLINIC | Age: 51
End: 2021-10-25
Payer: COMMERCIAL

## 2021-10-25 VITALS
TEMPERATURE: 99.2 F | DIASTOLIC BLOOD PRESSURE: 86 MMHG | SYSTOLIC BLOOD PRESSURE: 154 MMHG | RESPIRATION RATE: 16 BRPM | HEART RATE: 70 BPM | OXYGEN SATURATION: 98 % | HEIGHT: 69 IN | BODY MASS INDEX: 42.21 KG/M2 | WEIGHT: 285 LBS

## 2021-10-25 DIAGNOSIS — R07.89 CHEST HEAVINESS: ICD-10-CM

## 2021-10-25 DIAGNOSIS — J20.9 ACUTE BRONCHITIS WITH SYMPTOMS > 10 DAYS: ICD-10-CM

## 2021-10-25 DIAGNOSIS — R05.3 CHRONIC COUGH: ICD-10-CM

## 2021-10-25 DIAGNOSIS — R05.9 COUGH: Primary | ICD-10-CM

## 2021-10-25 DIAGNOSIS — M54.6 ACUTE LEFT-SIDED THORACIC BACK PAIN: ICD-10-CM

## 2021-10-25 DIAGNOSIS — R05.9 COUGH: ICD-10-CM

## 2021-10-25 DIAGNOSIS — I25.10 CORONARY ARTERY DISEASE DUE TO CALCIFIED CORONARY LESION: ICD-10-CM

## 2021-10-25 DIAGNOSIS — K21.01 GASTROESOPHAGEAL REFLUX DISEASE WITH ESOPHAGITIS AND HEMORRHAGE: ICD-10-CM

## 2021-10-25 DIAGNOSIS — I25.84 CORONARY ARTERY DISEASE DUE TO CALCIFIED CORONARY LESION: ICD-10-CM

## 2021-10-25 PROCEDURE — 99215 OFFICE O/P EST HI 40 MIN: CPT | Performed by: NURSE PRACTITIONER

## 2021-10-25 PROCEDURE — 71046 X-RAY EXAM CHEST 2 VIEWS: CPT | Performed by: RADIOLOGY

## 2021-10-25 PROCEDURE — 72080 X-RAY EXAM THORACOLMB 2/> VW: CPT | Performed by: RADIOLOGY

## 2021-10-25 PROCEDURE — 93000 ELECTROCARDIOGRAM COMPLETE: CPT | Performed by: NURSE PRACTITIONER

## 2021-10-25 RX ORDER — PREDNISONE 20 MG/1
TABLET ORAL
Qty: 10 TABLET | Refills: 0 | Status: SHIPPED | OUTPATIENT
Start: 2021-10-25 | End: 2022-01-12

## 2021-10-25 RX ORDER — CODEINE PHOSPHATE AND GUAIFENESIN 10; 100 MG/5ML; MG/5ML
1-2 SOLUTION ORAL EVERY 12 HOURS PRN
Qty: 180 ML | Refills: 0 | Status: SHIPPED | OUTPATIENT
Start: 2021-10-25 | End: 2022-01-12

## 2021-10-25 RX ORDER — CYCLOBENZAPRINE HCL 5 MG
5-10 TABLET ORAL 3 TIMES DAILY PRN
Qty: 42 TABLET | Refills: 1 | Status: SHIPPED | OUTPATIENT
Start: 2021-10-25 | End: 2022-02-14

## 2021-10-25 RX ORDER — ALBUTEROL SULFATE 90 UG/1
2 AEROSOL, METERED RESPIRATORY (INHALATION) EVERY 6 HOURS PRN
Qty: 6.7 G | Refills: 0 | Status: SHIPPED | OUTPATIENT
Start: 2021-10-25 | End: 2022-01-12

## 2021-10-25 ASSESSMENT — MIFFLIN-ST. JEOR: SCORE: 2128.13

## 2021-10-25 NOTE — PROGRESS NOTES
Assessment & Plan     (R05.9) Cough  (primary encounter diagnosis)  Comment: Chest xray negative   Plan: XR Chest 2 Views, guaiFENesin-codeine         (ROBITUSSIN AC) 100-10 MG/5ML solution            (M54.6) Acute left-sided thoracic back pain  Comment: Xr negative recommend physical therapy   Plan: XR Thoracic Lumbar Spine 2 Views,         cyclobenzaprine (FLEXERIL) 5 MG tablet,         Physical Therapy Referral            (K21.01) Gastroesophageal reflux disease with esophagitis and hemorrhage  Comment: recommend upper GI   Plan: Adult Gastro Ref - Procedure Only      (J20.9) Acute bronchitis with symptoms > 10 days  Comment: Will attempt inhaler   Plan: predniSONE (DELTASONE) 20 MG tablet, albuterol         (PROAIR HFA/PROVENTIL HFA/VENTOLIN HFA) 108 (90        Base) MCG/ACT inhaler           (R07.89) Chest heaviness  Comment: EKG normal   Plan: EKG 12-lead complete w/read - Clinics, CT Chest        w/o Contrast      (R05.3) Chronic cough  Comment: unresloving cough will obtain CT scan   Plan: CT Chest w/o Contrast           55 minutes spent on the date of the encounter doing chart review, history and exam, documentation and further activities per the note      See Patient Instructions    Return in about 1 week (around 11/1/2021), or if symptoms worsen or fail to improve.    TERESA Romero CNP  [facility]    Sridhar Muller is a 51 year old who presents for the following health issues     HPI     Patient would like to follow up with his cough. He is still coughing at night and it is keeping him awake at night.     Patient states that sometimes his chest feels heavy. This has been going on for about three weeks.     He states that sometimes he feels like gagging. It happens randomly.    His left armpit was sore to the touch this morning.    Back Pain  Onset/Duration: 3 weeks  Description:   Location of pain: upper back bilateral  Character of pain: dull ache  Pain radiation:  "none  New numbness or weakness in legs, not attributed to pain: no   Intensity: moderate  Progression of Symptoms: same  History:   Specific cause: none  Pain interferes with job:  no   History of back problems: no prior back problems  Any previous MRI or X-rays: None  Sees a specialist for back pain: No  Alleviating factors:   Improved by: none    Precipitating factors:  Worsened by: exercise  Therapies tried and outcome: none    Accompanying Signs & Symptoms:  Risk of Fracture: None  Risk of Cauda Equina: None  Risk of Infection: None  Risk of Cancer: None  Risk of Ankylosing Spondylitis: Onset at age <35, male, AND morning back stiffness no          Review of Systems   Constitutional, HEENT, cardiovascular, pulmonary, gi and gu systems are negative, except as otherwise noted.      Objective    BP (!) 154/86 (BP Location: Right arm, Cuff Size: Adult Large)   Pulse 70   Temp 99.2  F (37.3  C) (Tympanic)   Resp 16   Ht 1.753 m (5' 9\")   Wt 129.3 kg (285 lb)   SpO2 98%   BMI 42.09 kg/m    There is no height or weight on file to calculate BMI.  Physical Exam   GENERAL: healthy, alert and no distress  EYES: Eyes grossly normal to inspection, PERRL and conjunctivae and sclerae normal  HENT: ear canals and TM's normal, nose and mouth without ulcers or lesions  NECK: no adenopathy, no asymmetry, masses, or scars and thyroid normal to palpation  RESP: lungs clear to auscultation - no rales, rhonchi or wheezes  CV: regular rate and rhythm, normal S1 S2, no S3 or S4, no murmur, click or rub, no peripheral edema and peripheral pulses strong  ABDOMEN: soft, nontender, no hepatosplenomegaly, no masses and bowel sounds normal  MS: no gross musculoskeletal defects noted, no edema  SKIN: no suspicious lesions or rashes  NEURO: Normal strength and tone, mentation intact and speech normal  PSYCH: mentation appears normal, affect normal/bright    Tender:  lumbar facet joints, left para lumbar muscles, right para lumbar " muscles  Non-tender:  thoracic spinous processes, thoracic facet joints, left parathoracic muscles, right parathoracic muscles, left SI joint, right SI joint, left sciatic notch, right sciatic notch  Range of Motion:  left lateral thoracic bending   full, right lateral thoracic bending  full, left thoracic rotation  full, right thoracic rotation  full, lumbar flexion  full, lumbar extension  decreased, left lateral lumbar bending  decreased, right lateral lumbar bending  decreased, left lateral lumbar rotation  decreased, right lateral lumbar rotation  decreased  Strength:  able to heel walk  Special tests:  negative straight leg raises    Hip Exam: Hip ROM full    X-ray reviewed and interpreted by myself in office no acute findings will await final radiology report    Results for orders placed or performed in visit on 10/25/21   XR Chest 2 Views     Status: None    Narrative    CHEST TWO VIEWS 10/25/2021 3:58 PM     HISTORY: Cough.    COMPARISON: September 9, 2019.       Impression    IMPRESSION: There are no acute infiltrates. The cardiac silhouette is  not enlarged. Pulmonary vasculature is unremarkable.    CHON NAJERA MD         SYSTEM ID:  [id number]   Results for orders placed or performed in visit on 10/25/21   XR Thoracic Lumbar Spine 2 Views     Status: None    Narrative    THORACIC LUMBAR SPINE TWO VIEWS October 25, 2021 3:58 PM     HISTORY: Thoracic sprain. Acute left-sided thoracic back pain.    COMPARISON: None.      Impression    IMPRESSION: No fracture is identified. Multilevel mild degenerative  disc disease. Soft tissues are unremarkable.    LEDA STAUFFER MD         SYSTEM ID:  [email or web address]

## 2021-10-27 ENCOUNTER — LAB (OUTPATIENT)
Dept: LAB | Facility: CLINIC | Age: 51
End: 2021-10-27
Payer: COMMERCIAL

## 2021-10-27 DIAGNOSIS — Z11.59 ENCOUNTER FOR SCREENING FOR OTHER VIRAL DISEASES: ICD-10-CM

## 2021-10-27 PROCEDURE — U0003 INFECTIOUS AGENT DETECTION BY NUCLEIC ACID (DNA OR RNA); SEVERE ACUTE RESPIRATORY SYNDROME CORONAVIRUS 2 (SARS-COV-2) (CORONAVIRUS DISEASE [COVID-19]), AMPLIFIED PROBE TECHNIQUE, MAKING USE OF HIGH THROUGHPUT TECHNOLOGIES AS DESCRIBED BY CMS-2020-01-R: HCPCS

## 2021-10-27 PROCEDURE — U0005 INFEC AGEN DETEC AMPLI PROBE: HCPCS

## 2021-10-28 ENCOUNTER — ANESTHESIA EVENT (OUTPATIENT)
Dept: GASTROENTEROLOGY | Facility: CLINIC | Age: 51
End: 2021-10-28
Payer: COMMERCIAL

## 2021-10-28 LAB — SARS-COV-2 RNA RESP QL NAA+PROBE: NEGATIVE

## 2021-10-28 ASSESSMENT — COPD QUESTIONNAIRES: COPD: 1

## 2021-10-28 NOTE — ANESTHESIA PREPROCEDURE EVALUATION
Anesthesia Pre-Procedure Evaluation    Patient: Lalito Weathers   MRN: [id number] : 1970        Preoperative Diagnosis: Gastroesophageal reflux disease with esophagitis and hemorrhage [K21.01]    Procedure : Procedure(s):  ESOPHAGOGASTRODUODENOSCOPY (EGD)          No past medical history on file.   Past Surgical History:   Procedure Laterality Date     COLONOSCOPY N/A 2019    Procedure: COLONOSCOPY, WITH POLYPECTOMY AND BIOPSY;  Surgeon: Herman Champagne MD;  Location: [facility]      Allergies   Allergen Reactions     Vasquez Flavor      Statin Drugs [Hmg-Coa-R Inhibitors]      Zantac [Ranitidine]       Social History     Tobacco Use     Smoking status: Former Smoker     Smokeless tobacco: Former User   Substance Use Topics     Alcohol use: Yes     Comment: social      Wt Readings from Last 1 Encounters:   10/25/21 129.3 kg (285 lb)        Anesthesia Evaluation   Pt has had prior anesthetic. Type: MAC.    No history of anesthetic complications       ROS/MED HX  ENT/Pulmonary: Comment: Chronic cough    (+) GRACY risk factors, hypertension, obese, tobacco use, Past use, COPD,     Neurologic:  - neg neurologic ROS     Cardiovascular: Comment: palpitations    (+) Dyslipidemia hypertension-----dysrhythmias, PVCs, Irregular Heartbeat/Palpitations,     METS/Exercise Tolerance:     Hematologic:  - neg hematologic  ROS     Musculoskeletal:  - neg musculoskeletal ROS     GI/Hepatic:     (+) GERD, Asymptomatic on medication,     Renal/Genitourinary:  - neg Renal ROS     Endo: Comment: Morbid obesity    (+) type II DM, Last HgA1c: 5, Obesity,     Psychiatric/Substance Use:  - neg psychiatric ROS     Infectious Disease:  - neg infectious disease ROS     Malignancy:  - neg malignancy ROS     Other:  - neg other ROS          Physical Exam    Airway        Mallampati: III   TM distance: > 3 FB   Neck ROM: full   Mouth opening: > 3 cm    Respiratory Devices and Support         Dental  no notable dental history          Cardiovascular   cardiovascular exam normal          Pulmonary   pulmonary exam normal                OUTSIDE LABS:  CBC:   Lab Results   Component Value Date    WBC 7.1 08/05/2020    WBC 8.1 07/04/2019    HGB 13.4 08/05/2020    HGB 14.2 07/04/2019    HCT 40.6 08/05/2020    HCT 41.9 07/04/2019     08/05/2020     07/04/2019     BMP:   Lab Results   Component Value Date     03/04/2021     08/05/2020    POTASSIUM 3.6 03/24/2021    POTASSIUM 3.8 03/04/2021    CHLORIDE 107 03/04/2021    CHLORIDE 107 08/05/2020    CO2 27 03/04/2021    CO2 29 08/05/2020    BUN 20 03/04/2021    BUN 18 08/05/2020    CR 1.09 03/04/2021    CR 0.95 08/05/2020     (H) 03/04/2021    GLC 92 08/05/2020     COAGS: No results found for: PTT, INR, FIBR  POC:   Lab Results   Component Value Date    BGM 86 07/04/2019     HEPATIC:   Lab Results   Component Value Date    ALBUMIN 4.3 08/05/2020    PROTTOTAL 7.8 08/05/2020    ALT 59 08/05/2020    AST 24 08/05/2020    ALKPHOS 71 08/05/2020    BILITOTAL 0.6 08/05/2020     OTHER:   Lab Results   Component Value Date    A1C 5.6 10/14/2021    HERMELINDA 9.2 03/04/2021    TSH 0.99 07/04/2019       Anesthesia Plan    ASA Status:  3   NPO Status:  NPO Appropriate    Anesthesia Type: General.      Maintenance: Balanced.        Consents    Anesthesia Plan(s) and associated risks, benefits, and realistic alternatives discussed. Questions answered and patient/representative(s) expressed understanding.     - Discussed with:  Patient         Postoperative Care            Comments:                Boris Hagan, CRNA, APRN CRNA

## 2021-10-29 ENCOUNTER — HOSPITAL ENCOUNTER (OUTPATIENT)
Facility: CLINIC | Age: 51
Discharge: HOME | End: 2021-10-29
Attending: SURGERY | Admitting: SURGERY
Payer: COMMERCIAL

## 2021-10-29 ENCOUNTER — ANESTHESIA (OUTPATIENT)
Dept: GASTROENTEROLOGY | Facility: CLINIC | Age: 51
End: 2021-10-29
Payer: COMMERCIAL

## 2021-10-29 ENCOUNTER — HOSPITAL ENCOUNTER (OUTPATIENT)
Dept: CT IMAGING | Facility: CLINIC | Age: 51
End: 2021-10-29
Attending: NURSE PRACTITIONER
Payer: COMMERCIAL

## 2021-10-29 VITALS
OXYGEN SATURATION: 94 % | HEART RATE: 65 BPM | BODY MASS INDEX: 40.94 KG/M2 | RESPIRATION RATE: 18 BRPM | WEIGHT: 286 LBS | DIASTOLIC BLOOD PRESSURE: 79 MMHG | TEMPERATURE: 99.5 F | HEIGHT: 70 IN | SYSTOLIC BLOOD PRESSURE: 131 MMHG

## 2021-10-29 DIAGNOSIS — R07.89 CHEST HEAVINESS: ICD-10-CM

## 2021-10-29 DIAGNOSIS — R05.3 CHRONIC COUGH: ICD-10-CM

## 2021-10-29 LAB
GLUCOSE BLDC GLUCOMTR-MCNC: 120 MG/DL (ref 70–99)
UPPER GI ENDOSCOPY: NORMAL

## 2021-10-29 PROCEDURE — 43239 EGD BIOPSY SINGLE/MULTIPLE: CPT | Performed by: SURGERY

## 2021-10-29 PROCEDURE — 250N000009 HC RX 250: Performed by: SURGERY

## 2021-10-29 PROCEDURE — 370N000017 HC ANESTHESIA TECHNICAL FEE, PER MIN: Performed by: SURGERY

## 2021-10-29 PROCEDURE — 88305 TISSUE EXAM BY PATHOLOGIST: CPT | Mod: TC | Performed by: SURGERY

## 2021-10-29 PROCEDURE — 71250 CT THORAX DX C-: CPT

## 2021-10-29 PROCEDURE — 250N000009 HC RX 250: Performed by: NURSE ANESTHETIST, CERTIFIED REGISTERED

## 2021-10-29 PROCEDURE — 258N000003 HC RX IP 258 OP 636: Performed by: SURGERY

## 2021-10-29 PROCEDURE — 82962 GLUCOSE BLOOD TEST: CPT

## 2021-10-29 PROCEDURE — 250N000011 HC RX IP 250 OP 636: Performed by: NURSE ANESTHETIST, CERTIFIED REGISTERED

## 2021-10-29 RX ORDER — NALOXONE HYDROCHLORIDE 0.4 MG/ML
0.4 INJECTION, SOLUTION INTRAMUSCULAR; INTRAVENOUS; SUBCUTANEOUS
Status: CANCELLED | OUTPATIENT
Start: 2021-10-29

## 2021-10-29 RX ORDER — PROPOFOL 10 MG/ML
INJECTION, EMULSION INTRAVENOUS CONTINUOUS PRN
Status: DISCONTINUED | OUTPATIENT
Start: 2021-10-29 | End: 2021-10-29

## 2021-10-29 RX ORDER — KETAMINE HYDROCHLORIDE 10 MG/ML
INJECTION, SOLUTION INTRAMUSCULAR; INTRAVENOUS PRN
Status: DISCONTINUED | OUTPATIENT
Start: 2021-10-29 | End: 2021-10-29

## 2021-10-29 RX ORDER — NALOXONE HYDROCHLORIDE 0.4 MG/ML
0.2 INJECTION, SOLUTION INTRAMUSCULAR; INTRAVENOUS; SUBCUTANEOUS
Status: CANCELLED | OUTPATIENT
Start: 2021-10-29

## 2021-10-29 RX ORDER — ONDANSETRON 2 MG/ML
4 INJECTION INTRAMUSCULAR; INTRAVENOUS
Status: DISCONTINUED | OUTPATIENT
Start: 2021-10-29 | End: 2021-10-29 | Stop reason: HOSPADM

## 2021-10-29 RX ORDER — LIDOCAINE 40 MG/G
CREAM TOPICAL
Status: DISCONTINUED | OUTPATIENT
Start: 2021-10-29 | End: 2021-10-29 | Stop reason: HOSPADM

## 2021-10-29 RX ORDER — PROPOFOL 10 MG/ML
INJECTION, EMULSION INTRAVENOUS PRN
Status: DISCONTINUED | OUTPATIENT
Start: 2021-10-29 | End: 2021-10-29

## 2021-10-29 RX ORDER — SODIUM CHLORIDE, SODIUM LACTATE, POTASSIUM CHLORIDE, CALCIUM CHLORIDE 600; 310; 30; 20 MG/100ML; MG/100ML; MG/100ML; MG/100ML
INJECTION, SOLUTION INTRAVENOUS CONTINUOUS
Status: DISCONTINUED | OUTPATIENT
Start: 2021-10-29 | End: 2021-10-29 | Stop reason: HOSPADM

## 2021-10-29 RX ORDER — FLUMAZENIL 0.1 MG/ML
0.2 INJECTION, SOLUTION INTRAVENOUS
Status: CANCELLED | OUTPATIENT
Start: 2021-10-29 | End: 2021-10-29

## 2021-10-29 RX ADMIN — LIDOCAINE HYDROCHLORIDE 0.1 ML: 10 INJECTION, SOLUTION EPIDURAL; INFILTRATION; INTRACAUDAL; PERINEURAL at 10:38

## 2021-10-29 RX ADMIN — TOPICAL ANESTHETIC 1 SPRAY: 200 SPRAY DENTAL; PERIODONTAL at 10:46

## 2021-10-29 RX ADMIN — SODIUM CHLORIDE, POTASSIUM CHLORIDE, SODIUM LACTATE AND CALCIUM CHLORIDE 1000 ML: 600; 310; 30; 20 INJECTION, SOLUTION INTRAVENOUS at 10:37

## 2021-10-29 RX ADMIN — PROPOFOL 100 MG: 10 INJECTION, EMULSION INTRAVENOUS at 10:49

## 2021-10-29 RX ADMIN — PROPOFOL 200 MCG/KG/MIN: 10 INJECTION, EMULSION INTRAVENOUS at 10:48

## 2021-10-29 RX ADMIN — KETAMINE HYDROCHLORIDE 20 MG: 10 INJECTION INTRAMUSCULAR; INTRAVENOUS at 10:54

## 2021-10-29 ASSESSMENT — MIFFLIN-ST. JEOR: SCORE: 2140.6

## 2021-10-29 ASSESSMENT — ENCOUNTER SYMPTOMS: DYSRHYTHMIAS: 1

## 2021-10-29 ASSESSMENT — LIFESTYLE VARIABLES: TOBACCO_USE: 1

## 2021-10-29 NOTE — H&P
51 year old year old male here for upper endoscopy for reflux.  Ongoing last month, improved with PPI.  He also admits voice changes and new dry cough with this.  No regurgitation.  No dysphagia.        Patient Active Problem List   Diagnosis     Tubular adenoma of colon     Essential hypertension     Palpitations     PVCs (premature ventricular contractions)     Vitamin D deficiency     Obesity (BMI 35.0-39.9) with comorbidity (H)     Type 2 diabetes mellitus with unspecified complications (H)     COPD (chronic obstructive pulmonary disease) (H)       History reviewed. No pertinent past medical history.    Past Surgical History:   Procedure Laterality Date     COLONOSCOPY N/A 6/21/2019    Procedure: COLONOSCOPY, WITH POLYPECTOMY AND BIOPSY;  Surgeon: Herman Champagne MD;  Location: [facility]       Family History   Problem Relation Age of Onset     Unknown/Adopted Mother      Unknown/Adopted Father        No current outpatient medications on file.       Allergies   Allergen Reactions     Esbon Flavor      Statin Drugs [Hmg-Coa-R Inhibitors]      Zantac [Ranitidine]        Pt reports that he has quit smoking. He has quit using smokeless tobacco. He reports current alcohol use. He reports that he does not use drugs.    Exam:    Awake, Alert OX3  Lungs - CTA bilaterally  CV - RRR, no murmurs, distal pulses intact  Abd - soft, non-distended, non-tender, +BS  Extr - No cyanosis or edema    A/P 51 year old year old male in need of upper endoscopy for reflux. Risks, benefits, alternatives, and complications were discussed including the possibility of perforation and the patient agreed to proceed.    Jakub Wray, DO on 10/29/2021 at 10:35 AM

## 2021-10-29 NOTE — ANESTHESIA CARE TRANSFER NOTE
Patient: Lalito Weathers    Procedure: Procedure(s):  ESOPHAGOGASTRODUODENOSCOPY, WITH BIOPSY       Diagnosis: Gastroesophageal reflux disease with esophagitis and hemorrhage [K21.01]  Diagnosis Additional Information: No value filed.    Anesthesia Type:   General     Note:      Level of Consciousness: awake      Independent Airway: airway patency satisfactory and stable  Dentition: dentition unchanged    Report to RN Given: handoff report given  Patient transferred to: Phase II    Handoff Report: Identifed the Patient, Identified the Reponsible Provider, Reviewed the pertinent medical history, Discussed the surgical course, Reviewed Intra-OP anesthesia mangement and issues during anesthesia, Set expectations for post-procedure period and Allowed opportunity for questions and acknowledgement of understanding      Vitals:  Vitals Value Taken Time   BP     Temp     Pulse     Resp     SpO2         Electronically Signed By: TERESA Licea CRNA  October 29, 2021  11:04 AM

## 2021-10-29 NOTE — ANESTHESIA POSTPROCEDURE EVALUATION
Patient: Lalito Weathers    Procedure: Procedure(s):  ESOPHAGOGASTRODUODENOSCOPY, WITH BIOPSY       Diagnosis:Gastroesophageal reflux disease with esophagitis and hemorrhage [K21.01]  Diagnosis Additional Information: No value filed.    Anesthesia Type:  General    Note:  Disposition: Outpatient   Postop Pain Control: Uneventful            Sign Out: Well controlled pain   PONV: No   Neuro/Psych: Uneventful            Sign Out: Acceptable/Baseline neuro status   Airway/Respiratory: Uneventful            Sign Out: Acceptable/Baseline resp. status   CV/Hemodynamics: Uneventful            Sign Out: Acceptable CV status; No obvious hypovolemia; No obvious fluid overload   Other NRE: NONE   DID A NON-ROUTINE EVENT OCCUR? No           Last vitals:  Vitals Value Taken Time   BP     Temp     Pulse     Resp     SpO2         Electronically Signed By: TERESA Licea CRNA  October 29, 2021  11:04 AM

## 2021-11-01 ENCOUNTER — TELEPHONE (OUTPATIENT)
Dept: FAMILY MEDICINE | Facility: CLINIC | Age: 51
End: 2021-11-01

## 2021-11-01 LAB
PATH REPORT.COMMENTS IMP SPEC: NORMAL
PATH REPORT.COMMENTS IMP SPEC: NORMAL
PATH REPORT.FINAL DX SPEC: NORMAL
PATH REPORT.GROSS SPEC: NORMAL
PATH REPORT.MICROSCOPIC SPEC OTHER STN: NORMAL
PHOTO IMAGE: NORMAL

## 2021-11-01 PROCEDURE — 88305 TISSUE EXAM BY PATHOLOGIST: CPT | Mod: 26 | Performed by: PATHOLOGY

## 2021-11-01 NOTE — TELEPHONE ENCOUNTER
Pt is concerned on the CT where is says: Severe coronary artery calcification is present.    He wants to make sure this is not something to be concerned about.

## 2021-11-01 NOTE — TELEPHONE ENCOUNTER
Renzo had UGI and CT last Friday and is hoping to get results from Lizet Berry.  You can nighat [facility] cell at [phone] or his direct number at work until [phone]

## 2021-11-01 NOTE — TELEPHONE ENCOUNTER
Notify patient Ct results sent via my chart. [place] CT scan.     Notify patient results reviewed with patient post procedure.  Can review with him Upper within normal limits and recommend continued antacid regimen.     Lizet Berry CNP

## 2022-01-04 ENCOUNTER — OFFICE VISIT (OUTPATIENT)
Dept: FAMILY MEDICINE | Facility: CLINIC | Age: 52
End: 2022-01-04
Payer: COMMERCIAL

## 2022-01-04 DIAGNOSIS — N50.811 PAIN IN BOTH TESTICLES: ICD-10-CM

## 2022-01-04 DIAGNOSIS — N50.812 PAIN IN BOTH TESTICLES: ICD-10-CM

## 2022-01-04 DIAGNOSIS — N45.1 EPIDIDYMITIS: Primary | ICD-10-CM

## 2022-01-04 DIAGNOSIS — E11.8 TYPE 2 DIABETES MELLITUS WITH UNSPECIFIED COMPLICATIONS (H): ICD-10-CM

## 2022-01-04 DIAGNOSIS — J42 CHRONIC BRONCHITIS, UNSPECIFIED CHRONIC BRONCHITIS TYPE (H): ICD-10-CM

## 2022-01-04 LAB
ALBUMIN UR-MCNC: 30 MG/DL
ANION GAP SERPL CALCULATED.3IONS-SCNC: 11 MMOL/L (ref 3–14)
APPEARANCE UR: CLEAR
BACTERIA #/AREA URNS HPF: ABNORMAL /HPF
BILIRUB UR QL STRIP: NEGATIVE
BUN SERPL-MCNC: 18 MG/DL (ref 7–30)
CALCIUM SERPL-MCNC: 9.7 MG/DL (ref 8.5–10.1)
CHLORIDE BLD-SCNC: 100 MMOL/L (ref 94–109)
CO2 SERPL-SCNC: 25 MMOL/L (ref 20–32)
COLOR UR AUTO: YELLOW
CREAT SERPL-MCNC: 0.91 MG/DL (ref 0.66–1.25)
GFR SERPL CREATININE-BSD FRML MDRD: >90 ML/MIN/1.73M2
GLUCOSE BLD-MCNC: 135 MG/DL (ref 70–99)
GLUCOSE UR STRIP-MCNC: NEGATIVE MG/DL
HBA1C MFR BLD: 5.7 % (ref 0–5.6)
HGB UR QL STRIP: NEGATIVE
HOLD SPECIMEN: NORMAL
KETONES UR STRIP-MCNC: NEGATIVE MG/DL
LEUKOCYTE ESTERASE UR QL STRIP: NEGATIVE
NITRATE UR QL: NEGATIVE
PH UR STRIP: 7 [PH] (ref 5–7)
POTASSIUM BLD-SCNC: 3.9 MMOL/L (ref 3.4–5.3)
PSA SERPL-MCNC: 0.59 UG/L (ref 0–4)
RBC #/AREA URNS AUTO: ABNORMAL /HPF
SODIUM SERPL-SCNC: 136 MMOL/L (ref 133–144)
SP GR UR STRIP: 1.01 (ref 1–1.03)
SQUAMOUS #/AREA URNS AUTO: ABNORMAL /LPF
UROBILINOGEN UR STRIP-ACNC: 0.2 E.U./DL
WBC #/AREA URNS AUTO: ABNORMAL /HPF

## 2022-01-04 PROCEDURE — 87086 URINE CULTURE/COLONY COUNT: CPT | Performed by: NURSE PRACTITIONER

## 2022-01-04 PROCEDURE — 99215 OFFICE O/P EST HI 40 MIN: CPT | Performed by: NURSE PRACTITIONER

## 2022-01-04 PROCEDURE — 81001 URINALYSIS AUTO W/SCOPE: CPT | Performed by: NURSE PRACTITIONER

## 2022-01-04 PROCEDURE — 83036 HEMOGLOBIN GLYCOSYLATED A1C: CPT | Performed by: NURSE PRACTITIONER

## 2022-01-04 PROCEDURE — G0103 PSA SCREENING: HCPCS | Performed by: NURSE PRACTITIONER

## 2022-01-04 PROCEDURE — 36415 COLL VENOUS BLD VENIPUNCTURE: CPT | Performed by: NURSE PRACTITIONER

## 2022-01-04 PROCEDURE — 80048 BASIC METABOLIC PNL TOTAL CA: CPT | Performed by: NURSE PRACTITIONER

## 2022-01-04 RX ORDER — LEVOFLOXACIN 500 MG/1
500 TABLET, FILM COATED ORAL DAILY
Qty: 10 TABLET | Refills: 0 | Status: SHIPPED | OUTPATIENT
Start: 2022-01-04 | End: 2022-01-12

## 2022-01-04 ASSESSMENT — PAIN SCALES - GENERAL: PAINLEVEL: SEVERE PAIN (6)

## 2022-01-04 ASSESSMENT — MIFFLIN-ST. JEOR: SCORE: 2172.35

## 2022-01-04 NOTE — PATIENT INSTRUCTIONS
Please contact [phone] to schedule your diagnostic procedure       Patient Education     Epididymitis   Inflammation of the epididymis can cause pain and swelling in your scrotum. The epididymis is a small tube next to the testicle that stores sperm. Epididymitis is often caused by an infection. In sexually active men, it is often caused by a sexually transmitted infection (STI) such as chlamydia or gonorrhea. In boys and in men over 40, it can be from bacteria from other parts of the urinary tract (not an STI infection).  Symptoms may begin with pain in the lower belly (abdomen) or low back. The pain then spreads down into the scrotum. Often only one side is affected. The testicle and scrotum swell and become very painful and red. You may have fever and a burning when passing urine. Sometimes you may have a discharge from the penis.  Treatment is with antibiotics, and anti-inflammatory and pain medicines. The condition should get better over the first few days of treatment. But it will take several weeks for all the swelling and mild pain to go away. If your healthcare provider thinks that an STI is the cause, your sexual partners may need to be treated.  Home care  Here are some tips to help you care for yourself at home:    Support the scrotum. When lying down, place a rolled towel under the scrotum. When walking, use an athletic supporter or 2 pairs of jockey-style underwear.    To ease pain, put ice packs on the inflamed area. To make an ice pack, put ice cubes in a plastic bag that seals at the top. Wrap the bag in a clean, thin towel. Never put an ice pack directly on the skin.    Take pain medicine as directed. You may use over-the-counter medicines to control pain, unless another medicine was given. If you have long-term (chronic) liver or kidney disease, talk with your healthcare provider before taking these medicines. Also talk with your provider if you've ever had a stomach ulcer or GI  (gastrointestinal) bleeding.    Get some rest.  Rest in bed for the first few days until the fever, pain, and swelling get better. It may take several weeks for all of the swelling to go away.    Prevent constipation. Constipation can make you strain. This makes the pain worse. Prevent constipation by eating natural laxatives. These include prunes, fresh fruits, and whole-grain cereals. If needed, use a mild over-the-counter laxative for constipation. Mineral oil can be used to keep the stools soft.    Wait to have sex. Don't have sex until you have finished all treatment and all symptoms have cleared.    Take all medicine as directed. Don't miss any doses. And don't stop taking your medicine early, even if you feel better.  Follow-up care  Follow up with your healthcare provider, or as advised, to be sure you are responding correctly to treatment. If a culture was taken, you may call for the result as directed. A culture test can ensure that you are on the correct antibiotic.   When to seek medical advice  Call your healthcare provider right away if any of these occur:    Fever of 100.4 F (38 C) or higher, or as directed by your provider    More pain or swelling of the testicle after starting treatment    Pressure or pain in your bladder that gets worse    Unable to pass urine for 8 hours  [employer] last reviewed this educational content on 9/1/2019 2000-2021 [employer]. All rights reserved. This information is not intended as a substitute for professional medical care. Always follow your healthcare professional's instructions.           Patient Education     Discharge Instructions for Epididymitis  You have been diagnosed with epididymitis. This is an inflammation of a coiled tube called the epididymis that is located behind your testicle, inside the scrotum. The epididymis collects and stores sperm made by the testicles. Epididymitis is often caused by bacteria in the urinary tract or by bacteria  passed between partners during sex. It can also be a complication of certain hospital procedures, or it can be caused by use of a urinary catheter. Here s what you need to do at home to care for yourself.   Home care    Be sure to finish all the medicine your healthcare provider prescribed--even if you feel better. Not finishing the medicine can make your condition harder to treat or cause the condition to come back.    Rest in bed if you are uncomfortable. Discomfort should go away within 1 to 3 days of treatment. Swelling may continue for up to 2 months.    Ask your healthcare provider about pain medicine to keep you comfortable.    Use an ice pack or bag of frozen peas to help relieve the pain. Wrap the peas or ice pack in a thin cloth and apply to the area.    Don t leave the ice pack on your skin for longer than 20 minutes, and don t use it more often than once every hour.    Elevate the scrotum with a rolled-up towel when you are resting.    For the first few days, wear an athletic supporter. When your pain subsides, wear briefs instead of boxers to better support the scrotum. This can help relieve pain.    Keep your penis and scrotum clean.    Use a condom to protect against sexually transmitted infections (STIs).    If your condition was caused by an STI, be sure to tell your sexual partner or partners.    Follow-up care  Make a follow-up appointment or as directed by your healthcare provider.   When to call your healthcare provider  Call your healthcare provider right away if you have any of the following:     Increased pain or swelling in the scrotum    Frequent urge or inability to urinate    Discharge from the penis    Pain during ejaculation    Fever of 100.4 F (38 C) or higher, or as directed by your provider  [employer] last reviewed this educational content on 6/1/2020 2000-2021 [employer]. All rights reserved. This information is not intended as a substitute for professional medical  care. Always follow your healthcare professional's instructions.           Patient Education     Treating Epididymitis and Orchitis    You have inflammation of the epididymis (epididymitis) and testicle (orchitis). This is likely from an infection. In many cases, epididymitis and orchitis occur along with urinary tract infections. Treatment includes medicine to get rid of the infection. It also includes medicine and other methods to ease symptoms.   Possible treatments    Antibiotics. Acute epididymitis is most often treated with oral antibiotics. You may also be given a shot (injection) of antibiotics. Be sure to take all of your medicine until it is gone, even if you feel better.    Anti-inflammatories. You may be prescribed medicine to reduce swelling and tenderness.    Rest. You will most likely need to rest for 3 to 4 days until swelling and fever are gone. When you are able, lie down with a towel folded under the scrotum to raise it slightly. This can help ease mild pain.    Scrotal support. If your testicles are swollen, wear an athletic supporter (jockstrap) or spandex shorts. This may help control swelling and ease symptoms.    Ice and heat. To ease swelling, use an ice pack wrapped in a thin towel on the scrotum. Once swelling is gone, sit in a warm bath to increase blood flow to the area. To make an ice pack, put ice in a plastic bag that seals at the top. Wrap the bag in a clean, thin towel. Never put an ice pack directly on the skin.    After treatment  The inflammation will go away with treatment. But you may have an achy feeling in the testicles for 2 to 4 weeks. This does not mean the infection has come back. The testicles just take time to heal. But if you feel a lump in a testicle after treatment, see your healthcare provider. Once the inflammation is gone, you can be active again.   If you are sexually active, your sex partner needs to see a healthcare provider as well. This is because sex can  sometimes spread the infection that causes this condition.   [employer] last reviewed this educational content on 12/1/2019 2000-2021 [employer]. All rights reserved. This information is not intended as a substitute for professional medical care. Always follow your healthcare professional's instructions.

## 2022-01-04 NOTE — LETTER
My COPD Action Plan     Name: Lalito Weathers    YOB: 1970   Date: 1/4/2022    My doctor: TERESA Romero CNP   My clinic: [address] [id number] [phone]  My Controller Medicine:    Dose:      My Rescue Medicine:    Dose:      My Flare Up Medicine:    Dose:      My COPD Severity:       Use of Oxygen: Oxygen Not Prescribed      Make sure you've had your pneumonia   vaccines.          GREEN ZONE       Doing well today      Usual level of activity and exercise    Usual amount of cough and mucus    No shortness of breath    Usual level of health (thinking clearly, sleeping well, feel like eating) Actions:      Take daily medicines    Use oxygen as prescribed    Follow regular exercise and diet plan    Avoid cigarette smoke and other irritants that harm the lungs           YELLOW ZONE          Having a bad day or flare up      Short of breath more than usual    A lot more sputum (mucus) than usual    Sputum looks yellow, green, tan, brown or bloody    More coughing or wheezing    Fever or chills    Less energy; trouble completing activities    Trouble thinking or focusing    Using quick relief inhaler or nebulizer more often    Poor sleep; symptoms wake me up    Do not feel like eating Actions:      Get plenty of rest    Take daily medicines    Use quick relief inhaler every 4 hours    If you use oxygen, call you doctor to see if you should adjust your oxygen    Do breathing exercises or other things to help you relax    Let a loved one, friend or neighbor know you are feeling worse    Call your care team if you have 2 or more symptoms.  Start taking steroids or antibiotics if directed by your care team           RED ZONE       Need medical care now      Severe shortness of breath (feel you can't breathe)    Fever, chills    Not enough breath to do any activity    Trouble coughing up mucus, walking or talking    Blood in  mucus    Frequent coughing   Rescue medicines are not working    Not able to sleep because of breathing    Feel confused or drowsy    Chest pain    Actions:      Call your health care team.  If you cannot reach your care team, call 911 or go to the emergency room.        Annual Reminders:  Meet with Care Team, Flu Shot every Fall  Pharmacy: [facility] [place], [place] - [address] [place] [address]

## 2022-01-04 NOTE — PROGRESS NOTES
Assessment & Plan     (N45.1) Epididymitis  (primary encounter diagnosis)  Comment: Genital strawberry percentage above epididymitis will treat with a course of Levaquin if not improving patient should follow-up  Plan: levofloxacin (LEVAQUIN) 500 MG tablet      (N50.811,  N50.812) Pain in both testicles  Comment:   Plan: UA Macro with Reflex to Micro and Culture - lab        collect, PSA, screen, Basic metabolic panel          (Ca, Cl, CO2, Creat, Gluc, K, Na, BUN), Urine         Microscopic, Urine Culture Aerobic Bacterial -         lab collect, US Testicular & Scrotum w Doppler         Ltd      (J42) Chronic bronchitis, unspecified chronic bronchitis type (H)  Comment:  Controlled no change in treatment plan  Plan: COPD ACTION PLAN      (E11.8) Type 2 diabetes mellitus with unspecified complications (H)  Comment:  Controlled no change in treatment plan  Plan: OPTOMETRY REFERRAL, HEMOGLOBIN A1C      50 minutes spent on the date of the encounter doing chart review, history and exam, documentation and further activities per the note           No follow-ups on file.    TERESA Romero CNP  [facility]    Sridhar Muller is a 51 year old who presents for the following health issues     HPI     Genitourinary - Male  Onset: a few days    Description:   Dysuria (painful urination): no   Hematuria (blood in urine): no   Frequency: YES  Are you urinating at night : no   Hesitancy (delay in urine): YES  Retention (unable to empty): no   Decrease in urinary flow: YES  Incontinence: YES    Progression of Symptoms:  worsening    Accompanying Signs & Symptoms:  Fever: no   Back/Flank pain: no   Urethral discharge: no   Testicle lumps/masses/pain: YES- pain  Nausea and/or vomiting: no   Abdominal pain: no     History:   History of frequent UTI's: no   History of kidney stones: no   History of hernias: no   Personal or Family history of Prostate problems: no  Sexually active:  "YES    Precipitating factors:   none    Alleviating factors:  none        Review of Systems   Constitutional, HEENT, cardiovascular, pulmonary, gi and gu systems are negative, except as otherwise noted.      Objective    BP (!) 160/80 (BP Location: Right arm, Cuff Size: Adult Large)   Pulse 78   Temp 97.7  F (36.5  C) (Tympanic)   Resp 16   Ht 1.765 m (5' 9.5\")   Wt 132.9 kg (293 lb)   SpO2 99%   BMI 42.65 kg/m    Body mass index is 42.65 kg/m .  Physical Exam   GENERAL: healthy, alert and no distress  EYES: Eyes grossly normal to inspection, PERRL and conjunctivae and sclerae normal  HENT: ear canals and TM's normal, nose and mouth without ulcers or lesions  NECK: no adenopathy, no asymmetry, masses, or scars and thyroid normal to palpation  RESP: lungs clear to auscultation - no rales, rhonchi or wheezes  CV: regular rate and rhythm, normal S1 S2, no S3 or S4, no murmur, click or rub, no peripheral edema and   peripheral pulses strong  ABDOMEN: soft, nontender, no hepatosplenomegaly, no masses and bowel sounds normal  : Testicular pain bilateral   MS: no gross musculoskeletal defects noted, no edema  SKIN: no suspicious lesions or rashes  NEURO: Normal strength and tone, mentation intact and speech normal  PSYCH: mentation appears normal, affect normal/bright    Results for orders placed or performed in visit on 01/04/22   UA Macro with Reflex to Micro and Culture - lab collect     Status: Abnormal    Specimen: Urine, Midstream   Result Value Ref Range    Color Urine Yellow Colorless, Straw, Light Yellow, Yellow    Appearance Urine Clear Clear    Glucose Urine Negative Negative mg/dL    Bilirubin Urine Negative Negative    Ketones Urine Negative Negative mg/dL    Specific Gravity Urine 1.015 1.003 - 1.035    Blood Urine Negative Negative    pH Urine 7.0 5.0 - 7.0    Protein Albumin Urine 30  (A) Negative mg/dL    Urobilinogen Urine 0.2 0.2, 1.0 E.U./dL    Nitrite Urine Negative Negative    Leukocyte " Esterase Urine Negative Negative   HEMOGLOBIN A1C     Status: Abnormal   Result Value Ref Range    Hemoglobin A1C 5.7 (H) 0.0 - 5.6 %   PSA, screen     Status: Normal   Result Value Ref Range    Prostate Specific Antigen Screen 0.59 0.00 - 4.00 ug/L    Narrative    Assay Method:  Chemiluminescence using Siemens   Vista analyzer.   Basic metabolic panel  (Ca, Cl, CO2, Creat, Gluc, K, Na, BUN)     Status: Abnormal   Result Value Ref Range    Sodium 136 133 - 144 mmol/L    Potassium 3.9 3.4 - 5.3 mmol/L    Chloride 100 94 - 109 mmol/L    Carbon Dioxide (CO2) 25 20 - 32 mmol/L    Anion Gap 11 3 - 14 mmol/L    Urea Nitrogen 18 7 - 30 mg/dL    Creatinine 0.91 0.66 - 1.25 mg/dL    Calcium 9.7 8.5 - 10.1 mg/dL    Glucose 135 (H) 70 - 99 mg/dL    GFR Estimate >90 >60 mL/min/1.73m2   Urine Microscopic     Status: Abnormal   Result Value Ref Range    Bacteria Urine Few (A) None Seen /HPF    RBC Urine 0-2 0-2 /HPF /HPF    WBC Urine 0-5 0-5 /HPF /HPF    Squamous Epithelials Urine Few (A) None Seen /LPF    Narrative    Urine Culture not indicated   Extra Serum Separator Tube (SST)     Status: None   Result Value Ref Range    Hold Specimen [facility]    Urine Culture Aerobic Bacterial - lab collect     Status: None    Specimen: Urine, Midstream   Result Value Ref Range    Culture No Growth    Extra Tube     Status: None    Narrative    The following orders were created for panel order Extra Tube.  Procedure                               Abnormality         Status                     ---------                               -----------         ------                     Extra Serum Separator Tu...[[id number]]                      Final result                 Please view results for these tests on the individual orders.

## 2022-01-05 ENCOUNTER — HOSPITAL ENCOUNTER (OUTPATIENT)
Dept: ULTRASOUND IMAGING | Facility: CLINIC | Age: 52
Discharge: HOME | End: 2022-01-05
Attending: NURSE PRACTITIONER | Admitting: NURSE PRACTITIONER
Payer: COMMERCIAL

## 2022-01-05 DIAGNOSIS — N50.811 PAIN IN BOTH TESTICLES: ICD-10-CM

## 2022-01-05 DIAGNOSIS — N50.812 PAIN IN BOTH TESTICLES: ICD-10-CM

## 2022-01-05 LAB — BACTERIA UR CULT: NO GROWTH

## 2022-01-05 PROCEDURE — 76870 US EXAM SCROTUM: CPT

## 2022-01-08 DIAGNOSIS — K21.00 GASTROESOPHAGEAL REFLUX DISEASE WITH ESOPHAGITIS WITHOUT HEMORRHAGE: ICD-10-CM

## 2022-01-10 VITALS
OXYGEN SATURATION: 99 % | RESPIRATION RATE: 16 BRPM | BODY MASS INDEX: 41.95 KG/M2 | SYSTOLIC BLOOD PRESSURE: 138 MMHG | HEIGHT: 70 IN | WEIGHT: 293 LBS | DIASTOLIC BLOOD PRESSURE: 75 MMHG | TEMPERATURE: 97.7 F | HEART RATE: 78 BPM

## 2022-01-10 RX ORDER — PANTOPRAZOLE SODIUM 20 MG/1
TABLET, DELAYED RELEASE ORAL
Qty: 90 TABLET | Refills: 0 | Status: SHIPPED | OUTPATIENT
Start: 2022-01-10 | End: 2022-02-14

## 2022-01-12 ENCOUNTER — OFFICE VISIT (OUTPATIENT)
Dept: FAMILY MEDICINE | Facility: CLINIC | Age: 52
End: 2022-01-12
Payer: COMMERCIAL

## 2022-01-12 DIAGNOSIS — N45.1 EPIDIDYMITIS: ICD-10-CM

## 2022-01-12 DIAGNOSIS — E66.01 MORBID OBESITY (H): ICD-10-CM

## 2022-01-12 DIAGNOSIS — I10 ESSENTIAL HYPERTENSION: ICD-10-CM

## 2022-01-12 PROCEDURE — 99214 OFFICE O/P EST MOD 30 MIN: CPT | Performed by: PHYSICIAN ASSISTANT

## 2022-01-12 RX ORDER — LEVOFLOXACIN 500 MG/1
500 TABLET, FILM COATED ORAL DAILY
Qty: 4 TABLET | Refills: 0 | Status: SHIPPED | OUTPATIENT
Start: 2022-01-12 | End: 2022-01-16

## 2022-01-12 RX ORDER — TRIAMTERENE AND HYDROCHLOROTHIAZIDE 37.5; 25 MG/1; MG/1
2 CAPSULE ORAL DAILY
Qty: 180 CAPSULE | Refills: 3 | Status: SHIPPED | OUTPATIENT
Start: 2022-01-12 | End: 2022-11-22

## 2022-01-12 ASSESSMENT — PAIN SCALES - GENERAL: PAINLEVEL: MILD PAIN (2)

## 2022-01-12 NOTE — PATIENT INSTRUCTIONS
Finish 10 day course of Leovfloxicin. If not resolved, continue Levo for 4 more days.     Start Ibuprofen 800 mg two to three times per day for the next 3-5 days.     If your symptoms do not resolve make sure you go to the Urologist.

## 2022-01-12 NOTE — PROGRESS NOTES
Assessment & Plan   Epididymitis  Seen last week and was diagnosed with epididymitis clinically. Symptoms have improved but not fully resolved. He is leaving for a work trips and is worried about his symptoms. No new symptoms like fevers, chills, abdominal pain, testicular swelling. Repeat exam today is unremarkable for other obvious cause of his pain. I think it is reasonable to treat for 14 days, if symptoms not resolved by day 10. Rx for 4 more days of Levo if needed. Warning signs and symptoms discussed on when to return to clinic or go to the ER. Pt verbalized understanding.    - levofloxacin (LEVAQUIN) 500 MG tablet; Take 1 tablet (500 mg) by mouth daily for 4 days    Morbid obesity (H)  Body mass index is 43.08 kg/m . Associated with HTN. Encouraged healthy lifestyle changes.     Essential hypertension  Needed refill of Dyazide. Recent lab work was wnl. Refilled today.   - triamterene-HCTZ (DYAZIDE) 37.5-25 MG capsule; Take 2 capsules by mouth daily    Return in about 2 weeks (around 1/26/2022), or if symptoms worsen or fail to improve, for In-Clinic Visit.    TERRI [facility]    Sridhar Muller is a 51 year old who presents for the following health issues     HPI   Concern - Epididymitis   Onset: Ongoing   Description: Patient states that he is here to follow up. Saw Lizet on 01/04/2022 and was treated with Levofloxacin. Says that things were getting better until Monday when his symptoms returned. Says that it seems to be worse the thornton his bladder is full. Symptoms are much improved today.   Intensity: mild to moderate   Progression of Symptoms:  Intermittent improvement   Accompanying Signs & Symptoms: none   Previous history of similar problem: na   Precipitating factors:        Worsened by: full bladder   Alleviating factors:        Improved by: na   Therapies tried and outcome: Levofloxacin     Review of Systems   See HPI       Objective    /82 (BP  Location: Right arm)   Pulse 62   Temp 97.6  F (36.4  C) (Tympanic)   Resp 20   Wt 134.3 kg (296 lb)   SpO2 97%   BMI 43.08 kg/m    Body mass index is 43.08 kg/m .  Physical Exam   Constitutional: healthy, alert, and no distress  Head: Normocephalic. Atraumatic  Eyes: No conjunctival injection, sclera anicteric  Respiratory: No resp distress.  : Normal external genitalia. No testicular swelling, tenderness. No epididymal tenderness. No varicocele or hydrocele present. No hernia present bilaterally. No erythema, discoloration of the scrotum.   Musculoskeletal: extremities normal- no gross deformities noted, and normal muscle tone  Neurologic: Gait normal. CN 2-12 grossly intact  Psychiatric: mentation appears normal and affect normal/bright

## 2022-01-16 VITALS
OXYGEN SATURATION: 97 % | WEIGHT: 296 LBS | TEMPERATURE: 97.6 F | BODY MASS INDEX: 43.08 KG/M2 | RESPIRATION RATE: 20 BRPM | SYSTOLIC BLOOD PRESSURE: 130 MMHG | DIASTOLIC BLOOD PRESSURE: 82 MMHG | HEART RATE: 62 BPM

## 2022-01-20 ENCOUNTER — MYC MEDICAL ADVICE (OUTPATIENT)
Dept: FAMILY MEDICINE | Facility: CLINIC | Age: 52
End: 2022-01-20
Payer: COMMERCIAL

## 2022-02-01 ENCOUNTER — ONCOLOGY VISIT (OUTPATIENT)
Dept: ONCOLOGY | Facility: CLINIC | Age: 52
End: 2022-02-01
Attending: STUDENT IN AN ORGANIZED HEALTH CARE EDUCATION/TRAINING PROGRAM
Payer: COMMERCIAL

## 2022-02-01 VITALS
TEMPERATURE: 98.7 F | WEIGHT: 295.1 LBS | OXYGEN SATURATION: 95 % | BODY MASS INDEX: 42.95 KG/M2 | RESPIRATION RATE: 16 BRPM | SYSTOLIC BLOOD PRESSURE: 138 MMHG | HEART RATE: 80 BPM | DIASTOLIC BLOOD PRESSURE: 82 MMHG

## 2022-02-01 DIAGNOSIS — N50.812 PAIN IN BOTH TESTICLES: Primary | ICD-10-CM

## 2022-02-01 DIAGNOSIS — N50.811 PAIN IN BOTH TESTICLES: Primary | ICD-10-CM

## 2022-02-01 PROCEDURE — 99203 OFFICE O/P NEW LOW 30 MIN: CPT | Performed by: STUDENT IN AN ORGANIZED HEALTH CARE EDUCATION/TRAINING PROGRAM

## 2022-02-01 PROCEDURE — G0463 HOSPITAL OUTPT CLINIC VISIT: HCPCS

## 2022-02-01 ASSESSMENT — PAIN SCALES - GENERAL: PAINLEVEL: NO PAIN (0)

## 2022-02-01 NOTE — LETTER
"    2/1/2022         RE: Lalito Weathers  [id number] [facility] [id number]        Dear Colleague,    Thank you for referring your patient, Lalito Weathers, to the [facility]. Please see a copy of my visit note below.    Oncology Rooming Note    February 1, 2022 8:15 AM   Lalito Weathers is a 51 year old male who presents for:    Chief Complaint   Patient presents with     Consult     Initial Vitals: /82 (Patient Position: Sitting)   Pulse 80   Temp 98.7  F (37.1  C) (Oral)   Resp 16   Wt 133.9 kg (295 lb 1.6 oz)   SpO2 95%   BMI 42.95 kg/m   Estimated body mass index is 42.95 kg/m  as calculated from the following:    Height as of 1/4/22: 1.765 m (5' 9.5\").    Weight as of this encounter: 133.9 kg (295 lb 1.6 oz). Body surface area is 2.56 meters squared.  No Pain (0) Comment: Data Unavailable   No LMP for male patient.  Allergies reviewed: Yes  Medications reviewed: Yes    Medications: Medication refills not needed today.  Pharmacy name entered into [facility]: [facility], [place] - [address] [place] [address]    Clinical concerns Not as tender x 2 weeks.  Decreased flow of urine.       Remigio Ramos LPN                      Chief Complaint:   Testicular pain           Consult or Referral:     Mr. Lalito Weathers is a 51 year old male seen at the request of Dr. Berkowitz.         History of Present Illness:     Lalito Weathers is a 51 year old male being seen  for testicular pain.  Duration of problem: Few weeks  Previous treatments: 2 weeks of levofloxacin      Reviewed previous notes from Dr. Jamal Muller presents today with a history of bothersome testicular pain for the last month or so  Was seen previously by his primary care doctor and prescribed initially 10 days and then a total of 14 days of levofloxacin for the same  No history of significant lower urinary tract symptoms except for some hesitancy  Also complains of occasional dysuria and " discomfort in the perineal area in general  No perianal pain  Was adopted not sure of prostate cancer history but PSA assessments have been normal               Past Medical History:   History reviewed. No pertinent past medical history.         Past Surgical History:     Past Surgical History:   Procedure Laterality Date     COLONOSCOPY N/A 6/21/2019    Procedure: COLONOSCOPY, WITH POLYPECTOMY AND BIOPSY;  Surgeon: Herman Champagne MD;  Location: [facility]     ESOPHAGOSCOPY, GASTROSCOPY, DUODENOSCOPY (EGD), COMBINED N/A 10/29/2021    Procedure: ESOPHAGOGASTRODUODENOSCOPY, WITH BIOPSY;  Surgeon: Jakub Wray DO;  Location: [facility]            Medications     Current Outpatient Medications   Medication     aspirin 81 MG EC tablet     cholecalciferol (VITAMIN D3) 5000 units TABS tablet     fish oil-omega-3 fatty acids 1000 MG capsule     lisinopril (ZESTRIL) 40 MG tablet     multivitamin w/minerals (MULTI-VITAMIN) tablet     rosuvastatin (CRESTOR) 10 MG tablet     triamterene-HCTZ (DYAZIDE) 37.5-25 MG capsule     verapamil ER (VERELAN) 360 MG 24 hr capsule     vitamin C (ASCORBIC ACID) 1000 MG TABS     cyclobenzaprine (FLEXERIL) 5 MG tablet     pantoprazole (PROTONIX) 20 MG EC tablet     ranitidine (ZANTAC) 75 MG tablet     No current facility-administered medications for this visit.            Family History:     Family History   Problem Relation Age of Onset     Unknown/Adopted Father             Social History:     Social History     Socioeconomic History     Marital status:      Spouse name: Not on file     Number of children: Not on file     Years of education: Not on file     Highest education level: Not on file   Occupational History     Not on file   Tobacco Use     Smoking status: Former Smoker     Smokeless tobacco: Former User   Vaping Use     Vaping Use: Never used   Substance and Sexual Activity     Alcohol use: Yes     Comment: social     Drug use: No     Sexual activity: Yes     Partners:  Female   Other Topics Concern     Not on file   Social History Narrative     Not on file     Social Determinants of Health     Financial Resource Strain: Not on file   Food Insecurity: Not on file   Transportation Needs: Not on file   Physical Activity: Not on file   Stress: Not on file   Social Connections: Not on file   Intimate Partner Violence: Not on file   Housing Stability: Not on file            Allergies:   Food, Aspirin, Vasquez flavor, Statin drugs [hmg-coa-r inhibitors], and Zantac [ranitidine]         Review of Systems:  From intake questionnaire     Skin: negative  Eyes: negative  Ears/Nose/Throat: negative  Respiratory: No shortness of breath, dyspnea on exertion, cough, or hemoptysis  Cardiovascular: No chest pain or palpitations  Gastrointestinal: negative; no nausea/vomiting, constipation or diarrhea  Genitourinary: as per HPI  Musculoskeletal: negative  Neurologic: negative  Psychiatric: negative  Hematologic/Lymphatic/Immunologic: negative  Endocrine: negative         Physical Exam:     Patient is a 51 year old  male   Vitals: Blood pressure 138/82, pulse 80, temperature 98.7  F (37.1  C), temperature source Oral, resp. rate 16, weight 133.9 kg (295 lb 1.6 oz), SpO2 95 %.  Constitutional: Body mass index is 42.95 kg/m .  Alert, no acute distress, oriented, conversant  Eyes: no scleral icterus; extraocular muscles intact, moist conjunctivae  Neck: trachea midline, no thyromegaly  Ears/nose/mouth: throat/mouth:normal, good dentition  Respiratory: no respiratory distress, or pursed lip breathing  Cardiovascular: pulses strong and intact; no obvious jugular venous distension present  Gastrointestinal: soft, nontender, no organomegaly or masses,   Lymphatics: No inguinal adenopathy  Musculoskeletal: extremities normal, no peripheral edema  Skin: no suspicious lesions or rashes  Neuro: Alert, oriented, speech and mentation normal  Psych: affect and mood normal, alert and oriented to person, place and  time  Gait: Normal  : penis, scrotum, testes normal, MARISELA anodular, symmetric cough impulse on the left side groin      Labs and Pathology:    The following labs were reviewed by me and discussed with the patient:  UA: Normal  Significant for   Lab Results   Component Value Date    CR 0.91 01/04/2022    CR 1.09 03/04/2021    CR 0.95 08/05/2020    CR 1.00 10/23/2019    CR 1.04 08/02/2019    CR 0.96 07/04/2019     Prostate Specific Antigen Screen   Date Value Ref Range Status   01/04/2022 0.59 0.00 - 4.00 ug/L Final             Imaging:    The following imaging exams were independently viewed and interpreted by me and discussed with patient:  Scrotal Ultrasound: Normal               Assessment and Plan:     Pain in both testicles  On examination and review of results testes and the epididymis all appear normal with no current evidence of infection/inflammation  Discussed about continuing with weight loss measures as well as scrotal support to help with testicular discomfort  He can also take occasional ibuprofen for the testicular pain  Could possibly have an incipient inguinal hernia.  Recommend that if he persists to have pain on the left groin or feels a bulge or a mass on the left side he should consult with his primary care doctor and see a general surgeon at appointment.        Plan:  Scrotal supporter is to continue occasional analgesics as needed follow-up as needed    Orders  No orders of the defined types were placed in this encounter.      Stephan Vizcarra MD  [facility]      ==========================    Additional Billing and Coding Information:  Review of external notes as documented above   Review of the result(s) of each unique test - UA, creatinine, PSA, scrotal ultrasound    Independent interpretation of a test performed by another physician/other qualified health care professional (not separately reported) -       Discussion of management or test interpretation with  external physician/other qualified healthcare professional/appropriate source -       Diagnosis or treatment significantly limited by social determinants of health -       17 minutes spent on the date of the encounter doing chart review, review of test results, interpretation of tests, patient visit and documentation     ==========================      Again, thank you for allowing me to participate in the care of your patient.        Sincerely,        Stephan Vizcarra MD

## 2022-02-01 NOTE — PROGRESS NOTES
Chief Complaint:   Testicular pain           Consult or Referral:     Mr. Lalito Weathers is a 51 year old male seen at the request of Dr. Berkowitz.         History of Present Illness:     Lalito Weathers is a 51 year old male being seen  for testicular pain.  Duration of problem: Few weeks  Previous treatments: 2 weeks of levofloxacin      Reviewed previous notes from Dr. Jamal Muller presents today with a history of bothersome testicular pain for the last month or so  Was seen previously by his primary care doctor and prescribed initially 10 days and then a total of 14 days of levofloxacin for the same  No history of significant lower urinary tract symptoms except for some hesitancy  Also complains of occasional dysuria and discomfort in the perineal area in general  No perianal pain  Was adopted not sure of prostate cancer history but PSA assessments have been normal               Past Medical History:   History reviewed. No pertinent past medical history.         Past Surgical History:     Past Surgical History:   Procedure Laterality Date     COLONOSCOPY N/A 6/21/2019    Procedure: COLONOSCOPY, WITH POLYPECTOMY AND BIOPSY;  Surgeon: Herman Champagne MD;  Location: [facility]     ESOPHAGOSCOPY, GASTROSCOPY, DUODENOSCOPY (EGD), COMBINED N/A 10/29/2021    Procedure: ESOPHAGOGASTRODUODENOSCOPY, WITH BIOPSY;  Surgeon: Jakub Wray DO;  Location: [facility]            Medications     Current Outpatient Medications   Medication     aspirin 81 MG EC tablet     cholecalciferol (VITAMIN D3) 5000 units TABS tablet     fish oil-omega-3 fatty acids 1000 MG capsule     lisinopril (ZESTRIL) 40 MG tablet     multivitamin w/minerals (MULTI-VITAMIN) tablet     rosuvastatin (CRESTOR) 10 MG tablet     triamterene-HCTZ (DYAZIDE) 37.5-25 MG capsule     verapamil ER (VERELAN) 360 MG 24 hr capsule     vitamin C (ASCORBIC ACID) 1000 MG TABS     cyclobenzaprine (FLEXERIL) 5 MG tablet     pantoprazole (PROTONIX) 20 MG EC tablet      ranitidine (ZANTAC) 75 MG tablet     No current facility-administered medications for this visit.            Family History:     Family History   Problem Relation Age of Onset     Unknown/Adopted Father             Social History:     Social History     Socioeconomic History     Marital status:      Spouse name: Not on file     Number of children: Not on file     Years of education: Not on file     Highest education level: Not on file   Occupational History     Not on file   Tobacco Use     Smoking status: Former Smoker     Smokeless tobacco: Former User   Vaping Use     Vaping Use: Never used   Substance and Sexual Activity     Alcohol use: Yes     Comment: social     Drug use: No     Sexual activity: Yes     Partners: Female   Other Topics Concern     Not on file   Social History Narrative     Not on file     Social Determinants of Health     Financial Resource Strain: Not on file   Food Insecurity: Not on file   Transportation Needs: Not on file   Physical Activity: Not on file   Stress: Not on file   Social Connections: Not on file   Intimate Partner Violence: Not on file   Housing Stability: Not on file            Allergies:   Food, Aspirin, Veyo flavor, Statin drugs [hmg-coa-r inhibitors], and Zantac [ranitidine]         Review of Systems:  From intake questionnaire     Skin: negative  Eyes: negative  Ears/Nose/Throat: negative  Respiratory: No shortness of breath, dyspnea on exertion, cough, or hemoptysis  Cardiovascular: No chest pain or palpitations  Gastrointestinal: negative; no nausea/vomiting, constipation or diarrhea  Genitourinary: as per HPI  Musculoskeletal: negative  Neurologic: negative  Psychiatric: negative  Hematologic/Lymphatic/Immunologic: negative  Endocrine: negative         Physical Exam:     Patient is a 51 year old  male   Vitals: Blood pressure 138/82, pulse 80, temperature 98.7  F (37.1  C), temperature source Oral, resp. rate 16, weight 133.9 kg (295 lb 1.6 oz), SpO2 95  %.  Constitutional: Body mass index is 42.95 kg/m .  Alert, no acute distress, oriented, conversant  Eyes: no scleral icterus; extraocular muscles intact, moist conjunctivae  Neck: trachea midline, no thyromegaly  Ears/nose/mouth: throat/mouth:normal, good dentition  Respiratory: no respiratory distress, or pursed lip breathing  Cardiovascular: pulses strong and intact; no obvious jugular venous distension present  Gastrointestinal: soft, nontender, no organomegaly or masses,   Lymphatics: No inguinal adenopathy  Musculoskeletal: extremities normal, no peripheral edema  Skin: no suspicious lesions or rashes  Neuro: Alert, oriented, speech and mentation normal  Psych: affect and mood normal, alert and oriented to person, place and time  Gait: Normal  : penis, scrotum, testes normal, MARISELA anodular, symmetric cough impulse on the left side groin      Labs and Pathology:    The following labs were reviewed by me and discussed with the patient:  UA: Normal  Significant for   Lab Results   Component Value Date    CR 0.91 01/04/2022    CR 1.09 03/04/2021    CR 0.95 08/05/2020    CR 1.00 10/23/2019    CR 1.04 08/02/2019    CR 0.96 07/04/2019     Prostate Specific Antigen Screen   Date Value Ref Range Status   01/04/2022 0.59 0.00 - 4.00 ug/L Final             Imaging:    The following imaging exams were independently viewed and interpreted by me and discussed with patient:  Scrotal Ultrasound: Normal               Assessment and Plan:     Pain in both testicles  On examination and review of results testes and the epididymis all appear normal with no current evidence of infection/inflammation  Discussed about continuing with weight loss measures as well as scrotal support to help with testicular discomfort  He can also take occasional ibuprofen for the testicular pain  Could possibly have an incipient inguinal hernia.  Recommend that if he persists to have pain on the left groin or feels a bulge or a mass on the left side  he should consult with his primary care doctor and see a general surgeon at appointment.        Plan:  Scrotal supporter is to continue occasional analgesics as needed follow-up as needed    Orders  No orders of the defined types were placed in this encounter.      Stephan Vizcarra MD  [facility]      ==========================    Additional Billing and Coding Information:  Review of external notes as documented above   Review of the result(s) of each unique test - UA, creatinine, PSA, scrotal ultrasound    Independent interpretation of a test performed by another physician/other qualified health care professional (not separately reported) -       Discussion of management or test interpretation with external physician/other qualified healthcare professional/appropriate source -       Diagnosis or treatment significantly limited by social determinants of health -       17 minutes spent on the date of the encounter doing chart review, review of test results, interpretation of tests, patient visit and documentation     ==========================

## 2022-02-01 NOTE — PROGRESS NOTES
"Oncology Rooming Note    February 1, 2022 8:15 AM   Lalito Weathers is a 51 year old male who presents for:    Chief Complaint   Patient presents with     Consult     Initial Vitals: /82 (Patient Position: Sitting)   Pulse 80   Temp 98.7  F (37.1  C) (Oral)   Resp 16   Wt 133.9 kg (295 lb 1.6 oz)   SpO2 95%   BMI 42.95 kg/m   Estimated body mass index is 42.95 kg/m  as calculated from the following:    Height as of 1/4/22: 1.765 m (5' 9.5\").    Weight as of this encounter: 133.9 kg (295 lb 1.6 oz). Body surface area is 2.56 meters squared.  No Pain (0) Comment: Data Unavailable   No LMP for male patient.  Allergies reviewed: Yes  Medications reviewed: Yes    Medications: Medication refills not needed today.  Pharmacy name entered into [employer]: [facility], [place] - [address] [place] [address]    Clinical concerns Not as tender x 2 weeks.  Decreased flow of urine.       Remigio Ramos LPN              "

## 2022-02-08 ENCOUNTER — TELEPHONE (OUTPATIENT)
Dept: ONCOLOGY | Facility: CLINIC | Age: 52
End: 2022-02-08
Payer: COMMERCIAL

## 2022-02-08 NOTE — TELEPHONE ENCOUNTER
Called Renzo to follow up after cmmunicating with Zackery Velazquez. Relayed that there was a suspicion for hernia on his clinical examination at his last clinic visit. He did not think that further antibiotics would be helpful.Educated that  can take some ibuprofen and try and continue to wear some scrotal supporter's for comfort.   Dr. Vizcarra  recommends that he talk to his primary care to see a general surgeon about the ruling out hernia and related pain because of that. Renzo expressed understanding/Mitzy TONYA Perez

## 2022-02-08 NOTE — TELEPHONE ENCOUNTER
Renzo called to relay that his symptoms have worsened since he was here for his apt with / Zackery. Renzo relayed that when he as here with Dr. Vizcarra he had just finished antibx therapy andhis symptoms were better at that time. Symptoms started to worsen 2 days after his clinic visit  And have been persistent for a week. He relays that he has bilateral testicular pain that is constant and it is now radiating to his bilateral lower back. Pain is worse when his bladder is full. He has pain when urinating that hurts to the end of his penis. He denies burning with urination. He is wearing smaller/tighter underwear for scrotal support. He said that Dr. Vizcarra mentioned that he may need to go back on medication if his symptoms worsened and he would appreciated help as he is very uncomfortable. Writer will discuss with Dr. Vizcarra and follow up with him afterward/Mitzy Perez RN

## 2022-02-09 ENCOUNTER — TELEPHONE (OUTPATIENT)
Dept: FAMILY MEDICINE | Facility: CLINIC | Age: 52
End: 2022-02-09
Payer: COMMERCIAL

## 2022-02-09 DIAGNOSIS — N50.811 PAIN IN BOTH TESTICLES: Primary | ICD-10-CM

## 2022-02-09 DIAGNOSIS — N50.812 PAIN IN BOTH TESTICLES: Primary | ICD-10-CM

## 2022-02-09 NOTE — TELEPHONE ENCOUNTER
Would recommend follow-up with general surgery if his pain persists and your able to further work that up to determine if there is an actual hernia there I have placed a referral patient should call to schedule an appointment if symptoms persist.    [facility]   [address] [facility] [address]   Phone: [phone]     Lizet Berry CNP

## 2022-02-09 NOTE — TELEPHONE ENCOUNTER
Reason for Call:  Other     Detailed comments: Pt says he was seen by Dr Vizcarra in [place] on 2/1 for testicle pain. He says the doctor feels he has a hernia and that is where his pain is coming from. Renzo is not sure if that is correct but he was told to contact his PCP and get a referral to a surgeon. He is wondering what Lizet thinks about this.   Pt is aware that Lizet is not in the office today.     Phone Number Patient can be reached at: Home number on file [phone] (home)    Best Time: anytime    Can we leave a detailed message on this number? YES    Call taken on 2/9/2022 at 8:46 AM by Anali Walden

## 2022-02-14 ENCOUNTER — OFFICE VISIT (OUTPATIENT)
Dept: SURGERY | Facility: CLINIC | Age: 52
End: 2022-02-14
Payer: COMMERCIAL

## 2022-02-14 VITALS
DIASTOLIC BLOOD PRESSURE: 90 MMHG | BODY MASS INDEX: 42.23 KG/M2 | TEMPERATURE: 98.8 F | WEIGHT: 295 LBS | HEART RATE: 77 BPM | SYSTOLIC BLOOD PRESSURE: 171 MMHG | HEIGHT: 70 IN

## 2022-02-14 DIAGNOSIS — K40.90 RIGHT INGUINAL HERNIA: Primary | ICD-10-CM

## 2022-02-14 DIAGNOSIS — N50.811 PAIN IN BOTH TESTICLES: ICD-10-CM

## 2022-02-14 DIAGNOSIS — N50.812 PAIN IN BOTH TESTICLES: ICD-10-CM

## 2022-02-14 PROCEDURE — 99214 OFFICE O/P EST MOD 30 MIN: CPT | Performed by: SURGERY

## 2022-02-14 ASSESSMENT — MIFFLIN-ST. JEOR: SCORE: 2181.42

## 2022-02-14 NOTE — PATIENT INSTRUCTIONS
Per physician instructions      If you have questions or concerns on any instructions given to you by your provider today or if you need to schedule an appointment, you can reach us at [phone].

## 2022-02-14 NOTE — NURSING NOTE
"Initial BP (!) 171/90 (BP Location: Right arm, Patient Position: Sitting, Cuff Size: Adult Large)   Pulse 77   Temp 98.8  F (37.1  C) (Tympanic)   Ht 1.765 m (5' 9.5\")   Wt 133.8 kg (295 lb)   BMI 42.94 kg/m   Estimated body mass index is 42.94 kg/m  as calculated from the following:    Height as of this encounter: 1.765 m (5' 9.5\").    Weight as of this encounter: 133.8 kg (295 lb). .    Vida Marti CMA    "

## 2022-02-14 NOTE — PROGRESS NOTES
PCP:  Lizet Berry    Chief complaint: Dysuria, possible hernia    History of Present Illness: Renzo is a 51-year-old man who presents to the surgical clinic for evaluation of possible hernia.  The patient has been experiencing pain in the testicles and burning when he urinates for the last 6 weeks or so.  He was initially started on a short course of antibiotics and started to feel better, but then the antibiotic was stopped.  He got a couple of days extension on that medication.  He has not been on antibiotics since that time.  He saw urologist in [place] recently and it was thought that he may be had a hernia on the left side.  Therefore, he was referred here.     He denies any bulging or pain in the groin on either side.   (the urologist) thought that the hernia might be on the left side.  The patient has no bowel or bladder obstructive symptoms.    Histories:  History reviewed. No pertinent past medical history.    Past Surgical History:   Procedure Laterality Date     COLONOSCOPY N/A 6/21/2019    Procedure: COLONOSCOPY, WITH POLYPECTOMY AND BIOPSY;  Surgeon: Herman Champagne MD;  Location: [facility]     ESOPHAGOSCOPY, GASTROSCOPY, DUODENOSCOPY (EGD), COMBINED N/A 10/29/2021    Procedure: ESOPHAGOGASTRODUODENOSCOPY, WITH BIOPSY;  Surgeon: Jakub Wray DO;  Location: [facility]       Family History   Problem Relation Age of Onset     Unknown/Adopted Father        Social History     Tobacco Use     Smoking status: Former Smoker     Smokeless tobacco: Former User   Substance Use Topics     Alcohol use: Yes     Comment: social       Current Outpatient Medications   Medication Sig Dispense Refill     aspirin 81 MG EC tablet        cholecalciferol (VITAMIN D3) 5000 units TABS tablet Take by mouth daily        fish oil-omega-3 fatty acids 1000 MG capsule Take 1 g by mouth daily        lisinopril (ZESTRIL) 40 MG tablet TAKE ONE TABLET BY MOUTH ONCE DAILY 90 tablet 3     multivitamin w/minerals  "(MULTI-VITAMIN) tablet Take 1 tablet by mouth daily        rosuvastatin (CRESTOR) 10 MG tablet TAKE ONE TABLET BY MOUTH ONCE DAILY 90 tablet 3     triamterene-HCTZ (DYAZIDE) 37.5-25 MG capsule Take 2 capsules by mouth daily 180 capsule 3     verapamil ER (VERELAN) 360 MG 24 hr capsule TAKE ONE CAPSULE BY MOUTH ONCE DAILY 90 capsule 3     vitamin C (ASCORBIC ACID) 1000 MG TABS Take 1,000 mg by mouth daily          Allergies   Allergen Reactions     Food Hives     mangos     Aspirin      [place] Flavor      Statin Drugs [Hmg-Coa-R Inhibitors]      Zantac [Ranitidine]        Images:  No results found for this or any previous visit (from the past 744 hour(s)).    Labs:  No results found for any visits on 02/14/22.    ROS:  Review of systems negative except as above    BP (!) 171/90 (BP Location: Right arm, Patient Position: Sitting, Cuff Size: Adult Large)   Pulse 77   Temp 98.8  F (37.1  C) (Tympanic)   Ht 1.765 m (5' 9.5\")   Wt 133.8 kg (295 lb)   BMI 42.94 kg/m      Exam:  General - Alert and Oriented X4, NAD, well nourished  HEENT - Normocephalic, atraumatic  Neck - supple  Lungs -respirations unlabored  CV - Heart RRR  Abdomen -obese, soft, non-tender    Groins - 2+ pulses bilaterally and no LAD, testicle normal bilaterally, right inguinal hernia definitely palpated, left side appears negative    Neuro - grossly intact  Extremities - No cyanosis, clubbing or edema.      Assessment and Plan: Lalito presents with a palpable left inguinal hernia, but no symptoms.  His symptoms are due to a urinary tract problem.  We have arranged for him to see  tomorrow.  At this point he has an asymptomatic hernia.  I spent about 30 minutes with him discussing the pathophysiology as well as surgical options for repair.  I would suggest that he have his urinary issue taken care of before considering elective hernia repair.  He was advised as to the potential risks of surgical treatment.      I will see him back to discuss " further if and when he decides to have this repaired.        Herman Champagne MD FACS

## 2022-02-14 NOTE — LETTER
2/14/2022         RE: Lalito Weathers  [address] [facility] [address]        Dear Colleague,    Thank you for referring your patient, Lalito Weathers, to the [facility]. Please see a copy of my visit note below.    PCP:  Lizet Berry    Chief complaint: Dysuria, possible hernia    History of Present Illness: Renzo is a 51-year-old man who presents to the surgical clinic for evaluation of possible hernia.  The patient has been experiencing pain in the testicles and burning when he urinates for the last 6 weeks or so.  He was initially started on a short course of antibiotics and started to feel better, but then the antibiotic was stopped.  He got a couple of days extension on that medication.  He has not been on antibiotics since that time.  He saw urologist in [place] recently and it was thought that he may be had a hernia on the left side.  Therefore, he was referred here.     He denies any bulging or pain in the groin on either side.   (the urologist) thought that the hernia might be on the left side.  The patient has no bowel or bladder obstructive symptoms.    Histories:  History reviewed. No pertinent past medical history.    Past Surgical History:   Procedure Laterality Date     COLONOSCOPY N/A 6/21/2019    Procedure: COLONOSCOPY, WITH POLYPECTOMY AND BIOPSY;  Surgeon: Herman Champagne MD;  Location: [facility]     ESOPHAGOSCOPY, GASTROSCOPY, DUODENOSCOPY (EGD), COMBINED N/A 10/29/2021    Procedure: ESOPHAGOGASTRODUODENOSCOPY, WITH BIOPSY;  Surgeon: Jakub Wray DO;  Location: [facility]       Family History   Problem Relation Age of Onset     Unknown/Adopted Father        Social History     Tobacco Use     Smoking status: Former Smoker     Smokeless tobacco: Former User   Substance Use Topics     Alcohol use: Yes     Comment: social       Current Outpatient Medications   Medication Sig Dispense Refill     aspirin 81 MG EC tablet        cholecalciferol (VITAMIN  "D3) 5000 units TABS tablet Take by mouth daily        fish oil-omega-3 fatty acids 1000 MG capsule Take 1 g by mouth daily        lisinopril (ZESTRIL) 40 MG tablet TAKE ONE TABLET BY MOUTH ONCE DAILY 90 tablet 3     multivitamin w/minerals (MULTI-VITAMIN) tablet Take 1 tablet by mouth daily        rosuvastatin (CRESTOR) 10 MG tablet TAKE ONE TABLET BY MOUTH ONCE DAILY 90 tablet 3     triamterene-HCTZ (DYAZIDE) 37.5-25 MG capsule Take 2 capsules by mouth daily 180 capsule 3     verapamil ER (VERELAN) 360 MG 24 hr capsule TAKE ONE CAPSULE BY MOUTH ONCE DAILY 90 capsule 3     vitamin C (ASCORBIC ACID) 1000 MG TABS Take 1,000 mg by mouth daily          Allergies   Allergen Reactions     Food Hives     mangos     Aspirin      Vasquez Flavor      Statin Drugs [Hmg-Coa-R Inhibitors]      Zantac [Ranitidine]        Images:  No results found for this or any previous visit (from the past 744 hour(s)).    Labs:  No results found for any visits on 02/14/22.    ROS:  Review of systems negative except as above    BP (!) 171/90 (BP Location: Right arm, Patient Position: Sitting, Cuff Size: Adult Large)   Pulse 77   Temp 98.8  F (37.1  C) (Tympanic)   Ht 1.765 m (5' 9.5\")   Wt 133.8 kg (295 lb)   BMI 42.94 kg/m      Exam:  General - Alert and Oriented X4, NAD, well nourished  HEENT - Normocephalic, atraumatic  Neck - supple  Lungs -respirations unlabored  CV - Heart RRR  Abdomen -obese, soft, non-tender    Groins - 2+ pulses bilaterally and no LAD, testicle normal bilaterally, right inguinal hernia definitely palpated, left side appears negative    Neuro - grossly intact  Extremities - No cyanosis, clubbing or edema.      Assessment and Plan: Lalito presents with a palpable left inguinal hernia, but no symptoms.  His symptoms are due to a urinary tract problem.  We have arranged for him to see  tomorrow.  At this point he has an asymptomatic hernia.  I spent about 30 minutes with him discussing the pathophysiology as " well as surgical options for repair.  I would suggest that he have his urinary issue taken care of before considering elective hernia repair.  He was advised as to the potential risks of surgical treatment.      I will see him back to discuss further if and when he decides to have this repaired.        Herman Champagne MD FACS              Again, thank you for allowing me to participate in the care of your patient.        Sincerely,        Herman Champagne MD

## 2022-02-15 ENCOUNTER — OFFICE VISIT (OUTPATIENT)
Dept: UROLOGY | Facility: CLINIC | Age: 52
End: 2022-02-15
Payer: COMMERCIAL

## 2022-02-15 VITALS
HEIGHT: 70 IN | DIASTOLIC BLOOD PRESSURE: 101 MMHG | OXYGEN SATURATION: 99 % | HEART RATE: 78 BPM | TEMPERATURE: 97.8 F | BODY MASS INDEX: 42.23 KG/M2 | WEIGHT: 295 LBS | SYSTOLIC BLOOD PRESSURE: 189 MMHG

## 2022-02-15 DIAGNOSIS — N50.812 PAIN IN BOTH TESTICLES: Primary | ICD-10-CM

## 2022-02-15 DIAGNOSIS — N50.811 PAIN IN BOTH TESTICLES: Primary | ICD-10-CM

## 2022-02-15 LAB
ALBUMIN UR-MCNC: NEGATIVE MG/DL
APPEARANCE UR: CLEAR
BACTERIA #/AREA URNS HPF: ABNORMAL /HPF
BILIRUB UR QL STRIP: NEGATIVE
COLOR UR AUTO: YELLOW
GLUCOSE UR STRIP-MCNC: NEGATIVE MG/DL
HGB UR QL STRIP: NEGATIVE
KETONES UR STRIP-MCNC: NEGATIVE MG/DL
LEUKOCYTE ESTERASE UR QL STRIP: NEGATIVE
NITRATE UR QL: NEGATIVE
PH UR STRIP: 5.5 [PH] (ref 5–7)
RBC #/AREA URNS AUTO: ABNORMAL /HPF
SP GR UR STRIP: 1.02 (ref 1–1.03)
SQUAMOUS #/AREA URNS AUTO: ABNORMAL /LPF
UROBILINOGEN UR STRIP-ACNC: 0.2 E.U./DL
WBC #/AREA URNS AUTO: ABNORMAL /HPF

## 2022-02-15 PROCEDURE — 51798 US URINE CAPACITY MEASURE: CPT | Performed by: UROLOGY

## 2022-02-15 PROCEDURE — 99213 OFFICE O/P EST LOW 20 MIN: CPT | Performed by: UROLOGY

## 2022-02-15 PROCEDURE — 81001 URINALYSIS AUTO W/SCOPE: CPT | Performed by: UROLOGY

## 2022-02-15 PROCEDURE — 87086 URINE CULTURE/COLONY COUNT: CPT | Performed by: UROLOGY

## 2022-02-15 RX ORDER — TAMSULOSIN HYDROCHLORIDE 0.4 MG/1
0.4 CAPSULE ORAL DAILY
Qty: 90 CAPSULE | Refills: 3 | Status: SHIPPED | OUTPATIENT
Start: 2022-02-15 | End: 2023-02-07

## 2022-02-15 ASSESSMENT — MIFFLIN-ST. JEOR: SCORE: 2181.42

## 2022-02-15 ASSESSMENT — PAIN SCALES - GENERAL: PAINLEVEL: SEVERE PAIN (6)

## 2022-02-15 NOTE — NURSING NOTE
Active order to obtain bladder scan? Yes   Name of ordering provider:  Rhys  Bladder scan preformed post void Yes:   Bladder scan reveled 0 ML  Provider notified?  Yes    Kelsy Breen LPN

## 2022-02-15 NOTE — NURSING NOTE
"Initial BP (!) 189/101 (BP Location: Right arm, Patient Position: Sitting, Cuff Size: Adult Large)   Pulse 78   Temp 97.8  F (36.6  C) (Tympanic)   Ht 1.765 m (5' 9.5\")   Wt 133.8 kg (295 lb)   SpO2 99%   BMI 42.94 kg/m   Estimated body mass index is 42.94 kg/m  as calculated from the following:    Height as of this encounter: 1.765 m (5' 9.5\").    Weight as of this encounter: 133.8 kg (295 lb). .    Kelsy Breen LPN on 2/15/2022 at 10:20 AM    "

## 2022-02-15 NOTE — PROGRESS NOTES
UROLOGY FOLLOW-UP NOTE          Chief Complaint:   Today I had the pleasure of seeing Mr. Lalito Weathers in follow-up for a chief complaint of bilateral scrotal pain.          Interval Update   Lalito Weathers is a very pleasant 51 year old male with a history of COPD. T2 diabetes, and HTN.     Brief History: Mr. Lalito Weathers was seen in urology by Dr. Vizcarra on 2/01/2022. At that time, he had completed a 14 day course of levofloxacin as prescribed by his PCP. Dr. Vizcarra felt there was no evidence of infection or inflammation and advised him to use analgesic pain medications as needed. He was seen by Dr. Champagne on 2/14/2022 for a possible right inguinal hernia. He did not feel his symptoms were related to the hernia, and therefore there would be no benefit in hernia repair.     Today's notes: He has pain in between the testicles. The pain gets better some days, but is never gone. The last few days the pain has been worse. There is some tenderness with palpation, but he is also having some associated lower abdominal pain. Pain tends to be better in the morning and worse at the end of the day. He does lift weights at his gym, but is not a heavy weight . Has some nausea secondary to pain.  Feels like pain is related to holding urine too long. Has new low back pain that started about a week ago, gets better when he empties his bladder. He has been emptying his bladder more frequently as he feels the pain is worse when his bladder is full. Some post void dribbling that has been off and on since urine started. Has some hesitancy and weakened urinary stream. Some dysuria. No gross hematuria. No fevers or chills. Has regular bowel movements. No personal history of UTI or kidney stones.     Feels levofloxacin did help until day 4-5 which is when his antibiotics were extended from 10 to 14 days. Takes ibuprofen which helps with the pain, but has to take 800 mg to see effect so he is been trying to take it only when  "the pain is severe.          Physical Exam:   Patient is a 51 year old  male   Vitals: Blood pressure (!) 189/101, pulse 78, temperature 97.8  F (36.6  C), temperature source Tympanic, height 1.765 m (5' 9.5\"), weight 133.8 kg (295 lb), SpO2 99 %.  General: Alert and oriented x 3, no acute distress.  Respiratory: Non-labored breathing.  Cardiac: Regular rate.  : penis, scrotum, testes normal. No epididymal tenderness to palpation.     PVR: 0 mL        Labs and Pathology:    I personally reviewed all applicable laboratory data and went over findings with patient  Significant for:    UA RESULTS:   Recent Labs   Lab Test 01/04/22  1013 07/21/21  0824   SG 1.015 1.015   URINEPH 7.0 7.0   NITRITE Negative Negative   RBCU 0-2  --    WBCU 0-5  --        PSA RESULTS  Prostate Specific Antigen Screen   Date Value Ref Range Status   01/04/2022 0.59 0.00 - 4.00 ug/L Final           Imaging:    I personally reviewed all applicable imaging and went over the below findings with patient.    Results for orders placed or performed during the hospital encounter of 01/05/22   US Testicular & Scrotum w Doppler Ltd    Narrative    EXAM: US TESTICULAR AND SCROTUM WITH DOPPLER LIMITED  LOCATION: [facility]  DATE/TIME: 1/5/2022 5:01 PM    INDICATION: Testicle pain for one week.  COMPARISON: None.  TECHNIQUE: Ultrasound of scrotum with color flow and spectral Doppler with waveform analysis performed.    FINDINGS:    RIGHT: Right testicle measures 4.8 x 2.8 x 3.6 cm. Normal testicle with no masses. Normal arterial duplex and normal color flow. Normal epididymis. Minimal right hydrocele. No varicocele.    LEFT: Left testicle measures 4.8 x 2.9 x 3.4 cm. Normal testicle with no masses. Normal arterial duplex and normal color flow. Normal epididymis. Minimal left hydrocele. No varicocele.      Impression    IMPRESSION:  1.  Normal appearance to the right and left testicle. Flow present bilaterally on waveform " analysis.    2.  Minimal bilateral hydroceles.    3.  Normal appearance to the right and left epididymis.    4.  No significant varicocele.              Assessment/Plan   51 year old male with bilateral scrotal pain and LUTS. Given lack of symptom improvement with levofloxacin and no epididymal tenderness on exam today, epididymitis is less likely. In the setting of benign scrotal exam and unremarkable scrotal US, it is difficult to know the cause of the scrotal pain. As he is having urinary symptoms associated with his scrotal and abdominal pain, we discussed a trial of tamsulosin to see if improving his urinary symptoms will improve his pain. He is in agreement with this. If this is not successful, we can explore other treatment options.    Plan:  1. Start tamsulosin 0.4 mg daily. Side effects were reviewed.   2. Ibuprofen 600 mg BID as needed for pain.  3. Follow up via telephone or in person visit in 6-8 weeks with Dr. Joiner.           Past Medical History:   No past medical history on file.         Past Surgical History:     Past Surgical History:   Procedure Laterality Date     COLONOSCOPY N/A 6/21/2019    Procedure: COLONOSCOPY, WITH POLYPECTOMY AND BIOPSY;  Surgeon: Herman Champagne MD;  Location: [facility]     ESOPHAGOSCOPY, GASTROSCOPY, DUODENOSCOPY (EGD), COMBINED N/A 10/29/2021    Procedure: ESOPHAGOGASTRODUODENOSCOPY, WITH BIOPSY;  Surgeon: Jakub Wray DO;  Location: [facility]            Medications     Current Outpatient Medications   Medication     aspirin 81 MG EC tablet     cholecalciferol (VITAMIN D3) 5000 units TABS tablet     fish oil-omega-3 fatty acids 1000 MG capsule     lisinopril (ZESTRIL) 40 MG tablet     multivitamin w/minerals (MULTI-VITAMIN) tablet     rosuvastatin (CRESTOR) 10 MG tablet     triamterene-HCTZ (DYAZIDE) 37.5-25 MG capsule     verapamil ER (VERELAN) 360 MG 24 hr capsule     vitamin C (ASCORBIC ACID) 1000 MG TABS     No current facility-administered medications for this  visit.            Family History:     Family History   Problem Relation Age of Onset     Unknown/Adopted Father             Social History:     Social History     Socioeconomic History     Marital status:      Spouse name: Not on file     Number of children: Not on file     Years of education: Not on file     Highest education level: Not on file   Occupational History     Not on file   Tobacco Use     Smoking status: Former Smoker     Smokeless tobacco: Former User   Vaping Use     Vaping Use: Never used   Substance and Sexual Activity     Alcohol use: Yes     Comment: social     Drug use: No     Sexual activity: Yes     Partners: Female   Other Topics Concern     Not on file   Social History Narrative     Not on file     Social Determinants of Health     Financial Resource Strain: Not on file   Food Insecurity: Not on file   Transportation Needs: Not on file   Physical Activity: Not on file   Stress: Not on file   Social Connections: Not on file   Intimate Partner Violence: Not on file   Housing Stability: Not on file            Allergies:   Food, Aspirin, Vasquez flavor, Statin drugs [hmg-coa-r inhibitors], and Zantac [ranitidine]         Review of Systems:  From intake questionnaire   Negative 14 system review except as noted on HPI, nurse's note.        STACY PORTILLO PA-C  Department of Urology

## 2022-02-16 LAB — BACTERIA UR CULT: NORMAL

## 2022-02-19 ENCOUNTER — HEALTH MAINTENANCE LETTER (OUTPATIENT)
Age: 52
End: 2022-02-19

## 2022-03-10 ENCOUNTER — TELEPHONE (OUTPATIENT)
Dept: FAMILY MEDICINE | Facility: CLINIC | Age: 52
End: 2022-03-10
Payer: COMMERCIAL

## 2022-03-10 NOTE — TELEPHONE ENCOUNTER
Patient Quality Outreach    Patient is due for the following:   Diabetes -  BP Check    NEXT STEPS:   Schedule a office visit for diabetes and blood pressure check    Type of outreach:    Phone, spoke to patient/parent. He states that his most recent blood pressure reading at home was 128/89. Notified patient that he is due for his diabetes check in early April. He states that he will call back to schedule.      Questions for provider review:    None     Archana Clay, CMA

## 2022-04-12 ENCOUNTER — OFFICE VISIT (OUTPATIENT)
Dept: UROLOGY | Facility: CLINIC | Age: 52
End: 2022-04-12
Payer: COMMERCIAL

## 2022-04-12 VITALS — OXYGEN SATURATION: 98 % | HEART RATE: 66 BPM | DIASTOLIC BLOOD PRESSURE: 91 MMHG | SYSTOLIC BLOOD PRESSURE: 178 MMHG

## 2022-04-12 DIAGNOSIS — N50.811 PAIN IN BOTH TESTICLES: Primary | ICD-10-CM

## 2022-04-12 DIAGNOSIS — N50.812 PAIN IN BOTH TESTICLES: Primary | ICD-10-CM

## 2022-04-12 LAB
ALBUMIN UR-MCNC: NEGATIVE MG/DL
APPEARANCE UR: CLEAR
BILIRUB UR QL STRIP: NEGATIVE
COLOR UR AUTO: YELLOW
GLUCOSE UR STRIP-MCNC: NEGATIVE MG/DL
HGB UR QL STRIP: NEGATIVE
KETONES UR STRIP-MCNC: NEGATIVE MG/DL
LEUKOCYTE ESTERASE UR QL STRIP: NEGATIVE
NITRATE UR QL: NEGATIVE
PH UR STRIP: 7 [PH] (ref 5–7)
RBC #/AREA URNS AUTO: NORMAL /HPF
SP GR UR STRIP: 1.01 (ref 1–1.03)
UROBILINOGEN UR STRIP-ACNC: 0.2 E.U./DL
WBC #/AREA URNS AUTO: NORMAL /HPF

## 2022-04-12 PROCEDURE — 87086 URINE CULTURE/COLONY COUNT: CPT | Performed by: UROLOGY

## 2022-04-12 PROCEDURE — 99214 OFFICE O/P EST MOD 30 MIN: CPT | Mod: 25 | Performed by: UROLOGY

## 2022-04-12 PROCEDURE — 81001 URINALYSIS AUTO W/SCOPE: CPT | Performed by: UROLOGY

## 2022-04-12 PROCEDURE — 51798 US URINE CAPACITY MEASURE: CPT | Performed by: UROLOGY

## 2022-04-12 RX ORDER — OXYBUTYNIN CHLORIDE 5 MG/1
5 TABLET, EXTENDED RELEASE ORAL DAILY
Qty: 90 TABLET | Refills: 3 | Status: SHIPPED | OUTPATIENT
Start: 2022-04-12 | End: 2022-09-07 | Stop reason: ALTCHOICE

## 2022-04-12 NOTE — NURSING NOTE
Active order to obtain bladder scan? Yes   Name of ordering provider:  Rhys    Bladder scan preformed post void Yes.  Bladder scan reveled 20ML  Provider notified?  Yes    Virginia KELLY CMA

## 2022-04-12 NOTE — PATIENT INSTRUCTIONS
Per physician instructions.    If you have questions or concerns on any instructions given to you by your provider today or if you need to schedule an appointment, you can reach us at [phone].  Listen to the menu for the Specialty Clinic option.      Thank you!

## 2022-04-12 NOTE — PROGRESS NOTES
Appointment source: Established Patient  Patient name: Lalito Weathers  Urology Staff: Chad Joiner MD    Subjective: This is a 51 year old year old male returning for follow up of chronic mild bilateral hemiscrotal pain.    The pain resolves overnight and then gradually recurs during the day.    Physical activity does not seem to be a factor as the level of pain towards the end of the day is about the same regardless of the degree of exertion.    The testicles are sometimes palpably uncomfortable but no swelling has been described.    He also has some difficulty with urinary frequency of urgency.  He does not have any episodes of urinary incontinence.    He recently also has been developing some sensation of bloating and irritability of his bowels for which she is being evaluated.    He has borderline diabetic and currently is being managed by diet and exercise.    Objective: On physical examination there is no evidence of inguinal hernia.    Scrotal contents is unremarkable.  Testicles were palpably normal and not uncomfortable during the examination.    Foreskin retracts easily.    Postvoid residual 20 mL    Assessment: Possible neurogenic bladder dysfunction producing low-grade bladder spasms that may be promoting scrotal discomfort.    Plan: We will start a course of low-dose oxybutynin (5 mg extended release) daily and he will report the results of this to me on my chart.  It is possible that his bladder may be producing some of the symptoms and that reducing bladder spasticity may make you more comfortable including reducing the scrotal pain.    Total time 20 minutes

## 2022-04-12 NOTE — NURSING NOTE
"Chief Complaint   Patient presents with     RECHECK     Pelvic/penile pain        Initial BP (!) 178/91 (BP Location: Right arm, Patient Position: Chair, Cuff Size: Adult Large)   Pulse 66   SpO2 98%  Estimated body mass index is 42.94 kg/m  as calculated from the following:    Height as of 2/15/22: 1.765 m (5' 9.5\").    Weight as of 2/15/22: 133.8 kg (295 lb).  BP completed using cuff size: large   Medications and allergies reviewed.      Virginia KELLY CMA       "

## 2022-04-14 ENCOUNTER — OFFICE VISIT (OUTPATIENT)
Dept: FAMILY MEDICINE | Facility: CLINIC | Age: 52
End: 2022-04-14
Payer: COMMERCIAL

## 2022-04-14 VITALS
SYSTOLIC BLOOD PRESSURE: 134 MMHG | BODY MASS INDEX: 41.23 KG/M2 | HEART RATE: 80 BPM | DIASTOLIC BLOOD PRESSURE: 84 MMHG | TEMPERATURE: 98.2 F | HEIGHT: 70 IN | RESPIRATION RATE: 18 BRPM | WEIGHT: 288 LBS

## 2022-04-14 DIAGNOSIS — Z11.59 NEED FOR HEPATITIS C SCREENING TEST: ICD-10-CM

## 2022-04-14 DIAGNOSIS — R10.84 ABDOMINAL PAIN, GENERALIZED: Primary | ICD-10-CM

## 2022-04-14 DIAGNOSIS — Z11.4 SCREENING FOR HIV (HUMAN IMMUNODEFICIENCY VIRUS): ICD-10-CM

## 2022-04-14 DIAGNOSIS — R14.0 BLOATED ABDOMEN: ICD-10-CM

## 2022-04-14 DIAGNOSIS — E11.8 TYPE 2 DIABETES MELLITUS WITH UNSPECIFIED COMPLICATIONS (H): ICD-10-CM

## 2022-04-14 LAB
ALBUMIN SERPL-MCNC: 4.3 G/DL (ref 3.4–5)
ALP SERPL-CCNC: 70 U/L (ref 40–150)
ALT SERPL W P-5'-P-CCNC: 57 U/L (ref 0–70)
ANION GAP SERPL CALCULATED.3IONS-SCNC: 7 MMOL/L (ref 3–14)
AST SERPL W P-5'-P-CCNC: 23 U/L (ref 0–45)
BACTERIA UR CULT: NO GROWTH
BILIRUB SERPL-MCNC: 0.7 MG/DL (ref 0.2–1.3)
BUN SERPL-MCNC: 22 MG/DL (ref 7–30)
CALCIUM SERPL-MCNC: 9.5 MG/DL (ref 8.5–10.1)
CHLORIDE BLD-SCNC: 103 MMOL/L (ref 94–109)
CO2 SERPL-SCNC: 28 MMOL/L (ref 20–32)
CREAT SERPL-MCNC: 1.18 MG/DL (ref 0.66–1.25)
ERYTHROCYTE [DISTWIDTH] IN BLOOD BY AUTOMATED COUNT: 13.1 % (ref 10–15)
GFR SERPL CREATININE-BSD FRML MDRD: 74 ML/MIN/1.73M2
GLUCOSE BLD-MCNC: 105 MG/DL (ref 70–99)
HBA1C MFR BLD: 5.3 % (ref 0–5.6)
HCT VFR BLD AUTO: 42.2 % (ref 40–53)
HGB BLD-MCNC: 13.6 G/DL (ref 13.3–17.7)
LIPASE SERPL-CCNC: 123 U/L (ref 73–393)
MCH RBC QN AUTO: 30.1 PG (ref 26.5–33)
MCHC RBC AUTO-ENTMCNC: 32.2 G/DL (ref 31.5–36.5)
MCV RBC AUTO: 93 FL (ref 78–100)
PLATELET # BLD AUTO: 233 10E3/UL (ref 150–450)
POTASSIUM BLD-SCNC: 4.2 MMOL/L (ref 3.4–5.3)
PROT SERPL-MCNC: 7.8 G/DL (ref 6.8–8.8)
RBC # BLD AUTO: 4.52 10E6/UL (ref 4.4–5.9)
SODIUM SERPL-SCNC: 138 MMOL/L (ref 133–144)
WBC # BLD AUTO: 7.2 10E3/UL (ref 4–11)

## 2022-04-14 PROCEDURE — 85027 COMPLETE CBC AUTOMATED: CPT | Performed by: NURSE PRACTITIONER

## 2022-04-14 PROCEDURE — 99214 OFFICE O/P EST MOD 30 MIN: CPT | Performed by: NURSE PRACTITIONER

## 2022-04-14 PROCEDURE — 80053 COMPREHEN METABOLIC PANEL: CPT | Performed by: NURSE PRACTITIONER

## 2022-04-14 PROCEDURE — 36415 COLL VENOUS BLD VENIPUNCTURE: CPT | Performed by: NURSE PRACTITIONER

## 2022-04-14 PROCEDURE — 83690 ASSAY OF LIPASE: CPT | Performed by: NURSE PRACTITIONER

## 2022-04-14 PROCEDURE — 83036 HEMOGLOBIN GLYCOSYLATED A1C: CPT | Performed by: NURSE PRACTITIONER

## 2022-04-14 NOTE — PROGRESS NOTES
"  Assessment & Plan     (R10.84) Abdominal pain, generalized  (primary encounter diagnosis)  Comment: CBC within normal limits will await further labs but would also recommend a CT scan due to location of pain and bloating patient will call and schedule.  Patient is aware if symptoms get worse should be seen in the emergency room I will working this up  Plan: CBC with platelets, Comprehensive metabolic         panel (BMP + Alb, Alk Phos, ALT, AST, Total.         Bili, TP), Lipase, CT Abdomen Pelvis w Contrast      (R14.0) Bloated abdomen  Comment:   Plan: CT Abdomen Pelvis w Contrast            (E11.8) Type 2 diabetes mellitus with unspecified complications (H)  Comment:  Controlled no change in treatment plan  Plan: OPTOMETRY REFERRAL, HEMOGLOBIN A1C      (Z11.4) Screening for HIV (human immunodeficiency virus)  Comment: Reviewed with patient declined removed from chart  Plan:     (Z11.59) Need for hepatitis C screening test  Comment: Reviewed with patient declined removed from chart  Plan:   6}     BMI:   Estimated body mass index is 41.92 kg/m  as calculated from the following:    Height as of this encounter: 1.765 m (5' 9.5\").    Weight as of this encounter: 130.6 kg (288 lb).   Weight management plan: Discussed healthy diet and exercise guidelines    See Patient Instructions    No follow-ups on file.    TERESA Romero CNP  [facility]    Sridhar Muller is a 51 year old who presents for the following health issues     History of Present Illness       Diabetes:   He presents for follow up of diabetes.  He is not checking blood glucose. He is concerned about other.  He is not experiencing numbness or burning in feet, excessive thirst, blurry vision, weight changes or redness, sores or blisters on feet. The patient has not had a diabetic eye exam in the last 12 months.         Reason for visit:  I have bloating and abdominal pain. Also I feel my blood sugar is up and need next " "steps  Symptom onset:  3-7 days ago  Symptoms include:  Bloating after I eat/ nauseated at times. Tied / feel like I am in a fog, feet sienna tingle  Symptom intensity:  Moderate  Symptom progression:  Staying the same  Had these symptoms before:  No  What makes it worse:  Eating for bloating  What makes it better:  Not really    He eats 2-3 servings of fruits and vegetables daily.He consumes 2 sweetened beverage(s) daily.He exercises with enough effort to increase his heart rate 30 to 60 minutes per day.  He exercises with enough effort to increase his heart rate 5 days per week.   He is taking medications regularly.       ABDOMINAL   PAIN     Onset: 4-5 days    Description:   Character: Dull ache  Location: epigastric region  Radiation: None    Intensity: moderate    Progression of Symptoms:  improving    Accompanying Signs & Symptoms:  Fever/Chills?: no   Gas/Bloating: YES  Nausea: YES  Vomitting: no   Diarrhea?: no   Constipation:no   Dysuria or Hematuria: no    History:   Trauma: no   Previous similar pain: no    Previous tests done: Colonoscopy 2019, Upper GI 10/2021    Precipitating factors:   Does the pain change with:     Food: YES- feels very full after eating      BM: YES- feels a little better after BM    Urination: no     Alleviating factors:  none    Therapies Tried and outcome: none    LMP:  not applicable             Review of Systems   Constitutional, HEENT, cardiovascular, pulmonary, GI, , musculoskeletal, neuro, skin, endocrine and psych systems are negative, except as otherwise noted.      Objective    /84 (BP Location: Right arm, Cuff Size: Adult Large)   Pulse 80   Temp 98.2  F (36.8  C) (Tympanic)   Resp 18   Ht 1.765 m (5' 9.5\")   Wt 130.6 kg (288 lb)   BMI 41.92 kg/m    There is no height or weight on file to calculate BMI.  Physical Exam   GENERAL: healthy, alert and no distress  EYES: Eyes grossly normal to inspection, PERRL and conjunctivae and sclerae normal  HENT: ear " canals and TM's normal, nose and mouth without ulcers or lesions  NECK: no adenopathy, no asymmetry, masses, or scars and thyroid normal to palpation  RESP: lungs clear to auscultation - no rales, rhonchi or wheezes  CV: regular rate and rhythm, normal S1 S2, no S3 or S4, no murmur, click or rub, no peripheral edema and peripheral pulses strong  ABDOMEN: soft, nontender, no hepatosplenomegaly, no masses and bowel sounds normal  MS: no gross musculoskeletal defects noted, no edema  SKIN: no suspicious lesions or rashes  NEURO: Normal strength and tone, mentation intact and speech normal  PSYCH: mentation appears normal, affect normal/bright    Results for orders placed or performed in visit on 04/14/22   HEMOGLOBIN A1C     Status: Normal   Result Value Ref Range    Hemoglobin A1C 5.3 0.0 - 5.6 %   CBC with platelets     Status: Normal   Result Value Ref Range    WBC Count 7.2 4.0 - 11.0 10e3/uL    RBC Count 4.52 4.40 - 5.90 10e6/uL    Hemoglobin 13.6 13.3 - 17.7 g/dL    Hematocrit 42.2 40.0 - 53.0 %    MCV 93 78 - 100 fL    MCH 30.1 26.5 - 33.0 pg    MCHC 32.2 31.5 - 36.5 g/dL    RDW 13.1 10.0 - 15.0 %    Platelet Count 233 150 - 450 10e3/uL

## 2022-04-21 ENCOUNTER — HOSPITAL ENCOUNTER (OUTPATIENT)
Dept: CT IMAGING | Facility: CLINIC | Age: 52
Discharge: HOME | End: 2022-04-21
Attending: NURSE PRACTITIONER | Admitting: NURSE PRACTITIONER
Payer: COMMERCIAL

## 2022-04-21 DIAGNOSIS — R14.0 BLOATED ABDOMEN: ICD-10-CM

## 2022-04-21 DIAGNOSIS — R10.84 ABDOMINAL PAIN, GENERALIZED: ICD-10-CM

## 2022-04-21 PROCEDURE — 74177 CT ABD & PELVIS W/CONTRAST: CPT

## 2022-04-21 PROCEDURE — 250N000011 HC RX IP 250 OP 636: Performed by: RADIOLOGY

## 2022-04-21 PROCEDURE — 250N000009 HC RX 250: Performed by: RADIOLOGY

## 2022-04-21 RX ORDER — IOPAMIDOL 755 MG/ML
100 INJECTION, SOLUTION INTRAVASCULAR ONCE
Status: COMPLETED | OUTPATIENT
Start: 2022-04-21 | End: 2022-04-21

## 2022-04-21 RX ADMIN — IOPAMIDOL 100 ML: 755 INJECTION, SOLUTION INTRAVENOUS at 07:44

## 2022-04-21 RX ADMIN — SODIUM CHLORIDE 74 ML: 9 INJECTION, SOLUTION INTRAVENOUS at 07:45

## 2022-06-09 ENCOUNTER — HOSPITAL ENCOUNTER (EMERGENCY)
Facility: CLINIC | Age: 52
Discharge: HOME | End: 2022-06-09
Payer: COMMERCIAL

## 2022-06-09 ENCOUNTER — HOSPITAL ENCOUNTER (EMERGENCY)
Facility: CLINIC | Age: 52
Discharge: HOME | End: 2022-06-09
Attending: FAMILY MEDICINE | Admitting: FAMILY MEDICINE
Payer: COMMERCIAL

## 2022-06-09 ENCOUNTER — NURSE TRIAGE (OUTPATIENT)
Dept: NURSING | Facility: CLINIC | Age: 52
End: 2022-06-09
Payer: COMMERCIAL

## 2022-06-09 VITALS
RESPIRATION RATE: 16 BRPM | TEMPERATURE: 98.1 F | SYSTOLIC BLOOD PRESSURE: 155 MMHG | DIASTOLIC BLOOD PRESSURE: 82 MMHG | HEART RATE: 51 BPM | OXYGEN SATURATION: 96 %

## 2022-06-09 DIAGNOSIS — I10 HYPERTENSION, UNSPECIFIED TYPE: ICD-10-CM

## 2022-06-09 DIAGNOSIS — R60.0 BILATERAL LOWER EXTREMITY EDEMA: ICD-10-CM

## 2022-06-09 LAB
ALBUMIN SERPL-MCNC: 4.4 G/DL (ref 3.4–5)
ALP SERPL-CCNC: 63 U/L (ref 40–150)
ALT SERPL W P-5'-P-CCNC: 62 U/L (ref 0–70)
ANION GAP SERPL CALCULATED.3IONS-SCNC: 7 MMOL/L (ref 3–14)
AST SERPL W P-5'-P-CCNC: 20 U/L (ref 0–45)
BASOPHILS # BLD AUTO: 0.1 10E3/UL (ref 0–0.2)
BASOPHILS NFR BLD AUTO: 1 %
BILIRUB SERPL-MCNC: 0.7 MG/DL (ref 0.2–1.3)
BUN SERPL-MCNC: 20 MG/DL (ref 7–30)
CALCIUM SERPL-MCNC: 9.5 MG/DL (ref 8.5–10.1)
CHLORIDE BLD-SCNC: 106 MMOL/L (ref 94–109)
CO2 SERPL-SCNC: 25 MMOL/L (ref 20–32)
CREAT SERPL-MCNC: 0.98 MG/DL (ref 0.66–1.25)
EOSINOPHIL # BLD AUTO: 0.1 10E3/UL (ref 0–0.7)
EOSINOPHIL NFR BLD AUTO: 1 %
ERYTHROCYTE [DISTWIDTH] IN BLOOD BY AUTOMATED COUNT: 13 % (ref 10–15)
GFR SERPL CREATININE-BSD FRML MDRD: >90 ML/MIN/1.73M2
GLUCOSE BLD-MCNC: 85 MG/DL (ref 70–99)
HCT VFR BLD AUTO: 39.5 % (ref 40–53)
HGB BLD-MCNC: 13.2 G/DL (ref 13.3–17.7)
IMM GRANULOCYTES # BLD: 0 10E3/UL
IMM GRANULOCYTES NFR BLD: 0 %
LYMPHOCYTES # BLD AUTO: 2.2 10E3/UL (ref 0.8–5.3)
LYMPHOCYTES NFR BLD AUTO: 28 %
MCH RBC QN AUTO: 29.5 PG (ref 26.5–33)
MCHC RBC AUTO-ENTMCNC: 33.4 G/DL (ref 31.5–36.5)
MCV RBC AUTO: 88 FL (ref 78–100)
MONOCYTES # BLD AUTO: 0.7 10E3/UL (ref 0–1.3)
MONOCYTES NFR BLD AUTO: 9 %
NEUTROPHILS # BLD AUTO: 4.9 10E3/UL (ref 1.6–8.3)
NEUTROPHILS NFR BLD AUTO: 61 %
NRBC # BLD AUTO: 0 10E3/UL
NRBC BLD AUTO-RTO: 0 /100
NT-PROBNP SERPL-MCNC: 19 PG/ML (ref 0–900)
PLATELET # BLD AUTO: 233 10E3/UL (ref 150–450)
POTASSIUM BLD-SCNC: 3.5 MMOL/L (ref 3.4–5.3)
PROT SERPL-MCNC: 7.8 G/DL (ref 6.8–8.8)
RBC # BLD AUTO: 4.48 10E6/UL (ref 4.4–5.9)
SODIUM SERPL-SCNC: 138 MMOL/L (ref 133–144)
TROPONIN I SERPL HS-MCNC: 10 NG/L
WBC # BLD AUTO: 8 10E3/UL (ref 4–11)

## 2022-06-09 PROCEDURE — 84484 ASSAY OF TROPONIN QUANT: CPT | Performed by: FAMILY MEDICINE

## 2022-06-09 PROCEDURE — 83880 ASSAY OF NATRIURETIC PEPTIDE: CPT | Performed by: FAMILY MEDICINE

## 2022-06-09 PROCEDURE — 36415 COLL VENOUS BLD VENIPUNCTURE: CPT | Performed by: FAMILY MEDICINE

## 2022-06-09 PROCEDURE — 85014 HEMATOCRIT: CPT | Performed by: FAMILY MEDICINE

## 2022-06-09 PROCEDURE — 99284 EMERGENCY DEPT VISIT MOD MDM: CPT | Performed by: FAMILY MEDICINE

## 2022-06-09 PROCEDURE — 93005 ELECTROCARDIOGRAM TRACING: CPT | Performed by: FAMILY MEDICINE

## 2022-06-09 PROCEDURE — 80053 COMPREHEN METABOLIC PANEL: CPT | Performed by: FAMILY MEDICINE

## 2022-06-09 PROCEDURE — 93010 ELECTROCARDIOGRAM REPORT: CPT | Performed by: FAMILY MEDICINE

## 2022-06-09 PROCEDURE — 99284 EMERGENCY DEPT VISIT MOD MDM: CPT | Mod: 25 | Performed by: FAMILY MEDICINE

## 2022-06-09 NOTE — ED TRIAGE NOTES
Pt reports swelling to bilateral ankles that started a couple weeks ago, pt reports he does have a hx of gout. Pt denies sob at this time.

## 2022-06-09 NOTE — TELEPHONE ENCOUNTER
Patient is calling and states that on the top of left foot is itching and feels like gout.  Around ankles and below calf is swelling that is more than normal.  Patient denies fever cough and shortness of breath.  Patient denies shortness of breath.  Denies foot cool or blue.  Denies calf pain on one side.      COVID 19 Nurse Triage Plan/Patient Instructions    Please be aware that novel coronavirus (COVID-19) may be circulating in the community. If you develop symptoms such as fever, cough, or SOB or if you have concerns about the presence of another infection including coronavirus (COVID-19), please contact your health care provider or visit [email or web address].     Disposition/Instructions    In-Person Visit with provider recommended. Reference Visit Selection Guide.    Thank you for taking steps to prevent the spread of this virus.  o Limit your contact with others.  o Wear a simple mask to cover your cough.  o Wash your hands well and often.    Resources    [facility]: About COVID-19: [email or web address]    CDC: What to Do If You're Sick: www.cdc.gov/coronavirus/2019-ncov/about/steps-when-sick.html    CDC: Ending Home Isolation: www.cdc.gov/coronavirus/2019-ncov/hcp/disposition-in-home-patients.html     CDC: Caring for Someone: www.cdc.gov/coronavirus/2019-ncov/if-you-are-sick/care-for-someone.html     [facility]: Interim Guidance for Hospital Discharge to Home: www.health.[facility].mn.us/diseases/coronavirus/hcp/hospdischarge.pdf    [facility] clinical trials (COVID-19 research studies): [email or web address]     Below are the COVID-19 hotlines at the [facility] ([facility]). Interpreters are available.   o For health questions: Call 527-771-1782 or 1-842.405.2484 (7 a.m. to 7 p.m.)  o For questions about schools and childcare: Call 612-764-6337 or 1-494.261.3980 (7 a.m. to 7 p.m.)                           Reason for Disposition    MODERATE  swelling of both ankles (e.g., swelling extends up to the knees) AND new onset or worsening    Additional Information    Negative: Sounds like a life-threatening emergency to the triager    Negative: Difficulty breathing at rest    Negative: Entire foot is cool or blue in comparison to other side    Negative: SEVERE swelling (e.g., swelling extends above knee, entire leg is swollen, weeping fluid)    Negative: Thigh or calf pain and only 1 side and present > 1 hour    Negative: Thigh, calf, or ankle swelling in only one leg    Negative: Thigh, calf, or ankle swelling in both legs, but one side is definitely more swollen (Exception: longstanding difference between legs)    Negative: Cast on leg or ankle and has increasing pain    Negative: Can't walk or can barely stand (new onset)    Negative: Fever and red area (or area very tender to touch)    Negative: Patient sounds very sick or weak to the triager    Negative: Swelling of face, arm or hands (Exception: slight puffiness of fingers during hot weather)    Negative: Pregnant > 20 weeks and sudden weight gain (i.e., > 2 lbs, 1 kg in one week)    Protocols used: LEG SWELLING AND EDEMA-A-OH

## 2022-06-10 NOTE — DISCHARGE INSTRUCTIONS
Compressive stockings as we discussed during the day off at night.  Continue all of your current antihypertensive medications.  Return to the emergency department if worse or changes.

## 2022-06-10 NOTE — ED NOTES
Pt here with increased bilat leg swelling and high blood pressure. Patient states he has had some increased diaphoresis with activity as well - denies any chest pain at this time. States was here earlier today although had to go back to work - returned now for work up .

## 2022-06-10 NOTE — ED PROVIDER NOTES
"  History     Chief Complaint   Patient presents with     Leg Swelling     Pt reports swelling to bilateral ankles that started a couple weeks ago, pt reports he does have a hx of gout. Pt denies sob at this time.      HPI  Lalito Weathers is a 51 year old male, past medical history is significant for COPD, type 2 diabetes, obesity, vitamin D deficiency, palpitations, PVCs, hypertension, presents to the emergency department with concerns of bilateral ankle swelling times several weeks.  History is obtained from the patient who states that he is noted swelling in his bilateral lower extremities as evidenced by the sock indentation just above his ankle level which is more noticeable than usual.  He denies any more shortness of breath than usual and states that he had his usual gym workout this morning whole body, and had no difficulties completing a workout, no more short of breath than usual with exertion, no chest pain.  He made appointment with a primary care provider for next week but felt that he should come in and be evaluated earlier given the more noticeable swelling.  He states that he has gout often in his lower extremities specifically the ankles but he has had no pain recently does not feel that he is having a gout flare.  He notes no recalled trauma recently or prolonged periods of immobilization.  He does not currently smoke.  He tried to go to urgent care but apparently with his blood pressure elevation he was told he had to be seen in the ER.  He notes that he is compliant with his blood pressure medication but that he has \"whitecoat hypertension\" and that he is a little bit anxious about being here in the emergency department.    Allergies:  Allergies   Allergen Reactions     Food Hives     mangos     Aspirin      [place] Flavor      Statin Drugs [Hmg-Coa-R Inhibitors]      Zantac [Ranitidine]        Problem List:    Patient Active Problem List    Diagnosis Date Noted     COPD (chronic obstructive " pulmonary disease) (H) 10/14/2021     Priority: Medium     Type 2 diabetes mellitus with unspecified complications (H) 08/08/2020     Priority: Medium     Obesity (BMI 35.0-39.9) with comorbidity (H) 08/09/2019     Priority: Medium     Tubular adenoma of colon 06/28/2019     Priority: Medium     Vitamin D deficiency 03/28/2017     Priority: Medium     Palpitations 10/02/2008     Priority: Medium     PVCs (premature ventricular contractions) 10/02/2008     Priority: Medium     Essential hypertension 01/14/2005     Priority: Medium     LW Onset:  [id number]  ; Hypertension          Past Medical History:    No past medical history on file.    Past Surgical History:    Past Surgical History:   Procedure Laterality Date     COLONOSCOPY N/A 6/21/2019    Procedure: COLONOSCOPY, WITH POLYPECTOMY AND BIOPSY;  Surgeon: Herman Champagne MD;  Location: [facility]     ESOPHAGOSCOPY, GASTROSCOPY, DUODENOSCOPY (EGD), COMBINED N/A 10/29/2021    Procedure: ESOPHAGOGASTRODUODENOSCOPY, WITH BIOPSY;  Surgeon: Jakub Wray DO;  Location: [facility]       Family History:    Family History   Problem Relation Age of Onset     Unknown/Adopted Father        Social History:  Marital Status:   [2]  Social History     Tobacco Use     Smoking status: Former Smoker     Smokeless tobacco: Former User   Vaping Use     Vaping Use: Never used   Substance Use Topics     Alcohol use: Yes     Comment: social     Drug use: No        Medications:    aspirin 81 MG EC tablet  cholecalciferol (VITAMIN D3) 5000 units TABS tablet  fish oil-omega-3 fatty acids 1000 MG capsule  lisinopril (ZESTRIL) 40 MG tablet  multivitamin w/minerals (THERA-VIT-M) tablet  oxybutynin ER (DITROPAN-XL) 5 MG 24 hr tablet  rosuvastatin (CRESTOR) 10 MG tablet  tamsulosin (FLOMAX) 0.4 MG capsule  triamterene-HCTZ (DYAZIDE) 37.5-25 MG capsule  verapamil ER (VERELAN) 360 MG 24 hr capsule  vitamin C (ASCORBIC ACID) 1000 MG TABS          Review of Systems   All other systems  reviewed and are negative.      Physical Exam   BP: (!) 194/110  Pulse: 58  Temp: 98.1  F (36.7  C)  Resp: 18  SpO2: 99 %      Physical Exam  Vitals and nursing note reviewed.   Constitutional:       General: He is not in acute distress.     Appearance: Normal appearance. He is obese. He is not ill-appearing.   HENT:      Head: Normocephalic and atraumatic.      Right Ear: Tympanic membrane, ear canal and external ear normal.      Left Ear: Tympanic membrane, ear canal and external ear normal.      Nose: Nose normal.      Mouth/Throat:      Mouth: Mucous membranes are dry.      Pharynx: Oropharynx is clear.   Eyes:      Extraocular Movements: Extraocular movements intact.      Conjunctiva/sclera: Conjunctivae normal.      Pupils: Pupils are equal, round, and reactive to light.   Cardiovascular:      Rate and Rhythm: Normal rate and regular rhythm.      Pulses: Normal pulses.      Heart sounds: Normal heart sounds.   Pulmonary:      Effort: Pulmonary effort is normal.      Breath sounds: Normal breath sounds.   Abdominal:      General: Bowel sounds are normal.      Palpations: Abdomen is soft.   Musculoskeletal:      Cervical back: Normal range of motion and neck supple.      Comments: There is mild distal one third tibial edema most notably with compression line from the low-cut socks around the medial and lateral malleolar area.   Skin:     General: Skin is warm and dry.      Capillary Refill: Capillary refill takes less than 2 seconds.   Neurological:      General: No focal deficit present.      Mental Status: He is alert and oriented to person, place, and time.   Psychiatric:         Mood and Affect: Mood normal.         Behavior: Behavior normal.         ED Course             EKG Interpretation:      Interpreted by Edward Arredondo MD  Time reviewed: 1930 72 bpm normal sinus rhythm no acute ST-T wave changes.  Normal EKG       Procedures              Critical Care time:  none               No results found  for this or any previous visit (from the past 24 hour(s)).8:35 PM  Reviewed normal EKG, lab diagnostics in the room.  No concerning findings.  Discussed possible medication etiology but doubt this given the length of time the patient has been on any of the medications in his med list that are potentially an etiology for this.  Age-related venous incompetence seems more likely.  The patient displays no renal abnormality and no evidence for CHF clinically or elevation of BNP by lab.  I recommended compression stockings on during the day off at night thigh-high if he is willing to do this.  No changes to meds and no additional meds.  Diuretics not indicated for this and the patient is already on HCTZ triamterene.  His blood pressure has come down to an acceptable range without intervention here as the patient has been in the room, relaxed and comfortable.  He states his systolics at home are routinely in the 130s range.      Medications - No data to display    Assessments & Plan (with Medical Decision Making)   Assessment and plan with medical decision making at the time stamp above.      Disclaimer: This note consists of symbols derived from keyboarding, dictation and/or voice recognition software. As a result, there may be errors in the script that have gone undetected. Please consider this when interpreting information found in this chart.      I have reviewed the nursing notes.    I have reviewed the findings, diagnosis, plan and need for follow up with the patient.            Discharge Medication List as of 6/9/2022  8:37 PM          Final diagnoses:   Bilateral lower extremity edema   Hypertension, unspecified type       6/9/2022   [facility] EMERGENCY DEPT     Edward Arredondo MD  06/09/22 2037       Edward Arredondo MD  06/17/22 121

## 2022-06-28 ENCOUNTER — OFFICE VISIT (OUTPATIENT)
Dept: FAMILY MEDICINE | Facility: CLINIC | Age: 52
End: 2022-06-28
Payer: COMMERCIAL

## 2022-06-28 ENCOUNTER — ANCILLARY PROCEDURE (OUTPATIENT)
Dept: GENERAL RADIOLOGY | Facility: CLINIC | Age: 52
End: 2022-06-28
Attending: NURSE PRACTITIONER
Payer: COMMERCIAL

## 2022-06-28 VITALS
RESPIRATION RATE: 18 BRPM | HEIGHT: 70 IN | HEART RATE: 76 BPM | WEIGHT: 286 LBS | TEMPERATURE: 98.1 F | SYSTOLIC BLOOD PRESSURE: 138 MMHG | DIASTOLIC BLOOD PRESSURE: 74 MMHG | BODY MASS INDEX: 40.94 KG/M2

## 2022-06-28 DIAGNOSIS — M25.562 CHRONIC PAIN OF BOTH KNEES: ICD-10-CM

## 2022-06-28 DIAGNOSIS — M25.561 CHRONIC PAIN OF BOTH KNEES: ICD-10-CM

## 2022-06-28 DIAGNOSIS — R60.0 BILATERAL LEG EDEMA: ICD-10-CM

## 2022-06-28 DIAGNOSIS — G89.29 CHRONIC PAIN OF BOTH KNEES: ICD-10-CM

## 2022-06-28 DIAGNOSIS — M17.12 PRIMARY OSTEOARTHRITIS OF LEFT KNEE: ICD-10-CM

## 2022-06-28 DIAGNOSIS — E11.8 TYPE 2 DIABETES MELLITUS WITH UNSPECIFIED COMPLICATIONS (H): Primary | ICD-10-CM

## 2022-06-28 LAB
ANION GAP SERPL CALCULATED.3IONS-SCNC: 11 MMOL/L (ref 3–14)
BUN SERPL-MCNC: 21 MG/DL (ref 7–30)
CALCIUM SERPL-MCNC: 9.1 MG/DL (ref 8.5–10.1)
CHLORIDE BLD-SCNC: 104 MMOL/L (ref 94–109)
CO2 SERPL-SCNC: 21 MMOL/L (ref 20–32)
CREAT SERPL-MCNC: 0.89 MG/DL (ref 0.66–1.25)
GFR SERPL CREATININE-BSD FRML MDRD: >90 ML/MIN/1.73M2
GLUCOSE BLD-MCNC: 92 MG/DL (ref 70–99)
POTASSIUM BLD-SCNC: 3.4 MMOL/L (ref 3.4–5.3)
SODIUM SERPL-SCNC: 136 MMOL/L (ref 133–144)

## 2022-06-28 PROCEDURE — 99214 OFFICE O/P EST MOD 30 MIN: CPT | Performed by: NURSE PRACTITIONER

## 2022-06-28 PROCEDURE — 73565 X-RAY EXAM OF KNEES: CPT | Mod: TC | Performed by: RADIOLOGY

## 2022-06-28 PROCEDURE — 80048 BASIC METABOLIC PNL TOTAL CA: CPT | Performed by: NURSE PRACTITIONER

## 2022-06-28 PROCEDURE — 36415 COLL VENOUS BLD VENIPUNCTURE: CPT | Performed by: NURSE PRACTITIONER

## 2022-06-28 RX ORDER — FUROSEMIDE 20 MG
20 TABLET ORAL DAILY
Qty: 7 TABLET | Refills: 0 | Status: SHIPPED | OUTPATIENT
Start: 2022-06-28 | End: 2022-09-07

## 2022-06-28 ASSESSMENT — PAIN SCALES - GENERAL: PAINLEVEL: NO PAIN (0)

## 2022-06-28 NOTE — PROGRESS NOTES
Assessment & Plan     (E11.8) Type 2 diabetes mellitus with unspecified complications (H)  (primary encounter diagnosis)  Comment:  Controlled no change in treatment plan  Plan: OPTOMETRY REFERRAL    (M25.561,  M25.562,  G89.29) Chronic pain of both knees  Comment: Patient has chronic bone pain questionable sclerotic foci noted on x-ray based on symptoms will obtain MRI for further evaluation  Plan: XR Knee AP Standing Bilateral      (R60.0) Bilateral leg edema  Comment: We will attempt short course of Lasix for diuresis patient to have recheck of electrolyte panel after diuresis  Plan: furosemide (LASIX) 20 MG tablet, Basic         metabolic panel  (Ca, Cl, CO2, Creat, Gluc, K,         Na, BUN)      (M17.12) Primary osteoarthritis of left knee  Comment:   Plan: MR Knee Left w/o Contrast           :[id number]}No follow-ups on file.    TERESA Romero CNP  [facility]    Sridhar Muller is a 51 year old, presenting for the following health issues:  No chief complaint on file.      History of Present Illness       Reason for visit:  Follow up from ER swollen ankles  Symptom onset:  1-2 weeks ago  Symptom intensity:  Mild  Symptom progression:  Staying the same  Had these symptoms before:  Yes  Has tried/received treatment for these symptoms:  No  What makes it worse:  No  What makes it better:  No    He eats 2-3 servings of fruits and vegetables daily.He consumes 1 sweetened beverage(s) daily.He exercises with enough effort to increase his heart rate 60 or more minutes per day.  He exercises with enough effort to increase his heart rate 5 days per week.   He is taking medications regularly.       ED/UC Followup:    Facility:  [facility] Emergency Department  Date of visit: 6/9/22  Reason for visit: bilateral lower extremity edema  Current Status: Patient states he is feeling fine but sill has swelling in his ankles and feet. He states that it is worse in his left  "leg.    Patient would like to discuss some pain he has had in his knees for the last few years. He states that they feel hot to the touch.     He also has numbness in his left index finger.        Review of Systems   Constitutional, HEENT, cardiovascular, pulmonary, gi and gu systems are negative, except as otherwise noted.       Objective    /74   Pulse 76   Temp 98.1  F (36.7  C) (Tympanic)   Resp 18   Ht 1.765 m (5' 9.5\")   Wt 129.7 kg (286 lb)   BMI 41.63 kg/m    There is no height or weight on file to calculate BMI.  Physical Exam   GENERAL: healthy, alert and no distress  EYES: Eyes grossly normal to inspection, PERRL and conjunctivae and sclerae normal  HENT: ear canals and TM's normal, nose and mouth without ulcers or lesions  NECK: no adenopathy, no asymmetry, masses, or scars and thyroid normal to palpation  RESP: lungs clear to auscultation - no rales, rhonchi or wheezes  CV: regular rate and rhythm, normal S1 S2, no S3 or S4, no murmur, click or rub, no peripheral edema and peripheral pulses strong  MS: no gross musculoskeletal defects noted, no edema  SKIN: no suspicious lesions or rashes  NEURO: Normal strength and tone, mentation intact and speech normal  PSYCH: mentation appears normal, affect normal/bright      Inspection:       AP/lateral alignment normal      Non-tender: patellar facets, MCL, LCL, lateral joint line, medial joint line, IT band, posterior knee   Active Range of Motion: all normal  Strength: quad  5/5, Hamstrings  5/5, Gastroc  5/5, Tibialis anterior  5/5, Permeals  5/5 and core strength  5/5 hip abductors and other core muscles   Special tests: normal Valgus stress test, normal Varus, negative Lachman's test, negative pivot shift, negative posterior drawer, no posterolateral corner signs, negative Joshua's, no apprehension with lateral stress of the patella.    Results for orders placed or performed in visit on 06/28/22   XR Knee AP Standing Bilateral     Status: None "    Narrative    KNEE AP STANDING BILATERAL  6/28/2022 4:48 PM    INDICATION: Rule out ostearthritis. Chronic pain of both knees.    COMPARISON: None available.       Impression    IMPRESSION: A single AP standing view of the knees demonstrates normal  and symmetric medial and lateral compartment joint spaces. No visible  displaced fracture. Nonspecific sclerotic foci overlie the distal left  femoral metadiaphysis.    RICHIE TRAN MD         SYSTEM ID:  [id number]   Results for orders placed or performed in visit on 06/28/22   Basic metabolic panel  (Ca, Cl, CO2, Creat, Gluc, K, Na, BUN)     Status: Normal   Result Value Ref Range    Sodium 136 133 - 144 mmol/L    Potassium 3.4 3.4 - 5.3 mmol/L    Chloride 104 94 - 109 mmol/L    Carbon Dioxide (CO2) 21 20 - 32 mmol/L    Anion Gap 11 3 - 14 mmol/L    Urea Nitrogen 21 7 - 30 mg/dL    Creatinine 0.89 0.66 - 1.25 mg/dL    Calcium 9.1 8.5 - 10.1 mg/dL    Glucose 92 70 - 99 mg/dL    GFR Estimate >90 >60 mL/min/1.73m2

## 2022-08-06 ENCOUNTER — HEALTH MAINTENANCE LETTER (OUTPATIENT)
Age: 52
End: 2022-08-06

## 2022-08-31 ENCOUNTER — NURSE TRIAGE (OUTPATIENT)
Dept: NURSING | Facility: CLINIC | Age: 52
End: 2022-08-31

## 2022-08-31 ENCOUNTER — HOSPITAL ENCOUNTER (OUTPATIENT)
Dept: CT IMAGING | Facility: CLINIC | Age: 52
Discharge: HOME | End: 2022-08-31
Attending: NURSE PRACTITIONER | Admitting: NURSE PRACTITIONER
Payer: COMMERCIAL

## 2022-08-31 ENCOUNTER — OFFICE VISIT (OUTPATIENT)
Dept: FAMILY MEDICINE | Facility: CLINIC | Age: 52
End: 2022-08-31
Payer: COMMERCIAL

## 2022-08-31 VITALS
BODY MASS INDEX: 40.71 KG/M2 | WEIGHT: 284.4 LBS | HEART RATE: 75 BPM | SYSTOLIC BLOOD PRESSURE: 126 MMHG | RESPIRATION RATE: 24 BRPM | DIASTOLIC BLOOD PRESSURE: 70 MMHG | OXYGEN SATURATION: 96 % | HEIGHT: 70 IN | TEMPERATURE: 98.5 F

## 2022-08-31 DIAGNOSIS — K57.32 DIVERTICULITIS OF COLON: Primary | ICD-10-CM

## 2022-08-31 DIAGNOSIS — D72.829 LEUKOCYTOSIS, UNSPECIFIED TYPE: ICD-10-CM

## 2022-08-31 DIAGNOSIS — R10.31 RLQ ABDOMINAL PAIN: Primary | ICD-10-CM

## 2022-08-31 DIAGNOSIS — R10.31 RLQ ABDOMINAL PAIN: ICD-10-CM

## 2022-08-31 LAB
ALBUMIN SERPL BCG-MCNC: 4.5 G/DL (ref 3.5–5.2)
ALBUMIN UR-MCNC: NEGATIVE MG/DL
ALP SERPL-CCNC: 64 U/L (ref 40–129)
ALT SERPL W P-5'-P-CCNC: 32 U/L (ref 10–50)
ANION GAP SERPL CALCULATED.3IONS-SCNC: 13 MMOL/L (ref 7–15)
APPEARANCE UR: CLEAR
AST SERPL W P-5'-P-CCNC: 17 U/L (ref 10–50)
BILIRUB SERPL-MCNC: 0.7 MG/DL
BILIRUB UR QL STRIP: NEGATIVE
BUN SERPL-MCNC: 14.7 MG/DL (ref 6–20)
CALCIUM SERPL-MCNC: 9.3 MG/DL (ref 8.6–10)
CHLORIDE SERPL-SCNC: 100 MMOL/L (ref 98–107)
COLOR UR AUTO: YELLOW
CREAT BLD-MCNC: 1.1 MG/DL (ref 0.7–1.3)
CREAT SERPL-MCNC: 1.02 MG/DL (ref 0.67–1.17)
DEPRECATED HCO3 PLAS-SCNC: 26 MMOL/L (ref 22–29)
ERYTHROCYTE [DISTWIDTH] IN BLOOD BY AUTOMATED COUNT: 13 % (ref 10–15)
GFR SERPL CREATININE-BSD FRML MDRD: 88 ML/MIN/1.73M2
GFR SERPL CREATININE-BSD FRML MDRD: >60 ML/MIN/1.73M2
GLUCOSE SERPL-MCNC: 108 MG/DL (ref 70–99)
GLUCOSE UR STRIP-MCNC: NEGATIVE MG/DL
HCT VFR BLD AUTO: 37.9 % (ref 40–53)
HGB BLD-MCNC: 12.6 G/DL (ref 13.3–17.7)
HGB UR QL STRIP: NEGATIVE
KETONES UR STRIP-MCNC: NEGATIVE MG/DL
LEUKOCYTE ESTERASE UR QL STRIP: NEGATIVE
MCH RBC QN AUTO: 30 PG (ref 26.5–33)
MCHC RBC AUTO-ENTMCNC: 33.2 G/DL (ref 31.5–36.5)
MCV RBC AUTO: 90 FL (ref 78–100)
NITRATE UR QL: NEGATIVE
PH UR STRIP: 7 [PH] (ref 5–7)
PLATELET # BLD AUTO: 232 10E3/UL (ref 150–450)
POTASSIUM SERPL-SCNC: 4.2 MMOL/L (ref 3.4–5.3)
PROT SERPL-MCNC: 7.2 G/DL (ref 6.4–8.3)
RBC # BLD AUTO: 4.2 10E6/UL (ref 4.4–5.9)
SODIUM SERPL-SCNC: 139 MMOL/L (ref 136–145)
SP GR UR STRIP: 1.02 (ref 1–1.03)
UROBILINOGEN UR STRIP-ACNC: 0.2 E.U./DL
WBC # BLD AUTO: 12.9 10E3/UL (ref 4–11)

## 2022-08-31 PROCEDURE — 81003 URINALYSIS AUTO W/O SCOPE: CPT | Performed by: NURSE PRACTITIONER

## 2022-08-31 PROCEDURE — 80053 COMPREHEN METABOLIC PANEL: CPT | Performed by: NURSE PRACTITIONER

## 2022-08-31 PROCEDURE — 99214 OFFICE O/P EST MOD 30 MIN: CPT | Performed by: NURSE PRACTITIONER

## 2022-08-31 PROCEDURE — 74177 CT ABD & PELVIS W/CONTRAST: CPT

## 2022-08-31 PROCEDURE — 250N000011 HC RX IP 250 OP 636: Performed by: RADIOLOGY

## 2022-08-31 PROCEDURE — 82565 ASSAY OF CREATININE: CPT

## 2022-08-31 PROCEDURE — 250N000009 HC RX 250: Performed by: RADIOLOGY

## 2022-08-31 PROCEDURE — 36415 COLL VENOUS BLD VENIPUNCTURE: CPT | Performed by: NURSE PRACTITIONER

## 2022-08-31 PROCEDURE — 85027 COMPLETE CBC AUTOMATED: CPT | Performed by: NURSE PRACTITIONER

## 2022-08-31 RX ORDER — CIPROFLOXACIN 500 MG/1
500 TABLET, FILM COATED ORAL 2 TIMES DAILY
Qty: 20 TABLET | Refills: 0 | Status: SHIPPED | OUTPATIENT
Start: 2022-08-31 | End: 2022-09-10

## 2022-08-31 RX ORDER — IOPAMIDOL 755 MG/ML
100 INJECTION, SOLUTION INTRAVASCULAR ONCE
Status: COMPLETED | OUTPATIENT
Start: 2022-08-31 | End: 2022-08-31

## 2022-08-31 RX ORDER — METRONIDAZOLE 500 MG/1
500 TABLET ORAL 3 TIMES DAILY
Qty: 30 TABLET | Refills: 0 | Status: SHIPPED | OUTPATIENT
Start: 2022-08-31 | End: 2022-09-10

## 2022-08-31 RX ADMIN — IOPAMIDOL 100 ML: 755 INJECTION, SOLUTION INTRAVENOUS at 14:31

## 2022-08-31 RX ADMIN — SODIUM CHLORIDE 74 ML: 9 INJECTION, SOLUTION INTRAVENOUS at 14:32

## 2022-08-31 ASSESSMENT — PAIN SCALES - GENERAL: PAINLEVEL: SEVERE PAIN (7)

## 2022-08-31 NOTE — PROGRESS NOTES
Assessment & Plan     RLQ abdominal pain  Patient has 3-day history of right lower abdominal pain with 1 low-grade fever noted.  Denies any nausea or vomiting.  Reports normal stools and no problems with urination.  Pain is crampy and pretty constant.  Has a history of diverticulosis noted on abdominal CT scan in April.  Denies any new or different foods, no recent bending, twisting, lifting or pulling.  Right lower quadrant abdominal tenderness with rebound.  CBC obtained in office with elevated WBC and slightly low hemoglobin.  Urinalysis negative for infection, awaiting comprehensive panel.  Given symptoms and elevated white count, recommend stat CT of the abdomen to further evaluate cause.  Discussed with patient could likely be diverticulitis.  Will call patient with results and further recommendations.  In the interim time okay to take Tylenol and use heat for comfort.  - CBC with platelets; Future  - Comprehensive metabolic panel (BMP + Alb, Alk Phos, ALT, AST, Total. Bili, TP); Future  - UA Macro with Reflex to Micro and Culture - lab collect; Future  - CBC with platelets  - Comprehensive metabolic panel (BMP + Alb, Alk Phos, ALT, AST, Total. Bili, TP)  - UA Macro with Reflex to Micro and Culture - lab collect  - CT Abdomen w/o Contrast; Future    Leukocytosis, unspecified type  Elevated white blood count as above.  - CT Abdomen w/o Contrast; Future             See Patient Instructions. After discussion with patient, patient verbalizes and agreeable to receive AVS instructions via My Chart, not printed today     Return for Recheck as needed.    Lesvia Rivera, DNP, APRN-CNP   [facility]    Subjective     Lalito Weathers is a 51 year old male who presents today for the following   health issues:    HPI  Answers for HPI/ROS submitted by the patient on 8/31/2022  How many servings of fruits and vegetables do you eat daily?: 2-3  On average, how many sweetened beverages do you drink  each day (Examples: soda, juice, sweet tea, etc.  Do NOT count diet or artificially sweetened beverages)?: 1  How many minutes a day do you exercise enough to make your heart beat faster?: 30 to 60  How many days a week do you exercise enough to make your heart beat faster?: 5  How many days per week do you miss taking your medication?: 0  What is the reason for your visit today?: Lower abdominal pain  When did your symptoms begin?: 1-3 days ago  What are your symptoms?: Cramps pain - if presses on gets worse. Is constant  How would you describe these symptoms?: Moderate  Are your symptoms:: Worsening  Have you had these symptoms before?: No  Is there anything that makes you feel worse?: No  Is there anything that makes you feel better?: No  Bowel movements are normal - no constipation or blood  Hurt back on Wednesday - taking 600 mg two times daily for a couple days    Last night in the middle of the night was 100, didn't take anything and no fever this morning   Does eat spicy, but no different than before   No acid reflux symptoms or history       CT ABDOMEN PELVIS W CONTRAST 4/21/2022 7:55 AM  CLINICAL HISTORY: Abdominal distension; Abdominal pain, generalized;  Bloated abdomen     TECHNIQUE: CT scan of the abdomen and pelvis was performed following  injection of IV contrast. Multiplanar reformats were obtained. Dose  reduction techniques were used.  CONTRAST: 100 mL Isovue 370     COMPARISON: None.     FINDINGS:   LOWER CHEST: Normal.     HEPATOBILIARY: Subcentimeter low-attenuation hepatic lesions  compatible with benign etiology. No follow-up needed in a low-risk  patient. Unremarkable gallbladder.     PANCREAS: Normal.     SPLEEN: Mild splenomegaly measures 13.8 cm in craniocaudal length.     ADRENAL GLANDS: Normal.     KIDNEYS/BLADDER: Normal.     BOWEL: Tiny hiatal hernia. The small bowel is unremarkable. Moderate  sigmoid diverticulosis. No bowel obstruction or inflammation.     PELVIC ORGANS:  "Normal.     ADDITIONAL FINDINGS: Mild calcified plaque in the aorta. No  lymphadenopathy. No ascites.     MUSCULOSKELETAL: Small fat-containing right inguinal hernia.                                                                     IMPRESSION:   1.  No finding to explain abdominal pain/bloating.  2.  Mild splenomegaly.  3.  Colonic diverticulosis.     ARLENE ARELLANO MD           Review of Systems    Constitutional, HEENT, cardiovascular, pulmonary, gi and gu systems are negative, except as otherwise noted.    Objective   /70   Pulse 75   Temp 98.5  F (36.9  C)   Resp 24   Ht 1.765 m (5' 9.5\")   Wt 129 kg (284 lb 6.4 oz)   SpO2 96%   BMI 41.40 kg/m    Body mass index is 41.4 kg/m .    Physical Exam  GENERAL APPEARANCE: healthy, alert and no distress  NECK: no adenopathy, no asymmetry, masses, or scars and thyroid normal to palpation  RESP: lungs clear to auscultation - no rales, rhonchi or wheezes  CV: regular rates and rhythm, normal S1 S2, no S3 or S4 and no murmur, click or rub  ABDOMEN: soft, tender right lower quadrant with rebound (no appendix), without hepatosplenomegaly or masses, bowel sounds normal and obese  MS: extremities normal- no gross deformities noted and peripheral pulses normal  SKIN: no suspicious lesions or rashes  NEURO: Normal strength and tone, mentation intact and speech normal  PSYCH: mentation appears normal and affect normal/bright    Diagnostic Test Results:  Results for orders placed or performed in visit on 08/31/22 (from the past 24 hour(s))   CBC with platelets   Result Value Ref Range    WBC Count 12.9 (H) 4.0 - 11.0 10e3/uL    RBC Count 4.20 (L) 4.40 - 5.90 10e6/uL    Hemoglobin 12.6 (L) 13.3 - 17.7 g/dL    Hematocrit 37.9 (L) 40.0 - 53.0 %    MCV 90 78 - 100 fL    MCH 30.0 26.5 - 33.0 pg    MCHC 33.2 31.5 - 36.5 g/dL    RDW 13.0 10.0 - 15.0 %    Platelet Count 232 150 - 450 10e3/uL   UA Macro with Reflex to Micro and Culture - lab collect    Specimen: Urine, " Midstream   Result Value Ref Range    Color Urine Yellow Colorless, Straw, Light Yellow, Yellow    Appearance Urine Clear Clear    Glucose Urine Negative Negative mg/dL    Bilirubin Urine Negative Negative    Ketones Urine Negative Negative mg/dL    Specific Gravity Urine 1.020 1.003 - 1.035    Blood Urine Negative Negative    pH Urine 7.0 5.0 - 7.0    Protein Albumin Urine Negative Negative mg/dL    Urobilinogen Urine 0.2 0.2, 1.0 E.U./dL    Nitrite Urine Negative Negative    Leukocyte Esterase Urine Negative Negative    Narrative    Microscopic not indicated        Chart documentation with Dragon Voice recognition Software. Although reviewed after completion, some words and grammatical errors may remain.

## 2022-08-31 NOTE — TELEPHONE ENCOUNTER
"Patient is calling to report pain in his lower abdomen that started on 8/28 afternoon.  Pain is located by belly button. Crampy  6-7/10 pain with bloating.  Last bowel movement was yesterday and it looked normal per patient.  He had a temp of 100 this morning.  Denies nausea, vomiting, diarrhea, constipation.  He is urinating normally.    He is taking ibuprofen without any relief.  He feels his pain is worse today than the previous days.    Disposition:  See PCP within 24 hours.  Care advice given. Advised patient to go to [facility] if no appointments available today.  Pt verbalized understanding.    Trudy Wagner, RN, BSN Nurse Triage Advisor 8/31/2022 5:05 AM       Reason for Disposition    [1] MODERATE pain (e.g., interferes with normal activities) AND [2] pain comes and goes (cramps) AND [3] present > 24 hours  (Exception: pain with Vomiting or Diarrhea - see that Guideline)    Additional Information    Negative: Shock suspected (e.g., cold/pale/clammy skin, too weak to stand, low BP, rapid pulse)    Negative: Difficult to awaken or acting confused (e.g., disoriented, slurred speech)    Negative: Passed out (i.e., lost consciousness, collapsed and was not responding)    Negative: Sounds like a life-threatening emergency to the triager    Negative: [1] SEVERE pain (e.g., excruciating) AND [2] present > 1 hour    Negative: [1] SEVERE pain AND [2] age > 60 years    Negative: [1] Vomiting AND [2] contains red blood or black (\"coffee ground\") material  (Exception: few red streaks in vomit that only happened once)    Negative: Blood in bowel movements  (Exception: Blood on surface of BM with constipation)    Negative: Black or tarry bowel movements (Exception: chronic-unchanged black-grey bowel movements AND is taking iron pills or Pepto-bismol)    Negative: [1] Unable to urinate (or only a few drops) > 4 hours AND [2] bladder feels very full (e.g., palpable bladder or strong urge to urinate)    Negative: [1] Pain in the " scrotum or testicle AND [2] present > 1 hour    Negative: Patient sounds very sick or weak to the triager    Negative: [1] MILD-MODERATE pain AND [2] constant AND [3] present > 2 hours    Negative: [1] Vomiting AND [2] abdomen looks much more swollen than usual    Negative: [1] Vomiting AND [2] contains bile (green color)    Negative: White of the eyes have turned yellow (i.e., jaundice)    Negative: Fever > 103 F (39.4 C)    Negative: [1] Fever > 101 F (38.3 C) AND [2] age > 60 years    Negative: [1] Fever > 100.0 F (37.8 C) AND [2] bedridden (e.g., nursing home patient, CVA, chronic illness, recovering from surgery)    Negative: [1] Fever > 100.0 F (37.8 C) AND [2] diabetes mellitus or weak immune system (e.g., HIV positive, cancer chemo, splenectomy, organ transplant, chronic steroids)    Negative: [1] SEVERE pain AND [2] present < 1 hour    Protocols used: ABDOMINAL PAIN - MALE-A-AH

## 2022-08-31 NOTE — PATIENT INSTRUCTIONS
RLQ abdominal pain  Patient has 3-day history of right lower abdominal pain with 1 low-grade fever noted.  Denies any nausea or vomiting.  Reports normal stools and no problems with urination.  Pain is crampy and pretty constant.  Has a history of diverticulosis noted on abdominal CT scan in April.  Denies any new or different foods, no recent bending, twisting, lifting or pulling.  Right lower quadrant abdominal tenderness with rebound.  CBC obtained in office with elevated WBC and slightly low hemoglobin.  Urinalysis negative for infection, awaiting comprehensive panel.  Given symptoms and elevated white count, recommend stat CT of the abdomen to further evaluate cause.  Discussed with patient could likely be diverticulitis.  Will call patient with results and further recommendations.  In the interim time okay to take Tylenol and use heat for comfort.  - CBC with platelets; Future  - Comprehensive metabolic panel (BMP + Alb, Alk Phos, ALT, AST, Total. Bili, TP); Future  - UA Macro with Reflex to Micro and Culture - lab collect; Future  - CBC with platelets  - Comprehensive metabolic panel (BMP + Alb, Alk Phos, ALT, AST, Total. Bili, TP)  - UA Macro with Reflex to Micro and Culture - lab collect  - CT Abdomen w/o Contrast; Future    Leukocytosis, unspecified type  Elevated white blood count as above.  - CT Abdomen w/o Contrast; Future

## 2022-09-07 ENCOUNTER — OFFICE VISIT (OUTPATIENT)
Dept: FAMILY MEDICINE | Facility: CLINIC | Age: 52
End: 2022-09-07
Payer: COMMERCIAL

## 2022-09-07 ENCOUNTER — ANCILLARY PROCEDURE (OUTPATIENT)
Dept: GENERAL RADIOLOGY | Facility: CLINIC | Age: 52
End: 2022-09-07
Attending: NURSE PRACTITIONER
Payer: COMMERCIAL

## 2022-09-07 VITALS
BODY MASS INDEX: 40.67 KG/M2 | TEMPERATURE: 98.5 F | OXYGEN SATURATION: 96 % | HEART RATE: 64 BPM | DIASTOLIC BLOOD PRESSURE: 79 MMHG | WEIGHT: 279.4 LBS | RESPIRATION RATE: 24 BRPM | SYSTOLIC BLOOD PRESSURE: 122 MMHG

## 2022-09-07 DIAGNOSIS — K57.32 DIVERTICULITIS OF COLON: Primary | ICD-10-CM

## 2022-09-07 DIAGNOSIS — M79.672 FOOT PAIN, LEFT: ICD-10-CM

## 2022-09-07 DIAGNOSIS — Z87.39 HISTORY OF GOUT: ICD-10-CM

## 2022-09-07 DIAGNOSIS — M79.89 SWELLING OF LEFT FOOT: ICD-10-CM

## 2022-09-07 DIAGNOSIS — E11.8 TYPE 2 DIABETES MELLITUS WITH UNSPECIFIED COMPLICATIONS (H): ICD-10-CM

## 2022-09-07 LAB
HBA1C MFR BLD: 5.3 % (ref 0–5.6)
URATE SERPL-MCNC: 8.9 MG/DL (ref 3.4–7)

## 2022-09-07 PROCEDURE — 83036 HEMOGLOBIN GLYCOSYLATED A1C: CPT | Performed by: NURSE PRACTITIONER

## 2022-09-07 PROCEDURE — 99213 OFFICE O/P EST LOW 20 MIN: CPT | Performed by: NURSE PRACTITIONER

## 2022-09-07 PROCEDURE — 36415 COLL VENOUS BLD VENIPUNCTURE: CPT | Performed by: NURSE PRACTITIONER

## 2022-09-07 PROCEDURE — 84550 ASSAY OF BLOOD/URIC ACID: CPT | Performed by: NURSE PRACTITIONER

## 2022-09-07 PROCEDURE — 73630 X-RAY EXAM OF FOOT: CPT | Mod: TC | Performed by: RADIOLOGY

## 2022-09-07 ASSESSMENT — ENCOUNTER SYMPTOMS: ABDOMINAL PAIN: 1

## 2022-09-07 ASSESSMENT — PAIN SCALES - GENERAL: PAINLEVEL: MODERATE PAIN (5)

## 2022-09-07 NOTE — PROGRESS NOTES
Assessment & Plan       Diverticulitis of colon  History of diverticulosis, seen in the office approximately 1 week ago with right lower quadrant abdominal pain with leukocytosis.  Abdominal CT verified diverticulitis.  Patient started on antibiotics.  Noting significant improvement, no tenderness on examination.  Advised patient to continue and complete full course of antibiotics and can slowly start introducing foods back in a diet as tolerated.    Type 2 diabetes mellitus with unspecified complications (H)  Chronic, last A1c 5.3.  Currently not on any medications.  Is active and eats fairly healthy.  We will recheck A1c today  - HEMOGLOBIN A1C; Future  - HEMOGLOBIN A1C    Foot pain, left  Top of left foot pain with swelling over the last 3 days without any known trauma or injury.  Patient is active in the gym.  Does have a history of gout, however this is an atypical presentation.  We will check uric acid today to rule out gout and will also obtain x-ray in office today.  Will notify patient of results and any further recommendations.  In the interim, advised to wrap with Ace wrap (compression) and ice as able.  - Uric acid; Future  - XR Foot Left G/E 3 Views; Future  - Uric acid    Swelling of left foot  Swelling and pain of left foot as above.  - Uric acid; Future  - XR Foot Left G/E 3 Views; Future  - Uric acid    History of gout  Patient has history of gout, foot pain and swelling as above.  We will recheck uric acid level.               See Patient Instructions. After discussion with patient, patient verbalizes and agreeable to receive AVS instructions via My Chart, not printed today     Return in about 1 week (around 9/14/2022), or if symptoms worsen or fail to improve.    Lesvia Melendez, NITA, APRN-CNP   [facility]    Sridhar Muller is a 51 year old, presenting for the following health issues:  Results (CT results), Abdominal Pain, and Foot Pain      Abdominal Pain      How  many servings of fruits and vegetables do you eat daily?: 2-3  On average, how many sweetened beverages do you drink each day (Examples: soda, juice, sweet tea, etc.  Do NOT count diet or artificially sweetened beverages)?: 1  How many minutes a day do you exercise enough to make your heart beat faster?: 30 to 60  How many days a week do you exercise enough to make your heart beat faster?: 5  How many days per week do you miss taking your medication?: 0  What is the reason for your visit today?: Lower abdominal pain  When did your symptoms begin?:since approxiamately 8/29/2022  What are your symptoms?: Cramps pain   How would you describe these symptoms?: mild  Are your symptoms:: improving  Have you had these symptoms before?: No  Is there anything that makes you feel worse?: No  Is there anything that makes you feel better?: antibiotics are helping sx  bms are beginning to form.    Is feeling much better      Concern - left foot pain  Onset: since 9/3/2022  Description: left foot and ankle swelling and pain - pain is in the foot (on top)  Intensity: moderate  Progression of Symptoms:  worsening  Accompanying Signs & Symptoms: swelling pain  Previous history of similar problem: history of gout  Precipitating factors:        Worsened by: 0  Alleviating factors:        Improved by: 0  Therapies tried and outcome:  none     Has swelling on the top of foot  Previous gout in right great toe  Pain 7 yesterday, today at a 5   Has held off on the indomethacin     Review of Systems   Gastrointestinal: Positive for abdominal pain.      Constitutional, HEENT, cardiovascular, pulmonary, gi and gu systems are negative, except as otherwise noted.      Objective    /79   Pulse 64   Temp 98.5  F (36.9  C)   Resp 24   Wt 126.7 kg (279 lb 6.4 oz)   SpO2 96%   BMI 40.67 kg/m    Body mass index is 40.67 kg/m .  Physical Exam   GENERAL APPEARANCE: healthy, alert and no distress  RESP: lungs clear to auscultation - no rales,  rhonchi or wheezes  CV: regular rates and rhythm, normal S1 S2, no S3 or S4 and no murmur, click or rub  ABDOMEN: soft, nontender, without hepatosplenomegaly or masses, bowel sounds normal and obese  MS: extremities normal- no gross deformities noted, peripheral pulses normal and localized swelling/lump and tenderness on the top of left foot   SKIN: no suspicious lesions or rashes  NEURO: Normal strength and tone, mentation intact and speech normal  PSYCH: mentation appears normal and affect normal/bright    Diagnostic Test Results:  Labs reviewed in Epic  Pending               Chart documentation with Dragon Voice recognition Software. Although reviewed after completion, some words and grammatical errors may remain.     used

## 2022-09-07 NOTE — PATIENT INSTRUCTIONS
Diverticulitis of colon  History of diverticulosis, seen in the office approximately 1 week ago with right lower quadrant abdominal pain with leukocytosis.  Abdominal CT verified diverticulitis.  Patient started on antibiotics.  Noting significant improvement, no tenderness on examination.  Advised patient to continue and complete full course of antibiotics and can slowly start introducing foods back in a diet as tolerated.    Type 2 diabetes mellitus with unspecified complications (H)  Chronic, last A1c 5.3.  Currently not on any medications.  Is active and eats fairly healthy.  We will recheck A1c today  - HEMOGLOBIN A1C; Future  - HEMOGLOBIN A1C    Foot pain, left  Top of left foot pain with swelling over the last 3 days without any known trauma or injury.  Patient is active in the gym.  Does have a history of gout, however this is an atypical presentation.  We will check uric acid today to rule out gout and will also obtain x-ray in office today.  Will notify patient of results and any further recommendations.  In the interim, advised to wrap with Ace wrap (compression) and ice as able.  - Uric acid; Future  - XR Foot Left G/E 3 Views; Future  - Uric acid    Swelling of left foot  Swelling and pain of left foot as above.  - Uric acid; Future  - XR Foot Left G/E 3 Views; Future  - Uric acid    History of gout  Patient has history of gout, foot pain and swelling as above.  We will recheck uric acid level.

## 2022-09-08 ENCOUNTER — TELEPHONE (OUTPATIENT)
Dept: FAMILY MEDICINE | Facility: CLINIC | Age: 52
End: 2022-09-08

## 2022-09-08 DIAGNOSIS — M10.9 ACUTE GOUT OF LEFT FOOT, UNSPECIFIED CAUSE: Primary | ICD-10-CM

## 2022-09-08 NOTE — TELEPHONE ENCOUNTER
Uric acid is high, wondering what plan is for this, his foot is hurting. Still on antibiotics for diverticulitis. Can he start something for this gout? Said in the past Lizet Jamal has said his triamterene might be causing the gout flare ups? Wondering about starting daily medication to prevent gout flare up also.

## 2022-09-09 RX ORDER — INDOMETHACIN 25 MG/1
25-50 CAPSULE ORAL 2 TIMES DAILY WITH MEALS
Qty: 60 CAPSULE | Refills: 0 | Status: SHIPPED | OUTPATIENT
Start: 2022-09-09 | End: 2022-09-22 | Stop reason: SINTOL

## 2022-09-09 NOTE — TELEPHONE ENCOUNTER
Please notify patient we will have him start indomethacin twice daily for the next 2 weeks.  He can apply ice and keep foot elevated.  The diuretic portion of the triamterene can definitely cause gout.  Would recommend he address this at his next routine office visit with Lizet.    Thanks,  Lesvia Melendez, DNP, APRN-CNP

## 2022-09-09 NOTE — TELEPHONE ENCOUNTER
Pt informed/ advised as noted per Lesvia.  Reviewed Rx dose/ directions- take with food.  Advised to review drug facts with pharm also.  Pt requesting plan per Lesvia as noted below to be sent via [employer] for his reference. Info sent.  KIMBERLEY Mendoza RN

## 2022-09-22 ENCOUNTER — OFFICE VISIT (OUTPATIENT)
Dept: FAMILY MEDICINE | Facility: CLINIC | Age: 52
End: 2022-09-22
Payer: COMMERCIAL

## 2022-09-22 VITALS
SYSTOLIC BLOOD PRESSURE: 128 MMHG | HEIGHT: 70 IN | DIASTOLIC BLOOD PRESSURE: 74 MMHG | OXYGEN SATURATION: 98 % | HEART RATE: 63 BPM | TEMPERATURE: 97.9 F | BODY MASS INDEX: 40.23 KG/M2 | WEIGHT: 281 LBS

## 2022-09-22 DIAGNOSIS — M10.9 ACUTE GOUT OF LEFT ANKLE, UNSPECIFIED CAUSE: Primary | ICD-10-CM

## 2022-09-22 DIAGNOSIS — Z13.220 SCREENING FOR HYPERLIPIDEMIA: ICD-10-CM

## 2022-09-22 DIAGNOSIS — E11.8 TYPE 2 DIABETES MELLITUS WITH UNSPECIFIED COMPLICATIONS (H): ICD-10-CM

## 2022-09-22 DIAGNOSIS — L30.8 OTHER ECZEMA: ICD-10-CM

## 2022-09-22 DIAGNOSIS — M79.89 LEFT LEG SWELLING: ICD-10-CM

## 2022-09-22 LAB
ANION GAP SERPL CALCULATED.3IONS-SCNC: 12 MMOL/L (ref 7–15)
BUN SERPL-MCNC: 18.2 MG/DL (ref 6–20)
CALCIUM SERPL-MCNC: 9.7 MG/DL (ref 8.6–10)
CHLORIDE SERPL-SCNC: 100 MMOL/L (ref 98–107)
CHOLEST SERPL-MCNC: 165 MG/DL
CREAT SERPL-MCNC: 1.13 MG/DL (ref 0.67–1.17)
CREAT UR-MCNC: 96.2 MG/DL
DEPRECATED HCO3 PLAS-SCNC: 28 MMOL/L (ref 22–29)
GFR SERPL CREATININE-BSD FRML MDRD: 77 ML/MIN/1.73M2
GLUCOSE SERPL-MCNC: 85 MG/DL (ref 70–99)
HDLC SERPL-MCNC: 42 MG/DL
LDLC SERPL CALC-MCNC: 93 MG/DL
MICROALBUMIN UR-MCNC: 30.5 MG/L
MICROALBUMIN/CREAT UR: 31.7 MG/G CR (ref 0–17)
NONHDLC SERPL-MCNC: 123 MG/DL
POTASSIUM SERPL-SCNC: 4.1 MMOL/L (ref 3.4–5.3)
SODIUM SERPL-SCNC: 140 MMOL/L (ref 136–145)
TRIGL SERPL-MCNC: 149 MG/DL

## 2022-09-22 PROCEDURE — 80061 LIPID PANEL: CPT | Performed by: NURSE PRACTITIONER

## 2022-09-22 PROCEDURE — 82043 UR ALBUMIN QUANTITATIVE: CPT | Performed by: NURSE PRACTITIONER

## 2022-09-22 PROCEDURE — 36415 COLL VENOUS BLD VENIPUNCTURE: CPT | Performed by: NURSE PRACTITIONER

## 2022-09-22 PROCEDURE — 99214 OFFICE O/P EST MOD 30 MIN: CPT | Performed by: NURSE PRACTITIONER

## 2022-09-22 PROCEDURE — 80048 BASIC METABOLIC PNL TOTAL CA: CPT | Performed by: NURSE PRACTITIONER

## 2022-09-22 RX ORDER — TRIAMCINOLONE ACETONIDE 1 MG/G
CREAM TOPICAL 2 TIMES DAILY
Qty: 45 G | Refills: 0 | Status: SHIPPED | OUTPATIENT
Start: 2022-09-22 | End: 2023-02-27

## 2022-09-22 RX ORDER — FUROSEMIDE 20 MG
20 TABLET ORAL DAILY
Qty: 5 TABLET | Refills: 0 | Status: SHIPPED | OUTPATIENT
Start: 2022-09-22 | End: 2023-02-27

## 2022-09-22 RX ORDER — ALLOPURINOL 100 MG/1
TABLET ORAL
Qty: 42 TABLET | Refills: 0 | Status: SHIPPED | OUTPATIENT
Start: 2022-09-22 | End: 2022-10-18

## 2022-09-22 ASSESSMENT — PAIN SCALES - GENERAL: PAINLEVEL: NO PAIN (0)

## 2022-09-22 NOTE — PROGRESS NOTES
Assessment & Plan     (M10.9) Acute gout of left ankle, unspecified cause  (primary encounter diagnosis)  Comment: Uncontrolled will start on allopurinol if not controlled may need to change out his hypertensive medications at this point we will see if symptoms are controlled with allopurinol before making any changes  Plan: allopurinol (ZYLOPRIM) 100 MG tablet      (E11.8) Type 2 diabetes mellitus with unspecified complications (H)  Comment: Controlled no change in symptoms  Plan: Adult Eye  Referral, Lipid panel         reflex to direct LDL Non-fasting, Albumin         Random Urine Quantitative with Creat Ratio            (Z13.220) Screening for hyperlipidemia  Comment:   Plan: Lipid panel reflex to direct LDL Non-fasting            (M79.89) Left leg swelling  Comment: Mild left leg swelling improving with compression socks will work with Lasix for 5 days if not resolving reappears patient will follow-up  Plan: furosemide (LASIX) 20 MG tablet, Basic         metabolic panel  (Ca, Cl, CO2, Creat, Gluc, K,         Na, BUN)      (L30.8) Other eczema  Comment: *Eczema to upper arm will start on Kenalog  Plan: triamcinolone (KENALOG) 0.1 % external cream          No follow-ups on file.    TERESA Romero CNP  [facility]    Sridhar Muller is a 52 year old, presenting for the following health issues:  Arthritis      HPI     Patient was taking indomethacin for gout. After he started taking it he seemed to be having a reaction on his skin. He has several red, itchy spots on his body. He stopped taking indomethacin and it seemed to get better.    He still has swelling in his left ankle from his gout. He would like to discuss alternative options to treat his gout and he would like to discuss his diuretic.        Review of Systems   Constitutional, HEENT, cardiovascular, pulmonary, gi and gu systems are negative, except as otherwise noted.      Objective    /74   Pulse 63   " Temp 97.9  F (36.6  C) (Tympanic)   Ht 1.765 m (5' 9.5\")   Wt 127.5 kg (281 lb)   SpO2 98%   BMI 40.90 kg/m    There is no height or weight on file to calculate BMI.  Physical Exam   GENERAL: healthy, alert and no distress  EYES: Eyes grossly normal to inspection, PERRL and conjunctivae and sclerae normal  HENT: ear canals and TM's normal, nose and mouth without ulcers or lesions  NECK: no adenopathy, no asymmetry, masses, or scars and thyroid normal to palpation  RESP: lungs clear to auscultation - no rales, rhonchi or wheezes  CV: regular rate and rhythm, normal S1 S2, no S3 or S4, no murmur, click or rub, no peripheral edema and peripheral pulses strong  ABDOMEN: soft, nontender, no hepatosplenomegaly, no masses and bowel sounds normal  MS: no gross musculoskeletal defects noted, no edema  SKIN: no suspicious lesions or rashes  NEURO: Normal strength and tone, mentation intact and speech normal  PSYCH: mentation appears normal, affect normal/bright                    "

## 2022-09-29 ENCOUNTER — TELEPHONE (OUTPATIENT)
Dept: FAMILY MEDICINE | Facility: CLINIC | Age: 52
End: 2022-09-29

## 2022-09-29 DIAGNOSIS — M10.9 ACUTE GOUT OF LEFT ANKLE, UNSPECIFIED CAUSE: Primary | ICD-10-CM

## 2022-09-29 RX ORDER — PREDNISONE 20 MG/1
TABLET ORAL
Qty: 10 TABLET | Refills: 0 | Status: SHIPPED | OUTPATIENT
Start: 2022-09-29 | End: 2023-01-20

## 2022-09-29 NOTE — TELEPHONE ENCOUNTER
Reason for Call:  Rt foot big toe knuckle- Swollen and Red    Detailed comments: Pt has flare up 9/28/22 and getting worse. Pt is currently on Allopurinol daily. Pt did start his Allopurinol 9/24/22 one week. He was told he would get a flare up starting this medication. Lizet and Pharmacist did mention a steroid if he did get a flare up.   Please Advise next step.     Pt did get a rash from Indomethacin he had used first.       Phone Number Patient can be reached at: Home number on file [phone] (home)    Best Time: Any Time      Can we leave a detailed message on this number? YES    Call taken on 9/29/2022 at 7:32 AM by Floresita Sneed

## 2022-09-29 NOTE — TELEPHONE ENCOUNTER
Pt informed. Reviewed Rx dose/ directions.  To contact care team if any further questions/ concerns.  KIMBERLEY Mendoza RN

## 2022-10-15 DIAGNOSIS — M10.9 ACUTE GOUT OF LEFT ANKLE, UNSPECIFIED CAUSE: ICD-10-CM

## 2022-10-17 NOTE — TELEPHONE ENCOUNTER
Routing refill request to provider for review/approval because:  Labs out of range:    Uric Acid   Date Value Ref Range Status   09/07/2022 8.9 (H) 3.4 - 7.0 mg/dL Final   03/04/2021 7.2 3.5 - 7.2 mg/dL Pete HERNANDEZ RN

## 2022-10-18 RX ORDER — ALLOPURINOL 100 MG/1
200 TABLET ORAL DAILY
Qty: 180 TABLET | Refills: 0 | Status: SHIPPED | OUTPATIENT
Start: 2022-10-18 | End: 2023-01-12

## 2022-10-22 ENCOUNTER — HEALTH MAINTENANCE LETTER (OUTPATIENT)
Age: 52
End: 2022-10-22

## 2022-10-29 DIAGNOSIS — I25.83 CORONARY ARTERY DISEASE DUE TO LIPID RICH PLAQUE: ICD-10-CM

## 2022-10-29 DIAGNOSIS — I10 ESSENTIAL HYPERTENSION: ICD-10-CM

## 2022-10-29 DIAGNOSIS — I25.10 CORONARY ARTERY DISEASE DUE TO LIPID RICH PLAQUE: ICD-10-CM

## 2022-10-31 RX ORDER — VERAPAMIL HYDROCHLORIDE 360 MG/1
CAPSULE, DELAYED RELEASE PELLETS ORAL
Qty: 90 CAPSULE | Refills: 3 | Status: SHIPPED | OUTPATIENT
Start: 2022-10-31 | End: 2023-10-16

## 2022-10-31 RX ORDER — ROSUVASTATIN CALCIUM 10 MG/1
TABLET, COATED ORAL
Qty: 90 TABLET | Refills: 3 | Status: SHIPPED | OUTPATIENT
Start: 2022-10-31 | End: 2023-10-16

## 2022-10-31 RX ORDER — LISINOPRIL 40 MG/1
TABLET ORAL
Qty: 90 TABLET | Refills: 3 | Status: SHIPPED | OUTPATIENT
Start: 2022-10-31 | End: 2023-10-16

## 2022-11-22 DIAGNOSIS — I10 ESSENTIAL HYPERTENSION: ICD-10-CM

## 2022-11-22 RX ORDER — TRIAMTERENE AND HYDROCHLOROTHIAZIDE 37.5; 25 MG/1; MG/1
2 CAPSULE ORAL DAILY
Qty: 180 CAPSULE | Refills: 3 | Status: SHIPPED | OUTPATIENT
Start: 2022-11-22 | End: 2023-11-13

## 2022-12-10 ENCOUNTER — HEALTH MAINTENANCE LETTER (OUTPATIENT)
Age: 52
End: 2022-12-10

## 2023-01-11 DIAGNOSIS — M10.9 ACUTE GOUT OF LEFT ANKLE, UNSPECIFIED CAUSE: ICD-10-CM

## 2023-01-12 RX ORDER — ALLOPURINOL 100 MG/1
200 TABLET ORAL DAILY
Qty: 180 TABLET | Refills: 0 | Status: SHIPPED | OUTPATIENT
Start: 2023-01-12 | End: 2023-04-10

## 2023-01-12 NOTE — TELEPHONE ENCOUNTER
Routing refill request to provider for review/approval because:  Labs out of range:  Uric acid      Lab Test 09/07/22  0848   URIC 8.9*     TONYA Arauz

## 2023-01-20 ENCOUNTER — E-VISIT (OUTPATIENT)
Dept: FAMILY MEDICINE | Facility: CLINIC | Age: 53
End: 2023-01-20
Payer: COMMERCIAL

## 2023-01-20 DIAGNOSIS — M10.9 ACUTE GOUT OF LEFT ANKLE, UNSPECIFIED CAUSE: ICD-10-CM

## 2023-01-20 PROCEDURE — 99421 OL DIG E/M SVC 5-10 MIN: CPT | Performed by: PHYSICIAN ASSISTANT

## 2023-01-20 RX ORDER — PREDNISONE 20 MG/1
TABLET ORAL
Qty: 10 TABLET | Refills: 0 | Status: SHIPPED | OUTPATIENT
Start: 2023-01-20 | End: 2023-02-27

## 2023-01-20 NOTE — PATIENT INSTRUCTIONS
Thank you for choosing us for your care. I have placed an order for a prescription so that you can start treatment. View your full visit summary for details by clicking on the link below. Your pharmacist will able to address any questions you may have about the medication.     If you're not feeling better within 5-7 days, please schedule an appointment.  You can schedule an appointment right here in [facility], or call [phone]  If the visit is for the same symptoms as your eVisit, we'll refund the cost of your eVisit if seen within seven days.

## 2023-02-01 DIAGNOSIS — N50.812 PAIN IN BOTH TESTICLES: ICD-10-CM

## 2023-02-01 DIAGNOSIS — N50.811 PAIN IN BOTH TESTICLES: ICD-10-CM

## 2023-02-01 NOTE — TELEPHONE ENCOUNTER
Requested Prescriptions   Pending Prescriptions Disp Refills     tamsulosin (FLOMAX) 0.4 MG capsule 90 capsule 3     Sig: Take 1 capsule (0.4 mg) by mouth daily       There is no refill protocol information for this order        Last office visit: 4/12/2022 with prescribing provider:  Dr. Joiner    [facility]

## 2023-02-07 RX ORDER — TAMSULOSIN HYDROCHLORIDE 0.4 MG/1
0.4 CAPSULE ORAL DAILY
Qty: 90 CAPSULE | Refills: 0 | Status: SHIPPED | OUTPATIENT
Start: 2023-02-07 | End: 2023-02-27

## 2023-02-07 NOTE — TELEPHONE ENCOUNTER
Pt called again to check status of RX, waiting for his tamsulosin (FLOMAX) 0.4 MG capsule, please call to confirm [phone], needs before Thursday 02/09/23

## 2023-02-27 ENCOUNTER — OFFICE VISIT (OUTPATIENT)
Dept: UROLOGY | Facility: CLINIC | Age: 53
End: 2023-02-27
Payer: COMMERCIAL

## 2023-02-27 VITALS — OXYGEN SATURATION: 98 % | SYSTOLIC BLOOD PRESSURE: 174 MMHG | DIASTOLIC BLOOD PRESSURE: 83 MMHG | HEART RATE: 68 BPM

## 2023-02-27 DIAGNOSIS — N50.812 PAIN IN BOTH TESTICLES: ICD-10-CM

## 2023-02-27 DIAGNOSIS — R35.0 URINARY FREQUENCY: Primary | ICD-10-CM

## 2023-02-27 DIAGNOSIS — Z12.5 SCREENING FOR PROSTATE CANCER: ICD-10-CM

## 2023-02-27 DIAGNOSIS — N50.811 PAIN IN BOTH TESTICLES: ICD-10-CM

## 2023-02-27 LAB — PSA SERPL-MCNC: 0.48 NG/ML (ref 0–3.5)

## 2023-02-27 PROCEDURE — G0103 PSA SCREENING: HCPCS | Performed by: STUDENT IN AN ORGANIZED HEALTH CARE EDUCATION/TRAINING PROGRAM

## 2023-02-27 PROCEDURE — 99214 OFFICE O/P EST MOD 30 MIN: CPT | Mod: 25 | Performed by: STUDENT IN AN ORGANIZED HEALTH CARE EDUCATION/TRAINING PROGRAM

## 2023-02-27 PROCEDURE — 51798 US URINE CAPACITY MEASURE: CPT | Performed by: STUDENT IN AN ORGANIZED HEALTH CARE EDUCATION/TRAINING PROGRAM

## 2023-02-27 PROCEDURE — 36415 COLL VENOUS BLD VENIPUNCTURE: CPT | Performed by: STUDENT IN AN ORGANIZED HEALTH CARE EDUCATION/TRAINING PROGRAM

## 2023-02-27 RX ORDER — SOLIFENACIN SUCCINATE 5 MG/1
5 TABLET, FILM COATED ORAL DAILY
Qty: 90 TABLET | Refills: 1 | Status: SHIPPED | OUTPATIENT
Start: 2023-02-27 | End: 2023-06-22

## 2023-02-27 RX ORDER — TAMSULOSIN HYDROCHLORIDE 0.4 MG/1
0.4 CAPSULE ORAL DAILY
Qty: 90 CAPSULE | Refills: 3 | Status: SHIPPED | OUTPATIENT
Start: 2023-02-27 | End: 2023-06-19

## 2023-02-27 NOTE — NURSING NOTE
"Initial BP (!) 174/83 (BP Location: Right arm, Patient Position: Chair, Cuff Size: Adult Large)   Pulse 68   SpO2 98%  Estimated body mass index is 40.9 kg/m  as calculated from the following:    Height as of 9/22/22: 1.765 m (5' 9.5\").    Weight as of 9/22/22: 127.5 kg (281 lb). .    Active order to obtain bladder scan? Yes   Name of ordering provider:  Cheli Barlow  Bladder scan preformed post void Patient last voided about an hour and a half ago. Unable to  Bladder scan reveled 128ML  Provider notified?  Yes    Fide Thomas CMA          "

## 2023-02-27 NOTE — PROGRESS NOTES
UROLOGY FOLLOW-UP NOTE          Chief Complaint:   Today I had the pleasure of seeing . Lalito Weathers in follow-up for a chief complaint of urinary frequency.          Interval Update   Lalito Weathers is a very pleasant 52 year old male with a history of COPD, T2 DM, and HTN.     Brief History: Mr. Lalito Weathers has been followed by Dr. Joiner for bilateral scrotal pain and LUTS. He was started on tamsulosin in February 2022. He reported some urinary frequency and urgency and he was started on oxybutynin XR 5 mg daily.     Today's notes: He continues to take tamsulosin, but stopped oxybutynin as he did not feel it was helpful. He reports some intermittent frequency and urgency today.     He reports regular bowel movements.          Physical Exam:   Patient is a 52 year old  male   Vitals: Blood pressure (!) 174/83, pulse 68, SpO2 98 %.  General: Alert and oriented x 3, no acute distress.  Respiratory: Non-labored breathing.  Cardiac: Regular rate.    PVR: 128 mL (90 minutes after last void)        Labs and Pathology:    I personally reviewed all applicable laboratory data and went over findings with patient  Significant for:    BMP RESULTS:  Recent Labs   Lab Test 09/22/22  1335 08/31/22  1419 08/31/22  0904 06/28/22  1718 06/09/22  1955 03/24/21  1614 03/04/21  1749 08/05/20  0746 10/23/19  0839 08/02/19  0000     --  139 136 138   < > 141 139 136  --    POTASSIUM 4.1  --  4.2 3.4 3.5   < > 3.8 4.0 3.8 4.1   CHLORIDE 100  --  100 104 106   < > 107 107 104  --    CO2 28  --  26 21 25   < > 27 29 27  --    ANIONGAP 12  --  13 11 7   < > 7 3 5  --    GLC 85  --  108* 92 85   < > 103* 92 93 200*   BUN 18.2  --  14.7 21 20   < > 20 18 24  --    CR 1.13 1.1 1.02 0.89 0.98   < > 1.09 0.95 1.00 1.04   GFRESTIMATED 77 >60 88 >90 >90   < > 77 >90 87 >60   GFRESTBLACK  --   --   --   --   --   --  90 >90 >90 >60   HREMELINDA 9.7  --  9.3 9.1 9.5   < > 9.2 9.7 9.2  --     < > = values in this interval not displayed.        UA RESULTS:   Recent Labs   Lab Test 08/31/22  0904 04/12/22  1200 02/15/22  1110 01/04/22  1013   SG 1.020 1.010 1.025 1.015   URINEPH 7.0 7.0 5.5 7.0   NITRITE Negative Negative Negative Negative   RBCU  --  None Seen 0-2 0-2   WBCU  --  None Seen 0-5 0-5       PSA RESULTS  Prostate Specific Antigen Screen   Date Value Ref Range Status   01/04/2022 0.59 0.00 - 4.00 ug/L Final            Assessment/Plan   52 year old male seen in follow up for urinary frequency and urgency. He takes tamsulosin 0.4 mg daily. He tried oxybutynin XR 5 mg daily, but did not find it helpful. He is interested in trying an alternative medication.     We discussed that if his scrotal pain reoccurs, pelvic floor PT or consultation with Dr. Munoz may be helpful.     Plan:  1. Continue tamsulosin 0.4 mg daily.   2. Start solifenacin 5 mg daily.   3. PSA today for prostate cancer screening.   4. [facility] 6-8 week trial of solifenacin.            Past Medical History:   No past medical history on file.         Past Surgical History:     Past Surgical History:   Procedure Laterality Date     COLONOSCOPY N/A 6/21/2019    Procedure: COLONOSCOPY, WITH POLYPECTOMY AND BIOPSY;  Surgeon: Herman Champagne MD;  Location: [facility]     ESOPHAGOSCOPY, GASTROSCOPY, DUODENOSCOPY (EGD), COMBINED N/A 10/29/2021    Procedure: ESOPHAGOGASTRODUODENOSCOPY, WITH BIOPSY;  Surgeon: Jakub Wray DO;  Location: [facility]            Medications     Current Outpatient Medications   Medication     tamsulosin (FLOMAX) 0.4 MG capsule     allopurinol (ZYLOPRIM) 100 MG tablet     aspirin 81 MG EC tablet     cholecalciferol (VITAMIN D3) 5000 units TABS tablet     fish oil-omega-3 fatty acids 1000 MG capsule     lisinopril (ZESTRIL) 40 MG tablet     multivitamin w/minerals (THERA-VIT-M) tablet     rosuvastatin (CRESTOR) 10 MG tablet     triamterene-HCTZ (DYAZIDE) 37.5-25 MG capsule     verapamil ER (VERELAN) 360 MG 24 hr capsule     vitamin C  (ASCORBIC ACID) 1000 MG TABS     No current facility-administered medications for this visit.            Family History:     Family History   Problem Relation Age of Onset     Unknown/Adopted Father             Social History:     Social History     Socioeconomic History     Marital status:      Spouse name: Not on file     Number of children: Not on file     Years of education: Not on file     Highest education level: Not on file   Occupational History     Not on file   Tobacco Use     Smoking status: Former     Smokeless tobacco: Former   Vaping Use     Vaping Use: Never used   Substance and Sexual Activity     Alcohol use: Yes     Comment: social     Drug use: No     Sexual activity: Yes     Partners: Female   Other Topics Concern     Not on file   Social History Narrative     Not on file     Social Determinants of Health     Financial Resource Strain: Not on file   Food Insecurity: Not on file   Transportation Needs: Not on file   Physical Activity: Not on file   Stress: Not on file   Social Connections: Not on file   Intimate Partner Violence: Not on file   Housing Stability: Not on file            Allergies:   Food, Aspirin, [place] flavor, Statin drugs [hmg-coa-r inhibitors], and Zantac [ranitidine]         Review of Systems:  From intake questionnaire   Negative 14 system review except as noted on HPI, nurse's note.        STACY PORTILLO PA-C  Department of Urology

## 2023-02-28 ENCOUNTER — TELEPHONE (OUTPATIENT)
Dept: FAMILY MEDICINE | Facility: CLINIC | Age: 53
End: 2023-02-28
Payer: COMMERCIAL

## 2023-02-28 NOTE — TELEPHONE ENCOUNTER
Patient Quality Outreach    Patient is due for the following:   Diabetes -  A1C and Eye Exam    Next Steps:   Schedule a office visit for a diabetes check and an eye exam    Type of outreach:    Phone, spoke to patient/parent. He has not had an eye exam. He states that he will call us to schedule an appointment some time after April.      Questions for provider review:    None     Archana Clay, [facility]

## 2023-03-07 ENCOUNTER — TELEPHONE (OUTPATIENT)
Dept: UROLOGY | Facility: CLINIC | Age: 53
End: 2023-03-07
Payer: COMMERCIAL

## 2023-03-07 NOTE — TELEPHONE ENCOUNTER
[facility] Call Center    Phone Message    May a detailed message be left on voicemail: yes     Reason for Call: Other: Renzo is calling stating that he is still having the testicular pain and is asking what the next steps should be. Please review and call back, thanks.     Action Taken: Other: [facility]    Travel Screening: Not Applicable

## 2023-03-07 NOTE — TELEPHONE ENCOUNTER
EM  I believe when I saw him a few weeks ago, he was not having scrotal pain. I would advise that scrotal pain can be intermittent and often resolves with time. Advil, Tylenol, ice, heat, and supportive underwear can be helpful. If the pain is significan t, can order testicular US to make sure everything looks normal. Otherwise would recommend observation for the time being. If duration is >1-2 months, could referral to pelvic floor PT.

## 2023-04-01 ENCOUNTER — HEALTH MAINTENANCE LETTER (OUTPATIENT)
Age: 53
End: 2023-04-01

## 2023-04-03 ENCOUNTER — TELEPHONE (OUTPATIENT)
Dept: FAMILY MEDICINE | Facility: CLINIC | Age: 53
End: 2023-04-03
Payer: COMMERCIAL

## 2023-04-03 NOTE — TELEPHONE ENCOUNTER
Patient Quality Outreach    Patient is due for the following:   Diabetes -  A1C and BP Check  IVD  -  BP Check    Next Steps:   Schedule a office visit for a diabetes/ blood pressure check    Type of outreach:    Patient state that he will call us to schedule an appointment after April.      Questions for provider review:    None     Archana Clay, [facility]

## 2023-04-05 ENCOUNTER — ALLIED HEALTH/NURSE VISIT (OUTPATIENT)
Dept: UROLOGY | Facility: CLINIC | Age: 53
End: 2023-04-05
Payer: COMMERCIAL

## 2023-04-05 DIAGNOSIS — N50.811 PAIN IN BOTH TESTICLES: ICD-10-CM

## 2023-04-05 DIAGNOSIS — R35.0 URINARY FREQUENCY: Primary | ICD-10-CM

## 2023-04-05 DIAGNOSIS — N50.812 PAIN IN BOTH TESTICLES: ICD-10-CM

## 2023-04-05 LAB
ALBUMIN UR-MCNC: NEGATIVE MG/DL
APPEARANCE UR: CLEAR
BACTERIA #/AREA URNS HPF: ABNORMAL /HPF
BILIRUB UR QL STRIP: NEGATIVE
COLOR UR AUTO: YELLOW
GLUCOSE UR STRIP-MCNC: NEGATIVE MG/DL
HGB UR QL STRIP: NEGATIVE
KETONES UR STRIP-MCNC: NEGATIVE MG/DL
LEUKOCYTE ESTERASE UR QL STRIP: NEGATIVE
NITRATE UR QL: NEGATIVE
PH UR STRIP: 6 [PH] (ref 5–7)
RBC #/AREA URNS AUTO: ABNORMAL /HPF
SP GR UR STRIP: <=1.005 (ref 1–1.03)
UROBILINOGEN UR STRIP-ACNC: 0.2 E.U./DL
WBC #/AREA URNS AUTO: ABNORMAL /HPF

## 2023-04-05 PROCEDURE — 51798 US URINE CAPACITY MEASURE: CPT

## 2023-04-05 PROCEDURE — 81001 URINALYSIS AUTO W/SCOPE: CPT

## 2023-04-05 PROCEDURE — 87086 URINE CULTURE/COLONY COUNT: CPT

## 2023-04-05 NOTE — PROGRESS NOTES
Active order to obtain bladder scan? Yes   Name of ordering provider: Cheli Barlow PA-C  Provider on-site at time of procedure: Cheli Barlow PA-C  Reason for scan: bladder outlet obstruction,  dysuria, urinary hesitancy, and occasional weak urinary stream and chronic prostatitis.   Bladder scan preformed post void Yes: UA/UC     Bladder scan reveled 164 ML  Provider notified?  Yes    Inder RANDOLPH RN   Specialty Clinics

## 2023-04-06 LAB — BACTERIA UR CULT: NO GROWTH

## 2023-04-08 DIAGNOSIS — M10.9 ACUTE GOUT OF LEFT ANKLE, UNSPECIFIED CAUSE: ICD-10-CM

## 2023-04-10 RX ORDER — ALLOPURINOL 100 MG/1
200 TABLET ORAL DAILY
Qty: 180 TABLET | Refills: 1 | Status: SHIPPED | OUTPATIENT
Start: 2023-04-10 | End: 2023-10-02

## 2023-04-11 ENCOUNTER — OFFICE VISIT (OUTPATIENT)
Dept: UROLOGY | Facility: CLINIC | Age: 53
End: 2023-04-11
Payer: COMMERCIAL

## 2023-04-11 VITALS — OXYGEN SATURATION: 98 % | HEART RATE: 64 BPM | SYSTOLIC BLOOD PRESSURE: 165 MMHG | DIASTOLIC BLOOD PRESSURE: 92 MMHG

## 2023-04-11 DIAGNOSIS — N41.1 CHRONIC PROSTATITIS: Primary | ICD-10-CM

## 2023-04-11 PROCEDURE — 99213 OFFICE O/P EST LOW 20 MIN: CPT | Performed by: STUDENT IN AN ORGANIZED HEALTH CARE EDUCATION/TRAINING PROGRAM

## 2023-04-11 RX ORDER — SULFAMETHOXAZOLE/TRIMETHOPRIM 800-160 MG
1 TABLET ORAL 2 TIMES DAILY
Qty: 56 TABLET | Refills: 0 | Status: SHIPPED | OUTPATIENT
Start: 2023-04-11 | End: 2023-05-09

## 2023-04-11 NOTE — NURSING NOTE
"Initial BP (!) 165/92   Pulse 64   SpO2 98%  Estimated body mass index is 40.9 kg/m  as calculated from the following:    Height as of 9/22/22: 1.765 m (5' 9.5\").    Weight as of 9/22/22: 127.5 kg (281 lb). .    Active order to obtain bladder scan? Yes   Name of ordering provider:  Cheli Barlow  Bladder scan preformed post void Yes.  Bladder scan reveled 60ML  Provider notified?  Yes    Fide Thomas CMA          "

## 2023-04-11 NOTE — PROGRESS NOTES
UROLOGY FOLLOW-UP NOTE          Chief Complaint:   Today I had the pleasure of seeing Mr. Lalito Weathers in follow-up for a chief complaint of dysuria.         Interval Update   Lalito Weathers is a very pleasant 52 year old male with a history of COPD, T2 DM, and HTN.     Brief History: Mr. Lalito Weathers was last seen on 2/27/2023 for urinary frequency and urgency. He takes tamsulosin 0.4 mg daily. He tried oxybutynin XR 5 mg daily, but did not find it helpful. He was started on solifenacin instead.     Today's notes: He reports a few week history of bilateral scrotal pain, dysuria, urinary hesitancy, and occasional weak urinary stream. UA from 4/5/2023 was unremarkable.          Physical Exam:   Patient is a 52 year old  male   Vitals: Blood pressure (!) 165/92, pulse 64, SpO2 98 %.  General: Alert and oriented x 3, no acute distress.  Respiratory: Non-labored breathing.  Cardiac: Regular rate.  : prostate tender to palpation    PVR: 60 mL        Labs and Pathology:    I personally reviewed all applicable laboratory data and went over findings with patient  Significant for:    CBC RESULTS:  Recent Labs   Lab Test 08/31/22  0904 06/09/22 1955 04/14/22  0717 08/05/20  0746   WBC 12.9* 8.0 7.2 7.1   HGB 12.6* 13.2* 13.6 13.4    233 233 222        BMP RESULTS:  Recent Labs   Lab Test 09/22/22  1335 08/31/22  1419 08/31/22  0904 06/28/22  1718 06/09/22 1955 03/24/21  1614 03/04/21  1749 08/05/20  0746 10/23/19  0839 08/02/19  0000     --  139 136 138   < > 141 139 136  --    POTASSIUM 4.1  --  4.2 3.4 3.5   < > 3.8 4.0 3.8 4.1   CHLORIDE 100  --  100 104 106   < > 107 107 104  --    CO2 28  --  26 21 25   < > 27 29 27  --    ANIONGAP 12  --  13 11 7   < > 7 3 5  --    GLC 85  --  108* 92 85   < > 103* 92 93 200*   BUN 18.2  --  14.7 21 20   < > 20 18 24  --    CR 1.13 1.1 1.02 0.89 0.98   < > 1.09 0.95 1.00 1.04   GFRESTIMATED 77 >60 88 >90 >90   < > 77 >90 87 >60   GFRESTBLACK  --   --   --    --   --   --  90 >90 >90 >60   HERMELINDA 9.7  --  9.3 9.1 9.5   < > 9.2 9.7 9.2  --     < > = values in this interval not displayed.       UA RESULTS:   Recent Labs   Lab Test 04/05/23  1136 08/31/22  0904 04/12/22  1200 02/15/22  1110   SG <=1.005 1.020 1.010 1.025   URINEPH 6.0 7.0 7.0 5.5   NITRITE Negative Negative Negative Negative   RBCU None Seen  --  None Seen 0-2   WBCU None Seen  --  None Seen 0-5       PSA RESULTS  Prostate Specific Antigen Screen   Date Value Ref Range Status   02/27/2023 0.48 0.00 - 3.50 ng/mL Final   01/04/2022 0.59 0.00 - 4.00 ug/L Final              Assessment/Plan   52 year old male seen in follow up for urinary frequency. He reports a few week history of bilateral scrotal pain, dysuria, urinary hesitancy, and occasional weak urinary stream. Prostate was tender to palpation on examination. We discussed prostatitis as the possible cause of his acute symptoms. We also discussed alternative options if his symptoms do not resolve with treatment including increasing his tamsulosin dose or having a cystoscopy for bladder outlet evaluation.       Plan:  1. Start Bactrim BID x 28 days for chronic prostatitis.   2. Continue tamsulosin and solifenacin.   3. Patient will contact me if symptoms are not improving by two weeks.            Past Medical History:   No past medical history on file.         Past Surgical History:     Past Surgical History:   Procedure Laterality Date     COLONOSCOPY N/A 6/21/2019    Procedure: COLONOSCOPY, WITH POLYPECTOMY AND BIOPSY;  Surgeon: Herman Champagne MD;  Location: [facility]     ESOPHAGOSCOPY, GASTROSCOPY, DUODENOSCOPY (EGD), COMBINED N/A 10/29/2021    Procedure: ESOPHAGOGASTRODUODENOSCOPY, WITH BIOPSY;  Surgeon: Jakub Wray DO;  Location: [facility]            Medications     Current Outpatient Medications   Medication     solifenacin (VESICARE) 5 MG tablet     tamsulosin (FLOMAX) 0.4 MG capsule     allopurinol (ZYLOPRIM) 100 MG tablet     aspirin 81 MG EC  tablet     cholecalciferol (VITAMIN D3) 5000 units TABS tablet     fish oil-omega-3 fatty acids 1000 MG capsule     lisinopril (ZESTRIL) 40 MG tablet     multivitamin w/minerals (THERA-VIT-M) tablet     rosuvastatin (CRESTOR) 10 MG tablet     triamterene-HCTZ (DYAZIDE) 37.5-25 MG capsule     verapamil ER (VERELAN) 360 MG 24 hr capsule     vitamin C (ASCORBIC ACID) 1000 MG TABS     No current facility-administered medications for this visit.            Family History:     Family History   Problem Relation Age of Onset     Unknown/Adopted Father             Social History:     Social History     Socioeconomic History     Marital status:      Spouse name: Not on file     Number of children: Not on file     Years of education: Not on file     Highest education level: Not on file   Occupational History     Not on file   Tobacco Use     Smoking status: Former     Smokeless tobacco: Former   Vaping Use     Vaping status: Never Used   Substance and Sexual Activity     Alcohol use: Yes     Comment: social     Drug use: No     Sexual activity: Yes     Partners: Female   Other Topics Concern     Not on file   Social History Narrative     Not on file     Social Determinants of Health     Financial Resource Strain: Not on file   Food Insecurity: Not on file   Transportation Needs: Not on file   Physical Activity: Not on file   Stress: Not on file   Social Connections: Not on file   Intimate Partner Violence: Not on file   Housing Stability: Not on file            Allergies:   Food, Aspirin, [place] flavor, Statin drugs [hmg-coa-r inhibitors], and Zantac [ranitidine]         Review of Systems:  From intake questionnaire   Negative 14 system review except as noted on HPI, nurse's note.        STACY PORTILLO PA-C  Department of Urology

## 2023-05-01 DIAGNOSIS — N41.1 CHRONIC PROSTATITIS: ICD-10-CM

## 2023-05-01 PROBLEM — M10.9 GOUT: Chronic | Status: ACTIVE | Noted: 2023-05-01

## 2023-05-01 PROBLEM — E66.01 MORBID OBESITY (H): Chronic | Status: ACTIVE | Noted: 2019-08-09

## 2023-05-01 PROBLEM — E78.5 HYPERLIPIDEMIA: Chronic | Status: ACTIVE | Noted: 2023-05-01

## 2023-05-01 PROBLEM — Z86.0100 HISTORY OF COLONIC POLYPS: Status: ACTIVE | Noted: 2023-05-01

## 2023-05-01 PROBLEM — D12.6 TUBULAR ADENOMA OF COLON: Status: RESOLVED | Noted: 2019-06-28 | Resolved: 2023-05-01

## 2023-05-01 PROBLEM — E55.9 VITAMIN D DEFICIENCY: Status: ACTIVE | Noted: 2017-03-28

## 2023-05-01 PROBLEM — Z86.0100 HISTORY OF COLONIC POLYPS: Chronic | Status: ACTIVE | Noted: 2023-05-01

## 2023-05-01 RX ORDER — SULFAMETHOXAZOLE/TRIMETHOPRIM 800-160 MG
1 TABLET ORAL 2 TIMES DAILY
Qty: 56 TABLET | Refills: 0 | OUTPATIENT
Start: 2023-05-01

## 2023-05-01 ASSESSMENT — SLEEP AND FATIGUE QUESTIONNAIRES
HOW LIKELY ARE YOU TO NOD OFF OR FALL ASLEEP WHILE LYING DOWN TO REST IN THE AFTERNOON WHEN CIRCUMSTANCES PERMIT: WOULD NEVER DOZE
HOW LIKELY ARE YOU TO NOD OFF OR FALL ASLEEP WHILE SITTING AND TALKING TO SOMEONE: WOULD NEVER DOZE
HOW LIKELY ARE YOU TO NOD OFF OR FALL ASLEEP WHILE SITTING INACTIVE IN A PUBLIC PLACE: WOULD NEVER DOZE
HOW LIKELY ARE YOU TO NOD OFF OR FALL ASLEEP WHILE SITTING QUIETLY AFTER LUNCH WITHOUT ALCOHOL: WOULD NEVER DOZE
HOW LIKELY ARE YOU TO NOD OFF OR FALL ASLEEP WHILE WATCHING TV: SLIGHT CHANCE OF DOZING
HOW LIKELY ARE YOU TO NOD OFF OR FALL ASLEEP WHILE SITTING AND READING: WOULD NEVER DOZE
HOW LIKELY ARE YOU TO NOD OFF OR FALL ASLEEP IN A CAR, WHILE STOPPED FOR A FEW MINUTES IN TRAFFIC: WOULD NEVER DOZE
HOW LIKELY ARE YOU TO NOD OFF OR FALL ASLEEP WHEN YOU ARE A PASSENGER IN A CAR FOR AN HOUR WITHOUT A BREAK: WOULD NEVER DOZE

## 2023-05-01 NOTE — TELEPHONE ENCOUNTER
Refill request denied as pt was only to take Bactrim for one month and contact provider if not better. Spot[employer] message sent to pt to make appt if not better yet.  Mai MARQUEZ RN BSN PHN  Specialty Clinics

## 2023-05-02 ENCOUNTER — VIRTUAL VISIT (OUTPATIENT)
Dept: SLEEP MEDICINE | Facility: CLINIC | Age: 53
End: 2023-05-02
Payer: COMMERCIAL

## 2023-05-02 VITALS — WEIGHT: 290 LBS | BODY MASS INDEX: 41.52 KG/M2 | HEIGHT: 70 IN

## 2023-05-02 DIAGNOSIS — E66.01 MORBID OBESITY (H): Chronic | ICD-10-CM

## 2023-05-02 DIAGNOSIS — G47.33 OSA (OBSTRUCTIVE SLEEP APNEA): Primary | ICD-10-CM

## 2023-05-02 DIAGNOSIS — I10 ESSENTIAL HYPERTENSION: Chronic | ICD-10-CM

## 2023-05-02 PROCEDURE — 99204 OFFICE O/P NEW MOD 45 MIN: CPT | Mod: VID | Performed by: INTERNAL MEDICINE

## 2023-05-02 ASSESSMENT — PAIN SCALES - GENERAL: PAINLEVEL: NO PAIN (0)

## 2023-05-02 NOTE — NURSING NOTE
Is the patient currently in the state of [place]? YES    Visit mode:VIDEO    If the visit is dropped, the patient can be reconnected by: VIDEO VISIT: Text to cell phone: [phone]    Will anyone else be joining the visit? NO    How would you like to obtain your AVS? MyChart    Are changes needed to the allergy or medication list? NO    Reason for visit: Video Visit (Sleep Apnea, currently using Cpap machine, but hasn't been happy with [facility] so would like to establish care with a new Sleep doctor. machine is almost 6 years old so would like to make sure)    Janice Daniel, [facility]
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
## 2023-05-02 NOTE — PROGRESS NOTES
Virtual Visit Details    Type of service:  Video Visit     Originating Location (pt. Location): Other work    Distant Location (provider location):  Off-site  Platform used for Video Visit: Yony

## 2023-05-02 NOTE — PATIENT INSTRUCTIONS

## 2023-05-10 PROBLEM — G47.33 OSA (OBSTRUCTIVE SLEEP APNEA): Chronic | Status: ACTIVE | Noted: 2023-05-02

## 2023-05-10 NOTE — PROGRESS NOTES
Sleep Study [facility] 12/13/16 (302#)   - AHI 87, RDI 87 (primarily supine sleep)  - Low O2 44%   - TCM ranged from 42 to 54 mmHg   - PAP titrated to 20/13    Ordered supplies, replacement machine

## 2023-05-23 ENCOUNTER — TELEPHONE (OUTPATIENT)
Dept: SLEEP MEDICINE | Facility: CLINIC | Age: 53
End: 2023-05-23
Payer: COMMERCIAL

## 2023-05-23 DIAGNOSIS — E66.01 MORBID OBESITY (H): Primary | Chronic | ICD-10-CM

## 2023-05-23 DIAGNOSIS — G47.33 OSA (OBSTRUCTIVE SLEEP APNEA): Chronic | ICD-10-CM

## 2023-05-23 NOTE — TELEPHONE ENCOUNTER
Writer called patient and left a message for patient to call clinic back.     Dr. Damian wanted patient to follow up after receiving a new cpap, but Dr. Damian is scheduled out to September.     Upon call back, please assist patient with scheduling for a follow up & notify patient the recall cpap order form has also been faxed to [facility] [employer].

## 2023-06-12 ENCOUNTER — TELEPHONE (OUTPATIENT)
Dept: FAMILY MEDICINE | Facility: CLINIC | Age: 53
End: 2023-06-12
Payer: COMMERCIAL

## 2023-06-12 NOTE — TELEPHONE ENCOUNTER
Patient Quality Outreach    Patient is due for the following:   Diabetes -  A1C, Eye Exam, BP Check and Foot Exam    Next Steps:   Schedule a office visit for a diabetes check and labs    Type of outreach:    Sent MedWhat message.      Questions for provider review:    None           Archana Clay, [facility]

## 2023-06-19 DIAGNOSIS — N50.811 PAIN IN BOTH TESTICLES: ICD-10-CM

## 2023-06-19 DIAGNOSIS — N50.812 PAIN IN BOTH TESTICLES: ICD-10-CM

## 2023-06-19 RX ORDER — TAMSULOSIN HYDROCHLORIDE 0.4 MG/1
0.8 CAPSULE ORAL DAILY
Qty: 180 CAPSULE | Refills: 3 | Status: SHIPPED | OUTPATIENT
Start: 2023-06-19 | End: 2024-06-04

## 2023-06-20 ENCOUNTER — VIRTUAL VISIT (OUTPATIENT)
Dept: FAMILY MEDICINE | Facility: CLINIC | Age: 53
End: 2023-06-20
Payer: COMMERCIAL

## 2023-06-20 DIAGNOSIS — K21.9 GASTROESOPHAGEAL REFLUX DISEASE, UNSPECIFIED WHETHER ESOPHAGITIS PRESENT: Primary | ICD-10-CM

## 2023-06-20 PROCEDURE — 99213 OFFICE O/P EST LOW 20 MIN: CPT | Mod: VID | Performed by: NURSE PRACTITIONER

## 2023-06-20 RX ORDER — PANTOPRAZOLE SODIUM 20 MG/1
40 TABLET, DELAYED RELEASE ORAL DAILY
Qty: 30 TABLET | Refills: 0 | Status: SHIPPED | OUTPATIENT
Start: 2023-06-20 | End: 2023-09-27 | Stop reason: DRUGHIGH

## 2023-06-20 NOTE — PATIENT INSTRUCTIONS
Gastroesophageal reflux disease, unspecified whether esophagitis present  History of Reflux, gastritis noted on EGD in 2021 negative for any Hemphill's. Has had increased heartburn symptoms over the last week without much change in diet or stress. Feeling slightly chilled without any other respiratory or abdominal symptoms. Worsens with coffee and tea. Discussed avoiding offending foods/drinks and starting Protonix daily. Reviewed proper administration instructions. Continue for 3-4 weeks and recheck with provider at that time. Also, okay to hold Aspirin for this short period of time. Will discuss restarting after symptoms improve.   - pantoprazole (PROTONIX) 20 MG EC tablet; Take 2 tablets (40 mg) by mouth daily

## 2023-06-20 NOTE — PROGRESS NOTES
"Renzo is a 52 year old who is being evaluated via a billable video visit.      How would you like to obtain your AVS? MyChart  If the video visit is dropped, the invitation should be resent by: Text to cell phone: [phone]  Will anyone else be joining your video visit? No          Assessment & Plan     Gastroesophageal reflux disease, unspecified whether esophagitis present  History of Reflux, gastritis noted on EGD in 2021 negative for any Hemphill's. Has had increased heartburn symptoms over the last week without much change in diet or stress. Feeling slightly chilled without any other respiratory or abdominal symptoms. Worsens with coffee and tea. Discussed avoiding offending foods/drinks and starting Protonix daily. Reviewed proper administration instructions. Continue for 3-4 weeks and recheck with provider at that time. Also, okay to hold Aspirin for this short period of time. Will discuss restarting after symptoms improve.   - pantoprazole (PROTONIX) 20 MG EC tablet; Take 2 tablets (40 mg) by mouth daily             BMI:   Estimated body mass index is 41.61 kg/m  as calculated from the following:    Height as of 5/2/23: 1.778 m (5' 10\").    Weight as of 5/2/23: 131.5 kg (290 lb).   Weight management plan: Patient was referred to their PCP to discuss a diet and exercise plan.    See Patient Instructions    Lesvia Melendez DNP, APRN-CNP   [facility]    Sridhar Muller is a 52 year old, presenting for the following health issues:  Gastrophageal Reflux         Roomed by: Dena              History of Present Illness       Reason for visit:  Acid Reflex    He eats 2-3 servings of fruits and vegetables daily.He consumes 0 sweetened beverage(s) daily.He exercises with enough effort to increase his heart rate 30 to 60 minutes per day.  He exercises with enough effort to increase his heart rate 6 days per week.   He is taking medications regularly.      GERD/Heartburn  Onset: worsening " past 7 days    Description:     Burning in chest: YES    Intensity: 7/10    Progression of Symptoms: worsening    Accompanying Signs & Symptoms:  Does it feel like food gets stuck: no  Nausea: no  Vomiting (bloody?): no  Abdominal Pain: no  Black-Tarry stools: no:  Bloody stools: no    History:   Previous ulcers: no    Precipitating factors:   Caffeine use: YES- coffee and ice tea  Alcohol use: YES  NSAID/Aspirin use: YES- asa 81 mg, IBU prn  Tobacco use: no  Worse with eating anything.  Has Chills    Alleviating factors:  Drinking water    Therapies Tried and outcome:none    Maybe low grade fever   More sternal  Can feel a burning with his breath - worse after eating   Trying to drink water     Review of Systems   Constitutional, HEENT, cardiovascular, pulmonary, gi and gu systems are negative, except as otherwise noted.      Objective           Vitals:  No vitals were obtained today due to virtual visit.    Physical Exam   GENERAL: Healthy, alert and no distress  EYES: Eyes grossly normal to inspection.  No discharge or erythema, or obvious scleral/conjunctival abnormalities.  RESP: No audible wheeze, cough, or visible cyanosis.  No visible retractions or increased work of breathing.    SKIN: Visible skin clear. No significant rash, abnormal pigmentation or lesions.  NEURO: Cranial nerves grossly intact.  Mentation and speech appropriate for age.  PSYCH: Mentation appears normal, affect normal/bright, judgement and insight intact, normal speech and appearance well-groomed.    Diagnostic Test Results:  none            Video-Visit Details    Type of service:  Video Visit     Originating Location (pt. Location): Home    Distant Location (provider location):  Off-site  Platform used for Video Visit: Tail

## 2023-06-22 ENCOUNTER — OFFICE VISIT (OUTPATIENT)
Dept: UROLOGY | Facility: CLINIC | Age: 53
End: 2023-06-22
Payer: COMMERCIAL

## 2023-06-22 ENCOUNTER — DOCUMENTATION ONLY (OUTPATIENT)
Dept: SLEEP MEDICINE | Facility: CLINIC | Age: 53
End: 2023-06-22
Payer: COMMERCIAL

## 2023-06-22 VITALS
RESPIRATION RATE: 16 BRPM | HEART RATE: 69 BPM | DIASTOLIC BLOOD PRESSURE: 96 MMHG | BODY MASS INDEX: 41.84 KG/M2 | OXYGEN SATURATION: 97 % | SYSTOLIC BLOOD PRESSURE: 163 MMHG | WEIGHT: 291.6 LBS

## 2023-06-22 DIAGNOSIS — N40.1 BENIGN PROSTATIC HYPERPLASIA WITH WEAK URINARY STREAM: Primary | ICD-10-CM

## 2023-06-22 DIAGNOSIS — G47.33 OSA (OBSTRUCTIVE SLEEP APNEA): Primary | Chronic | ICD-10-CM

## 2023-06-22 DIAGNOSIS — R39.12 BENIGN PROSTATIC HYPERPLASIA WITH WEAK URINARY STREAM: Primary | ICD-10-CM

## 2023-06-22 DIAGNOSIS — R35.0 URINARY FREQUENCY: ICD-10-CM

## 2023-06-22 DIAGNOSIS — E66.01 MORBID OBESITY (H): Chronic | ICD-10-CM

## 2023-06-22 PROCEDURE — 99213 OFFICE O/P EST LOW 20 MIN: CPT | Performed by: STUDENT IN AN ORGANIZED HEALTH CARE EDUCATION/TRAINING PROGRAM

## 2023-06-22 RX ORDER — SOLIFENACIN SUCCINATE 10 MG/1
10 TABLET, FILM COATED ORAL DAILY
Qty: 90 TABLET | Refills: 1 | Status: SHIPPED | OUTPATIENT
Start: 2023-06-22 | End: 2024-01-10

## 2023-06-22 NOTE — PROGRESS NOTES
Patient was offered choice [facility]  Patient Lalito Weathers was set up at [place]  on June 22, 2023. Patient received a [employer]med Aircurve 10 Pressures were set at 12 EPAP, 19 IPAP cm H2O.   Patient s ramp is 5 cm H2O for 45 min.  Patient received a [employer] RespirApakaus Mask name: WISP FABRIC  Nasal mask size Medium, heated tubing and heated humidifier.  Patient has the following compliance requirements: none    Tomasz Muñoz

## 2023-06-22 NOTE — NURSING NOTE
Chief Complaint   Patient presents with     Urinary Frequency     Urinary issues per patient       There were no vitals filed for this visit.  Wt Readings from Last 1 Encounters:   05/02/23 131.5 kg (290 lb)     Denisha Cortez MA

## 2023-06-22 NOTE — PROGRESS NOTES
UROLOGY FOLLOW-UP NOTE          Chief Complaint:   Today I had the pleasure of seeing Mr. Lalito Weathers in follow-up for a chief complaint of LUTS.          Interval Update   Lalito Weathers is a very pleasant 52 year old male with a history of COPD, T2 DM, and HTN.    Brief History: Mr. Lalito Weathers has been followed for urinary frequency and urgency. He was on tamsulosin 0.4 mg and oxybutynin XR 5 mg daily for weak stream and urinary frequency. He did not find oxybutynin and was switched to solifenacin 5 mg daily instead. At his last visit on 4/11/2023, he noted a few weeks of scrotal pain, dysuria, urinary hesitancy, and weak urinary stream. On exam, the prostate was tender to palpation. He was treated for prostatitis.     Today's notes: He is doing well today. His symptoms tend to fluctuate, some days are better than others. He primary urinary symptoms include weak stream, hesitancy, frequency, and urgency. He has noticed an improvement with an increased dose of tamsulosin.          Physical Exam:   Patient is a 52 year old  male   Vitals: Blood pressure (!) 163/96, pulse 69, resp. rate 16, weight 132.3 kg (291 lb 9.6 oz), SpO2 97 %.  General: Alert and oriented x 3, no acute distress.  Respiratory: Non-labored breathing.  Cardiac: Regular rate.      Labs and Pathology:    I personally reviewed all applicable laboratory data and went over findings with patient  Significant for:    CBC RESULTS:  Recent Labs   Lab Test 08/31/22  0904 06/09/22 1955 04/14/22  0717 08/05/20  0746   WBC 12.9* 8.0 7.2 7.1   HGB 12.6* 13.2* 13.6 13.4    233 233 222        BMP RESULTS:  Recent Labs   Lab Test 09/22/22  1335 08/31/22  1419 08/31/22  0904 06/28/22  1718 06/09/22 1955 03/24/21  1614 03/04/21  1749 08/05/20  0746 10/23/19  0839 08/02/19  0000     --  139 136 138   < > 141 139 136  --    POTASSIUM 4.1  --  4.2 3.4 3.5   < > 3.8 4.0 3.8 4.1   CHLORIDE 100  --  100 104 106   < > 107 107 104  --    CO2 28   --  26 21 25   < > 27 29 27  --    ANIONGAP 12  --  13 11 7   < > 7 3 5  --    GLC 85  --  108* 92 85   < > 103* 92 93 200*   BUN 18.2  --  14.7 21 20   < > 20 18 24  --    CR 1.13 1.1 1.02 0.89 0.98   < > 1.09 0.95 1.00 1.04   GFRESTIMATED 77 >60 88 >90 >90   < > 77 >90 87 >60   GFRESTBLACK  --   --   --   --   --   --  90 >90 >90 >60   HERMELINDA 9.7  --  9.3 9.1 9.5   < > 9.2 9.7 9.2  --     < > = values in this interval not displayed.       UA RESULTS:   Recent Labs   Lab Test 04/05/23  1136 08/31/22  0904 04/12/22  1200 02/15/22  1110   SG <=1.005 1.020 1.010 1.025   URINEPH 6.0 7.0 7.0 5.5   NITRITE Negative Negative Negative Negative   RBCU None Seen  --  None Seen 0-2   WBCU None Seen  --  None Seen 0-5       PSA RESULTS  Prostate Specific Antigen Screen   Date Value Ref Range Status   02/27/2023 0.48 0.00 - 3.50 ng/mL Final   01/04/2022 0.59 0.00 - 4.00 ug/L Final              Assessment/Plan   52 year old male seen in follow up for urinary frequency and urgency, weak stream, and urinary hesitancy. He currently takes tamsulosin 0.8 mg and solifenacin 5 mg daily. He noticed an improvement when he increased his tamsulosin dose to 0.8 mg. He also has a history of chronic prostatitis, last treated in April 2023.     We discussed further options for management of his symptoms including adding finasteride, increasing his solifenacin dose, and pelvic floor PT. The patient would like a cystoscopy for further evaluation of obstructive urinary symptoms. This will be arranged. In the meantime, he is most interested in trying a higher dose of solifenacin and starting pelvic floor PT.    Plan:  1. Continue tamsulosin 0.8 mg daily.   2. Increase solifenacin to 10 mg daily.   3. Pelvic floor PT referral.   4. Cystoscopy to evaluate for an obstructing prostate, potential to consider bladder outlet surgery.            Past Medical History:     Past Medical History:   Diagnosis Date     Tubular adenoma of colon 6/28/2019             Past Surgical History:     Past Surgical History:   Procedure Laterality Date     APPENDECTOMY  12/30/2011     COLONOSCOPY N/A 06/21/2019    Procedure: COLONOSCOPY, WITH POLYPECTOMY AND BIOPSY;  Surgeon: Herman Champagne MD;  Location: [facility]     ESOPHAGOSCOPY, GASTROSCOPY, DUODENOSCOPY (EGD), COMBINED N/A 10/29/2021    Procedure: ESOPHAGOGASTRODUODENOSCOPY, WITH BIOPSY;  Surgeon: Jakub Wray DO;  Location: [facility]     ORTHOPEDIC SURGERY  05/10/2016    EXCISION LEFT UPPER INNER THIGH SOFT TISSUE MASS 5/10/16   MD NATE            Medications     Current Outpatient Medications   Medication     allopurinol (ZYLOPRIM) 100 MG tablet     aspirin 81 MG EC tablet     cholecalciferol (VITAMIN D3) 5000 units TABS tablet     fish oil-omega-3 fatty acids 1000 MG capsule     lisinopril (ZESTRIL) 40 MG tablet     multivitamin w/minerals (THERA-VIT-M) tablet     pantoprazole (PROTONIX) 20 MG EC tablet     rosuvastatin (CRESTOR) 10 MG tablet     solifenacin (VESICARE) 5 MG tablet     tamsulosin (FLOMAX) 0.4 MG capsule     triamterene-HCTZ (DYAZIDE) 37.5-25 MG capsule     verapamil ER (VERELAN) 360 MG 24 hr capsule     vitamin C (ASCORBIC ACID) 1000 MG TABS     No current facility-administered medications for this visit.            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   Tobacco Use     Smoking status: Former     Smokeless tobacco: Former   Vaping Use     Vaping Use: Never used   Substance and Sexual Activity     Alcohol use: Yes     Comment: social     Drug use: No     Sexual activity: Yes     Partners: Female   Other Topics Concern     Not  on file   Social History Narrative     Not on file     Social Determinants of Health     Financial Resource Strain: Not on file   Food Insecurity: Not on file   Transportation Needs: Not on file   Physical Activity: Not on file   Stress: Not on file   Social Connections: Not on file   Intimate Partner Violence: Not on file   Housing Stability: Not on file            Allergies:   Food, Aspirin, [place] flavor, Statin drugs [statins], and Zantac [ranitidine]         Review of Systems:  From intake questionnaire   Negative 14 system review except as noted on HPI, nurse's note.        STACY PORTILLO PA-C  Department of Urology

## 2023-06-26 ENCOUNTER — THERAPY VISIT (OUTPATIENT)
Dept: PHYSICAL THERAPY | Facility: CLINIC | Age: 53
End: 2023-06-26
Attending: STUDENT IN AN ORGANIZED HEALTH CARE EDUCATION/TRAINING PROGRAM
Payer: COMMERCIAL

## 2023-06-26 DIAGNOSIS — R35.0 URINARY FREQUENCY: ICD-10-CM

## 2023-06-26 DIAGNOSIS — R39.12 BENIGN PROSTATIC HYPERPLASIA WITH WEAK URINARY STREAM: ICD-10-CM

## 2023-06-26 DIAGNOSIS — N40.1 BENIGN PROSTATIC HYPERPLASIA WITH WEAK URINARY STREAM: ICD-10-CM

## 2023-06-26 PROCEDURE — 97110 THERAPEUTIC EXERCISES: CPT | Mod: GP | Performed by: PHYSICAL THERAPIST

## 2023-06-26 PROCEDURE — 97535 SELF CARE MNGMENT TRAINING: CPT | Mod: GP | Performed by: PHYSICAL THERAPIST

## 2023-06-26 PROCEDURE — 97162 PT EVAL MOD COMPLEX 30 MIN: CPT | Mod: GP | Performed by: PHYSICAL THERAPIST

## 2023-06-26 NOTE — PROGRESS NOTES
"PHYSICAL THERAPY EVALUATION  Type of Visit: Evaluation    See electronic medical record for Abuse and Falls Screening details.    Subjective     Spontaneously back in 12/2021 pt began to have a slow stream for no apparent reason.  Also began to have painful urges.  No injury noted.  Was at a convention and was in swimming pool and had an urge to urinate and held it.  He states this may have been the precursor.  Made appt for Urology and was told he had a hernia.  Met with the surgeon and while talking to nurse (\"my noe\" who identified that it was not a hernia) and MD found \"no hernia.\" Was put on Flomax and other bladder meds.  Did not help.  Started seeing PA and was given maximum Flomax and \"new bladder med\".  This has helped.  Will be getting a cystoscopy, but will not get for a few weeks. PA then sent to PT for PFM rehab.  *Has tried to sit for most of voids as this is most relaxing.  Primary c/o is strong urge to urinate, but cannot release the urine. No issues with BMs.    Presenting condition or subjective complaint: Strong urge with inability to pee  Date of onset: 06/22/23    Relevant medical history: Bladder or bowel problems; High blood pressure; Overweight; Sleep disorder like apnea   Dates & types of surgery: Appendix s/p 15-20 yrs ago    Prior diagnostic imaging/testing results:       Prior therapy history for the same diagnosis, illness or injury: No      Prior Level of Function   Transfers: Independent  Ambulation: Independent  ADL: Independent    Living Environment  Social support: With a significant other or spouse   Type of home: House   Stairs to enter the home: Yes 2 Is there a railing: Yes   Ramp:     Stairs inside the home: Yes 8 Is there a railing: Yes   Help at home:    Equipment owned:       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, sales, service - desk job  Hobbies/Interests: Motorcycle, snow[employer]ertes    Patient goals for therapy: Normally pee, \"write my name in the snow if I had " "to.\"    Pain assessment: gets painful with the building urge to urinate     Objective      PELVIC EVALUATION  ADDITIONAL HISTORY:  Sex assigned at birth: Male  Gender identity: Male    Pronouns:        Bladder History:  Feels bladder filling: Yes  Triggers for feeling of inability to wait to go to the bathroom: No    How long can you wait to urinate: Varies; up to a couple of hours  Gets up at night to urinate:   1  Can stop the flow of urine when urinating: Yes  Volume of urine usually released: Medium   Other issues: Slow or hesitant urine stream  Number of bladder infections in last 12 months:    Fluid intake per day: Water = 36oz/day coffee = 12-16oz/day Weekend Occasional  Medications taken for bladder: Yes Flomax, Oxybutinin   Activities causing urine leak:   Not aware of leaking, was maybe leaking at gym ahead of this problem  Amount of urine typically leaked: Small  Pads used to help with leaking: No        Bowel History:  Frequency of bowel movement: 1-2 x per day  Consistency of stool: Soft-formed BSC 4  Ignores the urge to defecate: No  Other bowel issues:    Length of time spent trying to have a bowel movement: No issues with straining    Sexual Function History:  Sexual orientation: Straight    Sexually active: Yes  Lubrication used: No No  Pelvic pain:   Pain in scrotum/testicles at times.  Pain or difficulty with orgasms/erection/ejaculation: No Relief with ejaculation  State of menopause:    Hormone medications:        Do you get regular exercise: Yes, I do this type of exercise: Gym: combo of cardio and wt lifting; takes classes for different body parts, hour long class, Have you tried pelvic floor strengthening exercises for 4 weeks: No, Do you have any history of trauma that is relevant to your care that you d like to share: No    Discussed reason for referral regarding pelvic health needs and external/internal pelvic floor muscle examination with patient/guardian.  Opportunity provided to ask " questions and verbal consent for assessment and intervention was given.    PAIN: Pain Level at Rest: 0/10  Pain is Worst: same all times of day with urge  Pain is Exacerbated By: Urgency of Urination  POSTURE: Standing Posture: Rounded shoulders, Lordosis increased, Thoracic kyphosis increased  LUMBAR SCREEN: AROM WFL; tight in HS (B)  HIP SCREEN:  Strength: WNL     PELVIC/SI SCREEN:  WFL   PAIN PROVOCATION TEST: neg all today  BREATHING SYMMETRY: Asymmetrical, Decreased rib cage mobility    PELVIC EXAM  External Visual Inspection:  deferred d/t time constraints of session    Integumentary:   deferred d/t time constraints of session    External Digital Palpation per Perineum:   deferred d/t time constraints of session    Scar:   Location/Type: none  Mobility: none    Internal Digital Palpation:  Per Vagina:  deferred d/t time constraints of session    Per Rectum:  deferred d/t time constraints of session    ABDOMINAL ASSESSMENT  Diastasis Rectus Abdominis (CURT):  CURT presence: Yes    Abdominal Activation/Strength: Gustavo lowering test (measured in degrees): 45    Fascial Tension/Restriction/Tone: General fascial tension throughout    BIOFEEDBACK:  *Pt has not ever tried PFCs, not yet doing BF.    Assessment & Plan   CLINICAL IMPRESSIONS   Medical Diagnosis: Urinary Frequency; Benign prostatic hyperplasia with weak urinary stream    Treatment Diagnosis: Pelvic Floor Muscle Dysfunction   Impression/Assessment: Patient is a 52 year old male with pelvic pain and urinary complaints.  The following significant findings have been identified: Pain, Decreased proprioception and Impaired muscle performance. These impairments interfere with their ability to perform self care tasks, work tasks and recreational activities as compared to previous level of function.     Clinical Decision Making (Complexity):   Clinical Presentation: Evolving/Changing  Clinical Presentation Rationale: based on medical and personal factors listed  "in PT evaluation  Clinical Decision Making (Complexity): Moderate complexity    PLAN OF CARE  Treatment Interventions:  Modalities: Biofeedback, E-stim, Ultrasound  Interventions: Manual Therapy, Neuromuscular Re-education, Therapeutic Activity, Therapeutic Exercise, Self-Care/Home Management    Long Term Goals     PT Goal 1  Goal Identifier: STG  Goal Description: 1)Pt will report 50% improvement in strength of each void, in 4 weeks.  Target Date: 07/24/23  PT Goal 2  Goal Identifier: STG  Goal Description: 2)Pt will report routine void times of every 2 hours, in 4 weeks.  Target Date: 07/24/23  PT Goal 3  Goal Identifier: LTG  Goal Description: 3)Pt will report 2/10 or less pain with urges to void in 6 weeks.  Target Date: 08/07/23  PT Goal 4  Goal Identifier: LTG  Goal Description: 4)Pt will report \"near normal\" void speed/strengths at each void, in 8 weeks.  Target Date: 08/22/23  PT Goal 5  Goal Identifier: LTG  Goal Description: 5)Pt will be indep in HEP to prevent return of symptoms, in 8 weeks  Target Date: 08/22/23      Frequency of Treatment: 1x per week  Duration of Treatment: 8 weeks, weaning to every other week    Recommended Referrals to Other Professionals: none  Education Assessment:   Learner/Method: Patient;Listening;Reading;Demonstration;Pictures/Video;No Barriers to Learning    Risks and benefits of evaluation/treatment have been explained.   Patient/Family/caregiver agrees with Plan of Care.     Evaluation Time:     PT Isaias Moderate Complexity Minutes (36947): 30   Present: Not applicable    Signing Clinician: Izzy Weems, PT      "

## 2023-06-27 ENCOUNTER — MYC MEDICAL ADVICE (OUTPATIENT)
Dept: FAMILY MEDICINE | Facility: CLINIC | Age: 53
End: 2023-06-27

## 2023-06-27 ENCOUNTER — OFFICE VISIT (OUTPATIENT)
Dept: URGENT CARE | Facility: URGENT CARE | Age: 53
End: 2023-06-27
Payer: COMMERCIAL

## 2023-06-27 VITALS
TEMPERATURE: 98.1 F | DIASTOLIC BLOOD PRESSURE: 86 MMHG | BODY MASS INDEX: 41.9 KG/M2 | RESPIRATION RATE: 18 BRPM | OXYGEN SATURATION: 99 % | WEIGHT: 292 LBS | HEART RATE: 68 BPM | SYSTOLIC BLOOD PRESSURE: 176 MMHG

## 2023-06-27 DIAGNOSIS — R07.0 THROAT PAIN: Primary | ICD-10-CM

## 2023-06-27 LAB
DEPRECATED S PYO AG THROAT QL EIA: NEGATIVE
GROUP A STREP BY PCR: NOT DETECTED

## 2023-06-27 PROCEDURE — 99213 OFFICE O/P EST LOW 20 MIN: CPT | Performed by: PHYSICIAN ASSISTANT

## 2023-06-27 PROCEDURE — 87651 STREP A DNA AMP PROBE: CPT | Performed by: PHYSICIAN ASSISTANT

## 2023-06-27 RX ORDER — PREDNISONE 20 MG/1
40 TABLET ORAL DAILY
Qty: 10 TABLET | Refills: 0 | Status: SHIPPED | OUTPATIENT
Start: 2023-06-27 | End: 2023-07-02

## 2023-06-27 NOTE — PROGRESS NOTES
Assessment & Plan     Throat pain  Rapid strep negative, PCR pending. This is likely viral. Continue with supportive care. Get plenty of rest and push fluids. Can use Tylenol and/or ibuprofen as needed for pain and/or fever control. Discussed return to school/work guidelines. Return to clinic if symptoms worsen or do not improve; otherwise follow up as needed       - Streptococcus A Rapid Screen w/Reflex to PCR - Clinic Collect  - Group A Streptococcus PCR Throat Swab  - predniSONE (DELTASONE) 20 MG tablet; Take 2 tablets (40 mg) by mouth daily for 5 days                 Return in about 1 week (around 7/4/2023), or if symptoms worsen or fail to improve.    TERRI Rodriguez [facility] [place]            Subjective   Chief Complaint   Patient presents with     Mass     06/23/2023 felt acid reflex sx was prescribed antiacid medication,base of neck tender to the touch,raspy voice,a slight sore throat,chills on and off. Here to discus with provider         HPI     URI Adult    Onset of symptoms was 2 day(s) ago.  Course of illness is worsening.    Severity moderate  Current and Associated symptoms: sore throat and achy neck   Treatment measures tried include None tried.  Predisposing factors include None.                  Review of Systems         Objective    BP (!) 176/86   Pulse 68   Temp 98.1  F (36.7  C) (Tympanic)   Resp 18   Wt 132.5 kg (292 lb)   SpO2 99%   BMI 41.90 kg/m    Body mass index is 41.9 kg/m .  Physical Exam  Constitutional:       General: He is not in acute distress.     Appearance: He is well-developed.   HENT:      Head: Normocephalic and atraumatic.      Right Ear: Tympanic membrane and ear canal normal.      Left Ear: Tympanic membrane and ear canal normal.      Mouth/Throat:      Comments: Throat has mild erythema and uvula is red and swollen   Eyes:      Conjunctiva/sclera: Conjunctivae normal.   Cardiovascular:      Rate and Rhythm: Normal rate and regular  rhythm.   Pulmonary:      Effort: Pulmonary effort is normal.      Breath sounds: Normal breath sounds.   Skin:     General: Skin is warm and dry.      Findings: No rash.   Psychiatric:         Behavior: Behavior normal.

## 2023-07-06 ENCOUNTER — TELEPHONE (OUTPATIENT)
Dept: FAMILY MEDICINE | Facility: CLINIC | Age: 53
End: 2023-07-06
Payer: COMMERCIAL

## 2023-07-06 DIAGNOSIS — M10.9 ACUTE GOUT OF LEFT ANKLE, UNSPECIFIED CAUSE: Primary | ICD-10-CM

## 2023-07-06 RX ORDER — PREDNISONE 20 MG/1
TABLET ORAL
Qty: 10 TABLET | Refills: 0 | Status: SHIPPED | OUTPATIENT
Start: 2023-07-06 | End: 2023-07-20

## 2023-07-06 NOTE — TELEPHONE ENCOUNTER
Pt informed. Reviewed Rx dose/ directions.  Pt voices understanding/ agreement.  KIMBERLEY Mendoza RN

## 2023-07-06 NOTE — TELEPHONE ENCOUNTER
Pt reports recurrent gout flare. Onset sx 4-5 days ago lt big toe- redness, warmth, swelling, pain.   Last flare Jan 2023, prednisone prescribed per e visit with Tam.   Taking allopurinol as prescribed which has reduced number of flares.  Pt requesting prednisone refill without appt if possible.  Does have appt with Liezt 07-20-23 for F/U acid reflux.  Please advise.  KIMBERLEY Mendoza RN

## 2023-07-06 NOTE — TELEPHONE ENCOUNTER
Medication Question or Refill    Contacts       Type Contact Phone/Fax    07/06/2023 08:30 AM CDT Phone (Incoming) Jasiel Renzo KELLY (Self) [phone] (H)          What medication are you calling about (include dose and sig)?: PREDINISONE    Preferred Pharmacy:  [address] [address]  Phone: [phone] Fax: [phone]      Controlled Substance Agreement on file:   CSA -- Patient Level:    CSA: None found at the patient level.       Who prescribed the medication?: Lizet Berry    Do you need a refill? Yes    Do you have any questions or concerns?  Pt has a bad gout flare-up.       Could we send this information to you in [employer] or would you prefer to receive a phone call?:   Patient would prefer a phone call   Okay to leave a detailed message?: Yes at Cell number on file:    Telephone Information:   Mobile [phone]     .Lesvia Mike [facility]

## 2023-07-06 NOTE — TELEPHONE ENCOUNTER
Notify patient Prednisone was sent in.  If not improving should be seen in clinic     Lizet Berry CNP

## 2023-07-13 ENCOUNTER — TELEPHONE (OUTPATIENT)
Dept: FAMILY MEDICINE | Facility: CLINIC | Age: 53
End: 2023-07-13
Payer: COMMERCIAL

## 2023-07-13 NOTE — TELEPHONE ENCOUNTER
Patient Quality Outreach    Patient is due for the following:   IVD  -  BP Check    Next Steps:   Patient has upcoming appointment, these items will be addressed at that time.    Type of outreach:    Chart review performed, no outreach needed.      Questions for provider review:    None           Archana Clay, CMA

## 2023-07-20 ENCOUNTER — TELEPHONE (OUTPATIENT)
Dept: FAMILY MEDICINE | Facility: CLINIC | Age: 53
End: 2023-07-20

## 2023-07-20 ENCOUNTER — OFFICE VISIT (OUTPATIENT)
Dept: FAMILY MEDICINE | Facility: CLINIC | Age: 53
End: 2023-07-20
Attending: NURSE PRACTITIONER
Payer: COMMERCIAL

## 2023-07-20 VITALS
BODY MASS INDEX: 42.23 KG/M2 | HEIGHT: 70 IN | DIASTOLIC BLOOD PRESSURE: 74 MMHG | RESPIRATION RATE: 20 BRPM | TEMPERATURE: 98.1 F | WEIGHT: 295 LBS | OXYGEN SATURATION: 97 % | HEART RATE: 70 BPM | SYSTOLIC BLOOD PRESSURE: 138 MMHG

## 2023-07-20 DIAGNOSIS — E11.9 TYPE 2 DIABETES MELLITUS WITHOUT COMPLICATION, WITHOUT LONG-TERM CURRENT USE OF INSULIN (H): ICD-10-CM

## 2023-07-20 DIAGNOSIS — E11.8 TYPE 2 DIABETES MELLITUS WITH UNSPECIFIED COMPLICATIONS (H): ICD-10-CM

## 2023-07-20 DIAGNOSIS — K21.00 GASTROESOPHAGEAL REFLUX DISEASE WITH ESOPHAGITIS WITHOUT HEMORRHAGE: Primary | Chronic | ICD-10-CM

## 2023-07-20 DIAGNOSIS — E66.01 MORBID OBESITY (H): ICD-10-CM

## 2023-07-20 LAB
ALBUMIN SERPL BCG-MCNC: 4.6 G/DL (ref 3.5–5.2)
ALP SERPL-CCNC: 54 U/L (ref 40–129)
ALT SERPL W P-5'-P-CCNC: 54 U/L (ref 0–70)
ANION GAP SERPL CALCULATED.3IONS-SCNC: 9 MMOL/L (ref 7–15)
AST SERPL W P-5'-P-CCNC: 26 U/L (ref 0–45)
BILIRUB SERPL-MCNC: 0.5 MG/DL
BUN SERPL-MCNC: 17 MG/DL (ref 6–20)
CALCIUM SERPL-MCNC: 10.1 MG/DL (ref 8.6–10)
CHLORIDE SERPL-SCNC: 103 MMOL/L (ref 98–107)
CREAT SERPL-MCNC: 1.16 MG/DL (ref 0.67–1.17)
DEPRECATED HCO3 PLAS-SCNC: 25 MMOL/L (ref 22–29)
ERYTHROCYTE [DISTWIDTH] IN BLOOD BY AUTOMATED COUNT: 12.9 % (ref 10–15)
GFR SERPL CREATININE-BSD FRML MDRD: 75 ML/MIN/1.73M2
GLUCOSE SERPL-MCNC: 119 MG/DL (ref 70–99)
HBA1C MFR BLD: 6.1 % (ref 0–5.6)
HCT VFR BLD AUTO: 39.6 % (ref 40–53)
HGB BLD-MCNC: 13.1 G/DL (ref 13.3–17.7)
LIPASE SERPL-CCNC: 38 U/L (ref 13–60)
MCH RBC QN AUTO: 29.8 PG (ref 26.5–33)
MCHC RBC AUTO-ENTMCNC: 33.1 G/DL (ref 31.5–36.5)
MCV RBC AUTO: 90 FL (ref 78–100)
PLATELET # BLD AUTO: 174 10E3/UL (ref 150–450)
POTASSIUM SERPL-SCNC: 4.3 MMOL/L (ref 3.4–5.3)
PROT SERPL-MCNC: 7 G/DL (ref 6.4–8.3)
RBC # BLD AUTO: 4.39 10E6/UL (ref 4.4–5.9)
SODIUM SERPL-SCNC: 137 MMOL/L (ref 136–145)
WBC # BLD AUTO: 6.3 10E3/UL (ref 4–11)

## 2023-07-20 PROCEDURE — 80053 COMPREHEN METABOLIC PANEL: CPT | Performed by: NURSE PRACTITIONER

## 2023-07-20 PROCEDURE — 85027 COMPLETE CBC AUTOMATED: CPT | Performed by: NURSE PRACTITIONER

## 2023-07-20 PROCEDURE — 99215 OFFICE O/P EST HI 40 MIN: CPT | Performed by: NURSE PRACTITIONER

## 2023-07-20 PROCEDURE — 83690 ASSAY OF LIPASE: CPT | Performed by: NURSE PRACTITIONER

## 2023-07-20 PROCEDURE — 83036 HEMOGLOBIN GLYCOSYLATED A1C: CPT | Performed by: NURSE PRACTITIONER

## 2023-07-20 PROCEDURE — 36415 COLL VENOUS BLD VENIPUNCTURE: CPT | Performed by: NURSE PRACTITIONER

## 2023-07-20 RX ORDER — PANTOPRAZOLE SODIUM 20 MG/1
20 TABLET, DELAYED RELEASE ORAL DAILY
Qty: 90 TABLET | Refills: 0 | Status: SHIPPED | OUTPATIENT
Start: 2023-07-20 | End: 2023-10-09

## 2023-07-20 RX ORDER — SUCRALFATE 1 G/1
1 TABLET ORAL 4 TIMES DAILY
Qty: 40 TABLET | Refills: 0 | Status: SHIPPED | OUTPATIENT
Start: 2023-07-20 | End: 2023-07-30

## 2023-07-20 ASSESSMENT — PAIN SCALES - GENERAL: PAINLEVEL: NO PAIN (0)

## 2023-07-20 NOTE — PROGRESS NOTES
Assessment & Plan     Gastroesophageal reflux disease with esophagitis without hemorrhage  We will attempt Carafate patient will call me know if it is improving over the weekend if not we will determine if we need to do further upper GI's or further testing  - sucralfate (CARAFATE) 1 GM tablet  Dispense: 40 tablet; Refill: 0  - pantoprazole (PROTONIX) 20 MG EC tablet  Dispense: 90 tablet; Refill: 0    Type 2 diabetes mellitus with unspecified complications (H)  At goal we will attempt Ozempic for management of diabetes  - CBC with platelets  - Comprehensive metabolic panel (BMP + Alb, Alk Phos, ALT, AST, Total. Bili, TP)  - Lipase  - Lipase  - Comprehensive metabolic panel (BMP + Alb, Alk Phos, ALT, AST, Total. Bili, TP)  - CBC with platelets    Morbid obesity (H)  Reviewed diet and exercise  - HEMOGLOBIN A1C  - HEMOGLOBIN A1C    Type 2 diabetes mellitus without complication, without long-term current use of insulin (H)  We will attempt Ozempic for diabetes management  - semaglutide (OZEMPIC) 2 MG/3ML pen  Dispense: 7.5 mL; Refill: 0        50 minutes spent by me on the date of the encounter doing chart review, review of outside records, review of test results, interpretation of tests, patient visit, and documentation            TERESA Romero [facility]    Sridhar Muller is a 52 year old, presenting for the following health issues:  Gastrophageal Reflux        7/20/2023     6:50 AM   Additional Questions   Roomed by Archana KIM     [facility]     Medication Followup of protonix  Taking Medication as prescribed: yes  Side Effects:  None  Medication Helping Symptoms:  NO- patient states that he still has phlegm in the mornings, swollen glands, and he can still feel it in his chest        Review of Systems   Constitutional, HEENT, cardiovascular, pulmonary, gi and gu systems are negative, except as otherwise noted.      Objective    /74   Pulse 70   Temp 98.1  F (36.7  C)  "(Tympanic)   Resp 20   Ht 1.778 m (5' 10\")   Wt 133.8 kg (295 lb)   SpO2 97%   BMI 42.33 kg/m    Body mass index is 42.33 kg/m .  Physical Exam   GENERAL: healthy, alert and no distress  EYES: Eyes grossly normal to inspection, PERRL and conjunctivae and sclerae normal  RESP: lungs clear to auscultation - no rales, rhonchi or wheezes  CV: regular rate and rhythm, normal S1 S2, no S3 or S4, no murmur, click or rub, no peripheral edema and peripheral pulses strong  ABDOMEN: soft, nontender, no hepatosplenomegaly, no masses and bowel sounds normal  MS: no gross musculoskeletal defects noted, no edema  SKIN: no suspicious lesions or rashes  NEURO: Normal strength and tone, mentation intact and speech normal  PSYCH: mentation appears normal, affect normal/bright    Results for orders placed or performed in visit on 07/20/23 (from the past 24 hour(s))   CBC with platelets   Result Value Ref Range    WBC Count 6.3 4.0 - 11.0 10e3/uL    RBC Count 4.39 (L) 4.40 - 5.90 10e6/uL    Hemoglobin 13.1 (L) 13.3 - 17.7 g/dL    Hematocrit 39.6 (L) 40.0 - 53.0 %    MCV 90 78 - 100 fL    MCH 29.8 26.5 - 33.0 pg    MCHC 33.1 31.5 - 36.5 g/dL    RDW 12.9 10.0 - 15.0 %    Platelet Count 174 150 - 450 10e3/uL   HEMOGLOBIN A1C   Result Value Ref Range    Hemoglobin A1C 6.1 (H) 0.0 - 5.6 %                   "

## 2023-07-20 NOTE — TELEPHONE ENCOUNTER
Prior Authorization Retail Medication Request    Medication/Dose: ozempic  ICD code (if different than what is on RX):    Previously Tried and Failed:    Rationale:      Insurance Name:  [facility]/[employer]  Insurance ID:  [id number]  [phone]      Thank You,  Hannah Ayala, [facility]

## 2023-07-20 NOTE — PATIENT INSTRUCTIONS
Continue Protonix at 20 mg 1 tablet daily start Carafate 10-day treatment.  Follow-up on Monday if there is been any improvement with starting the Carafate if not we will determine further testing and will obtain labs today.

## 2023-07-24 ENCOUNTER — MYC MEDICAL ADVICE (OUTPATIENT)
Dept: FAMILY MEDICINE | Facility: CLINIC | Age: 53
End: 2023-07-24
Payer: COMMERCIAL

## 2023-07-26 NOTE — TELEPHONE ENCOUNTER
Central Prior Authorization Team   Phone: [phone]    PA Initiation    Medication: OZEMPIC (0.25 OR 0.5 MG/DOSE) 2 MG/3ML SC SOPN  Insurance Company: [employer] - Phone [phone] Fax [phone]  Pharmacy Filling the Rx: [facility] [address] [place] [address]  Filling Pharmacy Phone: [phone]  Filling Pharmacy Fax:    Start Date: 7/26/2023  Started PA on CMM and a response of This request has been approved using information available on the patient's profile. CaseId:[id number].

## 2023-07-26 NOTE — TELEPHONE ENCOUNTER
Prior Authorization Approval    Medication: OZEMPIC (0.25 OR 0.5 MG/DOSE) 2 MG/3ML SC SOPN  Authorization Effective Date: 6/26/2023  Authorization Expiration Date: 7/25/2024  Approved Dose/Quantity:   Reference #:     Insurance Company: [employer] - Phone [phone] Fax [phone]  Expected CoPay:       CoPay Card Available:      Financial Assistance Needed:   Which Pharmacy is filling the prescription: [facility] [id number] [address] [place] [address]  Pharmacy Notified: Yes  Patient Notified: No  **Instructed pharmacy to notify patient when script is ready to /ship.**

## 2023-08-21 ENCOUNTER — THERAPY VISIT (OUTPATIENT)
Dept: PHYSICAL THERAPY | Facility: CLINIC | Age: 53
End: 2023-08-21
Attending: STUDENT IN AN ORGANIZED HEALTH CARE EDUCATION/TRAINING PROGRAM
Payer: COMMERCIAL

## 2023-08-21 DIAGNOSIS — R35.0 URINARY FREQUENCY: Primary | ICD-10-CM

## 2023-08-21 PROCEDURE — 97110 THERAPEUTIC EXERCISES: CPT | Mod: GP | Performed by: PHYSICAL THERAPIST

## 2023-08-21 PROCEDURE — 97112 NEUROMUSCULAR REEDUCATION: CPT | Mod: GP | Performed by: PHYSICAL THERAPIST

## 2023-09-05 ENCOUNTER — THERAPY VISIT (OUTPATIENT)
Dept: PHYSICAL THERAPY | Facility: CLINIC | Age: 53
End: 2023-09-05
Attending: STUDENT IN AN ORGANIZED HEALTH CARE EDUCATION/TRAINING PROGRAM
Payer: COMMERCIAL

## 2023-09-05 DIAGNOSIS — R35.0 URINARY FREQUENCY: Primary | ICD-10-CM

## 2023-09-05 PROCEDURE — 97535 SELF CARE MNGMENT TRAINING: CPT | Mod: GP | Performed by: PHYSICAL THERAPIST

## 2023-09-05 PROCEDURE — 97110 THERAPEUTIC EXERCISES: CPT | Mod: GP | Performed by: PHYSICAL THERAPIST

## 2023-09-26 PROBLEM — N40.1 BENIGN PROSTATIC HYPERPLASIA WITH WEAK URINARY STREAM: Chronic | Status: ACTIVE | Noted: 2023-06-26

## 2023-09-26 PROBLEM — R39.12 BENIGN PROSTATIC HYPERPLASIA WITH WEAK URINARY STREAM: Chronic | Status: ACTIVE | Noted: 2023-06-26

## 2023-09-26 ASSESSMENT — SLEEP AND FATIGUE QUESTIONNAIRES
HOW LIKELY ARE YOU TO NOD OFF OR FALL ASLEEP WHILE SITTING AND TALKING TO SOMEONE: WOULD NEVER DOZE
HOW LIKELY ARE YOU TO NOD OFF OR FALL ASLEEP IN A CAR, WHILE STOPPED FOR A FEW MINUTES IN TRAFFIC: WOULD NEVER DOZE
HOW LIKELY ARE YOU TO NOD OFF OR FALL ASLEEP WHILE SITTING INACTIVE IN A PUBLIC PLACE: WOULD NEVER DOZE
HOW LIKELY ARE YOU TO NOD OFF OR FALL ASLEEP WHILE SITTING QUIETLY AFTER LUNCH WITHOUT ALCOHOL: WOULD NEVER DOZE
HOW LIKELY ARE YOU TO NOD OFF OR FALL ASLEEP WHILE LYING DOWN TO REST IN THE AFTERNOON WHEN CIRCUMSTANCES PERMIT: WOULD NEVER DOZE
HOW LIKELY ARE YOU TO NOD OFF OR FALL ASLEEP WHEN YOU ARE A PASSENGER IN A CAR FOR AN HOUR WITHOUT A BREAK: WOULD NEVER DOZE
HOW LIKELY ARE YOU TO NOD OFF OR FALL ASLEEP WHILE WATCHING TV: WOULD NEVER DOZE
HOW LIKELY ARE YOU TO NOD OFF OR FALL ASLEEP WHILE SITTING AND READING: WOULD NEVER DOZE

## 2023-09-27 ENCOUNTER — OFFICE VISIT (OUTPATIENT)
Dept: SLEEP MEDICINE | Facility: CLINIC | Age: 53
End: 2023-09-27
Payer: COMMERCIAL

## 2023-09-27 VITALS
RESPIRATION RATE: 16 BRPM | WEIGHT: 291.2 LBS | BODY MASS INDEX: 41.69 KG/M2 | SYSTOLIC BLOOD PRESSURE: 117 MMHG | HEIGHT: 70 IN | OXYGEN SATURATION: 97 % | HEART RATE: 67 BPM | DIASTOLIC BLOOD PRESSURE: 77 MMHG

## 2023-09-27 DIAGNOSIS — E66.01 MORBID OBESITY (H): Primary | Chronic | ICD-10-CM

## 2023-09-27 DIAGNOSIS — G47.33 OSA (OBSTRUCTIVE SLEEP APNEA): Chronic | ICD-10-CM

## 2023-09-27 PROCEDURE — 99214 OFFICE O/P EST MOD 30 MIN: CPT | Performed by: INTERNAL MEDICINE

## 2023-09-27 NOTE — PATIENT INSTRUCTIONS

## 2023-09-27 NOTE — NURSING NOTE
"Chief Complaint   Patient presents with    CPAP Follow Up       Initial BP (!) 147/88   Pulse 67   Resp 16   Ht 1.765 m (5' 9.5\")   Wt 132.1 kg (291 lb 3.2 oz)   SpO2 97%   BMI 42.39 kg/m   Estimated body mass index is 42.39 kg/m  as calculated from the following:    Height as of this encounter: 1.765 m (5' 9.5\").    Weight as of this encounter: 132.1 kg (291 lb 3.2 oz).    Medication Reconciliation: complete  ESS: 0  Neck circumference: 19 inches / 48 centimeters.  DME: WOODROW Leonard CMA      "

## 2023-09-27 NOTE — PROGRESS NOTES
Sleep Apnea - Follow-up Visit:    Impression/Plan:     Severe Sleep apnea.   Tolerating PAP well. Benefiting from treatment.    - See DME about humidifier settings   - Continue current treatments.     Lalito Weathers will follow up in about 2 year(s).     I spent 10 minutes with patient including counseling, and 5 minutes with chart review, and documentation         History of Present Illness:  Chief Complaint   Patient presents with    CPAP Follow Up       Lalito Weathers presents for follow-up of their severe sleep apnea, managed with BPAP.     DME Funding Options/ [employer]    He reports he initially presented with snoring, excessive daytime sleepiness (ESS 14)    Sleep Study [facility] 12/13/16 (302#)   - AHI 87, RDI 87 (primarily supine sleep)  - Low O2 44%   - TCM ranged from 42 to 54 mmHg   - PAP titrated to 20/13    He reports marked improvement in daytime function since starting PAP.    He initially presented to [facility]  5/2023 tolerating auto-bilevel PAP (Min EPAP 12, Min PS 5, Max PS 8, Max IPAP 20) well with good AHI on download    Patient Lalito Weathers was set up at [place]  on June 22, 2023. Patient received a Resmed Aircurve 10 Pressures were set at 12 EPAP, 19 IPAP cm H2O.      Do you use a CPAP Machine at home: Yes  Overall, on a scale of 0-10 how would you rate your CPAP (0 poor, 10 great): 5    Does not like manual humidifier setting, old machine was easier to clear reservoir    What type of mask do you use: Nasal Mask  Is your mask comfortable: Yes    Is your mask leaking: No      Do you notice snoring with mask on: No  Do you notice gasping arousals with mask on: No  Are you having significant oral or nasal dryness: Yes  Is the pressure setting comfortable: Yes      What is your typical bedtime: 8 - 8:30  How long does it take you to go to sleep on PAP therapy: A few minutes  What time do you typically get out of bed for the day: 4 - 4:15 am.  How many hours on average per night  are you using PAP therapy: 7.5 to 8  How many hours are you sleeping per night: 7 - 7.5  Do you feel well rested in the morning: Yes      ResMed   Bilevel PAP 19/12 cmH2O 30 day usage data:  100% of days with > 4 hours of use. 0/30 days with no use.   Average use 8' 22 minutes per day.   95%ile Leak 8.98 L/min.   AHI 0.42 events per hour.         EPWORTH SLEEPINESS SCALE         9/27/2023    12:00 PM    [place] Sleepiness Scale ( SHELLEY Mosqueda  [id number]<br>ESS - [place]/English - Final version - 21 Nov 07 - [facility] [place].)   [place] Score (Sleep) 0       INSOMNIA SEVERITY INDEX (EDDI)          9/27/2023    12:00 PM   Insomnia Severity Index (EDDI)   Difficulty falling asleep 0   Difficulty staying asleep 2   Problems waking up too early 1   How SATISFIED/DISSATISFIED are you with your CURRENT sleep pattern? 2   How NOTICEABLE to others do you think your sleep problem is in terms of impairing the quality of your life? 0   How WORRIED/DISTRESSED are you about your current sleep problem? 0   To what extent do you consider your sleep problem to INTERFERE with your daily functioning (e.g. daytime fatigue, mood, ability to function at work/daily chores, concentration, memory, mood, etc.) CURRENTLY? 1   EDDI Total Score 6       Guidelines for Scoring/Interpretation:  Total score categories:  0-7 = No clinically significant insomnia   8-14 = Subthreshold insomnia   15-21 = Clinical insomnia (moderate severity)  22-28 = Clinical insomnia (severe)  Used via courtesy of [email or web address].va.gov with permission from Braulio Whitaker PhD., [facility]        Past medical/surgical history, family history, social history, medications and allergies were reviewed.        Problem List:  Patient Active Problem List    Diagnosis Date Noted    GRACY (obstructive sleep apnea) - severe (AHI 87) 05/02/2023     Priority: Medium     Sleep Study [facility] 12/13/16 (302#) - AHI 87, RDI 87 (primarily supine sleep). Low O2 44%. TCM ranged from 42  "to 54 mmHg. PAP titrated to 20/13      Hyperlipidemia 05/01/2023     Priority: Medium    Essential hypertension 01/14/2005     Priority: Medium    Urinary frequency 06/26/2023     Priority: Low    Benign prostatic hyperplasia with weak urinary stream 06/26/2023     Priority: Low    History of colonic polyps 05/01/2023     Priority: Low     colonoscopy  2019       Gout 05/01/2023     Priority: Low    Chronic prostatitis 05/01/2023     Priority: Low    Obesity (BMI 40) with comorbidity (H) 08/09/2019     Priority: Low    Vitamin D deficiency 03/28/2017     Priority: Low    Palpitations 10/02/2008     Priority: Low    PVCs (premature ventricular contractions) 10/02/2008     Priority: Low        BP (!) 147/88   Pulse 67   Resp 16   Ht 1.765 m (5' 9.5\")   Wt 132.1 kg (291 lb 3.2 oz)   SpO2 97%   BMI 42.39 kg/m      "

## 2023-09-27 NOTE — NURSING NOTE
2 year appointment reminder message was created and will be sent out 2 - 3 months prior to next appointment due date. Via [employer]

## 2023-09-28 ENCOUNTER — OFFICE VISIT (OUTPATIENT)
Dept: UROLOGY | Facility: CLINIC | Age: 53
End: 2023-09-28
Payer: COMMERCIAL

## 2023-09-28 VITALS — HEART RATE: 69 BPM | SYSTOLIC BLOOD PRESSURE: 151 MMHG | DIASTOLIC BLOOD PRESSURE: 89 MMHG | RESPIRATION RATE: 16 BRPM

## 2023-09-28 DIAGNOSIS — R35.0 URINARY FREQUENCY: Primary | ICD-10-CM

## 2023-09-28 PROCEDURE — 88305 TISSUE EXAM BY PATHOLOGIST: CPT | Performed by: PATHOLOGY

## 2023-09-28 PROCEDURE — 52000 CYSTOURETHROSCOPY: CPT | Performed by: UROLOGY

## 2023-09-28 PROCEDURE — 99213 OFFICE O/P EST LOW 20 MIN: CPT | Mod: 25 | Performed by: UROLOGY

## 2023-09-28 NOTE — PROGRESS NOTES
CC: LUTS    HPI: 53 you male with LUTS.  Has failed meds and is currently doing pelvic floor PT.  Here for cysto.      Cystoscopy: after informed consent was obtained, the patient was prepped and draped in standard sterile fashion. The flexible cystoscope was introduced into the patient's urethra without difficulty. There were no strictures in the urethra. Upon entering the bladder, the UOs were orthotopic and effluxing clear urine. There was some ruffled mucosa on the right lateral trigone which was biopsied.  There were no other mucosal lesions, stones or foreign objects noted in the bladder. The was minimal prostatic hypertrophy in the prostatic fossa.  The remainder of the exam was normal.    ASSESSMENT AND PLAN:  Over half of today's 25-minute visit was spent reviewing the chart, results and counseling the patient regarding his LUTS.  I counseled the patient that we don't see a lot of hypertrophied prostate tissue and that his symptoms may very well be coming from his pelvic floor.  He will continue to do PT and then check back with our FRANCISCO Bower after the first of the year.  The patient is in agreement with the plan.

## 2023-09-28 NOTE — NURSING NOTE
"Initial BP (!) 151/89 (BP Location: Right arm, Patient Position: Chair, Cuff Size: Adult Regular)   Pulse 69   Resp 16  Estimated body mass index is 42.39 kg/m  as calculated from the following:    Height as of 9/27/23: 1.765 m (5' 9.5\").    Weight as of 9/27/23: 132.1 kg (291 lb 3.2 oz). .  Patient is here for cysto. yue glover LPN    "

## 2023-10-02 DIAGNOSIS — M10.9 ACUTE GOUT OF LEFT ANKLE, UNSPECIFIED CAUSE: ICD-10-CM

## 2023-10-02 LAB
PATH REPORT.COMMENTS IMP SPEC: NORMAL
PATH REPORT.COMMENTS IMP SPEC: NORMAL
PATH REPORT.FINAL DX SPEC: NORMAL
PATH REPORT.GROSS SPEC: NORMAL
PATH REPORT.MICROSCOPIC SPEC OTHER STN: NORMAL
PATH REPORT.RELEVANT HX SPEC: NORMAL
PHOTO IMAGE: NORMAL

## 2023-10-02 RX ORDER — ALLOPURINOL 100 MG/1
200 TABLET ORAL DAILY
Qty: 180 TABLET | Refills: 1 | Status: SHIPPED | OUTPATIENT
Start: 2023-10-02 | End: 2024-04-02

## 2023-10-08 DIAGNOSIS — K21.00 GASTROESOPHAGEAL REFLUX DISEASE WITH ESOPHAGITIS WITHOUT HEMORRHAGE: Chronic | ICD-10-CM

## 2023-10-09 RX ORDER — PANTOPRAZOLE SODIUM 20 MG/1
20 TABLET, DELAYED RELEASE ORAL DAILY
Qty: 90 TABLET | Refills: 0 | Status: SHIPPED | OUTPATIENT
Start: 2023-10-09 | End: 2023-12-14

## 2023-10-10 ENCOUNTER — TELEPHONE (OUTPATIENT)
Dept: FAMILY MEDICINE | Facility: CLINIC | Age: 53
End: 2023-10-10
Payer: COMMERCIAL

## 2023-10-10 NOTE — TELEPHONE ENCOUNTER
Patient Quality Outreach    Patient is due for the following:   IVD  -  BP Check    Next Steps:   Schedule a nurse only visit for a blood pressure check    Type of outreach:    Phone, spoke to patient/parent. He states that he checks his blood pressure at home and it has been running around 117/70      Questions for provider review:    None           Archana Clay, [facility]

## 2023-10-11 ENCOUNTER — THERAPY VISIT (OUTPATIENT)
Dept: PHYSICAL THERAPY | Facility: CLINIC | Age: 53
End: 2023-10-11
Attending: STUDENT IN AN ORGANIZED HEALTH CARE EDUCATION/TRAINING PROGRAM
Payer: COMMERCIAL

## 2023-10-11 DIAGNOSIS — R35.0 URINARY FREQUENCY: Primary | ICD-10-CM

## 2023-10-11 PROCEDURE — 97110 THERAPEUTIC EXERCISES: CPT | Mod: GP,XU | Performed by: PHYSICAL THERAPIST

## 2023-10-11 PROCEDURE — 90913 BFB TRAINING EA ADDL 15 MIN: CPT | Mod: GP | Performed by: PHYSICAL THERAPIST

## 2023-10-11 PROCEDURE — 90912 BFB TRAINING 1ST 15 MIN: CPT | Mod: GP | Performed by: PHYSICAL THERAPIST

## 2023-10-15 DIAGNOSIS — I25.10 CORONARY ARTERY DISEASE DUE TO LIPID RICH PLAQUE: ICD-10-CM

## 2023-10-15 DIAGNOSIS — I10 ESSENTIAL HYPERTENSION: ICD-10-CM

## 2023-10-15 DIAGNOSIS — I25.83 CORONARY ARTERY DISEASE DUE TO LIPID RICH PLAQUE: ICD-10-CM

## 2023-10-16 ENCOUNTER — TELEPHONE (OUTPATIENT)
Dept: FAMILY MEDICINE | Facility: CLINIC | Age: 53
End: 2023-10-16
Payer: COMMERCIAL

## 2023-10-16 DIAGNOSIS — E66.01 MORBID OBESITY (H): Primary | ICD-10-CM

## 2023-10-16 DIAGNOSIS — E11.9 TYPE 2 DIABETES MELLITUS WITHOUT COMPLICATION, WITHOUT LONG-TERM CURRENT USE OF INSULIN (H): ICD-10-CM

## 2023-10-16 DIAGNOSIS — E11.8 TYPE 2 DIABETES MELLITUS WITH UNSPECIFIED COMPLICATIONS (H): ICD-10-CM

## 2023-10-16 RX ORDER — VERAPAMIL HYDROCHLORIDE 360 MG/1
CAPSULE, DELAYED RELEASE PELLETS ORAL
Qty: 90 CAPSULE | Refills: 3 | Status: SHIPPED | OUTPATIENT
Start: 2023-10-16 | End: 2024-08-19

## 2023-10-16 RX ORDER — ROSUVASTATIN CALCIUM 10 MG/1
TABLET, COATED ORAL
Qty: 90 TABLET | Refills: 3 | Status: SHIPPED | OUTPATIENT
Start: 2023-10-16 | End: 2024-05-21

## 2023-10-16 RX ORDER — LISINOPRIL 40 MG/1
TABLET ORAL
Qty: 90 TABLET | Refills: 3 | Status: SHIPPED | OUTPATIENT
Start: 2023-10-16 | End: 2024-08-19

## 2023-10-16 NOTE — TELEPHONE ENCOUNTER
Prior Authorization Retail Medication Request    Medication/Dose: OZEMPIC 4MG/3ML  ICD code (if different than what is on RX):    Previously Tried and Failed:    Rationale:      Insurance Name:  [facility] [employer]   Insurance ID:  [id number]  PHONE [phone]      Pharmacy Information (if different than what is on RX)  Name:    Phone:        Thank you,  Elizabeth Delaney,  Pharmacy [facility]

## 2023-10-19 NOTE — TELEPHONE ENCOUNTER
PRIOR AUTHORIZATION DENIED    Medication: OZEMPIC (1 MG/DOSE) 4 MG/3ML SC SOPN  Insurance Company: [email or web address] ([facility]) - Phone [phone] Fax [phone]  Denial Date: 10/19/2023  Denial Rational: DOCUMENTATION MUST BE PROVIDED TO CONFIRM DX OF TYPE 2 DIABETES - LAB VALUES - A1C GREATER THAN OR EQUAL TO 6.5% AND MUST TRY/FAIL THREE MONTH TRIAL OF METFORMIN      Appeal Information: IF PROVIDER WOULD LIKE TO APPEAL THIS DECISION PLEASE PROVIDE THE PA TEAM WITH A LETTER OF MEDICAL NECESSITY      Patient Notified: No

## 2023-10-19 NOTE — TELEPHONE ENCOUNTER
Central Prior Authorization Team   Phone: [phone]    PA Initiation    Medication: OZEMPIC (1 MG/DOSE) 4 MG/3ML SC SOPN  Insurance Company: [employer]EL[employer] ([facility]) - Phone [phone] Fax [phone]  Pharmacy Filling the Rx: [facility] [place], [place] - [address] [place] [address]  Filling Pharmacy Phone: [phone]  Filling Pharmacy Fax:    Start Date: 10/19/2023

## 2023-10-24 ENCOUNTER — TELEPHONE (OUTPATIENT)
Dept: FAMILY MEDICINE | Facility: CLINIC | Age: 53
End: 2023-10-24
Payer: COMMERCIAL

## 2023-10-24 NOTE — TELEPHONE ENCOUNTER
[facility]    Fax: [phone]    Spec: [phone]    MO: [phone]

## 2023-10-26 NOTE — TELEPHONE ENCOUNTER
Central Prior Authorization Team   Phone: [phone]    PA Initiation    Medication: Wegovy 1mg/0.75ml soaj  Insurance Company: [employer] ([facility]) - Phone [phone] Fax [phone]  Pharmacy Filling the Rx: [facility] - [place], [place] - [address]  Filling Pharmacy Phone: [phone]  Filling Pharmacy Fax:    Start Date: 10/26/2023

## 2023-10-27 NOTE — TELEPHONE ENCOUNTER
PRIOR AUTHORIZATION DENIED    Medication: Wegovy 1mg/0.75ml soaj    Denial Date: 10/27/2023    Denial Rational:       Why was my request denied?  This request was denied because you did not meet the following requirements:  The requested medication and/or diagnosis are not a covered benefit and are excluded from coverage  in accordance with the terms and conditions of your plan benefit. Therefore, this request has been  administratively denied.      Appeal Information:  Drug exclusions can not be appealed.  This medication will not be covered by the prescription plan for any reason. The drug is not on formulary and there are no loopholes to gaining approval.

## 2023-10-31 NOTE — TELEPHONE ENCOUNTER
Pt informed. States he will contact his insurance to discuss.  Pt will call back or send Mobii message if wants to pursue weight mngmnt referral.  KIMBERLEY Mendoza RN

## 2023-10-31 NOTE — TELEPHONE ENCOUNTER
Notify Renzo Wegovy and Ozempic were not covered under his insurance if he would like to pursue looking at the use of these medications for weight loss we can refer to the weight loss clinic.  Please let let me know if you would like to see about going to the weight loss clinic and I can place a referral.    Lizet Berry CNP

## 2023-11-05 ENCOUNTER — HEALTH MAINTENANCE LETTER (OUTPATIENT)
Age: 53
End: 2023-11-05

## 2023-11-11 DIAGNOSIS — I10 ESSENTIAL HYPERTENSION: ICD-10-CM

## 2023-11-13 RX ORDER — TRIAMTERENE AND HYDROCHLOROTHIAZIDE 37.5; 25 MG/1; MG/1
2 CAPSULE ORAL DAILY
Qty: 180 CAPSULE | Refills: 3 | Status: SHIPPED | OUTPATIENT
Start: 2023-11-13 | End: 2024-04-15

## 2023-11-13 NOTE — TELEPHONE ENCOUNTER
"Requested Prescriptions   Pending Prescriptions Disp Refills    triamterene-HCTZ (DYAZIDE) 37.5-25 MG capsule [Pharmacy Med Name: TRIAMTERENE-HCTZ 37.5-25MG CAPS] 180 capsule 3     Sig: TAKE TWO CAPSULES BY MOUTH ONCE DAILY       Diuretics (Including Combos) Protocol Failed - 11/11/2023  5:04 AM        Failed - Blood pressure under 140/90 in past 12 months     BP Readings from Last 3 Encounters:   09/28/23 (!) 151/89   09/27/23 117/77   07/20/23 138/74                 Passed - Recent (12 mo) or future (30 days) visit within the authorizing provider's specialty     Patient has had an office visit with the authorizing provider or a provider within the authorizing providers department within the previous 12 mos or has a future within next 30 days. See \"Patient Info\" tab in inbasket, or \"Choose Columns\" in Meds & Orders section of the refill encounter.              Passed - Medication is active on med list        Passed - Patient is age 18 or older        Passed - Normal serum creatinine on file in past 12 months     Recent Labs   Lab Test 07/20/23  0735   CR 1.16              Passed - Normal serum potassium on file in past 12 months     Recent Labs   Lab Test 07/20/23  0735   POTASSIUM 4.3                    Passed - Normal serum sodium on file in past 12 months     Recent Labs   Lab Test 07/20/23  0735                      "

## 2023-12-05 ENCOUNTER — OFFICE VISIT (OUTPATIENT)
Dept: URGENT CARE | Facility: URGENT CARE | Age: 53
End: 2023-12-05
Payer: COMMERCIAL

## 2023-12-05 ENCOUNTER — ANCILLARY PROCEDURE (OUTPATIENT)
Dept: GENERAL RADIOLOGY | Facility: CLINIC | Age: 53
End: 2023-12-05
Attending: PHYSICIAN ASSISTANT
Payer: COMMERCIAL

## 2023-12-05 VITALS
OXYGEN SATURATION: 98 % | RESPIRATION RATE: 16 BRPM | WEIGHT: 296 LBS | HEART RATE: 74 BPM | BODY MASS INDEX: 43.08 KG/M2 | DIASTOLIC BLOOD PRESSURE: 85 MMHG | SYSTOLIC BLOOD PRESSURE: 147 MMHG | TEMPERATURE: 98.2 F

## 2023-12-05 DIAGNOSIS — R07.9 CHEST PAIN, UNSPECIFIED TYPE: ICD-10-CM

## 2023-12-05 DIAGNOSIS — R05.1 ACUTE COUGH: ICD-10-CM

## 2023-12-05 DIAGNOSIS — K21.00 GASTROESOPHAGEAL REFLUX DISEASE WITH ESOPHAGITIS WITHOUT HEMORRHAGE: Primary | ICD-10-CM

## 2023-12-05 PROCEDURE — 93000 ELECTROCARDIOGRAM COMPLETE: CPT | Performed by: PHYSICIAN ASSISTANT

## 2023-12-05 PROCEDURE — 71046 X-RAY EXAM CHEST 2 VIEWS: CPT | Mod: TC | Performed by: RADIOLOGY

## 2023-12-05 PROCEDURE — 99214 OFFICE O/P EST MOD 30 MIN: CPT | Mod: 25 | Performed by: PHYSICIAN ASSISTANT

## 2023-12-05 RX ORDER — SUCRALFATE 1 G/1
1 TABLET ORAL 4 TIMES DAILY
Qty: 40 TABLET | Refills: 0 | Status: SHIPPED | OUTPATIENT
Start: 2023-12-05 | End: 2023-12-15

## 2023-12-05 NOTE — PROGRESS NOTES
"  Assessment & Plan     Gastroesophageal reflux disease with esophagitis without hemorrhage  There was some improvement with GI cocktail. Continue with protonix. Can add carafate x 10 days since that worked well in the past. Would recommend follow up with primary care provider in 1-2 weeks or sooner if needed.     - lidocaine (viscous) (XYLOCAINE) 2 % 15 mL, alum & mag hydroxide-simethicone (MAALOX) 15 mL GI Cocktail  - sucralfate (CARAFATE) 1 GM tablet; Take 1 tablet (1 g) by mouth 4 times daily for 10 days    Acute cough  Chest x-ray is clear. Continue with supportive care. Return to clinic if symptoms worsen or do not improve; otherwise follow up as needed      - XR Chest 2 Views; Future    Chest pain, unspecified type  EKG is within normal limits. Discussed in detail symptoms that would warrant emergent evaluation in the ED. Patient agrees with plan and will follow up as needed.    - EKG 12-lead complete w/read - Clinics             BMI:   Estimated body mass index is 43.08 kg/m  as calculated from the following:    Height as of 9/27/23: 1.765 m (5' 9.5\").    Weight as of this encounter: 134.3 kg (296 lb).           Return in about 1 week (around 12/12/2023) for Follow up.    TERRI Rodriguez [facility] [place]              Subjective   Chief Complaint   Patient presents with    Heartburn     X 1.5 week. Pt states that he was given sucralfate in July for heartburn and would like a refill. He is also unsure if he has heartburn or something else, feels burning on left chest side.       HPI     Heartburn    Onset of symptoms was 1.5 week(s) ago.  Course of illness is worsening.    Severity moderate  Current and Associated symptoms: burning sensation in chest, also coughing for a few weeks, feels similar to reflux he has had in the past  Treatment measures tried include protonix and carafate in the past.  Predisposing factors include history of reflux                    Review of " Systems         Objective    BP (!) 147/85   Pulse 74   Temp 98.2  F (36.8  C) (Tympanic)   Resp 16   Wt 134.3 kg (296 lb)   SpO2 98%   BMI 43.08 kg/m    Body mass index is 43.08 kg/m .    Physical Exam  Constitutional:       General: He is not in acute distress.     Appearance: He is well-developed.   HENT:      Head: Normocephalic and atraumatic.      Right Ear: Tympanic membrane and ear canal normal.      Left Ear: Tympanic membrane and ear canal normal.   Eyes:      Conjunctiva/sclera: Conjunctivae normal.   Cardiovascular:      Rate and Rhythm: Normal rate and regular rhythm.   Pulmonary:      Effort: Pulmonary effort is normal.      Breath sounds: Normal breath sounds.   Abdominal:      Tenderness: There is abdominal tenderness in the epigastric area and left upper quadrant.   Skin:     General: Skin is warm and dry.      Findings: No rash.   Psychiatric:         Behavior: Behavior normal.            Xray - Reviewed and interpreted by me.  No acute infiltrate   EKG - Reviewed and interpreted by me NSR

## 2023-12-14 ENCOUNTER — OFFICE VISIT (OUTPATIENT)
Dept: FAMILY MEDICINE | Facility: CLINIC | Age: 53
End: 2023-12-14
Payer: COMMERCIAL

## 2023-12-14 ENCOUNTER — TELEPHONE (OUTPATIENT)
Dept: FAMILY MEDICINE | Facility: CLINIC | Age: 53
End: 2023-12-14

## 2023-12-14 VITALS
OXYGEN SATURATION: 98 % | SYSTOLIC BLOOD PRESSURE: 138 MMHG | HEART RATE: 69 BPM | BODY MASS INDEX: 44.14 KG/M2 | TEMPERATURE: 98.5 F | RESPIRATION RATE: 16 BRPM | WEIGHT: 298 LBS | HEIGHT: 69 IN | DIASTOLIC BLOOD PRESSURE: 78 MMHG

## 2023-12-14 DIAGNOSIS — E78.5 HYPERLIPIDEMIA, UNSPECIFIED HYPERLIPIDEMIA TYPE: ICD-10-CM

## 2023-12-14 DIAGNOSIS — K21.00 GASTROESOPHAGEAL REFLUX DISEASE WITH ESOPHAGITIS WITHOUT HEMORRHAGE: Chronic | ICD-10-CM

## 2023-12-14 DIAGNOSIS — E11.8 TYPE 2 DIABETES MELLITUS WITH UNSPECIFIED COMPLICATIONS (H): Primary | ICD-10-CM

## 2023-12-14 DIAGNOSIS — E66.01 MORBID OBESITY (H): ICD-10-CM

## 2023-12-14 LAB
ANION GAP SERPL CALCULATED.3IONS-SCNC: 12 MMOL/L (ref 7–15)
BUN SERPL-MCNC: 18.4 MG/DL (ref 6–20)
CALCIUM SERPL-MCNC: 9.7 MG/DL (ref 8.6–10)
CHLORIDE SERPL-SCNC: 103 MMOL/L (ref 98–107)
CHOLEST SERPL-MCNC: 118 MG/DL
CREAT SERPL-MCNC: 1.09 MG/DL (ref 0.67–1.17)
CREAT UR-MCNC: 81.4 MG/DL
DEPRECATED HCO3 PLAS-SCNC: 25 MMOL/L (ref 22–29)
EGFRCR SERPLBLD CKD-EPI 2021: 80 ML/MIN/1.73M2
FASTING STATUS PATIENT QL REPORTED: YES
GLUCOSE SERPL-MCNC: 95 MG/DL (ref 70–99)
HBA1C MFR BLD: 5.7 % (ref 0–5.6)
HDLC SERPL-MCNC: 40 MG/DL
LDLC SERPL CALC-MCNC: 55 MG/DL
MICROALBUMIN UR-MCNC: 23.3 MG/L
MICROALBUMIN/CREAT UR: 28.62 MG/G CR (ref 0–17)
NONHDLC SERPL-MCNC: 78 MG/DL
POTASSIUM SERPL-SCNC: 3.9 MMOL/L (ref 3.4–5.3)
SODIUM SERPL-SCNC: 140 MMOL/L (ref 135–145)
TRIGL SERPL-MCNC: 116 MG/DL

## 2023-12-14 PROCEDURE — 83036 HEMOGLOBIN GLYCOSYLATED A1C: CPT | Performed by: NURSE PRACTITIONER

## 2023-12-14 PROCEDURE — 82043 UR ALBUMIN QUANTITATIVE: CPT | Performed by: NURSE PRACTITIONER

## 2023-12-14 PROCEDURE — 99214 OFFICE O/P EST MOD 30 MIN: CPT | Performed by: NURSE PRACTITIONER

## 2023-12-14 PROCEDURE — 80048 BASIC METABOLIC PNL TOTAL CA: CPT | Performed by: NURSE PRACTITIONER

## 2023-12-14 PROCEDURE — 82570 ASSAY OF URINE CREATININE: CPT | Performed by: NURSE PRACTITIONER

## 2023-12-14 PROCEDURE — 80061 LIPID PANEL: CPT | Performed by: NURSE PRACTITIONER

## 2023-12-14 PROCEDURE — 36415 COLL VENOUS BLD VENIPUNCTURE: CPT | Performed by: NURSE PRACTITIONER

## 2023-12-14 RX ORDER — PANTOPRAZOLE SODIUM 20 MG/1
20 TABLET, DELAYED RELEASE ORAL DAILY
Qty: 90 TABLET | Refills: 3 | Status: SHIPPED | OUTPATIENT
Start: 2023-12-14 | End: 2024-04-15

## 2023-12-14 ASSESSMENT — PAIN SCALES - GENERAL: PAINLEVEL: NO PAIN (0)

## 2023-12-14 NOTE — TELEPHONE ENCOUNTER
PRIOR AUTHORIZATION DENIED    Medication: SEMAGLUTIDE(0.25 OR 0.5MG/DOS) 2 MG/3ML SC SOPN  Insurance Company: [employer] ([facility]) - Phone [phone] Fax [phone]  Denial Date: 12/14/2023  Denial Reason(s):     Appeal Information:     Patient Notified: No

## 2023-12-14 NOTE — PROGRESS NOTES
"  Assessment & Plan     Type 2 diabetes mellitus with unspecified complications (H)   Controlled no change in treatment plan   - Albumin Random Urine Quantitative with Creat Ratio  - Basic metabolic panel  (Ca, Cl, CO2, Creat, Gluc, K, Na, BUN)  - semaglutide (OZEMPIC) 2 MG/3ML pen  Dispense: 6 mL; Refill: 0  - Basic metabolic panel  (Ca, Cl, CO2, Creat, Gluc, K, Na, BUN)  - Albumin Random Urine Quantitative with Creat Ratio    Hyperlipidemia, unspecified hyperlipidemia type  - Lipid panel reflex to direct LDL Non-fasting  - Lipid panel reflex to direct LDL Non-fasting    Morbid obesity (H)  Reviewed diet   - HEMOGLOBIN A1C  - HEMOGLOBIN A1C    Gastroesophageal reflux disease with esophagitis without hemorrhage  Improving   - pantoprazole (PROTONIX) 20 MG EC tablet  Dispense: 90 tablet; Refill: 3       MED REC REQUIRED  Post Medication Reconciliation Status:  Discharge medications reconciled, continue medications without change  BMI:   Estimated body mass index is 43.39 kg/m  as calculated from the following:    Height as of this encounter: 1.765 m (5' 9.49\").    Weight as of this encounter: 135.2 kg (298 lb).   Weight management plan: Discussed healthy diet and exercise guidelines    See Patient Instructions    TERESA Romero CNP  [facility]    Sridhar Muller is a 53 year old, presenting for the following health issues:  No chief complaint on file.      12/14/2023     8:37 AM   Additional Questions   Roomed by Archana KIM       [facility] [place]     ED/UC Followup:    Facility:  [facility]  Date of visit: 12/5/23  Reason for visit: Gastroesophageal reflux disease with esophagitis without hemorrhage    Current Status: Patient states that he still has some reflux but it is better than it was when he was in urgent care.         Review of Systems   Constitutional, HEENT, cardiovascular, pulmonary, gi and gu systems are negative, except as otherwise noted.      Objective    BP " "138/78   Pulse 69   Temp 98.5  F (36.9  C) (Tympanic)   Resp 16   Ht 1.765 m (5' 9.49\")   Wt 135.2 kg (298 lb)   SpO2 98%   BMI 43.39 kg/m    There is no height or weight on file to calculate BMI.  Physical Exam   GENERAL: healthy, alert and no distress  EYES: Eyes grossly normal to inspection, PERRL and conjunctivae and sclerae normal  HENT: ear canals and TM's normal, nose and mouth without ulcers or lesions  NECK: no adenopathy, no asymmetry, masses, or scars and thyroid normal to palpation  RESP: lungs clear to auscultation - no rales, rhonchi or wheezes  CV: regular rate and rhythm, normal S1 S2, no S3 or S4, no murmur, click or rub, no peripheral edema and peripheral pulses strong  ABDOMEN: soft, nontender, no hepatosplenomegaly, no masses and bowel sounds normal  MS: no gross musculoskeletal defects noted, no edema  SKIN: no suspicious lesions or rashes  NEURO: Normal strength and tone, mentation intact and speech normal  PSYCH: mentation appears normal, affect normal/bright                    "

## 2023-12-22 ENCOUNTER — MYC MEDICAL ADVICE (OUTPATIENT)
Dept: FAMILY MEDICINE | Facility: CLINIC | Age: 53
End: 2023-12-22
Payer: COMMERCIAL

## 2023-12-22 DIAGNOSIS — K21.00 GASTROESOPHAGEAL REFLUX DISEASE WITH ESOPHAGITIS WITHOUT HEMORRHAGE: Primary | ICD-10-CM

## 2023-12-22 DIAGNOSIS — E66.01 MORBID OBESITY (H): ICD-10-CM

## 2023-12-22 NOTE — TELEPHONE ENCOUNTER
Pls see ipvive message below.    Order pended, message routed to provider for consideration.     Julie Behrendt RN

## 2023-12-22 NOTE — TELEPHONE ENCOUNTER
Patient is not covered we will have patient follow-up with weight loss clinic if he would like to be seen for weight loss patient will follow-up with me if he needs a referral.  Lizet Berry CNP

## 2023-12-26 ENCOUNTER — VIRTUAL VISIT (OUTPATIENT)
Dept: FAMILY MEDICINE | Facility: CLINIC | Age: 53
End: 2023-12-26
Payer: COMMERCIAL

## 2023-12-26 DIAGNOSIS — J98.01 ACUTE BRONCHOSPASM: ICD-10-CM

## 2023-12-26 DIAGNOSIS — J06.9 VIRAL URI WITH COUGH: Primary | ICD-10-CM

## 2023-12-26 PROCEDURE — 99441 PR PHYSICIAN TELEPHONE EVALUATION 5-10 MIN: CPT | Performed by: FAMILY MEDICINE

## 2023-12-26 RX ORDER — ALBUTEROL SULFATE 90 UG/1
2 AEROSOL, METERED RESPIRATORY (INHALATION) EVERY 6 HOURS PRN
Qty: 18 G | Refills: 1 | Status: SHIPPED | OUTPATIENT
Start: 2023-12-26 | End: 2024-02-01

## 2023-12-26 RX ORDER — OSELTAMIVIR PHOSPHATE 75 MG/1
75 CAPSULE ORAL 2 TIMES DAILY
Qty: 10 CAPSULE | Refills: 0 | Status: SHIPPED | OUTPATIENT
Start: 2023-12-26 | End: 2023-12-31

## 2023-12-26 NOTE — H&P (VIEW-ONLY)
"Renzo is a 53 year old who is being evaluated via a billable telephone  visit.      How would you like to obtain your AVS? MyChart  If the  visit is dropped, the invitation should be resent by: Text to cell phone: [phone]  Will anyone else be joining your visit? No          Assessment & Plan     Viral URI with cough  Very achy/ \" body pain/ dry cough\" feels like the flu  Had negative Covid test  -start oseltamivir (TAMIFLU) 75 MG capsule; Take 1 capsule (75 mg) by mouth 2 times daily for 5 days  If worse to UC or ER  Acute bronchospasm  Wheezing   Want to avoid oral prednisone and ibuprofen with his GERD/ Upcoming GI evalI   - albuterol (PROAIR HFA/PROVENTIL HFA/VENTOLIN HFA) 108 (90 Base) MCG/ACT inhaler; Inhale 2 puffs into the lungs every 6 hours as needed for shortness of breath, wheezing or cough  - fluticasone (FLOVENT DISKUS) 50 MCG/ACT inhaler; Inhale 1 puff into the lungs every 12 hours             Fluids/ rest  See Patient Instructions    John Suarez MD  [facility]    Subjective   Renzo is a 53 year old, presenting for the following health issues:  No chief complaint on file.      HPI     Acute Illness  Acute illness concerns: \" flu \"  Onset/Duration: 2 days  Symptoms:  Fever: No  Chills/Sweats: YES  Headache (location?): No  Sinus Pressure: No  Conjunctivitis:  No  Ear Pain: no  Rhinorrhea: YES  Congestion: YES  Sore Throat: YES  Cough: YES-non-productive  Wheeze: YES  Decreased Appetite: YES  Nausea: No  Vomiting: No  Diarrhea: No    Fatigue/Achiness: YES        Review of Systems   Constitutional, HEENT, cardiovascular, pulmonary, gi and gu systems are negative, except as otherwise noted.      Objective           Vitals:  No vitals were obtained today due to virtual visit.    Physical Exam   GENERAL: alert, no distress, and fatigued  RESP: No audible wheeze, cough, or visible cyanosis.   NEURO: Cranial nerves grossly intact.  Mentation and speech appropriate for " age.  PSYCH: Mentation appears normal, affect normal/bright, judgement and insight intact, normal speech   Office Visit on 12/14/2023   Component Date Value Ref Range Status    Sodium 12/14/2023 140  135 - 145 mmol/L Final    Reference intervals for this test were updated on 09/26/2023 to more accurately reflect our healthy population. There may be differences in the flagging of prior results with similar values performed with this method. Interpretation of those prior results can be made in the context of the updated reference intervals.     Potassium 12/14/2023 3.9  3.4 - 5.3 mmol/L Final    Chloride 12/14/2023 103  98 - 107 mmol/L Final    Carbon Dioxide (CO2) 12/14/2023 25  22 - 29 mmol/L Final    Anion Gap 12/14/2023 12  7 - 15 mmol/L Final    Urea Nitrogen 12/14/2023 18.4  6.0 - 20.0 mg/dL Final    Creatinine 12/14/2023 1.09  0.67 - 1.17 mg/dL Final    GFR Estimate 12/14/2023 80  >60 mL/min/1.73m2 Final    Calcium 12/14/2023 9.7  8.6 - 10.0 mg/dL Final    Glucose 12/14/2023 95  70 - 99 mg/dL Final    Hemoglobin A1C 12/14/2023 5.7 (H)  0.0 - 5.6 % Final    Normal <5.7%   Prediabetes 5.7-6.4%    Diabetes 6.5% or higher     Note: Adopted from ADA consensus guidelines.    Creatinine Urine mg/dL 12/14/2023 81.4  mg/dL Final    The reference ranges have not been established in urine creatinine. The results should be integrated into the clinical context for interpretation.    Albumin Urine mg/L 12/14/2023 23.3  mg/L Final    The reference ranges have not been established in urine albumin. The results should be integrated into the clinical context for interpretation.    Albumin Urine mg/g Cr 12/14/2023 28.62 (H)  0.00 - 17.00 mg/g Cr Final    Microalbuminuria is defined as an albumin:creatinine ratio of 17 to 299 for males and 25 to 299 for females. A ratio of albumin:creatinine of 300 or higher is indicative of overt proteinuria.  Due to biologic variability, positive results should be confirmed by a second,  first-morning random or 24-hour timed urine specimen. If there is discrepancy, a third specimen is recommended. When 2 out of 3 results are in the microalbuminuria range, this is evidence for incipient nephropathy and warrants increased efforts at glucose control, blood pressure control, and institution of therapy with an angiotensin-converting-enzyme (ACE) inhibitor (if the patient can tolerate it).      Cholesterol 12/14/2023 118  <200 mg/dL Final    Triglycerides 12/14/2023 116  <150 mg/dL Final    Direct Measure HDL 12/14/2023 40  >=40 mg/dL Final    LDL Cholesterol Calculated 12/14/2023 55  <=100 mg/dL Final    Non HDL Cholesterol 12/14/2023 78  <130 mg/dL Final    Patient Fasting > 8hrs? 12/14/2023 Yes   Final               Telephone-Visit Details       Originating Location (pt. Location): Home    Distant Location (provider location):  On-site  Platform used for Video Visit: Telephone

## 2023-12-26 NOTE — PROGRESS NOTES
"Renzo is a 53 year old who is being evaluated via a billable telephone  visit.      How would you like to obtain your AVS? MyChart  If the  visit is dropped, the invitation should be resent by: Text to cell phone: [phone]  Will anyone else be joining your visit? No          Assessment & Plan     Viral URI with cough  Very achy/ \" body pain/ dry cough\" feels like the flu  Had negative Covid test  -start oseltamivir (TAMIFLU) 75 MG capsule; Take 1 capsule (75 mg) by mouth 2 times daily for 5 days  If worse to UC or ER  Acute bronchospasm  Wheezing   Want to avoid oral prednisone and ibuprofen with his GERD/ Upcoming GI evalI   - albuterol (PROAIR HFA/PROVENTIL HFA/VENTOLIN HFA) 108 (90 Base) MCG/ACT inhaler; Inhale 2 puffs into the lungs every 6 hours as needed for shortness of breath, wheezing or cough  - fluticasone (FLOVENT DISKUS) 50 MCG/ACT inhaler; Inhale 1 puff into the lungs every 12 hours             Fluids/ rest  See Patient Instructions    John Suarez MD  [facility]    Subjective   Renzo is a 53 year old, presenting for the following health issues:  No chief complaint on file.      HPI     Acute Illness  Acute illness concerns: \" flu \"  Onset/Duration: 2 days  Symptoms:  Fever: No  Chills/Sweats: YES  Headache (location?): No  Sinus Pressure: No  Conjunctivitis:  No  Ear Pain: no  Rhinorrhea: YES  Congestion: YES  Sore Throat: YES  Cough: YES-non-productive  Wheeze: YES  Decreased Appetite: YES  Nausea: No  Vomiting: No  Diarrhea: No    Fatigue/Achiness: YES        Review of Systems   Constitutional, HEENT, cardiovascular, pulmonary, gi and gu systems are negative, except as otherwise noted.      Objective           Vitals:  No vitals were obtained today due to virtual visit.    Physical Exam   GENERAL: alert, no distress, and fatigued  RESP: No audible wheeze, cough, or visible cyanosis.   NEURO: Cranial nerves grossly intact.  Mentation and speech appropriate for " age.  PSYCH: Mentation appears normal, affect normal/bright, judgement and insight intact, normal speech   Office Visit on 12/14/2023   Component Date Value Ref Range Status    Sodium 12/14/2023 140  135 - 145 mmol/L Final    Reference intervals for this test were updated on 09/26/2023 to more accurately reflect our healthy population. There may be differences in the flagging of prior results with similar values performed with this method. Interpretation of those prior results can be made in the context of the updated reference intervals.     Potassium 12/14/2023 3.9  3.4 - 5.3 mmol/L Final    Chloride 12/14/2023 103  98 - 107 mmol/L Final    Carbon Dioxide (CO2) 12/14/2023 25  22 - 29 mmol/L Final    Anion Gap 12/14/2023 12  7 - 15 mmol/L Final    Urea Nitrogen 12/14/2023 18.4  6.0 - 20.0 mg/dL Final    Creatinine 12/14/2023 1.09  0.67 - 1.17 mg/dL Final    GFR Estimate 12/14/2023 80  >60 mL/min/1.73m2 Final    Calcium 12/14/2023 9.7  8.6 - 10.0 mg/dL Final    Glucose 12/14/2023 95  70 - 99 mg/dL Final    Hemoglobin A1C 12/14/2023 5.7 (H)  0.0 - 5.6 % Final    Normal <5.7%   Prediabetes 5.7-6.4%    Diabetes 6.5% or higher     Note: Adopted from ADA consensus guidelines.    Creatinine Urine mg/dL 12/14/2023 81.4  mg/dL Final    The reference ranges have not been established in urine creatinine. The results should be integrated into the clinical context for interpretation.    Albumin Urine mg/L 12/14/2023 23.3  mg/L Final    The reference ranges have not been established in urine albumin. The results should be integrated into the clinical context for interpretation.    Albumin Urine mg/g Cr 12/14/2023 28.62 (H)  0.00 - 17.00 mg/g Cr Final    Microalbuminuria is defined as an albumin:creatinine ratio of 17 to 299 for males and 25 to 299 for females. A ratio of albumin:creatinine of 300 or higher is indicative of overt proteinuria.  Due to biologic variability, positive results should be confirmed by a second,  first-morning random or 24-hour timed urine specimen. If there is discrepancy, a third specimen is recommended. When 2 out of 3 results are in the microalbuminuria range, this is evidence for incipient nephropathy and warrants increased efforts at glucose control, blood pressure control, and institution of therapy with an angiotensin-converting-enzyme (ACE) inhibitor (if the patient can tolerate it).      Cholesterol 12/14/2023 118  <200 mg/dL Final    Triglycerides 12/14/2023 116  <150 mg/dL Final    Direct Measure HDL 12/14/2023 40  >=40 mg/dL Final    LDL Cholesterol Calculated 12/14/2023 55  <=100 mg/dL Final    Non HDL Cholesterol 12/14/2023 78  <130 mg/dL Final    Patient Fasting > 8hrs? 12/14/2023 Yes   Final               Telephone-Visit Details       Originating Location (pt. Location): Home    Distant Location (provider location):  On-site  Platform used for Video Visit: Telephone

## 2023-12-28 ENCOUNTER — MYC MEDICAL ADVICE (OUTPATIENT)
Dept: FAMILY MEDICINE | Facility: CLINIC | Age: 53
End: 2023-12-28
Payer: COMMERCIAL

## 2023-12-28 DIAGNOSIS — R05.1 ACUTE COUGH: Primary | ICD-10-CM

## 2023-12-29 ENCOUNTER — OFFICE VISIT (OUTPATIENT)
Dept: URGENT CARE | Facility: URGENT CARE | Age: 53
End: 2023-12-29
Payer: COMMERCIAL

## 2023-12-29 VITALS
WEIGHT: 285 LBS | DIASTOLIC BLOOD PRESSURE: 87 MMHG | SYSTOLIC BLOOD PRESSURE: 136 MMHG | HEART RATE: 88 BPM | RESPIRATION RATE: 16 BRPM | BODY MASS INDEX: 41.5 KG/M2 | OXYGEN SATURATION: 96 % | TEMPERATURE: 98.9 F

## 2023-12-29 DIAGNOSIS — R05.1 ACUTE COUGH: Primary | ICD-10-CM

## 2023-12-29 PROCEDURE — 99213 OFFICE O/P EST LOW 20 MIN: CPT | Performed by: PHYSICIAN ASSISTANT

## 2023-12-29 RX ORDER — BENZONATATE 200 MG/1
200 CAPSULE ORAL 3 TIMES DAILY PRN
Qty: 42 CAPSULE | Refills: 0 | Status: SHIPPED | OUTPATIENT
Start: 2023-12-29 | End: 2024-01-18

## 2023-12-29 RX ORDER — BENZONATATE 200 MG/1
200 CAPSULE ORAL 3 TIMES DAILY PRN
Qty: 30 CAPSULE | Refills: 0 | Status: SHIPPED | OUTPATIENT
Start: 2023-12-29 | End: 2024-04-15

## 2023-12-29 ASSESSMENT — ENCOUNTER SYMPTOMS
COUGH: 1
SHORTNESS OF BREATH: 0

## 2023-12-29 NOTE — PROGRESS NOTES
SUBJECTIVE:   Lalito Weathers is a 53 year old male presenting with a chief complaint of   Chief Complaint   Patient presents with    Cough     He was given flovent diskus on Tuesday and used it for 3 days, stopped using because he thinks it had made it cough worse, has not used flovent today. Has dry mouth and coarse voice and states these are the side effects of flovent. He said he can't sleep due to cough and has not been able to use is bi-pap machine in the last 2 nights.       He is an established patient of [facility].  Patient presents with complaints of cough after being diagnosed with influenza (no test).  He was given tamiflu, albuterol inhaler and flovent.  He feels that the flovent is making his cough worse and has stopped it.,  States cannot use bipap due to coughing.  He has tried niquil as well without success.        Treatment:  niquil      Review of Systems   Respiratory:  Positive for cough. Negative for shortness of breath.    Cardiovascular:  Negative for chest pain.   All other systems reviewed and are negative.      Past Medical History:   Diagnosis Date    Tubular adenoma of colon 6/28/2019     Family History   Problem Relation Age of Onset    Unknown/Adopted Father      Current Outpatient Medications   Medication Sig Dispense Refill    albuterol (PROAIR HFA/PROVENTIL HFA/VENTOLIN HFA) 108 (90 Base) MCG/ACT inhaler Inhale 2 puffs into the lungs every 6 hours as needed for shortness of breath, wheezing or cough 18 g 1    allopurinol (ZYLOPRIM) 100 MG tablet TAKE 2 TABLETS (200 MG) BY MOUTH DAILY 180 tablet 1    aspirin 81 MG EC tablet       benzonatate (TESSALON) 200 MG capsule Take 1 capsule (200 mg) by mouth 3 times daily as needed for cough 42 capsule 0    benzonatate (TESSALON) 200 MG capsule Take 1 capsule (200 mg) by mouth 3 times daily as needed for cough 30 capsule 0    cholecalciferol (VITAMIN D3) 5000 units TABS tablet Take by mouth daily      fish oil-omega-3 fatty acids 1000 MG  capsule Take 1 g by mouth daily      lisinopril (ZESTRIL) 40 MG tablet TAKE ONE TABLET BY MOUTH ONCE DAILY 90 tablet 3    multivitamin w/minerals (THERA-VIT-M) tablet Take 1 tablet by mouth daily      oseltamivir (TAMIFLU) 75 MG capsule Take 1 capsule (75 mg) by mouth 2 times daily for 5 days 10 capsule 0    pantoprazole (PROTONIX) 20 MG EC tablet Take 1 tablet (20 mg) by mouth daily 90 tablet 3    rosuvastatin (CRESTOR) 10 MG tablet TAKE ONE TABLET BY MOUTH ONCE DAILY 90 tablet 3    semaglutide (OZEMPIC) 2 MG/3ML pen Inject 0.25 mg Subcutaneous every 7 days for 30 days, THEN 0.5 mg every 7 days for 30 days. 6 mL 0    solifenacin (VESICARE) 10 MG tablet Take 1 tablet (10 mg) by mouth daily 90 tablet 1    tamsulosin (FLOMAX) 0.4 MG capsule Take 2 capsules (0.8 mg) by mouth daily 180 capsule 3    triamterene-HCTZ (DYAZIDE) 37.5-25 MG capsule TAKE TWO CAPSULES BY MOUTH ONCE DAILY 180 capsule 3    verapamil ER (VERELAN) 360 MG 24 hr capsule TAKE ONE CAPSULE BY MOUTH ONCE DAILY 90 capsule 3    vitamin C (ASCORBIC ACID) 1000 MG TABS Take 1,000 mg by mouth daily      fluticasone (FLOVENT DISKUS) 50 MCG/ACT inhaler Inhale 1 puff into the lungs every 12 hours (Patient not taking: Reported on 12/29/2023) 60 each 0     Social History     Tobacco Use    Smoking status: Former     Passive exposure: Never    Smokeless tobacco: Former   Substance Use Topics    Alcohol use: Yes     Comment: social       OBJECTIVE  /87   Pulse 88   Temp 98.9  F (37.2  C) (Tympanic)   Resp 16   Wt 129.3 kg (285 lb)   SpO2 96%   BMI 41.50 kg/m      Physical Exam    Labs:  No results found for this or any previous visit (from the past 24 hour(s)).    ASSESSMENT:      ICD-10-CM    1. Acute cough  R05.1 benzonatate (TESSALON) 200 MG capsule           Medical Decision Making:    Differential Diagnosis:  Influenza, cough    Serious Comorbid Conditions:  Adult:   reviewed    PLAN:    Rx for tessalon perles.  Explained that cough is a big symptom  of influenza and may be around for some time.  May stop flovent.  Continue with albuterol as needed.      Followup:    If not improving or if condition worsens, follow up with your Primary Care Provider, If not improving or if conditions worsens over the next 12-24 hours, go to the Emergency Department    There are no Patient Instructions on file for this visit.

## 2024-01-05 ENCOUNTER — ANESTHESIA EVENT (OUTPATIENT)
Dept: GASTROENTEROLOGY | Facility: CLINIC | Age: 54
End: 2024-01-05
Payer: COMMERCIAL

## 2024-01-05 ASSESSMENT — LIFESTYLE VARIABLES: TOBACCO_USE: 1

## 2024-01-05 NOTE — ANESTHESIA PREPROCEDURE EVALUATION
Anesthesia Pre-Procedure Evaluation    Patient: Lalito Weathers   MRN: [id number] : 1970        Procedure : Procedure(s):  Esophagoscopy, gastroscopy, duodenoscopy (EGD), combined          Past Medical History:   Diagnosis Date    Tubular adenoma of colon 2019      Past Surgical History:   Procedure Laterality Date    APPENDECTOMY  2011    COLONOSCOPY N/A 2019    Procedure: COLONOSCOPY, WITH POLYPECTOMY AND BIOPSY;  Surgeon: Herman Champagne MD;  Location: [facility]    ESOPHAGOSCOPY, GASTROSCOPY, DUODENOSCOPY (EGD), COMBINED N/A 10/29/2021    Procedure: ESOPHAGOGASTRODUODENOSCOPY, WITH BIOPSY;  Surgeon: Jakub Wray DO;  Location: [facility]    ORTHOPEDIC SURGERY  05/10/2016    EXCISION LEFT UPPER INNER THIGH SOFT TISSUE MASS 5/10/16   MD NATE      Allergies   Allergen Reactions    Food Hives     mangos    Aspirin     Vasquez Flavor     Statin Drugs [Statins]     Zantac [Ranitidine]       Social History     Tobacco Use    Smoking status: Former     Passive exposure: Never    Smokeless tobacco: Former   Substance Use Topics    Alcohol use: Yes     Comment: social      Wt Readings from Last 1 Encounters:   23 129.3 kg (285 lb)        Anesthesia Evaluation   Pt has had prior anesthetic. Type: General and MAC.    No history of anesthetic complications       ROS/MED HX  ENT/Pulmonary:     (+) sleep apnea,               tobacco use, Past use,                       Neurologic:  - neg neurologic ROS     Cardiovascular:     (+) Dyslipidemia hypertension- -   -  - -                                      METS/Exercise Tolerance: >4 METS    Hematologic:  - neg hematologic  ROS     Musculoskeletal:  - neg musculoskeletal ROS     GI/Hepatic:  - neg GI/hepatic ROS     Renal/Genitourinary:  - neg Renal ROS     Endo:     (+)               Obesity,       Psychiatric/Substance Use:  - neg psychiatric ROS     Infectious Disease:  - neg infectious disease ROS     Malignancy:  - neg malignancy ROS    "  Other:  - neg other ROS          Physical Exam    Airway  airway exam normal      Mallampati: II   TM distance: > 3 FB   Neck ROM: full   Mouth opening: > 3 cm    Respiratory Devices and Support         Dental       (+) Minor Abnormalities - some fillings, tiny chips      Cardiovascular   cardiovascular exam normal       Rhythm and rate: regular and normal     Pulmonary   pulmonary exam normal        breath sounds clear to auscultation           OUTSIDE LABS:  CBC:   Lab Results   Component Value Date    WBC 6.3 07/20/2023    WBC 12.9 (H) 08/31/2022    HGB 13.1 (L) 07/20/2023    HGB 12.6 (L) 08/31/2022    HCT 39.6 (L) 07/20/2023    HCT 37.9 (L) 08/31/2022     07/20/2023     08/31/2022     BMP:   Lab Results   Component Value Date     12/14/2023     07/20/2023    POTASSIUM 3.9 12/14/2023    POTASSIUM 4.3 07/20/2023    CHLORIDE 103 12/14/2023    CHLORIDE 103 07/20/2023    CO2 25 12/14/2023    CO2 25 07/20/2023    BUN 18.4 12/14/2023    BUN 17.0 07/20/2023    CR 1.09 12/14/2023    CR 1.16 07/20/2023    GLC 95 12/14/2023     (H) 07/20/2023     COAGS: No results found for: \"PTT\", \"INR\", \"FIBR\"  POC:   Lab Results   Component Value Date    BGM 86 07/04/2019     HEPATIC:   Lab Results   Component Value Date    ALBUMIN 4.6 07/20/2023    PROTTOTAL 7.0 07/20/2023    ALT 54 07/20/2023    AST 26 07/20/2023    ALKPHOS 54 07/20/2023    BILITOTAL 0.5 07/20/2023     OTHER:   Lab Results   Component Value Date    A1C 5.7 (H) 12/14/2023    HERMELINDA 9.7 12/14/2023    LIPASE 38 07/20/2023    TSH 0.99 07/04/2019       Anesthesia Plan    ASA Status:  3    NPO Status:  NPO Appropriate    Anesthesia Type: General.     - Airway: Native airway   Induction: Intravenous, Propofol.   Maintenance: TIVA.        Consents    Anesthesia Plan(s) and associated risks, benefits, and realistic alternatives discussed. Questions answered and patient/representative(s) expressed understanding.     - Discussed: Risks, Benefits " "and Alternatives for BOTH SEDATION and the PROCEDURE were discussed     - Discussed with:  Patient      - Extended Intubation/Ventilatory Support Discussed: No.      - Patient is DNR/DNI Status: No     Use of blood products discussed: No .     Postoperative Care    Pain management: Oral pain medications.   PONV prophylaxis: Ondansetron (or other 5HT-3)     Comments:               TERESA Gurrola CRNA    I have reviewed the pertinent notes and labs in the chart from the past 30 days and (re)examined the patient.  Any updates or changes from those notes are reflected in this note.              # Severe Obesity: Estimated body mass index is 41.5 kg/m  as calculated from the following:    Height as of 12/14/23: 1.765 m (5' 9.49\").    Weight as of 12/29/23: 129.3 kg (285 lb).      "

## 2024-01-08 ENCOUNTER — HOSPITAL ENCOUNTER (OUTPATIENT)
Facility: CLINIC | Age: 54
Discharge: HOME | End: 2024-01-08
Attending: SURGERY | Admitting: SURGERY
Payer: COMMERCIAL

## 2024-01-08 ENCOUNTER — ANESTHESIA (OUTPATIENT)
Dept: GASTROENTEROLOGY | Facility: CLINIC | Age: 54
End: 2024-01-08
Payer: COMMERCIAL

## 2024-01-08 VITALS
TEMPERATURE: 97.9 F | BODY MASS INDEX: 41.5 KG/M2 | SYSTOLIC BLOOD PRESSURE: 132 MMHG | RESPIRATION RATE: 16 BRPM | DIASTOLIC BLOOD PRESSURE: 74 MMHG | OXYGEN SATURATION: 98 % | WEIGHT: 285 LBS | HEART RATE: 60 BPM

## 2024-01-08 LAB — UPPER GI ENDOSCOPY: NORMAL

## 2024-01-08 PROCEDURE — 250N000011 HC RX IP 250 OP 636: Performed by: NURSE ANESTHETIST, CERTIFIED REGISTERED

## 2024-01-08 PROCEDURE — 88305 TISSUE EXAM BY PATHOLOGIST: CPT | Mod: TC | Performed by: SURGERY

## 2024-01-08 PROCEDURE — 43239 EGD BIOPSY SINGLE/MULTIPLE: CPT | Performed by: SURGERY

## 2024-01-08 PROCEDURE — 258N000003 HC RX IP 258 OP 636

## 2024-01-08 PROCEDURE — 370N000017 HC ANESTHESIA TECHNICAL FEE, PER MIN: Performed by: SURGERY

## 2024-01-08 PROCEDURE — 250N000009 HC RX 250: Performed by: NURSE ANESTHETIST, CERTIFIED REGISTERED

## 2024-01-08 RX ORDER — ONDANSETRON 2 MG/ML
4 INJECTION INTRAMUSCULAR; INTRAVENOUS EVERY 30 MIN PRN
Status: DISCONTINUED | OUTPATIENT
Start: 2024-01-08 | End: 2024-01-08 | Stop reason: HOSPADM

## 2024-01-08 RX ORDER — LIDOCAINE HYDROCHLORIDE 20 MG/ML
INJECTION, SOLUTION INFILTRATION; PERINEURAL PRN
Status: DISCONTINUED | OUTPATIENT
Start: 2024-01-08 | End: 2024-01-08

## 2024-01-08 RX ORDER — PROPOFOL 10 MG/ML
INJECTION, EMULSION INTRAVENOUS PRN
Status: DISCONTINUED | OUTPATIENT
Start: 2024-01-08 | End: 2024-01-08

## 2024-01-08 RX ORDER — SODIUM CHLORIDE, SODIUM LACTATE, POTASSIUM CHLORIDE, CALCIUM CHLORIDE 600; 310; 30; 20 MG/100ML; MG/100ML; MG/100ML; MG/100ML
INJECTION, SOLUTION INTRAVENOUS CONTINUOUS
Status: DISCONTINUED | OUTPATIENT
Start: 2024-01-08 | End: 2024-01-08 | Stop reason: HOSPADM

## 2024-01-08 RX ORDER — ONDANSETRON 4 MG/1
4 TABLET, ORALLY DISINTEGRATING ORAL EVERY 30 MIN PRN
Status: DISCONTINUED | OUTPATIENT
Start: 2024-01-08 | End: 2024-01-08 | Stop reason: HOSPADM

## 2024-01-08 RX ORDER — LIDOCAINE 40 MG/G
CREAM TOPICAL
Status: DISCONTINUED | OUTPATIENT
Start: 2024-01-08 | End: 2024-01-08 | Stop reason: HOSPADM

## 2024-01-08 RX ADMIN — PROPOFOL 30 MG: 10 INJECTION, EMULSION INTRAVENOUS at 13:51

## 2024-01-08 RX ADMIN — PROPOFOL 30 MG: 10 INJECTION, EMULSION INTRAVENOUS at 13:46

## 2024-01-08 RX ADMIN — PROPOFOL 40 MG: 10 INJECTION, EMULSION INTRAVENOUS at 13:47

## 2024-01-08 RX ADMIN — PROPOFOL 30 MG: 10 INJECTION, EMULSION INTRAVENOUS at 13:45

## 2024-01-08 RX ADMIN — PROPOFOL 30 MG: 10 INJECTION, EMULSION INTRAVENOUS at 13:44

## 2024-01-08 RX ADMIN — SODIUM CHLORIDE, POTASSIUM CHLORIDE, SODIUM LACTATE AND CALCIUM CHLORIDE: 600; 310; 30; 20 INJECTION, SOLUTION INTRAVENOUS at 13:21

## 2024-01-08 RX ADMIN — PROPOFOL 30 MG: 10 INJECTION, EMULSION INTRAVENOUS at 13:49

## 2024-01-08 RX ADMIN — PROPOFOL 70 MG: 10 INJECTION, EMULSION INTRAVENOUS at 13:43

## 2024-01-08 RX ADMIN — LIDOCAINE HYDROCHLORIDE 100 MG: 20 INJECTION, SOLUTION INFILTRATION; PERINEURAL at 13:42

## 2024-01-08 ASSESSMENT — ACTIVITIES OF DAILY LIVING (ADL): ADLS_ACUITY_SCORE: 35

## 2024-01-08 NOTE — INTERVAL H&P NOTE
"I have reviewed the surgical (or preoperative) H&P that is linked to this encounter, and examined the patient. There are no significant changes    EGD 2021 (nl); no blood thinner; protonix 20mg    Clinical Conditions Present on Arrival:  Clinically Significant Risk Factors Present on Admission                 # Drug Induced Platelet Defect: home medication list includes an antiplatelet medication  # Severe Obesity: Estimated body mass index is 41.5 kg/m  as calculated from the following:    Height as of 12/14/23: 1.765 m (5' 9.49\").    Weight as of 12/29/23: 129.3 kg (285 lb).       "

## 2024-01-08 NOTE — LETTER
Lalito Weathers  [id number] [facility] [address]  January 13, 2024    Dear Lalito,   This letter is to inform you of the results of your pathology report on your upper endoscopy (EGD).    Your pathology report was:  Final Diagnosis   Gastroesophageal junction, biopsy:  -Normal squamous mucosa without inflammation or other histologic abnormality; no evidence of columnar mucosa     Normal, please follow up by having a repeat upper endoscopy (EGD), if your symptoms worsen.  If you have questions, please contact your primary care or the referring physician.    If you have any questions or concerns please do not hesitate to call my office at [phone].  Sincerely,     [facility] DO Aguirre FACOS  [facility]

## 2024-01-08 NOTE — ANESTHESIA CARE TRANSFER NOTE
Patient: Lalito Weathers    Procedure: Procedure(s):  ESOPHAGOGASTRODUODENOSCOPY, WITH BIOPSY       Diagnosis: Gastroesophageal reflux disease with esophagitis without hemorrhage [K21.00]  Diagnosis Additional Information: No value filed.    Anesthesia Type:   General     Note:    Oropharynx: oropharynx clear of all foreign objects  Level of Consciousness: awake  Oxygen Supplementation: room air    Independent Airway: airway patency satisfactory and stable  Dentition: dentition unchanged  Vital Signs Stable: post-procedure vital signs reviewed and stable    Patient transferred to: Phase II    Handoff Report: Identifed the Patient, Identified the Reponsible Provider, Reviewed the pertinent medical history, Discussed the surgical course, Reviewed Intra-OP anesthesia mangement and issues during anesthesia, Set expectations for post-procedure period and Allowed opportunity for questions and acknowledgement of understanding      Vitals:  Vitals Value Taken Time   /76 01/08/24 1410   Temp 36.6  C (97.9  F) 01/08/24 1358   Pulse 65 01/08/24 1410   Resp 16 01/08/24 1358   SpO2 96 % 01/08/24 1401   Vitals shown include unfiled device data.    Electronically Signed By: TERESA Mg CRNA  January 8, 2024  2:14 PM

## 2024-01-08 NOTE — ANESTHESIA POSTPROCEDURE EVALUATION
Patient: Lalito Weathers    Procedure: Procedure(s):  ESOPHAGOGASTRODUODENOSCOPY, WITH BIOPSY       Anesthesia Type:  General    Note:  Disposition: Outpatient   Postop Pain Control: Uneventful            Sign Out: Well controlled pain   PONV: No   Neuro/Psych: Uneventful            Sign Out: Acceptable/Baseline neuro status   Airway/Respiratory: Uneventful            Sign Out: Acceptable/Baseline resp. status   CV/Hemodynamics: Uneventful            Sign Out: Acceptable CV status; No obvious hypovolemia; No obvious fluid overload   Other NRE: NONE   DID A NON-ROUTINE EVENT OCCUR? No           Last vitals:  Vitals Value Taken Time   /74 01/08/24 1430   Temp 36.6  C (97.9  F) 01/08/24 1358   Pulse 60 01/08/24 1430   Resp 16 01/08/24 1430   SpO2 98 % 01/08/24 1432   Vitals shown include unfiled device data.    Electronically Signed By: Boris Hagan CRNA, APRN CRNA  January 8, 2024  3:42 PM

## 2024-01-10 DIAGNOSIS — R35.0 URINARY FREQUENCY: ICD-10-CM

## 2024-01-10 PROCEDURE — 88305 TISSUE EXAM BY PATHOLOGIST: CPT | Mod: 26 | Performed by: PATHOLOGY

## 2024-01-10 RX ORDER — SOLIFENACIN SUCCINATE 10 MG/1
10 TABLET, FILM COATED ORAL DAILY
Qty: 90 TABLET | Refills: 1 | Status: SHIPPED | OUTPATIENT
Start: 2024-01-10 | End: 2024-07-02

## 2024-01-18 ENCOUNTER — OFFICE VISIT (OUTPATIENT)
Dept: FAMILY MEDICINE | Facility: CLINIC | Age: 54
End: 2024-01-18
Payer: COMMERCIAL

## 2024-01-18 ENCOUNTER — MYC MEDICAL ADVICE (OUTPATIENT)
Dept: FAMILY MEDICINE | Facility: CLINIC | Age: 54
End: 2024-01-18

## 2024-01-18 VITALS
DIASTOLIC BLOOD PRESSURE: 74 MMHG | HEIGHT: 69 IN | RESPIRATION RATE: 16 BRPM | HEART RATE: 71 BPM | WEIGHT: 292 LBS | TEMPERATURE: 98.7 F | BODY MASS INDEX: 43.25 KG/M2 | OXYGEN SATURATION: 97 % | SYSTOLIC BLOOD PRESSURE: 138 MMHG

## 2024-01-18 DIAGNOSIS — E66.01 MORBID OBESITY (H): ICD-10-CM

## 2024-01-18 DIAGNOSIS — E11.8 TYPE 2 DIABETES MELLITUS WITH UNSPECIFIED COMPLICATIONS (H): Primary | ICD-10-CM

## 2024-01-18 DIAGNOSIS — J42 CHRONIC BRONCHITIS, UNSPECIFIED CHRONIC BRONCHITIS TYPE (H): ICD-10-CM

## 2024-01-18 DIAGNOSIS — J37.0 CHRONIC LARYNGITIS: ICD-10-CM

## 2024-01-18 DIAGNOSIS — H10.33 ACUTE BACTERIAL CONJUNCTIVITIS OF BOTH EYES: ICD-10-CM

## 2024-01-18 DIAGNOSIS — J32.0 CHRONIC MAXILLARY SINUSITIS: ICD-10-CM

## 2024-01-18 DIAGNOSIS — J98.01 ACUTE BRONCHOSPASM: ICD-10-CM

## 2024-01-18 PROBLEM — J44.9 COPD (CHRONIC OBSTRUCTIVE PULMONARY DISEASE) (H): Status: ACTIVE | Noted: 2024-01-18

## 2024-01-18 PROCEDURE — 99214 OFFICE O/P EST MOD 30 MIN: CPT | Performed by: NURSE PRACTITIONER

## 2024-01-18 RX ORDER — POLYMYXIN B SULFATE AND TRIMETHOPRIM 1; 10000 MG/ML; [USP'U]/ML
2 SOLUTION OPHTHALMIC EVERY 4 HOURS
Qty: 5 ML | Refills: 0 | Status: SHIPPED | OUTPATIENT
Start: 2024-01-18 | End: 2024-01-25

## 2024-01-18 RX ORDER — FLUTICASONE PROPIONATE 50 MCG
BLISTER, WITH INHALATION DEVICE INHALATION
Qty: 60 EACH | Refills: 0 | Status: SHIPPED | OUTPATIENT
Start: 2024-01-18 | End: 2024-02-01

## 2024-01-18 RX ORDER — FLUTICASONE PROPIONATE 50 MCG
1 SPRAY, SUSPENSION (ML) NASAL DAILY
Qty: 16 G | Refills: 0 | Status: SHIPPED | OUTPATIENT
Start: 2024-01-18 | End: 2024-02-08

## 2024-01-18 ASSESSMENT — PAIN SCALES - GENERAL: PAINLEVEL: NO PAIN (0)

## 2024-01-18 NOTE — PROGRESS NOTES
"  Assessment & Plan   Type 2 diabetes mellitus with unspecified complications (H)   Controlled no change in treatment plan     Morbid obesity (H)  Reviewed healthy diet and exercise    Acute bacterial conjunctivitis of both eyes  - polymixin b-trimethoprim (POLYTRIM) 44550-7.1 UNIT/ML-% ophthalmic solution  Dispense: 5 mL; Refill: 0    Chronic laryngitis  Cough is greatly improved continues to have chronic laryngitis upper GI was negative acid reflux symptoms have resolved we will have him follow-up with ENT  - Adult ENT  Referral    Chronic maxillary sinusitis  Will start Flonase- fluticasone (FLONASE) 50 MCG/ACT nasal spray  Dispense: 16 g; Refill: 0      (J42) Chronic bronchitis, unspecified chronic bronchitis type (H)  Comment: stable   Plan:      MED REC REQUIRED  Post Medication Reconciliation Status:  Discharge medications reconciled, continue medications without change  See Patient Instructions    Sridhar Muller is a 53 year old, presenting for the following health issues:  Results        1/18/2024     8:50 AM   Additional Questions   Roomed by Archana REAL     Patient is here to follow up after his upper GI and biopsy that was done on 1/8/24.    He has noticed that his eye have felt itchy and have had discharge over the last couple weeks.       Objective    /74   Pulse 71   Temp 98.7  F (37.1  C) (Tympanic)   Resp 16   Ht 1.765 m (5' 9.49\")   Wt 132.5 kg (292 lb)   SpO2 97%   BMI 42.52 kg/m    There is no height or weight on file to calculate BMI.    Review of Systems  Constitutional, HEENT, cardiovascular, pulmonary, gi and gu systems are negative, except as otherwise noted.  Physical Exam   GENERAL: alert and no distress  EYES: Eyes grossly normal to inspection, PERRL and conjunctivae and sclerae normal  HENT: ear canals and TM's normal, nose and mouth without ulcers or lesions  NECK: no adenopathy, no asymmetry, masses, or scars  RESP: lungs clear to auscultation - no rales, " rhonchi or wheezes  CV: regular rate and rhythm, normal S1 S2, no S3 or S4, no murmur, click or rub, no peripheral edema  ABDOMEN: soft, nontender, no hepatosplenomegaly, no masses and bowel sounds normal  MS: no gross musculoskeletal defects noted, no edema  SKIN: no suspicious lesions or rashes  NEURO: Normal strength and tone, mentation intact and speech normal  PSYCH: mentation appears normal, affect normal/bright  LYMPH: no cervical, supraclavicular, axillary, or inguinal adenopathy    No results found for this or any previous visit (from the past 24 hour(s)).        Signed Electronically by: TERESA Romero CNP

## 2024-01-19 ENCOUNTER — TELEPHONE (OUTPATIENT)
Dept: FAMILY MEDICINE | Facility: CLINIC | Age: 54
End: 2024-01-19
Payer: COMMERCIAL

## 2024-01-19 DIAGNOSIS — J42 CHRONIC BRONCHITIS, UNSPECIFIED CHRONIC BRONCHITIS TYPE (H): Primary | ICD-10-CM

## 2024-01-19 NOTE — TELEPHONE ENCOUNTER
Prior Authorization Retail Medication Request    Medication/Dose: Flovent 50  Diagnosis and ICD code (if different than what is on RX):    New/renewal/insurance change PA/secondary ins. PA:  Previously Tried and Failed:    Rationale:    PLAN EXCLUSION ALT OPT:ARNUITY ELLIPTA, QVAR REDIHALER    Insurance  Primary:[facility] commercial   Insurance ID:  [id number]    Secondary (if applicable):  Insurance ID:      Pharmacy Information (if different than what is on RX)  Name:    Phone:    Fax:

## 2024-01-31 NOTE — TELEPHONE ENCOUNTER
PA Initiation    Medication: FLOVENT DISKUS 50 MCG/ACT IN AEPB  Insurance Company: [employer] ([facility]) - Phone [phone] Fax [phone]  Pharmacy Filling the Rx: [place], [place] - [address] [place] [address]  Filling Pharmacy Phone: [phone]  Filling Pharmacy Fax: [phone]  Start Date: 1/31/2024

## 2024-01-31 NOTE — TELEPHONE ENCOUNTER
PRIOR AUTHORIZATION DENIED    Medication: FLOVENT DISKUS 50 MCG/ACT IN AEPB    Insurance Company: [employer] ([facility]) - Phone [phone] Fax [phone]    Denial Date: 1/31/2024    Denial Reason(s): Patient needs to try and fail ALL preferred medications: Arnuity Ellipta, Qvar Redihaler.     Appeal Information:

## 2024-02-01 DIAGNOSIS — J98.01 ACUTE BRONCHOSPASM: ICD-10-CM

## 2024-02-01 RX ORDER — ALBUTEROL SULFATE 90 UG/1
AEROSOL, METERED RESPIRATORY (INHALATION)
Qty: 8.5 G | Refills: 1 | Status: SHIPPED | OUTPATIENT
Start: 2024-02-01 | End: 2024-06-04

## 2024-02-08 DIAGNOSIS — J32.0 CHRONIC MAXILLARY SINUSITIS: ICD-10-CM

## 2024-02-08 RX ORDER — FLUTICASONE PROPIONATE 50 MCG
1 SPRAY, SUSPENSION (ML) NASAL DAILY
Qty: 16 G | Refills: 3 | Status: SHIPPED | OUTPATIENT
Start: 2024-02-08 | End: 2024-04-15

## 2024-02-08 NOTE — TELEPHONE ENCOUNTER
Left a second message to call back to discuss. Left detailed message that we are not in the office until Friday.    Prescription approved per [facility] Refill Protocol.  Julie Behrendt RN

## 2024-02-11 PROBLEM — R35.0 URINARY FREQUENCY: Status: RESOLVED | Noted: 2023-06-26 | Resolved: 2024-02-11

## 2024-02-12 NOTE — PROGRESS NOTES
"    DISCHARGE  Reason for Discharge: Patient chooses to discontinue therapy.  Patient has failed to schedule further appointments.    Equipment Issued: none    Discharge Plan: Patient to continue home program.    Referring Provider:  Cheli Barlow     10/11/23 0500   Appointment Info   Signing clinician's name / credentials Izzy Weems, PT MA #[id number]   Total/Authorized Visits 6 ([facility])   Visits Used 4   Medical Diagnosis Urinary Frequency; Benign prostatic hyperplasia with weak urinary stream   PT Tx Diagnosis Pelvic Floor Muscle Dysfunction   Progress Note/Certification   Onset of illness/injury or Date of Surgery 06/22/23   Therapy Frequency 1x per week   Predicted Duration 8 weeks, weaning to every other week   Progress Note Due Date 08/21/23   PT Goal 1   Goal Identifier STG   Goal Description 1)Pt will report 50% improvement in strength of each void, in 4 weeks.   Goal Progress Met: states 50% of streams seem stronger.  (cont for 3 weeks)   Target Date 09/11/23   Date Met 10/11/23   PT Goal 2   Goal Identifier STG   Goal Description 2)Pt will report routine void times of every 2 hours, in 4 weeks.   Goal Progress Met: states he is voiding \"about every 2 hours.\"   Target Date 07/24/23   Date Met 08/21/23   PT Goal 3   Goal Identifier LTG   Goal Description 3)Pt will report 2/10 or less pain with urges to void in 6 weeks.   Goal Progress Met: pt states not really feeling much pain anymore with urges or elimination.   Target Date 09/19/23   Date Met 10/11/23   PT Goal 4   Goal Identifier LTG   Goal Description 4)Pt will report \"near normal\" void speed/strengths at each void, in 8 weeks.   Goal Progress Not Fully Met: pt states he is doing better with this, but still feels it could get better.  (cont x 4 weeks.)   Target Date 11/08/23   PT Goal 5   Goal Identifier LTG   Goal Description 5)Pt will be indep in HEP to prevent return of symptoms, in 8 weeks   Goal Progress Ongoing and updated at each session " "  Target Date 11/08/23   Subjective Report   Subjective Report Pt reports 3 nites in the last 2 weeks where he slept thru nite without getting up to void.   Objective Measure 1   Objective Measure Void Times   Details Notices longer periods between voids. Doesn't feel like he is into the toilet every 1 hour.   Objective Measure 2   Objective Measure Double Voids   Details Feels he is fully eliminating bladder at each void, but sometimes will get a stop and then stay at the toilet for a min or so and will be able to get more out. This is not his norm - happens 2-3x per week.   Objective Measure 3   Objective Measure Biofeedback   Details Abdominals: pt initially had difficulty activating without holding his breath;  PFM: Max = 13uV, Avg = 9uV, rest = 5 to 3uV and endurance = 5 sec   Biofeedback   Treatment Detail Obtained consent for rx from pt after explaining procedure. Pt in supported supine position with abdominal and adhesive perianal electrodes placed. BF administered using the Pieceable device and Begun software. Pt guided through quick and hold contractions using BF to enhance quality and control of muscle contractions.  Used visual display to reinforce both the contraction and the relaxation of the PFM. Worked repeatedly on elimination strategy, as pt had difficulty engaging and isolating each mm.   Biofeedback dual channel   Patient Response/Progress Pt states \"this has been so helpful.\"  Pt able to initiate correct elimination strategy by the end of BF time.   Pelvic Floor Muscles (PFM) Biofeedback Minutes Timed (03404) 15   Pelvic Floor Muscles (PFM) Biofeedback Addl Minutes Timed (18359) 15   Muscle RA, PFM   Therapeutic Procedure/Exercise   Therapeutic Procedures: strength, endurance, ROM, flexibillity minutes (36141) 23   Ther Proc 1 - Details Reviewed stretching of piriformis, HF and HS.  Reviewed pursed lip blowing technique for elimination - explained that \"your body is like a tube of " "toothpaste\" and that squeezing the tube is using abdominals and loosening the cap is relaxing the PFM. Had pt trial different trunk positions for ways to engage the PFM more easily. Pt needed continuous cues for breathing as all effort levels caused him to hold his breath initially. Reviewed 3 quick pokes, toilet dancing, and submax CR of PFM to further eliminate bladder.   Skilled Intervention Exer: functional use of PFM, training of effort levels for breathing; progression of HEP   Patient Response/Progress Able to demo exers correctly, but still needs cues to keep breathing.   Plan   Home program [id number]   Updates to plan of care Cont curent HEP with focus on elimination exers.   Plan for next session Cont with training of PFM with BF and/or RUSI - pt feels BF was most beneficial so far.  Advance HEP.   Comments   Pelvic Health Informed Consent Statement Discussed with patient/guardian reason for referral regarding pelvic health needs and external/internal pelvic floor muscle examination.  Opportunity provided to ask questions and verbal consent for assessment and intervention was given.   Total Session Time   Timed Code Treatment Minutes 53   Total Treatment Time (sum of timed and untimed services) 53     Thank you for the referral of this patient.  Izzy Weems, PT, MA  #[id number]    "

## 2024-02-21 ENCOUNTER — NURSE TRIAGE (OUTPATIENT)
Dept: FAMILY MEDICINE | Facility: CLINIC | Age: 54
End: 2024-02-21
Payer: COMMERCIAL

## 2024-02-21 ENCOUNTER — HOSPITAL ENCOUNTER (EMERGENCY)
Facility: CLINIC | Age: 54
Discharge: HOME | End: 2024-02-21
Attending: PHYSICIAN ASSISTANT | Admitting: PHYSICIAN ASSISTANT
Payer: COMMERCIAL

## 2024-02-21 VITALS
OXYGEN SATURATION: 96 % | RESPIRATION RATE: 20 BRPM | SYSTOLIC BLOOD PRESSURE: 145 MMHG | DIASTOLIC BLOOD PRESSURE: 87 MMHG | HEART RATE: 81 BPM | TEMPERATURE: 98.5 F

## 2024-02-21 DIAGNOSIS — R10.30 LOWER ABDOMINAL PAIN: ICD-10-CM

## 2024-02-21 PROCEDURE — 99214 OFFICE O/P EST MOD 30 MIN: CPT | Performed by: PHYSICIAN ASSISTANT

## 2024-02-21 PROCEDURE — G0463 HOSPITAL OUTPT CLINIC VISIT: HCPCS | Performed by: PHYSICIAN ASSISTANT

## 2024-02-21 ASSESSMENT — ENCOUNTER SYMPTOMS
DIARRHEA: 1
CONSTITUTIONAL NEGATIVE: 1
FEVER: 0
VOMITING: 0
ABDOMINAL PAIN: 1

## 2024-02-21 NOTE — ED PROVIDER NOTES
History     Chief Complaint   Patient presents with    Abdominal Pain     HPI  Lalito Weathers is a 53 year old male who presents to Urgent Care with complaints of lower abdominal pain for the past 2-3 days.  Patient reports that the pain is located diffusely across the lower abdomen as well as to the left side.  Patient states symptoms feel consistent with his past episode of diverticulitis.  Patient was noted to have acute uncomplicated sigmoid diverticulitis on CT scan on 8/31/2022.  He completed a course of Cipro and Flagyl with resolution in symptoms at that time.  Patient states prior to this abdominal pain starting, he developed a few days of diarrhea.  Stools are softer but more normal now.  Denies fevers, chills, nausea, vomiting, diarrhea, or abdominal pain.  Denies chest pain or shortness of breath.  Patient denies dysuria, hematuria, or increased urinary urgency or frequency.  Patient is eating and drinking without difficulties.  Patient is requesting antibiotics as his symptoms are identical to his past episode of diverticulitis.  Patient has history of appendectomy.      Allergies:  Allergies   Allergen Reactions    Food Hives     mangos    Arden Flavor     Statin Drugs [Statins]     Zantac [Ranitidine]        Problem List:    Patient Active Problem List    Diagnosis Date Noted    COPD (chronic obstructive pulmonary disease) (H) 01/18/2024     Priority: Medium    Benign prostatic hyperplasia with weak urinary stream 06/26/2023     Priority: Medium    GRACY (obstructive sleep apnea) - severe (AHI 87) 05/02/2023     Priority: Medium     Sleep Study [facility] 12/13/16 (302#) - AHI 87, RDI 87 (primarily supine sleep). Low O2 44%. TCM ranged from 42 to 54 mmHg. PAP titrated to 20/13      Hyperlipidemia 05/01/2023     Priority: Medium    Obesity (BMI 40) with comorbidity (H) 08/09/2019     Priority: Medium    Vitamin D deficiency 03/28/2017     Priority: Medium    Palpitations 10/02/2008     Priority: Medium     PVCs (premature ventricular contractions) 10/02/2008     Priority: Medium    Essential hypertension 01/14/2005     Priority: Medium    History of colonic polyps 05/01/2023     Priority: Low     colonoscopy  2019       Gout 05/01/2023     Priority: Low    Chronic prostatitis 05/01/2023     Priority: Low        Past Medical History:    Past Medical History:   Diagnosis Date    Hypertension     Obese     Sleep apnea     Tubular adenoma of colon 06/28/2019       Past Surgical History:    Past Surgical History:   Procedure Laterality Date    APPENDECTOMY  12/30/2011    COLONOSCOPY N/A 06/21/2019    Procedure: COLONOSCOPY, WITH POLYPECTOMY AND BIOPSY;  Surgeon: Herman Champagne MD;  Location: [facility]    ESOPHAGOSCOPY, GASTROSCOPY, DUODENOSCOPY (EGD), COMBINED N/A 10/29/2021    Procedure: ESOPHAGOGASTRODUODENOSCOPY, WITH BIOPSY;  Surgeon: Jakub Wray DO;  Location: [facility]    ESOPHAGOSCOPY, GASTROSCOPY, DUODENOSCOPY (EGD), COMBINED N/A 1/8/2024    Procedure: ESOPHAGOGASTRODUODENOSCOPY, WITH BIOPSY;  Surgeon: Jimbo Aguirre MD;  Location: [facility]    ORTHOPEDIC SURGERY  05/10/2016    EXCISION LEFT UPPER INNER THIGH SOFT TISSUE MASS 5/10/16   MD NATE       Family History:    Family History   Problem Relation Age of Onset    Unknown/Adopted Father        Social History:  Marital Status:   [2]  Social History     Tobacco Use    Smoking status: Former     Passive exposure: Never    Smokeless tobacco: Former   Vaping Use    Vaping Use: Never used   Substance Use Topics    Alcohol use: Yes     Comment: social    Drug use: No        Medications:    amoxicillin-clavulanate (AUGMENTIN) 875-125 MG tablet  albuterol (PROAIR HFA/PROVENTIL HFA/VENTOLIN HFA) 108 (90 Base) MCG/ACT inhaler  allopurinol (ZYLOPRIM) 100 MG tablet  aspirin 81 MG EC tablet  benzonatate (TESSALON) 200 MG capsule  cholecalciferol (VITAMIN D3) 5000 units TABS tablet  fish oil-omega-3 fatty acids 1000 MG capsule  fluticasone (ARNUITY ELLIPTA) 50  MCG/ACT inhaler  fluticasone (FLONASE) 50 MCG/ACT nasal spray  lisinopril (ZESTRIL) 40 MG tablet  multivitamin w/minerals (THERA-VIT-M) tablet  pantoprazole (PROTONIX) 20 MG EC tablet  rosuvastatin (CRESTOR) 10 MG tablet  solifenacin (VESICARE) 10 MG tablet  tamsulosin (FLOMAX) 0.4 MG capsule  triamterene-HCTZ (DYAZIDE) 37.5-25 MG capsule  verapamil ER (VERELAN) 360 MG 24 hr capsule  vitamin C (ASCORBIC ACID) 1000 MG TABS          Review of Systems   Constitutional: Negative.  Negative for fever.   Gastrointestinal:  Positive for abdominal pain and diarrhea. Negative for vomiting.   All other systems reviewed and are negative.      Physical Exam   BP: (!) 145/87  Pulse: 81  Temp: 98.5  F (36.9  C)  Resp: 20  SpO2: 96 %      Physical Exam  Constitutional:       General: He is not in acute distress.     Appearance: Normal appearance. He is well-developed. He is not ill-appearing, toxic-appearing or diaphoretic.   HENT:      Head: Normocephalic and atraumatic.      Right Ear: External ear normal.      Left Ear: External ear normal.      Nose: Nose normal.      Mouth/Throat:      Lips: Pink.      Mouth: Mucous membranes are moist.      Pharynx: Oropharynx is clear. No oropharyngeal exudate or posterior oropharyngeal erythema.   Eyes:      Extraocular Movements: Extraocular movements intact.      Conjunctiva/sclera: Conjunctivae normal.      Pupils: Pupils are equal, round, and reactive to light.   Cardiovascular:      Rate and Rhythm: Normal rate and regular rhythm.      Heart sounds: Normal heart sounds.   Pulmonary:      Effort: Pulmonary effort is normal. No respiratory distress.      Breath sounds: Normal breath sounds. No stridor. No wheezing.   Abdominal:      General: There is no distension.      Palpations: Abdomen is soft. There is no mass.      Tenderness: There is abdominal tenderness in the right lower quadrant, suprapubic area and left lower quadrant. There is no guarding or rebound.   Musculoskeletal:          General: Normal range of motion.      Cervical back: Normal range of motion and neck supple. No rigidity.   Lymphadenopathy:      Cervical: No cervical adenopathy.   Skin:     General: Skin is warm and dry.   Neurological:      Mental Status: He is alert and oriented to person, place, and time.   Psychiatric:         Behavior: Behavior is cooperative.         ED Course                 Procedures      No results found for this or any previous visit (from the past 24 hour(s)).    Medications - No data to display    Assessments & Plan (with Medical Decision Making)     Pt is a 53 year old male who presents to Urgent Care with complaints of lower abdominal pain for the past 2-3 days.  Patient reports that the pain is located diffusely across the lower abdomen as well as to the left side.  Patient states symptoms feel consistent with his past episode of diverticulitis.  Patient was noted to have acute uncomplicated sigmoid diverticulitis on CT scan on 8/31/2022.  He completed a course of Cipro and Flagyl with resolution in symptoms at that time.  Patient states prior to this abdominal pain starting, he developed a few days of diarrhea.  Stools are softer but more normal now. Patient is eating and drinking without difficulties.  Patient is requesting antibiotics as his symptoms are identical to his past episode of diverticulitis.  Patient has history of appendectomy.    Pt is afebrile on arrival.  Exam as above.  Diffuse lower abdominal tenderness on exam.  Patient appears well and in no distress.  Discussed my recommendation for evaluation in the emergency department in order to definitively diagnose diverticulitis and rule out any complicating factors such as perforation or abscess however patient declines this and prefers to get oral antibiotics as his symptoms are consistent with his past episode.  Stressed the importance of following up closely to ensure improving symptoms.  Instructed him to return to the  emergency department should he develop any worsening abdominal pain, vomiting, fevers, or any other symptoms of concern.  Patient expresses understanding of this and agreement with the plan.  Encouraged symptomatic treatments at home.  Return precautions were reviewed.  Hand-outs were provided.    Patient was sent with Augmentin and was instructed to follow-up with PCP closely for continued care and management.  He is to return to the ED for persistent and/or worsening symptoms.  Patient expressed understanding of the diagnosis and plan and was discharged home in good condition.    I have reviewed the nursing notes.    I have reviewed the findings, diagnosis, plan and need for follow up with the patient.    Discharge Medication List as of 2/21/2024  6:07 PM        START taking these medications    Details   amoxicillin-clavulanate (AUGMENTIN) 875-125 MG tablet Take 1 tablet by mouth 2 times daily for 10 days, Disp-20 tablet, R-0, E-Prescribe             Final diagnoses:   Lower abdominal pain       2/21/2024   [facility] EMERGENCY DEPT      Disclaimer:  This note consists of symbols derived from keyboarding, dictation and/or voice recognition software.  As a result, there may be errors in the script that have gone undetected.  Please consider this when interpreting information found in this chart.     Lisa Reeves PA-C  02/21/24 2125       Lisa Reeves PA-C  02/21/24 2126

## 2024-02-21 NOTE — TELEPHONE ENCOUNTER
Reason for Disposition   MILD TO MODERATE constant pain lasting > 2 hours    Protocols used: Abdominal Pain - Male-A-[place]    Nurse Triage SBAR    Is this a 2nd Level Triage? YES, LICENSED PRACTITIONER REVIEW IS REQUIRED    Situation: Lower abdominal pain that started on Sunday and has progressively worsened     Background: Previous bout of Diverticulitis - states current symptoms are the same as when he was diagnosed with Diverticulitis     Assessment: Patient reports he started experiencing lower abdominal pain on Sunday   States pain is mostly localized to the mid abdomen but does radiate to the sides    Rates the pain a 6/10 on the pain scale  Describes the pain as cramping and sharp  Pain is constant  States in the beginning, pain would be relieved with voiding or having a bowel movement, but now no interventions help with the pain     Denies diarrhea - states stools are softer than normal - had diarrhea a week ago   Denies blood in stools  Denies vomiting - does report mild nausea that has not effected his appetite  Good appetite and fluid intake  Denies fevers     Protocol Recommended Disposition:   Go To ED/UCC Now (Or To Office With PCP Approval)    Recommendation: UC for evaluation  Patient will go to [facility] [place]     Routed to provider    Does the patient meet one of the following criteria for ADS visit consideration? No    Teddy Navarro, Clinic RN  .[facility]

## 2024-02-23 ENCOUNTER — OFFICE VISIT (OUTPATIENT)
Dept: UROLOGY | Facility: CLINIC | Age: 54
End: 2024-02-23
Payer: COMMERCIAL

## 2024-02-23 VITALS
HEART RATE: 60 BPM | SYSTOLIC BLOOD PRESSURE: 145 MMHG | WEIGHT: 293.8 LBS | OXYGEN SATURATION: 98 % | HEIGHT: 69 IN | DIASTOLIC BLOOD PRESSURE: 86 MMHG | TEMPERATURE: 97.9 F | BODY MASS INDEX: 43.52 KG/M2

## 2024-02-23 DIAGNOSIS — R35.0 URINARY FREQUENCY: ICD-10-CM

## 2024-02-23 PROCEDURE — 99213 OFFICE O/P EST LOW 20 MIN: CPT | Performed by: STUDENT IN AN ORGANIZED HEALTH CARE EDUCATION/TRAINING PROGRAM

## 2024-02-23 ASSESSMENT — PAIN SCALES - GENERAL: PAINLEVEL: NO PAIN (0)

## 2024-02-23 NOTE — PROGRESS NOTES
"    UROLOGY FOLLOW-UP NOTE          Chief Complaint:   Today I had the pleasure of seeing . Lalito Weathers in follow-up for a chief complaint of LUTS.          Interval Update   Lalito Weathers is a very pleasant 53 year old male with a history of COPD, GRACY, HLD, HTN, and gout.     Brief History: Mr. Lalito Weathers has followed with urology for urinary frequency and urgency. He was on tamsulosin 0.4 mg and oxybutynin XR 5 mg daily for weak stream and urinary frequency. He did not find oxybutynin and was switched to solifenacin 5 mg daily instead. This was later increased to 10 mg daily.     He underwent cystoscopy on 9/28/2023 with Dr. Gore, which did not note an obstructing prostate. He was recommended to continue pelvic floor PT.     Today's notes: He is doing well today. He reports continued weaker stream and urinary hesitancy. He denies bothersome urinary frequency and urgency.     He feels the pelvic floor physical therapy has been helpful.          Physical Exam:   Patient is a 53 year old  male   Vitals: Blood pressure (!) 145/86, pulse 60, temperature 97.9  F (36.6  C), temperature source Tympanic, height 1.765 m (5' 9.49\"), weight 133.3 kg (293 lb 12.8 oz), SpO2 98%.  General: Alert and oriented x 3, no acute distress.  Respiratory: Non-labored breathing.  Cardiac: Regular rate.      Labs and Pathology:    I personally reviewed all applicable laboratory data and went over findings with patient  Significant for:    CBC RESULTS:  Recent Labs   Lab Test 07/20/23  0735 08/31/22  0904 06/09/22  1955 04/14/22  0717   WBC 6.3 12.9* 8.0 7.2   HGB 13.1* 12.6* 13.2* 13.6    232 233 233        BMP RESULTS:  Recent Labs   Lab Test 12/14/23  0923 07/20/23  0735 09/22/22  1335 08/31/22  1419 08/31/22  0904 03/24/21  1614 03/04/21  1749 08/05/20  0746 10/23/19  0839 08/02/19  0000    137 140  --  139   < > 141 139 136  --    POTASSIUM 3.9 4.3 4.1  --  4.2   < > 3.8 4.0 3.8 4.1   CHLORIDE 103 103 100  " --  100   < > 107 107 104  --    CO2 25 25 28  --  26   < > 27 29 27  --    ANIONGAP 12 9 12  --  13   < > 7 3 5  --    GLC 95 119* 85  --  108*   < > 103* 92 93 200*   BUN 18.4 17.0 18.2  --  14.7   < > 20 18 24  --    CR 1.09 1.16 1.13 1.1 1.02   < > 1.09 0.95 1.00 1.04   GFRESTIMATED 80 75 77 >60 88   < > 77 >90 87 >60   GFRESTBLACK  --   --   --   --   --   --  90 >90 >90 >60   HERMELINDA 9.7 10.1* 9.7  --  9.3   < > 9.2 9.7 9.2  --     < > = values in this interval not displayed.       UA RESULTS:   Recent Labs   Lab Test 04/05/23  1136 08/31/22  0904 04/12/22  1200 02/15/22  1110   SG <=1.005 1.020 1.010 1.025   URINEPH 6.0 7.0 7.0 5.5   NITRITE Negative Negative Negative Negative   RBCU None Seen  --  None Seen 0-2   WBCU None Seen  --  None Seen 0-5       PSA RESULTS  Prostate Specific Antigen Screen   Date Value Ref Range Status   02/27/2023 0.48 0.00 - 3.50 ng/mL Final   01/04/2022 0.59 0.00 - 4.00 ug/L Final            Assessment/Plan   53 year old male has followed with urology for urinary frequency and urgency. He was on tamsulosin 0.4 mg and oxybutynin XR 5 mg daily for weak stream and urinary frequency. He did not find oxybutynin and was switched to solifenacin 5 mg daily instead. This was later increased to 10 mg daily.     He underwent cystoscopy on 9/28/2023 with Dr. Gore, which did not note an obstructing prostate. He was recommended to continue pelvic floor PT.     He reports continued weaker stream and urinary hesitancy. He feels the pelvic floor physical therapy has been helpful.     We discussed potentially reducing the medications he takes for his urinary symptoms. I recommended he first try reducing his tamsulosin dose to one capsule per day. He could then try stopping solifenacin, if desired.     Plan:  Continue pelvic floor PT exercises.   Consider reducing tamsulosin dose to 0.4 mg daily. If that goes well, can try stopping solifenacin.   Follow up with urology when needed.           Past  Medical History:     Past Medical History:   Diagnosis Date    Hypertension     Obese     Sleep apnea     Tubular adenoma of colon 06/28/2019            Past Surgical History:     Past Surgical History:   Procedure Laterality Date    APPENDECTOMY  12/30/2011    COLONOSCOPY N/A 06/21/2019    Procedure: COLONOSCOPY, WITH POLYPECTOMY AND BIOPSY;  Surgeon: Herman Champagne MD;  Location: [facility]    ESOPHAGOSCOPY, GASTROSCOPY, DUODENOSCOPY (EGD), COMBINED N/A 10/29/2021    Procedure: ESOPHAGOGASTRODUODENOSCOPY, WITH BIOPSY;  Surgeon: Jakub Wray DO;  Location: [facility]    ESOPHAGOSCOPY, GASTROSCOPY, DUODENOSCOPY (EGD), COMBINED N/A 1/8/2024    Procedure: ESOPHAGOGASTRODUODENOSCOPY, WITH BIOPSY;  Surgeon: Jimbo Aguirre MD;  Location: [facility]    ORTHOPEDIC SURGERY  05/10/2016    EXCISION LEFT UPPER INNER THIGH SOFT TISSUE MASS 5/10/16   MD NATE            Medications     Current Outpatient Medications   Medication    amoxicillin-clavulanate (AUGMENTIN) 875-125 MG tablet    solifenacin (VESICARE) 10 MG tablet    tamsulosin (FLOMAX) 0.4 MG capsule    albuterol (PROAIR HFA/PROVENTIL HFA/VENTOLIN HFA) 108 (90 Base) MCG/ACT inhaler    allopurinol (ZYLOPRIM) 100 MG tablet    aspirin 81 MG EC tablet    benzonatate (TESSALON) 200 MG capsule    cholecalciferol (VITAMIN D3) 5000 units TABS tablet    fish oil-omega-3 fatty acids 1000 MG capsule    fluticasone (ARNUITY ELLIPTA) 50 MCG/ACT inhaler    fluticasone (FLONASE) 50 MCG/ACT nasal spray    lisinopril (ZESTRIL) 40 MG tablet    multivitamin w/minerals (THERA-VIT-M) tablet    pantoprazole (PROTONIX) 20 MG EC tablet    rosuvastatin (CRESTOR) 10 MG tablet    triamterene-HCTZ (DYAZIDE) 37.5-25 MG capsule    verapamil ER (VERELAN) 360 MG 24 hr capsule    vitamin C (ASCORBIC ACID) 1000 MG TABS     No current facility-administered medications for this visit.            Family History:     Family History   Problem Relation Age of Onset    Unknown/Adopted Father              Social History:     Social History     Socioeconomic History    Marital status:      Spouse name: Not on file    Number of children: Not on file    Years of education: Not on file    Highest education level: Not on file   Occupational History    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   Tobacco Use    Smoking status: Former     Passive exposure: Never    Smokeless tobacco: Former   Vaping Use    Vaping Use: Never used   Substance and Sexual Activity    Alcohol use: Yes     Comment: social    Drug use: No    Sexual activity: Yes     Partners: Female   Other Topics Concern    Not on file   Social History Narrative    Not on file     Social Determinants of Health     Financial Resource Strain: Low Risk  (1/18/2024)    Financial Resource Strain     Within the past 12 months, have you or your family members you live with been unable to get utilities (heat, electricity) when it was really needed?: No   Food Insecurity: Low Risk  (1/18/2024)    Food Insecurity     Within the past 12 months, did you worry that your food would run out before you got money to buy more?: No     Within the past 12 months, did the food you bought just not last and you didn t have money to get more?: No   Transportation Needs: Low Risk  (1/18/2024)    Transportation Needs     Within the past 12 months, has lack of transportation kept you from medical appointments, getting your medicines, non-medical meetings or appointments, work, or from getting things that you need?: No   Physical Activity: Not on file   Stress: Not on file   Social Connections: Not on file   Interpersonal Safety: Low Risk  (12/14/2023)    Interpersonal Safety     Do you feel physically and emotionally safe where you currently live?: Yes     Within the past 12 months, have you been hit, slapped, kicked or otherwise physically hurt by someone?: No     Within the past 12 months, have you been humiliated or emotionally abused in other ways by your partner  or ex-partner?: No   Housing Stability: Low Risk  (1/18/2024)    Housing Stability     Do you have housing? : Yes     Are you worried about losing your housing?: No            Allergies:   Food, Pender flavor, Statin drugs [statins], and Zantac [ranitidine]         Review of Systems:  From intake questionnaire   Negative 14 system review except as noted on HPI, nurse's note.        STACY PORTILLO PA-C  Department of Urology

## 2024-02-23 NOTE — PATIENT INSTRUCTIONS
First, try reducing tamsulosin dose from two capsules a day to one capsule a day.     Next, could try stopping solifenacin.    Name band;

## 2024-03-12 ENCOUNTER — TELEPHONE (OUTPATIENT)
Dept: FAMILY MEDICINE | Facility: CLINIC | Age: 54
End: 2024-03-12
Payer: COMMERCIAL

## 2024-03-12 NOTE — TELEPHONE ENCOUNTER
Patient Quality Outreach    Patient is due for the following:   Diabetes -  A1C, Eye Exam, and Foot Exam    Next Steps:   Schedule a office visit for a diabetes check    Type of outreach:    Chart review performed, no outreach needed. and Patient has an appointment scheduled.      Questions for provider review:    None           Archana Clay, [facility]

## 2024-03-24 ENCOUNTER — HEALTH MAINTENANCE LETTER (OUTPATIENT)
Age: 54
End: 2024-03-24

## 2024-03-27 ASSESSMENT — ENCOUNTER SYMPTOMS
CONSTITUTIONAL NEGATIVE: 1
GASTROINTESTINAL NEGATIVE: 1
EYES NEGATIVE: 1

## 2024-03-27 NOTE — PROGRESS NOTES
"Chief Complaint   Patient presents with    Consult     Laryngitis, hoarseness for a few months      PCP: Lizet Berry     Referring Provider: Lizet Berry    BP (!) 144/86   Temp 97.9  F (36.6  C) (Temporal)   Ht 1.765 m (5' 9.49\")   Wt 133.5 kg (294 lb 6.4 oz)   BMI 42.86 kg/m      ENT Problem List:  Patient Active Problem List   Diagnosis Code    Essential hypertension I10    Palpitations R00.2    PVCs (premature ventricular contractions) I49.3    Vitamin D deficiency E55.9    Obesity (BMI 40) with comorbidity (H) E66.01    History of colonic polyps Z86.010    Hyperlipidemia E78.5    Gout M10.9    Chronic prostatitis N41.1    GRACY (obstructive sleep apnea) - severe (AHI 87) G47.33    Benign prostatic hyperplasia with weak urinary stream N40.1, R39.12    COPD (chronic obstructive pulmonary disease) (H) J44.9      Current Medications:  Current Outpatient Medications   Medication    allopurinol (ZYLOPRIM) 100 MG tablet    aspirin 81 MG EC tablet    cholecalciferol (VITAMIN D3) 5000 units TABS tablet    fish oil-omega-3 fatty acids 1000 MG capsule    fluticasone (FLONASE) 50 MCG/ACT nasal spray    lisinopril (ZESTRIL) 40 MG tablet    multivitamin w/minerals (THERA-VIT-M) tablet    rosuvastatin (CRESTOR) 10 MG tablet    solifenacin (VESICARE) 10 MG tablet    tamsulosin (FLOMAX) 0.4 MG capsule    triamterene-HCTZ (DYAZIDE) 37.5-25 MG capsule    verapamil ER (VERELAN) 360 MG 24 hr capsule    vitamin C (ASCORBIC ACID) 1000 MG TABS    albuterol (PROAIR HFA/PROVENTIL HFA/VENTOLIN HFA) 108 (90 Base) MCG/ACT inhaler    benzonatate (TESSALON) 200 MG capsule    fluticasone (ARNUITY ELLIPTA) 50 MCG/ACT inhaler    pantoprazole (PROTONIX) 20 MG EC tablet     No current facility-administered medications for this visit.     CT CHEST WITHOUT CONTRAST 10/29/2021     CLINICAL HISTORY: Cough, persistent; Chest pain or SOB, pleurisy or effusion suspected; Dyspnea, chronic, unclear etiology; Chest heaviness; Chronic cough.   "   TECHNIQUE: CT chest without IV contrast. Multiplanar reformats were obtained. Dose reduction techniques were used.     CONTRAST: None.     COMPARISON: 10/17/2019     FINDINGS:   LUNGS AND PLEURA: Lungs are clear. No pleural effusion.     MEDIASTINUM/AXILLAE: Severe coronary artery calcification is present. No adenopathy.     UPPER ABDOMEN: No significant finding.     MUSCULOSKELETAL: Degenerative changes are noted through the spine.                                                                   IMPRESSION: No acute pathology.    XR CHEST 2 VIEWS 12/5/2023     INDICATION:  Acute cough  COMPARISON: None.                                                                   IMPRESSION: Negative chest.    HPI  Pleasant 53 year old male presents today as a(n) new patient for chronic laryngitis and voice hoarseness that onset a few months ago. He had a sickness that caused these symptoms and a dry cough for 7 weeks. Although his voice hoarseness was much worse when he was sick, it is still evident now.  He had an upper GI on suspicion of GERD, and this test showed no evidence of GERD. He sometimes has heartburn, and used to be on Protonix. Once GERD was ruled out, his provider suggested he see an ENT.  He gets phlegm and a tickle in in his throat occasionally but no throat pain. He gets phlegm once a day or every other day but is unsure at what time of day he gets this phlegm. He has some post nasal drip. He has used a CPAP machine, nasal mask, every night for 6-7 years, and recently got a new one that he hooks up with a humidifier.    He denies facial pain, aural fullness, issues breathing through his nose, difficulty swallowing, choking sensation when eating,    Review of Systems   Constitutional: Negative.    HENT:  Negative for sinus pain.    Eyes: Negative.    Respiratory:  Positive for cough.    Gastrointestinal: Negative.    Skin: Negative.    Endo/Heme/Allergies: Negative.        Physical Exam  Vitals and nursing  note reviewed.   Constitutional:       Appearance: Normal appearance.   HENT:      Head: Normocephalic and atraumatic.      Jaw: There is normal jaw occlusion.      Right Ear: Hearing, tympanic membrane and ear canal normal.      Left Ear: Hearing, tympanic membrane and ear canal normal.      Nose: No mucosal edema, congestion or rhinorrhea.      Right Nostril: No occlusion.      Left Nostril: No occlusion.      Right Turbinates: Not enlarged or swollen.      Left Turbinates: Not enlarged or swollen.      Right Sinus: No maxillary sinus tenderness or frontal sinus tenderness.      Left Sinus: No maxillary sinus tenderness or frontal sinus tenderness.      Mouth/Throat:      Mouth: Mucous membranes are moist.      Pharynx: Oropharynx is clear. Uvula midline.   Eyes:      Extraocular Movements: Extraocular movements intact.      Pupils: Pupils are equal, round, and reactive to light.   Neurological:      Mental Status: He is alert.     Diagnostic nasal endoscopy:    The patient was seen in the room and identified. Pros and cons of the procedure were explained to the patient. The procedure and its alternatives were explained to the patient in lay terms. Patient's questions were answered. Symptoms required a diagnostic endoscopic evaluation under local anesthesia. After obtaining an informed consent from the patient, 2% Lidocaine spray was applied to each nostril. Then a flexible scopic exam was performed. Septum is deviated to the right and then to the left. Ostiomeatal complexes are free of disease. No pus or polyp seen. Inferior turbinates are enlarged. Nasopharynx is normal. Rosenmüller fossa and torus tubarius are normal. Epiglottis, hypopharynx, false vocal folds are normal. No pooling in pyriform fossae. Vocal cords are mobile and normal other than slight edematous appearance of posterior commissure. Patient tolerated the procedure well and left the room with no complications.    Functional improper cord use and  deconditioning may also contribute.  Over 80% of patients with LPRD do not have heartburn or clear symptoms of GERD. The trhoat symptoms of LPRD can be caused by irritation from direct contact with stomach secretions, or by reflexive cricopharyngeal spasm from reflux into lower portions of the esophagus. I counseled the patient today on the importance of diet and the timing and volume of meals for nonpharmaceutical control of reflux diseases. I also advised frequent sips of water instead of throat clearing to manage the globus sensation and reduce cricopharyngeal spasm. I would like her to follow up with voice  therapist. The patient needs videostroboscopic eval  I will see the patient for follow up in 2 months.     Adult lifestyle changes to prevent LPR reviewed     Avoid eating and drinking within two to three hours prior to bedtime   Do not drink alcohol   Eat small meals and slowly   Limit problem foods:    o Caffeine  o Carbonated drinks  o Chocolate  o Peppermint  o Tomato  o Citrus fruits  o Fatty and fried foods     Lose weight   Quit smoking   Wear loose clothing    A/P  This pleasant patient has irritations in his throat causing coughing, throat clearing, and phlegm buildup from a prolonged illness or potential laryngo-pharyngeal reflux. There are no sinus infections present.   For laryngo-pharyngeal reflux, the following are recommended as part of an acid reflux diet:  No eating 2 hours before bed  Elevate the upper part of the body when laying down  Small meals  Good hydration- take a sip of water when feeling the need to cough or clear the throat  Avoid acidic, spicy, fried foods, dairy    If the condition does not improve, a speech therapy referral will be sent to conduct further investigations and treatment.    Follow up in clinic as needed.    Scribe/Staff:    Scribe Disclosure:   Iliana NUÑEZ, am serving as a scribe; to document services personally performed by Katlin Robert MD based on  data collection and the provider's statements to me.     Provider Disclosure:  I agree with above History, Review of Systems, Physical exam and Plan.  I have reviewed the content of the documentation and have edited it as needed. I have personally performed the services documented here and the documentation accurately represents those services and the decisions I have made.      Electronically signed by:  Katlin Robert MD

## 2024-03-28 ENCOUNTER — OFFICE VISIT (OUTPATIENT)
Dept: OTOLARYNGOLOGY | Facility: OTHER | Age: 54
End: 2024-03-28
Attending: NURSE PRACTITIONER
Payer: COMMERCIAL

## 2024-03-28 VITALS
WEIGHT: 294.4 LBS | HEIGHT: 69 IN | DIASTOLIC BLOOD PRESSURE: 86 MMHG | BODY MASS INDEX: 43.6 KG/M2 | SYSTOLIC BLOOD PRESSURE: 144 MMHG | TEMPERATURE: 97.9 F

## 2024-03-28 DIAGNOSIS — K21.9 LPRD (LARYNGOPHARYNGEAL REFLUX DISEASE): Primary | ICD-10-CM

## 2024-03-28 DIAGNOSIS — J37.0 CHRONIC LARYNGITIS: ICD-10-CM

## 2024-03-28 DIAGNOSIS — E66.01 MORBID OBESITY (H): Chronic | ICD-10-CM

## 2024-03-28 DIAGNOSIS — G47.33 OSA (OBSTRUCTIVE SLEEP APNEA): Chronic | ICD-10-CM

## 2024-03-28 PROCEDURE — 99203 OFFICE O/P NEW LOW 30 MIN: CPT | Mod: 25 | Performed by: OTOLARYNGOLOGY

## 2024-03-28 PROCEDURE — 31575 DIAGNOSTIC LARYNGOSCOPY: CPT | Performed by: OTOLARYNGOLOGY

## 2024-03-28 ASSESSMENT — ENCOUNTER SYMPTOMS
COUGH: 1
SINUS PAIN: 0

## 2024-03-28 ASSESSMENT — PAIN SCALES - GENERAL: PAINLEVEL: NO PAIN (0)

## 2024-03-28 NOTE — LETTER
"    3/28/2024         RE: Lalito Weathers  [address] [facility] [address]        Dear Colleague,    Thank you for referring your patient, Lalito Weathers, to the [facility]. Please see a copy of my visit note below.    Chief Complaint   Patient presents with     Consult     Laryngitis, hoarseness for a few months      PCP: Lizet Berry     Referring Provider: Lizet Berry    BP (!) 144/86   Temp 97.9  F (36.6  C) (Temporal)   Ht 1.765 m (5' 9.49\")   Wt 133.5 kg (294 lb 6.4 oz)   BMI 42.86 kg/m      ENT Problem List:  Patient Active Problem List   Diagnosis Code     Essential hypertension I10     Palpitations R00.2     PVCs (premature ventricular contractions) I49.3     Vitamin D deficiency E55.9     Obesity (BMI 40) with comorbidity (H) E66.01     History of colonic polyps Z86.010     Hyperlipidemia E78.5     Gout M10.9     Chronic prostatitis N41.1     GRACY (obstructive sleep apnea) - severe (AHI 87) G47.33     Benign prostatic hyperplasia with weak urinary stream N40.1, R39.12     COPD (chronic obstructive pulmonary disease) (H) J44.9      Current Medications:  Current Outpatient Medications   Medication     allopurinol (ZYLOPRIM) 100 MG tablet     aspirin 81 MG EC tablet     cholecalciferol (VITAMIN D3) 5000 units TABS tablet     fish oil-omega-3 fatty acids 1000 MG capsule     fluticasone (FLONASE) 50 MCG/ACT nasal spray     lisinopril (ZESTRIL) 40 MG tablet     multivitamin w/minerals (THERA-VIT-M) tablet     rosuvastatin (CRESTOR) 10 MG tablet     solifenacin (VESICARE) 10 MG tablet     tamsulosin (FLOMAX) 0.4 MG capsule     triamterene-HCTZ (DYAZIDE) 37.5-25 MG capsule     verapamil ER (VERELAN) 360 MG 24 hr capsule     vitamin C (ASCORBIC ACID) 1000 MG TABS     albuterol (PROAIR HFA/PROVENTIL HFA/VENTOLIN HFA) 108 (90 Base) MCG/ACT inhaler     benzonatate (TESSALON) 200 MG capsule     fluticasone (ARNUITY ELLIPTA) 50 MCG/ACT inhaler     pantoprazole (PROTONIX) 20 " MG EC tablet     No current facility-administered medications for this visit.     CT CHEST WITHOUT CONTRAST 10/29/2021     CLINICAL HISTORY: Cough, persistent; Chest pain or SOB, pleurisy or effusion suspected; Dyspnea, chronic, unclear etiology; Chest heaviness; Chronic cough.     TECHNIQUE: CT chest without IV contrast. Multiplanar reformats were obtained. Dose reduction techniques were used.     CONTRAST: None.     COMPARISON: 10/17/2019     FINDINGS:   LUNGS AND PLEURA: Lungs are clear. No pleural effusion.     MEDIASTINUM/AXILLAE: Severe coronary artery calcification is present. No adenopathy.     UPPER ABDOMEN: No significant finding.     MUSCULOSKELETAL: Degenerative changes are noted through the spine.                                                                   IMPRESSION: No acute pathology.    XR CHEST 2 VIEWS 12/5/2023     INDICATION:  Acute cough  COMPARISON: None.                                                                   IMPRESSION: Negative chest.    HPI  Pleasant 53 year old male presents today as a(n) new patient for chronic laryngitis and voice hoarseness that onset a few months ago. He had a sickness that caused these symptoms and a dry cough for 7 weeks. Although his voice hoarseness was much worse when he was sick, it is still evident now.  He had an upper GI on suspicion of GERD, and this test showed no evidence of GERD. He sometimes has heartburn, and used to be on Protonix. Once GERD was ruled out, his provider suggested he see an ENT.  He gets phlegm and a tickle in in his throat occasionally but no throat pain. He gets phlegm once a day or every other day but is unsure at what time of day he gets this phlegm. He has some post nasal drip. He has used a CPAP machine, nasal mask, every night for 6-7 years, and recently got a new one that he hooks up with a humidifier.    He denies facial pain, aural fullness, issues breathing through his nose, difficulty swallowing, choking  sensation when eating,    Review of Systems   Constitutional: Negative.    HENT:  Negative for sinus pain.    Eyes: Negative.    Respiratory:  Positive for cough.    Gastrointestinal: Negative.    Skin: Negative.    Endo/Heme/Allergies: Negative.        Physical Exam  Vitals and nursing note reviewed.   Constitutional:       Appearance: Normal appearance.   HENT:      Head: Normocephalic and atraumatic.      Jaw: There is normal jaw occlusion.      Right Ear: Hearing, tympanic membrane and ear canal normal.      Left Ear: Hearing, tympanic membrane and ear canal normal.      Nose: No mucosal edema, congestion or rhinorrhea.      Right Nostril: No occlusion.      Left Nostril: No occlusion.      Right Turbinates: Not enlarged or swollen.      Left Turbinates: Not enlarged or swollen.      Right Sinus: No maxillary sinus tenderness or frontal sinus tenderness.      Left Sinus: No maxillary sinus tenderness or frontal sinus tenderness.      Mouth/Throat:      Mouth: Mucous membranes are moist.      Pharynx: Oropharynx is clear. Uvula midline.   Eyes:      Extraocular Movements: Extraocular movements intact.      Pupils: Pupils are equal, round, and reactive to light.   Neurological:      Mental Status: He is alert.     Diagnostic nasal endoscopy:    The patient was seen in the room and identified. Pros and cons of the procedure were explained to the patient. The procedure and its alternatives were explained to the patient in lay terms. Patient's questions were answered. Symptoms required a diagnostic endoscopic evaluation under local anesthesia. After obtaining an informed consent from the patient, 2% Lidocaine spray was applied to each nostril. Then a flexible scopic exam was performed. Septum is deviated to the right and then to the left. Ostiomeatal complexes are free of disease. No pus or polyp seen. Inferior turbinates are enlarged. Nasopharynx is normal. Rosenmüller fossa and torus tubarius are normal.  Epiglottis, hypopharynx, false vocal folds are normal. No pooling in pyriform fossae. Vocal cords are mobile and normal other than slight edematous appearance of posterior commissure. Patient tolerated the procedure well and left the room with no complications.    Functional improper cord use and deconditioning may also contribute.  Over 80% of patients with LPRD do not have heartburn or clear symptoms of GERD. The trhoat symptoms of LPRD can be caused by irritation from direct contact with stomach secretions, or by reflexive cricopharyngeal spasm from reflux into lower portions of the esophagus. I counseled the patient today on the importance of diet and the timing and volume of meals for nonpharmaceutical control of reflux diseases. I also advised frequent sips of water instead of throat clearing to manage the globus sensation and reduce cricopharyngeal spasm. I would like her to follow up with voice  therapist. The patient needs videostroboscopic eval  I will see the patient for follow up in 2 months.     Adult lifestyle changes to prevent LPR reviewed      Avoid eating and drinking within two to three hours prior to bedtime    Do not drink alcohol    Eat small meals and slowly    Limit problem foods:    o Caffeine  o Carbonated drinks  o Chocolate  o Peppermint  o Tomato  o Citrus fruits  o Fatty and fried foods      Lose weight    Quit smoking    Wear loose clothing    A/P  This pleasant patient has irritations in his throat causing coughing, throat clearing, and phlegm buildup from a prolonged illness or potential laryngo-pharyngeal reflux. There are no sinus infections present.   For laryngo-pharyngeal reflux, the following are recommended as part of an acid reflux diet:  No eating 2 hours before bed  Elevate the upper part of the body when laying down  Small meals  Good hydration- take a sip of water when feeling the need to cough or clear the throat  Avoid acidic, spicy, fried foods, dairy    If the  condition does not improve, a speech therapy referral will be sent to conduct further investigations and treatment.    Follow up in clinic as needed.    Scribe/Staff:    Scribe Disclosure:   I, Iliana Bruce, am serving as a scribe; to document services personally performed by Katlin Robert MD based on data collection and the provider's statements to me.     Provider Disclosure:  I agree with above History, Review of Systems, Physical exam and Plan.  I have reviewed the content of the documentation and have edited it as needed. I have personally performed the services documented here and the documentation accurately represents those services and the decisions I have made.      Electronically signed by:  Katlin Robert MD       Again, thank you for allowing me to participate in the care of your patient.        Sincerely,        Katlin Robert MD

## 2024-04-02 DIAGNOSIS — M10.9 ACUTE GOUT OF LEFT ANKLE, UNSPECIFIED CAUSE: ICD-10-CM

## 2024-04-02 RX ORDER — ALLOPURINOL 100 MG/1
200 TABLET ORAL DAILY
Qty: 60 TABLET | Refills: 0 | Status: SHIPPED | OUTPATIENT
Start: 2024-04-02 | End: 2024-05-06

## 2024-04-02 NOTE — TELEPHONE ENCOUNTER
"Requested Prescriptions   Pending Prescriptions Disp Refills     triamterene-HCTZ (DYAZIDE) 37.5-25 MG capsule [Pharmacy Med Name: TRIAMTERENE-HCTZ 37.5-25MG CAPS] 60 capsule 0     Sig: TAKE TWO CAPSULES BY MOUTH ONCE DAILY, (NEED TO BE SEEN IN CLINIC FOR FURTHER REFILLS)       Diuretics (Including Combos) Protocol Failed - 12/19/2019  8:59 AM        Failed - Blood pressure under 140/90 in past 12 months     BP Readings from Last 3 Encounters:   11/04/19 (!) 160/83   10/23/19 128/78   09/09/19 (!) 182/96                 Passed - Recent (12 mo) or future (30 days) visit within the authorizing provider's specialty     Patient has had an office visit with the authorizing provider or a provider within the authorizing providers department within the previous 12 mos or has a future within next 30 days. See \"Patient Info\" tab in inbasket, or \"Choose Columns\" in Meds & Orders section of the refill encounter.              Passed - Medication is active on med list        Passed - Patient is age 18 or older        Passed - Normal serum creatinine on file in past 12 months     Recent Labs   Lab Test 10/23/19  0839   CR 1.00              Passed - Normal serum potassium on file in past 12 months     Recent Labs   Lab Test 10/23/19  0839   POTASSIUM 3.8                    Passed - Normal serum sodium on file in past 12 months     Recent Labs   Lab Test 10/23/19  0839                 Last Written Prescription Date:  11/20/19  Last Fill Quantity: 60,  # refills: 0   Last office visit: 10/23/2019 with prescribing provider:      Future Office Visit:      " No

## 2024-04-15 ENCOUNTER — OFFICE VISIT (OUTPATIENT)
Dept: FAMILY MEDICINE | Facility: CLINIC | Age: 54
End: 2024-04-15
Payer: COMMERCIAL

## 2024-04-15 ENCOUNTER — MYC MEDICAL ADVICE (OUTPATIENT)
Dept: FAMILY MEDICINE | Facility: CLINIC | Age: 54
End: 2024-04-15

## 2024-04-15 VITALS
TEMPERATURE: 97.7 F | HEART RATE: 66 BPM | WEIGHT: 294 LBS | HEIGHT: 69 IN | DIASTOLIC BLOOD PRESSURE: 79 MMHG | RESPIRATION RATE: 16 BRPM | OXYGEN SATURATION: 98 % | BODY MASS INDEX: 43.55 KG/M2 | SYSTOLIC BLOOD PRESSURE: 138 MMHG

## 2024-04-15 DIAGNOSIS — I10 ESSENTIAL HYPERTENSION: ICD-10-CM

## 2024-04-15 DIAGNOSIS — R07.9 CHEST PAIN, UNSPECIFIED TYPE: Primary | ICD-10-CM

## 2024-04-15 DIAGNOSIS — E78.5 HYPERLIPIDEMIA LDL GOAL <70: ICD-10-CM

## 2024-04-15 DIAGNOSIS — E11.8 TYPE 2 DIABETES MELLITUS WITH UNSPECIFIED COMPLICATIONS (H): ICD-10-CM

## 2024-04-15 DIAGNOSIS — E66.01 MORBID OBESITY (H): ICD-10-CM

## 2024-04-15 LAB — HBA1C MFR BLD: 5.9 % (ref 0–5.6)

## 2024-04-15 PROCEDURE — 36415 COLL VENOUS BLD VENIPUNCTURE: CPT | Performed by: NURSE PRACTITIONER

## 2024-04-15 PROCEDURE — 99214 OFFICE O/P EST MOD 30 MIN: CPT | Performed by: NURSE PRACTITIONER

## 2024-04-15 PROCEDURE — 83036 HEMOGLOBIN GLYCOSYLATED A1C: CPT | Performed by: NURSE PRACTITIONER

## 2024-04-15 RX ORDER — TRIAMTERENE AND HYDROCHLOROTHIAZIDE 37.5; 25 MG/1; MG/1
2 CAPSULE ORAL DAILY
Qty: 180 CAPSULE | Refills: 3 | Status: SHIPPED | OUTPATIENT
Start: 2024-04-15 | End: 2024-08-19

## 2024-04-15 RX ORDER — AMLODIPINE BESYLATE 5 MG/1
5 TABLET ORAL DAILY
Qty: 90 TABLET | Refills: 3 | Status: SHIPPED | OUTPATIENT
Start: 2024-04-15 | End: 2024-04-15

## 2024-04-15 RX ORDER — AMLODIPINE BESYLATE 2.5 MG/1
2.5 TABLET ORAL DAILY
Qty: 90 TABLET | Refills: 3 | Status: SHIPPED | OUTPATIENT
Start: 2024-04-15 | End: 2024-08-19

## 2024-04-15 ASSESSMENT — PAIN SCALES - GENERAL: PAINLEVEL: NO PAIN (0)

## 2024-04-15 NOTE — PROGRESS NOTES
"  Assessment & Plan     Chest pain, unspecified type  Patient has had an intermittent chest pain was seen at the ED concerning for costochondritis versus intermittent chest pain last stress test was greater than 5 years ago based on new symptoms recommend repeating stress testing given coronary artery angiogram patient was to take ibuprofen to see if that helps resolve the symptoms  - Echocardiogram Exercise Stress; Future    Type 2 diabetes mellitus with unspecified complications (H)   Controlled no change in treatment plan   - Adult Eye  Referral; Future    Morbid obesity (H)  Reviewed diet and exercise  - HEMOGLOBIN A1C; Future  - HEMOGLOBIN A1C    Essential hypertension  Uncontrolled will add Norvasc 2.5  - triamterene-HCTZ (DYAZIDE) 37.5-25 MG capsule; Take 2 capsules by mouth daily  - amLODIPine (NORVASC) 2.5 MG tablet; Take 1 tablet (2.5 mg) by mouth daily    Hyperlipidemia LDL goal <70  Will obtain CT calcium scan  - CT Calcium Screening; Future    MED REC REQUIRED  Abdomen neck pain  Post Medication Reconciliation Status:  Discharge medications reconciled, continue medications without change    See Patient Instructions    Sridhar Muller is a 53 year old, presenting for the following health issues:  ER F/U      4/15/2024     8:23 AM   Additional Questions   Roomed by Archana KIM     \[facility] [place] [facility]\""     ED/UC Followup:    Facility:  [facility]  Date of visit: 4/4/24  Reason for visit: chest pain, hypertension   Current Status: Patient states that his chest pain has been better.          Review of Systems  Constitutional, HEENT, cardiovascular, pulmonary, gi and gu systems are negative, except as otherwise noted.      Objective    /79   Pulse 66   Temp 97.7  F (36.5  C) (Tympanic)   Resp 16   Ht 1.765 m (5' 9.49\")   Wt 133.4 kg (294 lb)   SpO2 98%   BMI 42.81 kg/m    There is no height or weight on file to calculate BMI.  Physical Exam   GENERAL: alert and no distress  EYES: Eyes grossly normal " to inspection, PERRL and conjunctivae and sclerae normal  NECK: no adenopathy, no asymmetry, masses, or scars  RESP: lungs clear to auscultation - no rales, rhonchi or wheezes  CV: regular rate and rhythm, normal S1 S2, no S3 or S4, no murmur, click or rub, no peripheral edema  MS: no gross musculoskeletal defects noted, no edema  SKIN: no suspicious lesions or rashes  NEURO: Normal strength and tone, mentation intact and speech normal  PSYCH: mentation appears normal, affect normal/bright    Results for orders placed or performed in visit on 04/15/24   HEMOGLOBIN A1C     Status: Abnormal   Result Value Ref Range    Hemoglobin A1C 5.9 (H) 0.0 - 5.6 %           Signed Electronically by: TERESA Romero CNP

## 2024-05-04 DIAGNOSIS — M10.9 ACUTE GOUT OF LEFT ANKLE, UNSPECIFIED CAUSE: ICD-10-CM

## 2024-05-06 RX ORDER — ALLOPURINOL 100 MG/1
TABLET ORAL
Qty: 60 TABLET | Refills: 0 | Status: SHIPPED | OUTPATIENT
Start: 2024-05-06 | End: 2024-06-04

## 2024-05-06 NOTE — TELEPHONE ENCOUNTER
Requested Prescriptions   Pending Prescriptions Disp Refills    allopurinol (ZYLOPRIM) 100 MG tablet [Pharmacy Med Name: ALLOPURINOL 100MG TABS] 60 tablet 0     Sig: TAKE 2 TABLETS (200 MG) BY MOUTH DAILY---NEED LABS AND APPOINTMENT       Gout Agents Protocol Failed - 5/4/2024  5:04 AM        Failed - Has Uric Acid on file in past 12 months and value is less than 6     Recent Labs   Lab Test 09/07/22  0848   URIC 8.9*     If level is 6mg/dL or greater, ok to refill one time and refer to provider.           Passed - CBC on file in past 12 months     Recent Labs   Lab Test 07/20/23  0735   WBC 6.3   RBC 4.39*   HGB 13.1*   HCT 39.6*                    Passed - ALT on file in past 12 months     Recent Labs   Lab Test 07/20/23  0735   ALT 54             Passed - Medication is active on med list        Passed - Medication indicated for associated diagnosis     One of the following medications: colchicine is prescribed for one or more of the following conditions:     Amyloidosis   ulcer of mouth   Bechet's syndrome   Pericarditis   Peyronie's disease, psoriasis          Passed - Has GFR on file in past 12 months and most recent value is normal        Passed - Recent (12 mo) or future (90 days) visit within the authorizing provider's specialty     The patient must have completed an in-person or virtual visit within the past 12 months or has a future visit scheduled within the next 90 days with the authorizing provider s specialty.  Urgent care and e-visits do not quality as an office visit for this protocol.          Passed - Patient is age 18 or older     Refill protocol is for patient's aged 18 years and older

## 2024-05-14 ENCOUNTER — OFFICE VISIT (OUTPATIENT)
Dept: FAMILY MEDICINE | Facility: CLINIC | Age: 54
End: 2024-05-14
Payer: COMMERCIAL

## 2024-05-14 ENCOUNTER — MYC MEDICAL ADVICE (OUTPATIENT)
Dept: FAMILY MEDICINE | Facility: CLINIC | Age: 54
End: 2024-05-14

## 2024-05-14 VITALS
OXYGEN SATURATION: 97 % | SYSTOLIC BLOOD PRESSURE: 152 MMHG | HEART RATE: 81 BPM | WEIGHT: 282.6 LBS | BODY MASS INDEX: 41.86 KG/M2 | TEMPERATURE: 98.2 F | HEIGHT: 69 IN | RESPIRATION RATE: 12 BRPM | DIASTOLIC BLOOD PRESSURE: 88 MMHG

## 2024-05-14 DIAGNOSIS — S46.212A STRAIN OF LEFT BICEPS MUSCLE, INITIAL ENCOUNTER: Primary | ICD-10-CM

## 2024-05-14 DIAGNOSIS — I10 ESSENTIAL HYPERTENSION: Chronic | ICD-10-CM

## 2024-05-14 PROCEDURE — 99213 OFFICE O/P EST LOW 20 MIN: CPT | Performed by: PHYSICIAN ASSISTANT

## 2024-05-14 PROCEDURE — G2211 COMPLEX E/M VISIT ADD ON: HCPCS | Performed by: PHYSICIAN ASSISTANT

## 2024-05-14 RX ORDER — DICLOFENAC SODIUM 75 MG/1
75 TABLET, DELAYED RELEASE ORAL 2 TIMES DAILY
Qty: 30 TABLET | Refills: 0 | Status: SHIPPED | OUTPATIENT
Start: 2024-05-14 | End: 2024-06-04

## 2024-05-14 ASSESSMENT — PAIN SCALES - GENERAL: PAINLEVEL: MODERATE PAIN (4)

## 2024-05-14 NOTE — PROGRESS NOTES
Assessment & Plan     Strain of left biceps muscle, initial encounter  Acute injury  See below  Suspect partial tears of muscle/and/or tendon  Avoid aggravating activities     - diclofenac (VOLTAREN) 75 MG EC tablet; Take 1 tablet (75 mg) by mouth 2 times daily With food take for 3 days then as needed for pain    Essential hypertension  Is having pain today, Recheck 1 week, if still high will need to change meds    Patient Instructions   Rest  Ice  Send me a mychart for referral to sports med if not improving by Friday or if worsening                  Subjective   Renzo is a 53 year old, presenting for the following health issues:  Musculoskeletal Problem (Left arm injury)        5/14/2024    12:37 PM   Additional Questions   Roomed by Kimberley NAIK CMA   Accompanied by alone         5/14/2024    12:37 PM   Patient Reported Additional Medications   Patient reports taking the following new medications none     Musculoskeletal Problem    History of Present Illness       Reason for visit:  Bicep tear  Symptom onset:  Today  Symptoms include:  Pain  Symptom intensity:  Moderate  Symptom progression:  Staying the same  Had these symptoms before:  No  What makes it worse:  Using my arm  What makes it better:  Not yet.    He eats 2-3 servings of fruits and vegetables daily.He consumes 0 sweetened beverage(s) daily.He exercises with enough effort to increase his heart rate 60 or more minutes per day.  He exercises with enough effort to increase his heart rate 5 days per week.   He is taking medications regularly.       Was at the gym-was doing bicep curls single arm, occurred when he was lifting his arm back up.  Tender just proximal to his AC.  He felt it tear/rip. Felt three distinct pops. Pain right away and dropped the weight. Was six am. Pain is the same as this am. Hurts if he tries to use it/reach with the arm.  No edema at this point. No previous injury. Nothing new, done the exerise many times before. Not a heavier  "weight. Mild paresthesias in left forearm and palm of hand. Works sitting at a desk. Is right handed. Feels it is weak because of the pain. Has not taken any medications for pain. Has not tried ice.     Htn-elevated. on several meds.   checks at home runs 135 systolic.           Objective    BP (!) 146/80   Pulse 81   Temp 98.2  F (36.8  C) (Temporal)   Resp 12   Ht 1.765 m (5' 9.49\")   Wt 128.2 kg (282 lb 9.6 oz)   SpO2 97%   BMI 41.15 kg/m    Body mass index is 41.15 kg/m .  Physical Exam   GENERAL: alert and no distress  RESP: lungs clear to auscultation - no rales, rhonchi or wheezes  CV: regular rate and rhythm, normal S1 S2, no S3 or S4, no murmur, click or rub, no peripheral edema  MS: extremities normal- no gross deformities noted. Left arm-wrist and  and shoulder strength 5/5. Bicep strength is weakened when performing flexion on that side. This causes pain. No palpable lump. Tender just proximal to AC fossa and lateral on UE area. No obvious edema. No erythema. Distal sensation and pulses normal.   SKIN: no suspicious lesions or rashes  NEURO: Normal strength and tone, mentation intact and speech normal  PSYCH: mentation appears normal, affect normal/bright            Signed Electronically by: Elizabeth Bautista PA-C    "

## 2024-05-14 NOTE — PATIENT INSTRUCTIONS
Rest  Ice  Send me a mychart for referral to sports med if not improving by Friday or if worsening

## 2024-05-16 ENCOUNTER — MYC MEDICAL ADVICE (OUTPATIENT)
Dept: FAMILY MEDICINE | Facility: CLINIC | Age: 54
End: 2024-05-16
Payer: COMMERCIAL

## 2024-05-20 ENCOUNTER — MYC MEDICAL ADVICE (OUTPATIENT)
Dept: FAMILY MEDICINE | Facility: CLINIC | Age: 54
End: 2024-05-20
Payer: COMMERCIAL

## 2024-05-21 ENCOUNTER — HOSPITAL ENCOUNTER (OUTPATIENT)
Dept: CARDIOLOGY | Facility: CLINIC | Age: 54
Discharge: HOME | End: 2024-05-21
Attending: NURSE PRACTITIONER
Payer: COMMERCIAL

## 2024-05-21 ENCOUNTER — HOSPITAL ENCOUNTER (OUTPATIENT)
Dept: CT IMAGING | Facility: CLINIC | Age: 54
Discharge: HOME | End: 2024-05-21
Attending: NURSE PRACTITIONER
Payer: COMMERCIAL

## 2024-05-21 ENCOUNTER — TELEPHONE (OUTPATIENT)
Dept: FAMILY MEDICINE | Facility: CLINIC | Age: 54
End: 2024-05-21

## 2024-05-21 DIAGNOSIS — R94.39 ABNORMAL STRESS TEST: Primary | ICD-10-CM

## 2024-05-21 DIAGNOSIS — E78.5 HYPERLIPIDEMIA LDL GOAL <70: Primary | ICD-10-CM

## 2024-05-21 DIAGNOSIS — R07.9 CHEST PAIN, UNSPECIFIED TYPE: ICD-10-CM

## 2024-05-21 DIAGNOSIS — I25.10 CORONARY ARTERY DISEASE DUE TO LIPID RICH PLAQUE: ICD-10-CM

## 2024-05-21 DIAGNOSIS — I25.83 CORONARY ARTERY DISEASE DUE TO LIPID RICH PLAQUE: ICD-10-CM

## 2024-05-21 DIAGNOSIS — E78.5 HYPERLIPIDEMIA LDL GOAL <70: ICD-10-CM

## 2024-05-21 PROCEDURE — 93017 CV STRESS TEST TRACING ONLY: CPT

## 2024-05-21 PROCEDURE — 75571 CT HRT W/O DYE W/CA TEST: CPT | Mod: 26 | Performed by: INTERNAL MEDICINE

## 2024-05-21 PROCEDURE — 75571 CT HRT W/O DYE W/CA TEST: CPT

## 2024-05-21 PROCEDURE — 93018 CV STRESS TEST I&R ONLY: CPT | Performed by: INTERNAL MEDICINE

## 2024-05-21 PROCEDURE — 93016 CV STRESS TEST SUPVJ ONLY: CPT | Performed by: INTERNAL MEDICINE

## 2024-05-21 RX ORDER — ROSUVASTATIN CALCIUM 40 MG/1
40 TABLET, COATED ORAL DAILY
Qty: 90 TABLET | Refills: 3 | Status: SHIPPED | OUTPATIENT
Start: 2024-05-21 | End: 2024-08-19

## 2024-05-30 ENCOUNTER — OFFICE VISIT (OUTPATIENT)
Dept: CARDIOLOGY | Facility: CLINIC | Age: 54
End: 2024-05-30
Attending: NURSE PRACTITIONER
Payer: COMMERCIAL

## 2024-05-30 VITALS
BODY MASS INDEX: 41.35 KG/M2 | HEART RATE: 72 BPM | WEIGHT: 284 LBS | DIASTOLIC BLOOD PRESSURE: 66 MMHG | SYSTOLIC BLOOD PRESSURE: 124 MMHG | RESPIRATION RATE: 16 BRPM

## 2024-05-30 DIAGNOSIS — I25.83 CORONARY ARTERY DISEASE DUE TO LIPID RICH PLAQUE: ICD-10-CM

## 2024-05-30 DIAGNOSIS — R94.39 ABNORMAL STRESS TEST: ICD-10-CM

## 2024-05-30 DIAGNOSIS — I25.10 CORONARY ARTERY DISEASE DUE TO LIPID RICH PLAQUE: ICD-10-CM

## 2024-05-30 PROCEDURE — 99204 OFFICE O/P NEW MOD 45 MIN: CPT | Performed by: INTERNAL MEDICINE

## 2024-05-30 RX ORDER — NITROGLYCERIN 0.4 MG/1
0.4 TABLET SUBLINGUAL
COMMUNITY
Start: 2024-05-25

## 2024-05-30 RX ORDER — CLOPIDOGREL BISULFATE 75 MG/1
75 TABLET ORAL
COMMUNITY
Start: 2024-05-26 | End: 2024-08-19

## 2024-05-30 NOTE — LETTER
5/30/2024    Lizet Berry, APRN CNP  [address] [facility] [address]    RE: Lalito Weathers       Dear Colleague,     I had the pleasure of seeing Lalito Weathers in the [facility].    HEART CARE ENCOUNTER CONSULTATON YUKI KELLY [facility]  [phone]      Assessment/Recommendations   Assessment:  Coronary disease with recent PCI of distal circumflex with mild residual mid LAD disease at [facility] 5/24/2024  2.   Dyslipidemia: Lipids well-controlled  3.   Diet-controlled diabetes mellitus type 2  4.   Hypertension: Well-controlled  5.  Morbid obesity  6.  PVCs: Currently not symptomatic  7.  Mild carotid disease    Plan:  1.  Cardiac rehab  2.  Continue dual antiplatelet therapy with aspirin Plavix for 1 year  3.  Continue on high-dose statin therapy with repeat fasting lipids in a few months time  Follow-up in 3 months       History of Present Illness/Subjective    HPI: Lalito Weathers is a 53 year old male with history of hypertension, diabetes mellitus type 2, dyslipidemia, mild carotid disease, symptomatic PVCs, coronary disease with recent PCI of distal circumflex with mild residual mid LAD disease at [facility] 5/24/2024 who I am seeing today to establish care.  He is adopted and does not know his family history.  He quit smoking over 20 years ago.  In the beginning of April he was noting problems with mild 2-3/10 chest pressure that was occurring randomly not necessarily with exertion.  His blood pressure was increasing as well.  He went to the ED in the beginning of April and workup there was unremarkable.  He underwent an exercise stress test ordered by his primary on 5/21 that was abnormal showing ST abnormalities.  He also had a CT coronary calcium scan on 521 that was significantly elevated at 2643.  Due to ongoing chest discomfort he again went to the ED and troponin was borderline elevated.  He was brought to the Cath Lab and found to have 100%  distal circumflex which was stented.  30% mid LAD lesion as well.  He denies any recurrent chest pain.  He is starting to exercise more.  He is interested in cardiac rehab.    Recent Echocardiogram Results: 5/25/24   Visually Estimated EF: 55-60%    Normal left ventricular size and ejection fraction. Visually estimated ejection fraction is   55-60%. Mild left ventricular hypertrophy.    Normal right ventricular size and function.    Mild left atrial enlargement.    No significant valvular heart disease noted.    Ascending aorta dilated within the upper limits of normal.       Recent Coronary Angiogram Results: 5/24/24      [facility] [place] [facility]   Cardiac Catheterization Report     Name:  ZULEYKA WATSON Event Date: 5/24/2024 17:30   Excellian ID #: [id number]    Encounter #: [id number]   Accession #: [id number]   FADUMO #: [id number] Diagnostic Physician: MARK CARSON   [facility] [place]   Interventional Physician: MARK CARSON   [facility] [place]   Referring Physician:   Lizet Berry YOB: 1970   Gender: Male   Age: 53     Summary/Conclusions   PRESENTATION / INDICATIONS       DIAGNOSTIC SUMMARY   ? The LMCA is free of significant disease.   ? 30% stenosis in the Mid LAD   ? 100% stenosis in the Distal Circumflex   ? The RCA is free of significant disease.       INTERVENTION   ? Successful  1mm x 15mm Balloon,  2mm x 15mm Balloon,  2.25mm x 30mm Drug Eluting Stent,  2.25mm x 30mm Balloon,  2.25mm x 15mm Balloon, and  2.5mm x 15mm Balloon to Distal Circumflex, post stenosis 0%       RECOMMENDATIONS & PLAN   Plavix for 1 year          Physical Examination  Review of Systems   Vitals: /66 (BP Location: Right arm, Patient Position: Sitting, Cuff Size: Adult Large)   Pulse 72   Resp 16   Wt 128.8 kg (284 lb)   BMI 41.35 kg/m    BMI= Body mass index is 41.35 kg/m .  Wt Readings from Last 3 Encounters:   05/30/24 128.8 kg (284  lb)   05/14/24 128.2 kg (282 lb 9.6 oz)   04/15/24 133.4 kg (294 lb)       General Appearance:   no distress, normal body habitus   ENT/Mouth: membranes moist, no oral lesions or bleeding gums.      EYES:  no scleral icterus, normal conjunctivae   Neck: no carotid bruits or thyromegaly   Chest/Lungs:   lungs are clear to auscultation   Cardiovascular:   Regular. Normal first and second heart sounds with no murmur  no edema bilaterally        Extremities: no cyanosis or clubbing   Skin: no xanthelasma, warm.    Neurologic: normal  bilateral, no tremors     Psychiatric: alert and oriented x3, calm        Please refer above for cardiac ROS details.        Medical History  Surgical History Family History Social History   Past Medical History:   Diagnosis Date    Hypertension     Obese     Sleep apnea     Tubular adenoma of colon 06/28/2019     Past Surgical History:   Procedure Laterality Date    APPENDECTOMY  12/30/2011    COLONOSCOPY N/A 06/21/2019    Procedure: COLONOSCOPY, WITH POLYPECTOMY AND BIOPSY;  Surgeon: Herman Champagne MD;  Location: [facility]    ESOPHAGOSCOPY, GASTROSCOPY, DUODENOSCOPY (EGD), COMBINED N/A 10/29/2021    Procedure: ESOPHAGOGASTRODUODENOSCOPY, WITH BIOPSY;  Surgeon: Jakub Wray DO;  Location: [facility]    ESOPHAGOSCOPY, GASTROSCOPY, DUODENOSCOPY (EGD), COMBINED N/A 1/8/2024    Procedure: ESOPHAGOGASTRODUODENOSCOPY, WITH BIOPSY;  Surgeon: Jimbo Aguirre MD;  Location: [facility]    ORTHOPEDIC SURGERY  05/10/2016    EXCISION LEFT UPPER INNER THIGH SOFT TISSUE MASS 5/10/16   MD NATE     Family History   Problem Relation Age of Onset    Unknown/Adopted Father         Social History     Socioeconomic History    Marital status:      Spouse name: Not on file    Number of children: Not on file    Years of education: Not on file    Highest education level: Not on file   Occupational History    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   Tobacco Use    Smoking status:  Former     Passive exposure: Never    Smokeless tobacco: Former   Vaping Use    Vaping status: Never Used   Substance and Sexual Activity    Alcohol use: Yes     Comment: social    Drug use: No    Sexual activity: Yes     Partners: Female   Other Topics Concern    Not on file   Social History Narrative    Not on file     Social Determinants of Health     Financial Resource Strain: Low Risk  (1/18/2024)    Financial Resource Strain     Within the past 12 months, have you or your family members you live with been unable to get utilities (heat, electricity) when it was really needed?: No   Food Insecurity: Low Risk  (1/18/2024)    Food Insecurity     Within the past 12 months, did you worry that your food would run out before you got money to buy more?: No     Within the past 12 months, did the food you bought just not last and you didn t have money to get more?: No   Transportation Needs: Low Risk  (1/18/2024)    Transportation Needs     Within the past 12 months, has lack of transportation kept you from medical appointments, getting your medicines, non-medical meetings or appointments, work, or from getting things that you need?: No   Physical Activity: Not on file   Stress: Not on file   Social Connections: Unknown (5/28/2024)    Received from [facility]    Social Connections     Frequency of Communication with Friends and Family: Not on file   Interpersonal Safety: Low Risk  (12/14/2023)    Interpersonal Safety     Do you feel physically and emotionally safe where you currently live?: Yes     Within the past 12 months, have you been hit, slapped, kicked or otherwise physically hurt by someone?: No     Within the past 12 months, have you been humiliated or emotionally abused in other ways by your partner or ex-partner?: No   Housing Stability: Low Risk  (1/18/2024)    Housing Stability     Do you have housing? : Yes     Are you worried about losing your housing?: No            Medications  Allergies   Current Outpatient Medications   Medication Sig Dispense Refill    allopurinol (ZYLOPRIM) 100 MG tablet TAKE 2 TABLETS (200 MG) BY MOUTH DAILY---NEED LABS AND APPOINTMENT 60 tablet 0    amLODIPine (NORVASC) 2.5 MG tablet Take 1 tablet (2.5 mg) by mouth daily 90 tablet 3    aspirin 81 MG EC tablet       cholecalciferol (VITAMIN D3) 5000 units TABS tablet Take by mouth daily      clopidogrel (PLAVIX) 75 MG tablet Take 75 mg by mouth      fish oil-omega-3 fatty acids 1000 MG capsule Take 1 g by mouth daily      lisinopril (ZESTRIL) 40 MG tablet TAKE ONE TABLET BY MOUTH ONCE DAILY 90 tablet 3    multivitamin w/minerals (THERA-VIT-M) tablet Take 1 tablet by mouth daily      nitroGLYcerin (NITROSTAT) 0.4 MG sublingual tablet Place 0.4 mg under the tongue      rosuvastatin (CRESTOR) 40 MG tablet Take 1 tablet (40 mg) by mouth daily 90 tablet 3    solifenacin (VESICARE) 10 MG tablet Take 1 tablet (10 mg) by mouth daily 90 tablet 1    tamsulosin (FLOMAX) 0.4 MG capsule Take 2 capsules (0.8 mg) by mouth daily 180 capsule 3    triamterene-HCTZ (DYAZIDE) 37.5-25 MG capsule Take 2 capsules by mouth daily 180 capsule 3    verapamil ER (VERELAN) 360 MG 24 hr capsule TAKE ONE CAPSULE BY MOUTH ONCE DAILY 90 capsule 3    vitamin C (ASCORBIC ACID) 1000 MG TABS Take 1,000 mg by mouth daily      albuterol (PROAIR HFA/PROVENTIL HFA/VENTOLIN HFA) 108 (90 Base) MCG/ACT inhaler INHALE 2 PUFFS BY MOUTH INTO THE LUNGS EVERY 6 HOURS AS NEEDED FOR SHORTNESS OF BREATH, WHEEZING OR COUGH (Patient not taking: Reported on 5/30/2024) 8.5 g 1    diclofenac (VOLTAREN) 75 MG EC tablet Take 1 tablet (75 mg) by mouth 2 times daily With food take for 3 days then as needed for pain (Patient not taking: Reported on 5/30/2024) 30 tablet 0       Allergies   Allergen Reactions    Food Hives     mangos    [place] Flavor     Statin Drugs [Statins]     Zantac [Ranitidine]           Lab Results    Chemistry/lipid CBC Cardiac  "Enzymes/BNP/TSH/INR   Recent Labs   Lab Test 12/14/23  0923   CHOL 118   HDL 40   LDL 55   TRIG 116     Recent Labs   Lab Test 12/14/23  0923 09/22/22  1335 10/14/21  1022   LDL 55 93 57     Recent Labs   Lab Test 12/14/23  0923      POTASSIUM 3.9   CHLORIDE 103   CO2 25   GLC 95   BUN 18.4   CR 1.09   GFRESTIMATED 80   HERMELINDA 9.7     Recent Labs   Lab Test 12/14/23  0923 07/20/23  0735 09/22/22  1335   CR 1.09 1.16 1.13     Recent Labs   Lab Test 04/15/24  0919 12/14/23  0923 07/20/23  0735   A1C 5.9* 5.7* 6.1*          Recent Labs   Lab Test 07/20/23  0735   WBC 6.3   HGB 13.1*   HCT 39.6*   MCV 90        Recent Labs   Lab Test 07/20/23  0735 08/31/22  0904 06/09/22  1955   HGB 13.1* 12.6* 13.2*    No results for input(s): \"TROPONINI\" in the last 01218 hours.  Recent Labs   Lab Test 06/09/22  1955   NTBNPI 19     Recent Labs   Lab Test 07/04/19  1152   TSH 0.99     No results for input(s): \"INR\" in the last 50500 hours.     Shanda Wilkes MD            Thank you for allowing me to participate in the care of your patient.      Sincerely,     Shanda Wilkes MD     [facility]  cc:   Lizet Berry, APRN CNP  [address] [place], [place] [id number]      "

## 2024-05-30 NOTE — PATIENT INSTRUCTIONS
Lalito LETICIA Weathers,     It was a pleasure to see you in the office today. My recommendations for you include:   1. Blood pressure today 124/66  2. Cardiac rehab  3. Follow up 3 months     Please do not hesitate to call the [facility] clinic with any questions or concerns at [phone].    Sincerely,     Shanda Wilkes MD

## 2024-05-30 NOTE — PROGRESS NOTES
HEART CARE ENCOUNTER CONSULTATON YUKI KELLY [facility]  [phone]      Assessment/Recommendations   Assessment:  Coronary disease with recent PCI of distal circumflex with mild residual mid LAD disease at [facility] 5/24/2024  2.   Dyslipidemia: Lipids well-controlled  3.   Diet-controlled diabetes mellitus type 2  4.   Hypertension: Well-controlled  5.  Morbid obesity  6.  PVCs: Currently not symptomatic  7.  Mild carotid disease    Plan:  1.  Cardiac rehab  2.  Continue dual antiplatelet therapy with aspirin Plavix for 1 year  3.  Continue on high-dose statin therapy with repeat fasting lipids in a few months time  Follow-up in 3 months       History of Present Illness/Subjective    HPI: Lalito Weathers is a 53 year old male with history of hypertension, diabetes mellitus type 2, dyslipidemia, mild carotid disease, symptomatic PVCs, coronary disease with recent PCI of distal circumflex with mild residual mid LAD disease at [facility] 5/24/2024 who I am seeing today to establish care.  He is adopted and does not know his family history.  He quit smoking over 20 years ago.  In the beginning of April he was noting problems with mild 2-3/10 chest pressure that was occurring randomly not necessarily with exertion.  His blood pressure was increasing as well.  He went to the ED in the beginning of April and workup there was unremarkable.  He underwent an exercise stress test ordered by his primary on 5/21 that was abnormal showing ST abnormalities.  He also had a CT coronary calcium scan on 521 that was significantly elevated at 2643.  Due to ongoing chest discomfort he again went to the ED and troponin was borderline elevated.  He was brought to the Cath Lab and found to have 100% distal circumflex which was stented.  30% mid LAD lesion as well.  He denies any recurrent chest pain.  He is starting to exercise more.  He is interested in cardiac rehab.    Recent Echocardiogram Results:  5/25/24   Visually Estimated EF: 55-60%    Normal left ventricular size and ejection fraction. Visually estimated ejection fraction is   55-60%. Mild left ventricular hypertrophy.    Normal right ventricular size and function.    Mild left atrial enlargement.    No significant valvular heart disease noted.    Ascending aorta dilated within the upper limits of normal.       Recent Coronary Angiogram Results: 5/24/24      [facility] [place] [facility]   Cardiac Catheterization Report     Name:  ZULEYKA WATSON Event Date: 5/24/2024 17:30   Excellian ID #: [id number]    Encounter #: [id number]   Accession #: [id number]   FADUMO #: [id number] Diagnostic Physician: MARK CARSON   [facility] [place]   Interventional Physician: MARK CARSON   [facility] [place]   Referring Physician:   Lizet Berry YOB: 1970   Gender: Male   Age: 53     Summary/Conclusions   PRESENTATION / INDICATIONS       DIAGNOSTIC SUMMARY   ? The LMCA is free of significant disease.   ? 30% stenosis in the Mid LAD   ? 100% stenosis in the Distal Circumflex   ? The RCA is free of significant disease.       INTERVENTION   ? Successful  1mm x 15mm Balloon,  2mm x 15mm Balloon,  2.25mm x 30mm Drug Eluting Stent,  2.25mm x 30mm Balloon,  2.25mm x 15mm Balloon, and  2.5mm x 15mm Balloon to Distal Circumflex, post stenosis 0%       RECOMMENDATIONS & PLAN   Plavix for 1 year          Physical Examination  Review of Systems   Vitals: /66 (BP Location: Right arm, Patient Position: Sitting, Cuff Size: Adult Large)   Pulse 72   Resp 16   Wt 128.8 kg (284 lb)   BMI 41.35 kg/m    BMI= Body mass index is 41.35 kg/m .  Wt Readings from Last 3 Encounters:   05/30/24 128.8 kg (284 lb)   05/14/24 128.2 kg (282 lb 9.6 oz)   04/15/24 133.4 kg (294 lb)       General Appearance:   no distress, normal body habitus   ENT/Mouth: membranes moist, no oral lesions or bleeding gums.      EYES:   no scleral icterus, normal conjunctivae   Neck: no carotid bruits or thyromegaly   Chest/Lungs:   lungs are clear to auscultation   Cardiovascular:   Regular. Normal first and second heart sounds with no murmur  no edema bilaterally        Extremities: no cyanosis or clubbing   Skin: no xanthelasma, warm.    Neurologic: normal  bilateral, no tremors     Psychiatric: alert and oriented x3, calm        Please refer above for cardiac ROS details.        Medical History  Surgical History Family History Social History   Past Medical History:   Diagnosis Date    Hypertension     Obese     Sleep apnea     Tubular adenoma of colon 06/28/2019     Past Surgical History:   Procedure Laterality Date    APPENDECTOMY  12/30/2011    COLONOSCOPY N/A 06/21/2019    Procedure: COLONOSCOPY, WITH POLYPECTOMY AND BIOPSY;  Surgeon: Herman Champagne MD;  Location: [facility]    ESOPHAGOSCOPY, GASTROSCOPY, DUODENOSCOPY (EGD), COMBINED N/A 10/29/2021    Procedure: ESOPHAGOGASTRODUODENOSCOPY, WITH BIOPSY;  Surgeon: Jakub Wray DO;  Location: [facility]    ESOPHAGOSCOPY, GASTROSCOPY, DUODENOSCOPY (EGD), COMBINED N/A 1/8/2024    Procedure: ESOPHAGOGASTRODUODENOSCOPY, WITH BIOPSY;  Surgeon: Jimbo Aguirre MD;  Location: [facility]    ORTHOPEDIC SURGERY  05/10/2016    EXCISION LEFT UPPER INNER THIGH SOFT TISSUE MASS 5/10/16   MD NATE     Family History   Problem Relation Age of Onset    Unknown/Adopted Father         Social History     Socioeconomic History    Marital status:      Spouse name: Not on file    Number of children: Not on file    Years of education: Not on file    Highest education level: Not on file   Occupational History    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   Tobacco Use    Smoking status: Former     Passive exposure: Never    Smokeless tobacco: Former   Vaping Use    Vaping status: Never Used   Substance and Sexual Activity    Alcohol use: Yes     Comment: social    Drug use: No    Sexual  activity: Yes     Partners: Female   Other Topics Concern    Not on file   Social History Narrative    Not on file     Social Determinants of Health     Financial Resource Strain: Low Risk  (1/18/2024)    Financial Resource Strain     Within the past 12 months, have you or your family members you live with been unable to get utilities (heat, electricity) when it was really needed?: No   Food Insecurity: Low Risk  (1/18/2024)    Food Insecurity     Within the past 12 months, did you worry that your food would run out before you got money to buy more?: No     Within the past 12 months, did the food you bought just not last and you didn t have money to get more?: No   Transportation Needs: Low Risk  (1/18/2024)    Transportation Needs     Within the past 12 months, has lack of transportation kept you from medical appointments, getting your medicines, non-medical meetings or appointments, work, or from getting things that you need?: No   Physical Activity: Not on file   Stress: Not on file   Social Connections: Unknown (5/28/2024)    Received from [facility]    Social Connections     Frequency of Communication with Friends and Family: Not on file   Interpersonal Safety: Low Risk  (12/14/2023)    Interpersonal Safety     Do you feel physically and emotionally safe where you currently live?: Yes     Within the past 12 months, have you been hit, slapped, kicked or otherwise physically hurt by someone?: No     Within the past 12 months, have you been humiliated or emotionally abused in other ways by your partner or ex-partner?: No   Housing Stability: Low Risk  (1/18/2024)    Housing Stability     Do you have housing? : Yes     Are you worried about losing your housing?: No           Medications  Allergies   Current Outpatient Medications   Medication Sig Dispense Refill    allopurinol (ZYLOPRIM) 100 MG tablet TAKE 2 TABLETS (200 MG) BY MOUTH DAILY---NEED LABS AND APPOINTMENT 60 tablet 0     amLODIPine (NORVASC) 2.5 MG tablet Take 1 tablet (2.5 mg) by mouth daily 90 tablet 3    aspirin 81 MG EC tablet       cholecalciferol (VITAMIN D3) 5000 units TABS tablet Take by mouth daily      clopidogrel (PLAVIX) 75 MG tablet Take 75 mg by mouth      fish oil-omega-3 fatty acids 1000 MG capsule Take 1 g by mouth daily      lisinopril (ZESTRIL) 40 MG tablet TAKE ONE TABLET BY MOUTH ONCE DAILY 90 tablet 3    multivitamin w/minerals (THERA-VIT-M) tablet Take 1 tablet by mouth daily      nitroGLYcerin (NITROSTAT) 0.4 MG sublingual tablet Place 0.4 mg under the tongue      rosuvastatin (CRESTOR) 40 MG tablet Take 1 tablet (40 mg) by mouth daily 90 tablet 3    solifenacin (VESICARE) 10 MG tablet Take 1 tablet (10 mg) by mouth daily 90 tablet 1    tamsulosin (FLOMAX) 0.4 MG capsule Take 2 capsules (0.8 mg) by mouth daily 180 capsule 3    triamterene-HCTZ (DYAZIDE) 37.5-25 MG capsule Take 2 capsules by mouth daily 180 capsule 3    verapamil ER (VERELAN) 360 MG 24 hr capsule TAKE ONE CAPSULE BY MOUTH ONCE DAILY 90 capsule 3    vitamin C (ASCORBIC ACID) 1000 MG TABS Take 1,000 mg by mouth daily      albuterol (PROAIR HFA/PROVENTIL HFA/VENTOLIN HFA) 108 (90 Base) MCG/ACT inhaler INHALE 2 PUFFS BY MOUTH INTO THE LUNGS EVERY 6 HOURS AS NEEDED FOR SHORTNESS OF BREATH, WHEEZING OR COUGH (Patient not taking: Reported on 5/30/2024) 8.5 g 1    diclofenac (VOLTAREN) 75 MG EC tablet Take 1 tablet (75 mg) by mouth 2 times daily With food take for 3 days then as needed for pain (Patient not taking: Reported on 5/30/2024) 30 tablet 0       Allergies   Allergen Reactions    Food Hives     mangos    [place] Flavor     Statin Drugs [Statins]     Zantac [Ranitidine]           Lab Results    Chemistry/lipid CBC Cardiac Enzymes/BNP/TSH/INR   Recent Labs   Lab Test 12/14/23  0923   CHOL 118   HDL 40   LDL 55   TRIG 116     Recent Labs   Lab Test 12/14/23  0923 09/22/22  1335 10/14/21  1022   LDL 55 93 57     Recent Labs   Lab Test  "12/14/23 0923      POTASSIUM 3.9   CHLORIDE 103   CO2 25   GLC 95   BUN 18.4   CR 1.09   GFRESTIMATED 80   HERMELINDA 9.7     Recent Labs   Lab Test 12/14/23  0923 07/20/23  0735 09/22/22  1335   CR 1.09 1.16 1.13     Recent Labs   Lab Test 04/15/24  0919 12/14/23  0923 07/20/23  0735   A1C 5.9* 5.7* 6.1*          Recent Labs   Lab Test 07/20/23 0735   WBC 6.3   HGB 13.1*   HCT 39.6*   MCV 90        Recent Labs   Lab Test 07/20/23  0735 08/31/22  0904 06/09/22 1955   HGB 13.1* 12.6* 13.2*    No results for input(s): \"TROPONINI\" in the last 31815 hours.  Recent Labs   Lab Test 06/09/22 1955   NTBNPI 19     Recent Labs   Lab Test 07/04/19  1152   TSH 0.99     No results for input(s): \"INR\" in the last 14484 hours.     Shanda Wilkes MD                                      "

## 2024-05-31 ENCOUNTER — TELEPHONE (OUTPATIENT)
Dept: CARDIOLOGY | Facility: CLINIC | Age: 54
End: 2024-05-31
Payer: COMMERCIAL

## 2024-05-31 NOTE — TELEPHONE ENCOUNTER
Health Call Center    Phone Message    May a detailed message be left on voicemail: yes     Reason for Call: Other: [facility] called requesting to speak with a member of the patients care team in regards to prior authorization for a procedure. Please call back to further discuss.     Action Taken: Message routed to:  Other: Cardiology    Travel Screening: Not Applicable     Thank you!  [facility]

## 2024-05-31 NOTE — TELEPHONE ENCOUNTER
Spoke with [facility]- as they did not provide a reference number, provider, test, procedure, imaging etc that they were calling in regards to writer was unable to assist  any further. The call back indicates it is referring to a procedure. There are no procedures scheduled or planned. There is an echo scheduled but this is authorized in the chart. Unfortunately writer could not assist  any further. Call disconnected. -[email or web address]

## 2024-06-02 ENCOUNTER — HEALTH MAINTENANCE LETTER (OUTPATIENT)
Age: 54
End: 2024-06-02

## 2024-06-04 ENCOUNTER — OFFICE VISIT (OUTPATIENT)
Dept: FAMILY MEDICINE | Facility: CLINIC | Age: 54
End: 2024-06-04
Attending: NURSE PRACTITIONER
Payer: COMMERCIAL

## 2024-06-04 VITALS
TEMPERATURE: 98 F | BODY MASS INDEX: 42.21 KG/M2 | OXYGEN SATURATION: 96 % | HEART RATE: 81 BPM | DIASTOLIC BLOOD PRESSURE: 82 MMHG | SYSTOLIC BLOOD PRESSURE: 122 MMHG | RESPIRATION RATE: 20 BRPM | WEIGHT: 285 LBS | HEIGHT: 69 IN

## 2024-06-04 DIAGNOSIS — M10.9 ACUTE GOUT OF LEFT ANKLE, UNSPECIFIED CAUSE: ICD-10-CM

## 2024-06-04 DIAGNOSIS — I25.83 CORONARY ARTERY DISEASE DUE TO LIPID RICH PLAQUE: ICD-10-CM

## 2024-06-04 DIAGNOSIS — Z12.11 SCREEN FOR COLON CANCER: ICD-10-CM

## 2024-06-04 DIAGNOSIS — N50.812 PAIN IN BOTH TESTICLES: ICD-10-CM

## 2024-06-04 DIAGNOSIS — N50.811 PAIN IN BOTH TESTICLES: ICD-10-CM

## 2024-06-04 DIAGNOSIS — I25.10 CORONARY ARTERY DISEASE DUE TO LIPID RICH PLAQUE: ICD-10-CM

## 2024-06-04 DIAGNOSIS — I77.819 DILATION OF AORTA (H): Primary | ICD-10-CM

## 2024-06-04 PROCEDURE — 99214 OFFICE O/P EST MOD 30 MIN: CPT | Performed by: NURSE PRACTITIONER

## 2024-06-04 RX ORDER — ALLOPURINOL 100 MG/1
TABLET ORAL
Qty: 60 TABLET | Refills: 0 | Status: SHIPPED | OUTPATIENT
Start: 2024-06-04 | End: 2024-06-28

## 2024-06-04 RX ORDER — TAMSULOSIN HYDROCHLORIDE 0.4 MG/1
0.8 CAPSULE ORAL DAILY
Qty: 180 CAPSULE | Refills: 3 | Status: SHIPPED | OUTPATIENT
Start: 2024-06-04

## 2024-06-04 ASSESSMENT — PAIN SCALES - GENERAL: PAINLEVEL: NO PAIN (0)

## 2024-06-04 NOTE — TELEPHONE ENCOUNTER
Requested Prescriptions   Pending Prescriptions Disp Refills    allopurinol (ZYLOPRIM) 100 MG tablet [Pharmacy Med Name: ALLOPURINOL 100MG TABS] 60 tablet 0     Sig: TAKE TWO TABLETS BY MOUTH ONCE DAILY. NEED LABS AND APPOINTMENT       Gout Agents Protocol Failed - 6/4/2024  5:03 AM        Failed - Has Uric Acid on file in past 12 months and value is less than 6     Recent Labs   Lab Test 09/07/22  0848   URIC 8.9*     If level is 6mg/dL or greater, ok to refill one time and refer to provider.           Passed - CBC on file in past 12 months     Recent Labs   Lab Test 07/20/23  0735   WBC 6.3   RBC 4.39*   HGB 13.1*   HCT 39.6*                    Passed - ALT on file in past 12 months     Recent Labs   Lab Test 07/20/23  0735   ALT 54             Passed - Medication is active on med list        Passed - Medication indicated for associated diagnosis     One of the following medications: colchicine is prescribed for one or more of the following conditions:     Amyloidosis   ulcer of mouth   Bechet's syndrome   Pericarditis   Peyronie's disease, psoriasis          Passed - Has GFR on file in past 12 months and most recent value is normal        Passed - Recent (12 mo) or future (90 days) visit within the authorizing provider's specialty     The patient must have completed an in-person or virtual visit within the past 12 months or has a future visit scheduled within the next 90 days with the authorizing provider s specialty.  Urgent care and e-visits do not quality as an office visit for this protocol.          Passed - Patient is age 18 or older     Refill protocol is for patient's aged 18 years and older

## 2024-06-04 NOTE — PROGRESS NOTES
"  Assessment & Plan     Dilation of aorta (H24)  Mildly dilated aorta noted will have annual screening echo order was placed for echo  - Echocardiogram Complete; Future    Coronary artery disease due to lipid rich plaque  Doing well post stent placement as well as will be starting cardiac rehab.    Pain in both testicles  Stable   - tamsulosin (FLOMAX) 0.4 MG capsule; Take 2 capsules (0.8 mg) by mouth daily    Screen for colon cancer  Will have screening for colon cancer after being on Plavix for 6 months do not want to do it sooner so we do not disrupt Plavix  - Colonoscopy Screening  Referral; Future          BMI  Estimated body mass index is 42.09 kg/m  as calculated from the following:    Height as of this encounter: 1.753 m (5' 9\").    Weight as of this encounter: 129.3 kg (285 lb).   Weight management plan: Discussed healthy diet and exercise guidelines      See Patient Instructions    Sridhar Muller is a 53 year old, presenting for the following health issues:  Heart Problem      6/4/2024     7:13 AM   Additional Questions   Roomed by [facility]     Heart Problem    History of Present Illness       Diabetes:   He presents for follow up of diabetes.    He is not checking blood glucose.         He has no concerns regarding his diabetes at this time.   He is not experiencing numbness or burning in feet, excessive thirst, blurry vision, weight changes or redness, sores or blisters on feet. The patient has not had a diabetic eye exam in the last 12 months.            Follow up heart stent        Review of Systems  Constitutional, HEENT, cardiovascular, pulmonary, gi and gu systems are negative, except as otherwise noted.      Objective    /82 (BP Location: Right arm)   Pulse 81   Temp 98  F (36.7  C) (Tympanic)   Resp 20   Ht 1.753 m (5' 9\")   Wt 129.3 kg (285 lb)   SpO2 96%   BMI 42.09 kg/m    Body mass index is 42.09 kg/m .  Physical Exam   GENERAL: alert and no distress  EYES: Eyes grossly " normal to inspection, PERRL and conjunctivae and sclerae normal  HENT: ear canals and TM's normal, nose and mouth without ulcers or lesions  NECK: no adenopathy, no asymmetry, masses, or scars  RESP: lungs clear to auscultation - no rales, rhonchi or wheezes  CV: regular rate and rhythm, normal S1 S2, no S3 or S4, no murmur, click or rub, no peripheral edema  ABDOMEN: soft, nontender, no hepatosplenomegaly, no masses and bowel sounds normal  MS: no gross musculoskeletal defects noted, no edema  SKIN: no suspicious lesions or rashes  NEURO: Normal strength and tone, mentation intact and speech normal  PSYCH: mentation appears normal, affect normal/bright    No results found for any visits on 06/04/24.        Signed Electronically by: TERESA Romero CNP

## 2024-06-21 ENCOUNTER — HOSPITAL ENCOUNTER (OUTPATIENT)
Dept: CARDIAC REHAB | Facility: CLINIC | Age: 54
Discharge: HOME | End: 2024-06-21
Attending: INTERNAL MEDICINE
Payer: COMMERCIAL

## 2024-06-21 DIAGNOSIS — I25.10 CORONARY ARTERY DISEASE DUE TO LIPID RICH PLAQUE: ICD-10-CM

## 2024-06-21 DIAGNOSIS — I25.83 CORONARY ARTERY DISEASE DUE TO LIPID RICH PLAQUE: ICD-10-CM

## 2024-06-21 PROCEDURE — 93797 PHYS/QHP OP CAR RHAB WO ECG: CPT | Mod: 59

## 2024-06-21 PROCEDURE — 93798 PHYS/QHP OP CAR RHAB W/ECG: CPT

## 2024-06-28 ENCOUNTER — HOSPITAL ENCOUNTER (OUTPATIENT)
Dept: CARDIAC REHAB | Facility: CLINIC | Age: 54
Discharge: HOME | End: 2024-06-28
Attending: NURSE PRACTITIONER
Payer: COMMERCIAL

## 2024-06-28 DIAGNOSIS — M10.9 ACUTE GOUT OF LEFT ANKLE, UNSPECIFIED CAUSE: ICD-10-CM

## 2024-06-28 PROCEDURE — 93798 PHYS/QHP OP CAR RHAB W/ECG: CPT

## 2024-06-28 RX ORDER — ALLOPURINOL 100 MG/1
TABLET ORAL
Qty: 60 TABLET | Refills: 0 | Status: SHIPPED | OUTPATIENT
Start: 2024-06-28 | End: 2024-08-05

## 2024-06-28 NOTE — TELEPHONE ENCOUNTER
Pending Prescriptions:                       Disp   Refills    allopurinol (ZYLOPRIM) 100 MG tablet [Phar*60 tab*0        Sig: TAKE TWO TABLETS BY MOUTH ONCE DAILY----NEED LABS AND           APPOINTMENT    Routing refill request to provider for review/approval because:  Labs not current:  uric acid    Uric Acid   Date Value Ref Range Status   09/07/2022 8.9 (H) 3.4 - 7.0 mg/dL Final   03/04/2021 7.2 3.5 - 7.2 mg/dL Final     Peyton Louis RN  [facility]

## 2024-07-02 DIAGNOSIS — R35.0 URINARY FREQUENCY: ICD-10-CM

## 2024-07-02 RX ORDER — SOLIFENACIN SUCCINATE 10 MG/1
10 TABLET, FILM COATED ORAL DAILY
Qty: 90 TABLET | Refills: 1 | Status: SHIPPED | OUTPATIENT
Start: 2024-07-02

## 2024-07-02 NOTE — TELEPHONE ENCOUNTER
Requested Prescriptions   Pending Prescriptions Disp Refills    solifenacin (VESICARE) 10 MG tablet 90 tablet 1     Sig: Take 1 tablet (10 mg) by mouth daily       There is no refill protocol information for this order        Last office visit: 2/23/2024 ; last virtual visit: Visit date not found with prescribing provider:  Cheli Barlow     Future Office Visit:          Eula Curran   Clinic Station    [facility]  [phone]

## 2024-07-03 ENCOUNTER — HOSPITAL ENCOUNTER (OUTPATIENT)
Dept: CARDIAC REHAB | Facility: CLINIC | Age: 54
Discharge: HOME | End: 2024-07-03
Attending: NURSE PRACTITIONER
Payer: COMMERCIAL

## 2024-07-03 PROCEDURE — 93798 PHYS/QHP OP CAR RHAB W/ECG: CPT

## 2024-07-12 ENCOUNTER — HOSPITAL ENCOUNTER (OUTPATIENT)
Dept: CARDIAC REHAB | Facility: CLINIC | Age: 54
Discharge: HOME | End: 2024-07-12
Attending: NURSE PRACTITIONER
Payer: COMMERCIAL

## 2024-07-12 PROCEDURE — 93798 PHYS/QHP OP CAR RHAB W/ECG: CPT

## 2024-07-19 ENCOUNTER — HOSPITAL ENCOUNTER (OUTPATIENT)
Dept: CARDIAC REHAB | Facility: CLINIC | Age: 54
Discharge: HOME | End: 2024-07-19
Attending: NURSE PRACTITIONER
Payer: COMMERCIAL

## 2024-07-19 PROCEDURE — 93798 PHYS/QHP OP CAR RHAB W/ECG: CPT

## 2024-07-26 ENCOUNTER — HOSPITAL ENCOUNTER (OUTPATIENT)
Dept: CARDIAC REHAB | Facility: CLINIC | Age: 54
Discharge: HOME | End: 2024-07-26
Attending: NURSE PRACTITIONER
Payer: COMMERCIAL

## 2024-07-26 PROCEDURE — 93798 PHYS/QHP OP CAR RHAB W/ECG: CPT

## 2024-08-02 ENCOUNTER — HOSPITAL ENCOUNTER (OUTPATIENT)
Dept: CARDIAC REHAB | Facility: CLINIC | Age: 54
Discharge: HOME | End: 2024-08-02
Attending: NURSE PRACTITIONER
Payer: COMMERCIAL

## 2024-08-02 DIAGNOSIS — M10.9 ACUTE GOUT OF LEFT ANKLE, UNSPECIFIED CAUSE: ICD-10-CM

## 2024-08-02 PROCEDURE — 93798 PHYS/QHP OP CAR RHAB W/ECG: CPT

## 2024-08-05 RX ORDER — ALLOPURINOL 100 MG/1
TABLET ORAL
Qty: 60 TABLET | Refills: 0 | Status: SHIPPED | OUTPATIENT
Start: 2024-08-05 | End: 2024-09-04

## 2024-08-05 NOTE — TELEPHONE ENCOUNTER
Requested Prescriptions   Pending Prescriptions Disp Refills    allopurinol (ZYLOPRIM) 100 MG tablet [Pharmacy Med Name: ALLOPURINOL 100MG TABS] 60 tablet 0     Sig: TAKE TWO TABLETS BY MOUTH ONCE DAILY----NEED LABS AND APPOINTMENT FOR FURTHER REFILLS       Gout Agents Protocol Failed - 8/2/2024  5:04 AM        Failed - CBC on file in past 12 months     Recent Labs   Lab Test 07/20/23  0735   WBC 6.3   RBC 4.39*   HGB 13.1*   HCT 39.6*                    Failed - ALT on file in past 12 months     Recent Labs   Lab Test 07/20/23  0735   ALT 54             Failed - Has Uric Acid on file in past 12 months and value is less than 6     Recent Labs   Lab Test 09/07/22  0848   URIC 8.9*     If level is 6mg/dL or greater, ok to refill one time and refer to provider.           Passed - Medication is active on med list        Passed - Medication indicated for associated diagnosis     One of the following medications: colchicine is prescribed for one or more of the following conditions:     Amyloidosis   ulcer of mouth   Bechet's syndrome   Pericarditis   Peyronie's disease, psoriasis          Passed - Has GFR on file in past 12 months and most recent value is normal        Passed - Recent (12 mo) or future (90 days) visit within the authorizing provider's specialty     The patient must have completed an in-person or virtual visit within the past 12 months or has a future visit scheduled within the next 90 days with the authorizing provider s specialty.  Urgent care and e-visits do not quality as an office visit for this protocol.          Passed - Patient is age 18 or older     Refill protocol is for patient's aged 18 years and older                [address] [place] [address]

## 2024-08-09 ENCOUNTER — HOSPITAL ENCOUNTER (OUTPATIENT)
Dept: CARDIAC REHAB | Facility: CLINIC | Age: 54
Discharge: HOME | End: 2024-08-09
Attending: NURSE PRACTITIONER
Payer: COMMERCIAL

## 2024-08-09 PROCEDURE — 93798 PHYS/QHP OP CAR RHAB W/ECG: CPT

## 2024-08-11 ENCOUNTER — HEALTH MAINTENANCE LETTER (OUTPATIENT)
Age: 54
End: 2024-08-11

## 2024-08-16 ENCOUNTER — HOSPITAL ENCOUNTER (OUTPATIENT)
Dept: CARDIAC REHAB | Facility: CLINIC | Age: 54
Discharge: HOME | End: 2024-08-16
Attending: NURSE PRACTITIONER
Payer: COMMERCIAL

## 2024-08-16 PROCEDURE — 93798 PHYS/QHP OP CAR RHAB W/ECG: CPT

## 2024-08-19 ENCOUNTER — OFFICE VISIT (OUTPATIENT)
Dept: CARDIOLOGY | Facility: CLINIC | Age: 54
End: 2024-08-19
Payer: COMMERCIAL

## 2024-08-19 VITALS
SYSTOLIC BLOOD PRESSURE: 159 MMHG | BODY MASS INDEX: 42.38 KG/M2 | DIASTOLIC BLOOD PRESSURE: 79 MMHG | RESPIRATION RATE: 14 BRPM | HEART RATE: 62 BPM | WEIGHT: 287 LBS

## 2024-08-19 DIAGNOSIS — I10 ESSENTIAL HYPERTENSION: ICD-10-CM

## 2024-08-19 DIAGNOSIS — I25.83 CORONARY ARTERY DISEASE DUE TO LIPID RICH PLAQUE: ICD-10-CM

## 2024-08-19 DIAGNOSIS — I25.10 CORONARY ARTERY DISEASE DUE TO LIPID RICH PLAQUE: ICD-10-CM

## 2024-08-19 DIAGNOSIS — E78.5 HYPERLIPIDEMIA LDL GOAL <70: ICD-10-CM

## 2024-08-19 PROCEDURE — G2211 COMPLEX E/M VISIT ADD ON: HCPCS | Performed by: INTERNAL MEDICINE

## 2024-08-19 PROCEDURE — 99214 OFFICE O/P EST MOD 30 MIN: CPT | Performed by: INTERNAL MEDICINE

## 2024-08-19 RX ORDER — FUROSEMIDE 20 MG
20 TABLET ORAL PRN
Qty: 30 TABLET | Refills: 5 | Status: SHIPPED | OUTPATIENT
Start: 2024-08-19

## 2024-08-19 RX ORDER — LISINOPRIL 40 MG/1
40 TABLET ORAL DAILY
Qty: 90 TABLET | Refills: 3 | Status: SHIPPED | OUTPATIENT
Start: 2024-08-19

## 2024-08-19 RX ORDER — AMLODIPINE BESYLATE 2.5 MG/1
2.5 TABLET ORAL DAILY
Qty: 90 TABLET | Refills: 3 | Status: SHIPPED | OUTPATIENT
Start: 2024-08-19

## 2024-08-19 RX ORDER — METOPROLOL SUCCINATE 50 MG/1
50 TABLET, EXTENDED RELEASE ORAL DAILY
Qty: 90 TABLET | Refills: 3 | Status: SHIPPED | OUTPATIENT
Start: 2024-08-19

## 2024-08-19 RX ORDER — ROSUVASTATIN CALCIUM 40 MG/1
40 TABLET, COATED ORAL DAILY
Qty: 90 TABLET | Refills: 3 | Status: SHIPPED | OUTPATIENT
Start: 2024-08-19

## 2024-08-19 RX ORDER — TRIAMTERENE AND HYDROCHLOROTHIAZIDE 37.5; 25 MG/1; MG/1
2 CAPSULE ORAL DAILY
Qty: 180 CAPSULE | Refills: 3 | Status: SHIPPED | OUTPATIENT
Start: 2024-08-19

## 2024-08-19 RX ORDER — CLOPIDOGREL BISULFATE 75 MG/1
75 TABLET ORAL DAILY
Qty: 90 TABLET | Refills: 2 | Status: SHIPPED | OUTPATIENT
Start: 2024-08-19

## 2024-08-19 NOTE — PATIENT INSTRUCTIONS
Lalito KELLY Jasiel,     It was a pleasure to see you in the office today. My recommendations for you include:   1. Lasix as needed for swelling  2. Stop verapamil and start toprol XL 50 mg daily  3. Ultrasound of your legs to check for a clot     Please do not hesitate to call the [facility] clinic with any questions or concerns at [phone].    Sincerely,     Shanda Wilkes MD

## 2024-08-19 NOTE — LETTER
8/19/2024    Lizet Berry, APRN CNP  [address] [facility] [address]    RE: Lalito Weathers       Dear Colleague,     I had the pleasure of seeing Lalito Weathers in the [facility].    HEART CARE ENCOUNTER CONSULTATON YUKI KELLY [facility]  [phone]      Assessment/Recommendations   Assessment:  Coronary disease with recent PCI of distal circumflex with mild residual mid LAD disease at [facility] 5/24/2024  2.   Dyslipidemia: Lipids well-controlled  3.   Diet-controlled diabetes mellitus type 2  4.   Hypertension: poorly controlled  5.  Morbid obesity  6.  PVCs: Currently not symptomatic  7.  Mild carotid disease     Plan:  1.  May take lasix as needed   2.  Continue dual antiplatelet therapy with aspirin Plavix for 1 year  3.  Continue on high-dose statin therapy   4. Stop verapamil and start toprol XL 50 mg daily  5. Ultrasound lower extremities  Follow up 6 months       History of Present Illness/Subjective    HPI: Lalito Weathers is a 53 year old male with history of hypertension, diabetes mellitus type 2, dyslipidemia, mild carotid disease, symptomatic PVCs, coronary disease with recent PCI of distal circumflex with mild residual mid LAD disease at [facility] 5/24/2024 who I am seeing today in follow up. He has been doing well since I last saw him.  He has been dealing with occasional lower extremity edema. No dyspnea or chest pain. He was placed on verapamil years ago for PVCs.  At that time experienced fatigue with atenolol.     Recent Echocardiogram Results: 5/25/24   Visually Estimated EF: 55-60%    Normal left ventricular size and ejection fraction. Visually estimated ejection fraction is   55-60%. Mild left ventricular hypertrophy.    Normal right ventricular size and function.    Mild left atrial enlargement.    No significant valvular heart disease noted.    Ascending aorta dilated within the upper limits of normal.         Recent Coronary  Angiogram Results: 5/24/24      [facility]   Cardiac Catheterization Report     Name:  ZULEYKA WATSON Event Date: 5/24/2024 17:30   Excellian ID #: [id number]    Encounter #: [id number]   Accession #: [id number]   FADUMO #: [id number] Diagnostic Physician: MARK CARSON   [facility] [place], operated by [facility] [place]   Interventional Physician: MARK CARSON   [facility] [place], [facility] [place]   Referring Physician:   Lizet Berry YOB: 1970   Gender: Male   Age: 53     Summary/Conclusions   PRESENTATION / INDICATIONS       DIAGNOSTIC SUMMARY   ? The LMCA is free of significant disease.   ? 30% stenosis in the Mid LAD   ? 100% stenosis in the Distal Circumflex   ? The RCA is free of significant disease.       INTERVENTION   ? Successful  1mm x 15mm Balloon,  2mm x 15mm Balloon,  2.25mm x 30mm Drug Eluting Stent,  2.25mm x 30mm Balloon,  2.25mm x 15mm Balloon, and  2.5mm x 15mm Balloon to Distal Circumflex, post stenosis 0%       RECOMMENDATIONS & PLAN   Plavix for 1 year     The longitudinal plan of care for the diagnosis(es)/condition(s) as documented were addressed during this visit. Due to the added complexity in care, I will continue to support Renzo in the subsequent management and with ongoing continuity of care.      Physical Examination  Review of Systems   Vitals: BP (!) 159/79 (BP Location: Right arm, Patient Position: Sitting, Cuff Size: Adult Large)   Pulse 62   Resp 14   Wt 130.2 kg (287 lb)   BMI 42.38 kg/m    BMI= Body mass index is 42.38 kg/m .  Wt Readings from Last 3 Encounters:   08/19/24 130.2 kg (287 lb)   06/04/24 129.3 kg (285 lb)   05/30/24 128.8 kg (284 lb)       General Appearance:   no distress, normal body habitus   ENT/Mouth: membranes moist, no oral lesions or bleeding gums.      EYES:  no scleral icterus, normal conjunctivae   Neck: no carotid bruits or thyromegaly   Chest/Lungs:   lungs are clear to auscultation   Cardiovascular:    Regular. Normal first and second heart sounds with no murmur mild edema bilaterally        Extremities: no cyanosis or clubbing   Skin: no xanthelasma, warm.    Neurologic: normal  bilateral, no tremors     Psychiatric: alert and oriented x3, calm        Please refer above for cardiac ROS details.        Medical History  Surgical History Family History Social History   Past Medical History:   Diagnosis Date     Hypertension      Obese      Sleep apnea      Tubular adenoma of colon 06/28/2019     Past Surgical History:   Procedure Laterality Date     APPENDECTOMY  12/30/2011     COLONOSCOPY N/A 06/21/2019    Procedure: COLONOSCOPY, WITH POLYPECTOMY AND BIOPSY;  Surgeon: Herman Champagne MD;  Location: [facility]     ESOPHAGOSCOPY, GASTROSCOPY, DUODENOSCOPY (EGD), COMBINED N/A 10/29/2021    Procedure: ESOPHAGOGASTRODUODENOSCOPY, WITH BIOPSY;  Surgeon: Jakub Wray DO;  Location: [facility]     ESOPHAGOSCOPY, GASTROSCOPY, DUODENOSCOPY (EGD), COMBINED N/A 1/8/2024    Procedure: ESOPHAGOGASTRODUODENOSCOPY, WITH BIOPSY;  Surgeon: Jimbo Aguirre MD;  Location: [facility]     ORTHOPEDIC SURGERY  05/10/2016    EXCISION LEFT UPPER INNER THIGH SOFT TISSUE MASS 5/10/16   MD NATE     Family History   Problem Relation Age of Onset     Unknown/Adopted Father         Social History     Socioeconomic History     Marital status:      Spouse name: Not on file     Number of children: Not on file     Years of education: Not on file     Highest education level: Not on file   Occupational History     Occupation: [employer]   Tobacco Use     Smoking status: Former     Passive exposure: Never     Smokeless tobacco: Former   Vaping Use     Vaping status: Never Used   Substance and Sexual Activity     Alcohol use: Yes     Comment: social     Drug use: No     Sexual activity: Yes     Partners: Female   Other Topics Concern     Not on file   Social History Narrative     Not on file     Social  Determinants of Health     Financial Resource Strain: Low Risk  (1/18/2024)    Financial Resource Strain      Within the past 12 months, have you or your family members you live with been unable to get utilities (heat, electricity) when it was really needed?: No   Food Insecurity: Low Risk  (1/18/2024)    Food Insecurity      Within the past 12 months, did you worry that your food would run out before you got money to buy more?: No      Within the past 12 months, did the food you bought just not last and you didn t have money to get more?: No   Transportation Needs: Low Risk  (1/18/2024)    Transportation Needs      Within the past 12 months, has lack of transportation kept you from medical appointments, getting your medicines, non-medical meetings or appointments, work, or from getting things that you need?: No   Physical Activity: Not on file   Stress: Not on file   Social Connections: Unknown (5/28/2024)    [facility]    Social Connections      Frequency of Communication with Friends and Family: Not on file   Interpersonal Safety: Low Risk  (12/14/2023)    Interpersonal Safety      Do you feel physically and emotionally safe where you currently live?: Yes      Within the past 12 months, have you been hit, slapped, kicked or otherwise physically hurt by someone?: No      Within the past 12 months, have you been humiliated or emotionally abused in other ways by your partner or ex-partner?: No   Housing Stability: Low Risk  (1/18/2024)    Housing Stability      Do you have housing? : Yes      Are you worried about losing your housing?: No           Medications  Allergies   Current Outpatient Medications   Medication Sig Dispense Refill     allopurinol (ZYLOPRIM) 100 MG tablet TAKE TWO TABLETS BY MOUTH ONCE DAILY----NEED LABS AND APPOINTMENT FOR FURTHER REFILLS 60 tablet 0     amLODIPine (NORVASC) 2.5 MG tablet Take 1 tablet (2.5 mg) by mouth daily 90 tablet 3      aspirin 81 MG EC tablet        cholecalciferol (VITAMIN D3) 5000 units TABS tablet Take by mouth daily       clopidogrel (PLAVIX) 75 MG tablet Take 1 tablet (75 mg) by mouth daily 90 tablet 2     fish oil-omega-3 fatty acids 1000 MG capsule Take 1 g by mouth daily       furosemide (LASIX) 20 MG tablet Take 1 tablet (20 mg) by mouth as needed (for swelling) 30 tablet 5     lisinopril (ZESTRIL) 40 MG tablet Take 1 tablet (40 mg) by mouth daily 90 tablet 3     metoprolol succinate ER (TOPROL XL) 50 MG 24 hr tablet Take 1 tablet (50 mg) by mouth daily 90 tablet 3     multivitamin w/minerals (THERA-VIT-M) tablet Take 1 tablet by mouth daily       nitroGLYcerin (NITROSTAT) 0.4 MG sublingual tablet Place 0.4 mg under the tongue       rosuvastatin (CRESTOR) 40 MG tablet Take 1 tablet (40 mg) by mouth daily 90 tablet 3     solifenacin (VESICARE) 10 MG tablet Take 1 tablet (10 mg) by mouth daily 90 tablet 1     tamsulosin (FLOMAX) 0.4 MG capsule Take 2 capsules (0.8 mg) by mouth daily 180 capsule 3     triamterene-HCTZ (DYAZIDE) 37.5-25 MG capsule Take 2 capsules by mouth daily 180 capsule 3     vitamin C (ASCORBIC ACID) 1000 MG TABS Take 1,000 mg by mouth daily         Allergies   Allergen Reactions     Food Hives     mangos     Vasquez Flavor      Statin Drugs [Statins]      Zantac [Ranitidine]           Lab Results    Chemistry/lipid CBC Cardiac Enzymes/BNP/TSH/INR   Recent Labs   Lab Test 12/14/23  0923   CHOL 118   HDL 40   LDL 55   TRIG 116     Recent Labs   Lab Test 12/14/23 0923 09/22/22  1335 10/14/21  1022   LDL 55 93 57     Recent Labs   Lab Test 12/14/23  0923      POTASSIUM 3.9   CHLORIDE 103   CO2 25   GLC 95   BUN 18.4   CR 1.09   GFRESTIMATED 80   HERMELINDA 9.7     Recent Labs   Lab Test 12/14/23  0923 07/20/23  0735 09/22/22  1335   CR 1.09 1.16 1.13     Recent Labs   Lab Test 04/15/24  0919 12/14/23  0923 07/20/23  0735   A1C 5.9* 5.7* 6.1*          Recent Labs   Lab Test 07/20/23  0735   WBC 6.3   HGB 13.1*  "  HCT 39.6*   MCV 90        Recent Labs   Lab Test 07/20/23  0735 08/31/22  0904 06/09/22 1955   HGB 13.1* 12.6* 13.2*    No results for input(s): \"TROPONINI\" in the last 94231 hours.  Recent Labs   Lab Test 06/09/22 1955   NTBNPI 19     Recent Labs   Lab Test 07/04/19  1152   TSH 0.99     No results for input(s): \"INR\" in the last 93495 hours.     Shanda Wilkes MD                                        Thank you for allowing me to participate in the care of your patient.      Sincerely,     Shanda Wilkes MD     [facility]  cc:   Shanda Wilkes MD  [address] [facility] [address] [facility] [address]  [address]  [place] [address]      "

## 2024-08-23 NOTE — PROGRESS NOTES
HEART CARE ENCOUNTER CONSULTATON NOTE      [facility]  [phone]      Assessment/Recommendations   Assessment:  Coronary disease with recent PCI of distal circumflex with mild residual mid LAD disease at [facility] 5/24/2024  2.   Dyslipidemia: Lipids well-controlled  3.   Diet-controlled diabetes mellitus type 2  4.   Hypertension: poorly controlled  5.  Morbid obesity  6.  PVCs: Currently not symptomatic  7.  Mild carotid disease     Plan:  1.  May take lasix as needed   2.  Continue dual antiplatelet therapy with aspirin Plavix for 1 year  3.  Continue on high-dose statin therapy   4. Stop verapamil and start toprol XL 50 mg daily  5. Ultrasound lower extremities  Follow up 6 months       History of Present Illness/Subjective    HPI: Lalito Watson is a 53 year old male with history of hypertension, diabetes mellitus type 2, dyslipidemia, mild carotid disease, symptomatic PVCs, coronary disease with recent PCI of distal circumflex with mild residual mid LAD disease at [facility] 5/24/2024 who I am seeing today in follow up. He has been doing well since I last saw him.  He has been dealing with occasional lower extremity edema. No dyspnea or chest pain. He was placed on verapamil years ago for PVCs.  At that time experienced fatigue with atenolol.     Recent Echocardiogram Results: 5/25/24   Visually Estimated EF: 55-60%    Normal left ventricular size and ejection fraction. Visually estimated ejection fraction is   55-60%. Mild left ventricular hypertrophy.    Normal right ventricular size and function.    Mild left atrial enlargement.    No significant valvular heart disease noted.    Ascending aorta dilated within the upper limits of normal.         Recent Coronary Angiogram Results: 5/24/24      [facility] - [facility]   Cardiac Catheterization Report     Name:  LALITO WATSON Event Date: 5/24/2024 17:30   Excellian ID #: [id number]    Encounter #:  [id number]   Accession #: [id number]   [facility] #: [id number] Diagnostic Physician: MARK CARSON   [facility] [place]   Interventional Physician: MARK CARSON   [facility] [place]   Referring Physician:   Lizet Berry YOB: 1970   Gender: Male   Age: 53     Summary/Conclusions   PRESENTATION / INDICATIONS       DIAGNOSTIC SUMMARY   ? The LMCA is free of significant disease.   ? 30% stenosis in the Mid LAD   ? 100% stenosis in the Distal Circumflex   ? The RCA is free of significant disease.       INTERVENTION   ? Successful  1mm x 15mm Balloon,  2mm x 15mm Balloon,  2.25mm x 30mm Drug Eluting Stent,  2.25mm x 30mm Balloon,  2.25mm x 15mm Balloon, and  2.5mm x 15mm Balloon to Distal Circumflex, post stenosis 0%       RECOMMENDATIONS & PLAN   Plavix for 1 year     The longitudinal plan of care for the diagnosis(es)/condition(s) as documented were addressed during this visit. Due to the added complexity in care, I will continue to support Renzo in the subsequent management and with ongoing continuity of care.      Physical Examination  Review of Systems   Vitals: BP (!) 159/79 (BP Location: Right arm, Patient Position: Sitting, Cuff Size: Adult Large)   Pulse 62   Resp 14   Wt 130.2 kg (287 lb)   BMI 42.38 kg/m    BMI= Body mass index is 42.38 kg/m .  Wt Readings from Last 3 Encounters:   08/19/24 130.2 kg (287 lb)   06/04/24 129.3 kg (285 lb)   05/30/24 128.8 kg (284 lb)       General Appearance:   no distress, normal body habitus   ENT/Mouth: membranes moist, no oral lesions or bleeding gums.      EYES:  no scleral icterus, normal conjunctivae   Neck: no carotid bruits or thyromegaly   Chest/Lungs:   lungs are clear to auscultation   Cardiovascular:   Regular. Normal first and second heart sounds with no murmur mild edema bilaterally        Extremities: no cyanosis or clubbing   Skin: no xanthelasma, warm.    Neurologic: normal  bilateral, no tremors      Psychiatric: alert and oriented x3, calm        Please refer above for cardiac ROS details.        Medical History  Surgical History Family History Social History   Past Medical History:   Diagnosis Date    Hypertension     Obese     Sleep apnea     Tubular adenoma of colon 06/28/2019     Past Surgical History:   Procedure Laterality Date    APPENDECTOMY  12/30/2011    COLONOSCOPY N/A 06/21/2019    Procedure: COLONOSCOPY, WITH POLYPECTOMY AND BIOPSY;  Surgeon: Herman Champagne MD;  Location: [facility]    ESOPHAGOSCOPY, GASTROSCOPY, DUODENOSCOPY (EGD), COMBINED N/A 10/29/2021    Procedure: ESOPHAGOGASTRODUODENOSCOPY, WITH BIOPSY;  Surgeon: Jakub Wray DO;  Location: [facility]    ESOPHAGOSCOPY, GASTROSCOPY, DUODENOSCOPY (EGD), COMBINED N/A 1/8/2024    Procedure: ESOPHAGOGASTRODUODENOSCOPY, WITH BIOPSY;  Surgeon: Jimbo Aguirre MD;  Location: [facility]    ORTHOPEDIC SURGERY  05/10/2016    EXCISION LEFT UPPER INNER THIGH SOFT TISSUE MASS 5/10/16   MD NATE     Family History   Problem Relation Age of Onset    Unknown/Adopted Father         Social History     Socioeconomic History    Marital status:      Spouse name: Not on file    Number of children: Not on file    Years of education: Not on file    Highest education level: Not on file   Occupational History    Occupation: [employer]   Tobacco Use    Smoking status: Former     Passive exposure: Never    Smokeless tobacco: Former   Vaping Use    Vaping status: Never Used   Substance and Sexual Activity    Alcohol use: Yes     Comment: social    Drug use: No    Sexual activity: Yes     Partners: Female   Other Topics Concern    Not on file   Social History Narrative    Not on file     Social Determinants of Health     Financial Resource Strain: Low Risk  (1/18/2024)    Financial Resource Strain     Within the past 12 months, have you or your family members you live with been unable to get utilities (heat, electricity) when it  was really needed?: No   Food Insecurity: Low Risk  (1/18/2024)    Food Insecurity     Within the past 12 months, did you worry that your food would run out before you got money to buy more?: No     Within the past 12 months, did the food you bought just not last and you didn t have money to get more?: No   Transportation Needs: Low Risk  (1/18/2024)    Transportation Needs     Within the past 12 months, has lack of transportation kept you from medical appointments, getting your medicines, non-medical meetings or appointments, work, or from getting things that you need?: No   Physical Activity: Not on file   Stress: Not on file   Social Connections: Unknown (5/28/2024)    [facility]    Social Connections     Frequency of Communication with Friends and Family: Not on file   Interpersonal Safety: Low Risk  (12/14/2023)    Interpersonal Safety     Do you feel physically and emotionally safe where you currently live?: Yes     Within the past 12 months, have you been hit, slapped, kicked or otherwise physically hurt by someone?: No     Within the past 12 months, have you been humiliated or emotionally abused in other ways by your partner or ex-partner?: No   Housing Stability: Low Risk  (1/18/2024)    Housing Stability     Do you have housing? : Yes     Are you worried about losing your housing?: No           Medications  Allergies   Current Outpatient Medications   Medication Sig Dispense Refill    allopurinol (ZYLOPRIM) 100 MG tablet TAKE TWO TABLETS BY MOUTH ONCE DAILY----NEED LABS AND APPOINTMENT FOR FURTHER REFILLS 60 tablet 0    amLODIPine (NORVASC) 2.5 MG tablet Take 1 tablet (2.5 mg) by mouth daily 90 tablet 3    aspirin 81 MG EC tablet       cholecalciferol (VITAMIN D3) 5000 units TABS tablet Take by mouth daily      clopidogrel (PLAVIX) 75 MG tablet Take 1 tablet (75 mg) by mouth daily 90 tablet 2    fish oil-omega-3 fatty acids 1000 MG capsule Take 1 g by mouth  "daily      furosemide (LASIX) 20 MG tablet Take 1 tablet (20 mg) by mouth as needed (for swelling) 30 tablet 5    lisinopril (ZESTRIL) 40 MG tablet Take 1 tablet (40 mg) by mouth daily 90 tablet 3    metoprolol succinate ER (TOPROL XL) 50 MG 24 hr tablet Take 1 tablet (50 mg) by mouth daily 90 tablet 3    multivitamin w/minerals (THERA-VIT-M) tablet Take 1 tablet by mouth daily      nitroGLYcerin (NITROSTAT) 0.4 MG sublingual tablet Place 0.4 mg under the tongue      rosuvastatin (CRESTOR) 40 MG tablet Take 1 tablet (40 mg) by mouth daily 90 tablet 3    solifenacin (VESICARE) 10 MG tablet Take 1 tablet (10 mg) by mouth daily 90 tablet 1    tamsulosin (FLOMAX) 0.4 MG capsule Take 2 capsules (0.8 mg) by mouth daily 180 capsule 3    triamterene-HCTZ (DYAZIDE) 37.5-25 MG capsule Take 2 capsules by mouth daily 180 capsule 3    vitamin C (ASCORBIC ACID) 1000 MG TABS Take 1,000 mg by mouth daily         Allergies   Allergen Reactions    Food Hives     mangos    Vasquez Flavor     Statin Drugs [Statins]     Zantac [Ranitidine]           Lab Results    Chemistry/lipid CBC Cardiac Enzymes/BNP/TSH/INR   Recent Labs   Lab Test 12/14/23  0923   CHOL 118   HDL 40   LDL 55   TRIG 116     Recent Labs   Lab Test 12/14/23 0923 09/22/22  1335 10/14/21  1022   LDL 55 93 57     Recent Labs   Lab Test 12/14/23  0923      POTASSIUM 3.9   CHLORIDE 103   CO2 25   GLC 95   BUN 18.4   CR 1.09   GFRESTIMATED 80   HERMELINDA 9.7     Recent Labs   Lab Test 12/14/23  0923 07/20/23  0735 09/22/22  1335   CR 1.09 1.16 1.13     Recent Labs   Lab Test 04/15/24  0919 12/14/23  0923 07/20/23  0735   A1C 5.9* 5.7* 6.1*          Recent Labs   Lab Test 07/20/23  0735   WBC 6.3   HGB 13.1*   HCT 39.6*   MCV 90        Recent Labs   Lab Test 07/20/23  0735 08/31/22  0904 06/09/22  1955   HGB 13.1* 12.6* 13.2*    No results for input(s): \"TROPONINI\" in the last 63201 hours.  Recent Labs   Lab Test 06/09/22 1955   NTBNPI 19     Recent Labs   Lab Test " "07/04/19  1152   TSH 0.99     No results for input(s): \"INR\" in the last 84757 hours.     Shanda Wilkes MD                                      "

## 2024-08-30 ENCOUNTER — OFFICE VISIT (OUTPATIENT)
Dept: FAMILY MEDICINE | Facility: CLINIC | Age: 54
End: 2024-08-30
Payer: COMMERCIAL

## 2024-08-30 VITALS
WEIGHT: 290.6 LBS | DIASTOLIC BLOOD PRESSURE: 79 MMHG | OXYGEN SATURATION: 97 % | HEIGHT: 67 IN | SYSTOLIC BLOOD PRESSURE: 138 MMHG | HEART RATE: 64 BPM | BODY MASS INDEX: 45.61 KG/M2 | TEMPERATURE: 97.7 F

## 2024-08-30 DIAGNOSIS — L03.114 CELLULITIS OF LEFT ARM: ICD-10-CM

## 2024-08-30 DIAGNOSIS — L91.8 SKIN TAG: Primary | ICD-10-CM

## 2024-08-30 PROCEDURE — 11200 RMVL SKIN TAGS UP TO&INC 15: CPT | Performed by: NURSE PRACTITIONER

## 2024-08-30 PROCEDURE — 99213 OFFICE O/P EST LOW 20 MIN: CPT | Mod: 25 | Performed by: NURSE PRACTITIONER

## 2024-08-30 RX ORDER — CEFUROXIME AXETIL 500 MG/1
500 TABLET ORAL 2 TIMES DAILY
Qty: 14 TABLET | Refills: 0 | Status: SHIPPED | OUTPATIENT
Start: 2024-08-30 | End: 2024-09-06

## 2024-08-30 ASSESSMENT — PAIN SCALES - GENERAL: PAINLEVEL: MODERATE PAIN (4)

## 2024-08-30 NOTE — PROGRESS NOTES
"  Assessment & Plan     Skin tag  Getting caught on clothing, bleeding, now with mild surrounding erythema. Removed, see procedure note. Reviewed s/s worsening infection, wound care, reasons to be seen.  - REMOVAL OF SKIN TAGS, FIRST 15    Cellulitis of left arm  Mild cellulitis surrounding skin tag without discharge or induration. Start Ceftin.  - cefuroxime (CEFTIN) 500 MG tablet; Take 1 tablet (500 mg) by mouth 2 times daily for 7 days.            See Patient Instructions    Sridhar Muller is a 53 year old, presenting for the following health issues:  Skin Tags (Infected under left arm)      8/30/2024    10:53 AM   Additional Questions   Roomed by AJITH Horton CMA     History of Present Illness       Reason for visit:  On my left arm, a skin tag has become infected started yesterday and worse today, stings and painful, looking to get it removed and infection treated.  Symptom onset:  1-3 days ago  Symptoms include:  Stinging, swolen, painful  Symptom intensity:  Moderate  Symptom progression:  Worsening  Had these symptoms before:  No  What makes it worse:  Rubbing it with my shirt  What makes it better:  No   He is taking medications regularly.           Review of Systems  Constitutional, neuro, ENT, endocrine, pulmonary, cardiac, gastrointestinal, genitourinary, musculoskeletal, integument and psychiatric systems are negative, except as otherwise noted.      Objective    /79   Pulse 64   Temp 97.7  F (36.5  C) (Oral)   Ht 1.689 m (5' 6.5\")   Wt 131.8 kg (290 lb 9.6 oz)   SpO2 97%   BMI 46.20 kg/m    Body mass index is 46.2 kg/m .  Physical Exam   GENERAL: alert and no distress  SKIN: pedunculated flesh colored lesion to left medial upper arm with 3cm surrounding erythema, no discharge, no induration    Skin tag removal - indication:  bleeding, getting caught on clothing  Area cleaned with alcohol.   1 cc Lidocaine with epinepherine injected for anesthesia.    Pedunculated lesion removed easily with " iris scissors and cauterized to prevent bleeding.  Covered with bacitracin and bandage.    Post procedure care info given.  Follow up if any problems.    Total of 1 skin tags removed.          Signed Electronically by: TERESA Littlejohn CNP

## 2024-08-30 NOTE — PATIENT INSTRUCTIONS
"Take ceftin twice daily with food. Take a probiotic such as Culturelle or Florastor (recommend lactobacillus 50 billion CFU twice daily  by at least one hour from the antibiotic) while on the antibiotic or eat a Greek yogurt containing \"live active cultures\" daily.    Keep wound covered today. Wash with soap and water once daily.    If fevers, drainage from wound, worsening, be seen again right away.    "

## 2024-09-02 ENCOUNTER — MYC MEDICAL ADVICE (OUTPATIENT)
Dept: FAMILY MEDICINE | Facility: CLINIC | Age: 54
End: 2024-09-02

## 2024-09-02 ENCOUNTER — HOSPITAL ENCOUNTER (EMERGENCY)
Facility: CLINIC | Age: 54
Discharge: HOME | End: 2024-09-02
Attending: EMERGENCY MEDICINE | Admitting: EMERGENCY MEDICINE
Payer: COMMERCIAL

## 2024-09-02 ENCOUNTER — APPOINTMENT (OUTPATIENT)
Dept: ULTRASOUND IMAGING | Facility: CLINIC | Age: 54
End: 2024-09-02
Attending: EMERGENCY MEDICINE
Payer: COMMERCIAL

## 2024-09-02 VITALS
RESPIRATION RATE: 20 BRPM | HEART RATE: 69 BPM | OXYGEN SATURATION: 98 % | SYSTOLIC BLOOD PRESSURE: 154 MMHG | TEMPERATURE: 98 F | WEIGHT: 286 LBS | BODY MASS INDEX: 42.36 KG/M2 | HEIGHT: 69 IN | DIASTOLIC BLOOD PRESSURE: 88 MMHG

## 2024-09-02 DIAGNOSIS — R10.9 BILATERAL FLANK PAIN: ICD-10-CM

## 2024-09-02 DIAGNOSIS — M10.9 ACUTE GOUT OF LEFT ANKLE, UNSPECIFIED CAUSE: ICD-10-CM

## 2024-09-02 LAB
ALBUMIN SERPL BCG-MCNC: 4.7 G/DL (ref 3.5–5.2)
ALBUMIN UR-MCNC: NEGATIVE MG/DL
ALP SERPL-CCNC: 69 U/L (ref 40–150)
ALT SERPL W P-5'-P-CCNC: 57 U/L (ref 0–70)
ANION GAP SERPL CALCULATED.3IONS-SCNC: 10 MMOL/L (ref 7–15)
APPEARANCE UR: CLEAR
AST SERPL W P-5'-P-CCNC: 32 U/L (ref 0–45)
BASOPHILS # BLD AUTO: 0.1 10E3/UL (ref 0–0.2)
BASOPHILS NFR BLD AUTO: 1 %
BILIRUB SERPL-MCNC: 0.4 MG/DL
BILIRUB UR QL STRIP: NEGATIVE
BUN SERPL-MCNC: 21.3 MG/DL (ref 6–20)
CALCIUM SERPL-MCNC: 9.4 MG/DL (ref 8.8–10.4)
CHLORIDE SERPL-SCNC: 103 MMOL/L (ref 98–107)
COLOR UR AUTO: NORMAL
CREAT SERPL-MCNC: 0.96 MG/DL (ref 0.67–1.17)
EGFRCR SERPLBLD CKD-EPI 2021: >90 ML/MIN/1.73M2
EOSINOPHIL # BLD AUTO: 0.1 10E3/UL (ref 0–0.7)
EOSINOPHIL NFR BLD AUTO: 1 %
ERYTHROCYTE [DISTWIDTH] IN BLOOD BY AUTOMATED COUNT: 12.6 % (ref 10–15)
GLUCOSE SERPL-MCNC: 86 MG/DL (ref 70–99)
GLUCOSE UR STRIP-MCNC: NEGATIVE MG/DL
HCO3 SERPL-SCNC: 25 MMOL/L (ref 22–29)
HCT VFR BLD AUTO: 40.1 % (ref 40–53)
HGB BLD-MCNC: 13.8 G/DL (ref 13.3–17.7)
HGB UR QL STRIP: NEGATIVE
IMM GRANULOCYTES # BLD: 0.1 10E3/UL
IMM GRANULOCYTES NFR BLD: 1 %
KETONES UR STRIP-MCNC: NEGATIVE MG/DL
LEUKOCYTE ESTERASE UR QL STRIP: NEGATIVE
LYMPHOCYTES # BLD AUTO: 1.9 10E3/UL (ref 0.8–5.3)
LYMPHOCYTES NFR BLD AUTO: 23 %
MCH RBC QN AUTO: 30.1 PG (ref 26.5–33)
MCHC RBC AUTO-ENTMCNC: 34.4 G/DL (ref 31.5–36.5)
MCV RBC AUTO: 88 FL (ref 78–100)
MONOCYTES # BLD AUTO: 0.6 10E3/UL (ref 0–1.3)
MONOCYTES NFR BLD AUTO: 7 %
NEUTROPHILS # BLD AUTO: 5.4 10E3/UL (ref 1.6–8.3)
NEUTROPHILS NFR BLD AUTO: 67 %
NITRATE UR QL: NEGATIVE
NRBC # BLD AUTO: 0 10E3/UL
NRBC BLD AUTO-RTO: 0 /100
PH UR STRIP: 6 [PH] (ref 5–7)
PLATELET # BLD AUTO: 226 10E3/UL (ref 150–450)
POTASSIUM SERPL-SCNC: 3.9 MMOL/L (ref 3.4–5.3)
PROT SERPL-MCNC: 7.5 G/DL (ref 6.4–8.3)
RBC # BLD AUTO: 4.58 10E6/UL (ref 4.4–5.9)
RBC URINE: <1 /HPF
SODIUM SERPL-SCNC: 138 MMOL/L (ref 135–145)
SP GR UR STRIP: 1 (ref 1–1.03)
UROBILINOGEN UR STRIP-MCNC: NORMAL MG/DL
WBC # BLD AUTO: 8.1 10E3/UL (ref 4–11)
WBC URINE: 0 /HPF

## 2024-09-02 PROCEDURE — 76770 US EXAM ABDO BACK WALL COMP: CPT

## 2024-09-02 PROCEDURE — 258N000003 HC RX IP 258 OP 636: Performed by: FAMILY MEDICINE

## 2024-09-02 PROCEDURE — 99283 EMERGENCY DEPT VISIT LOW MDM: CPT | Performed by: EMERGENCY MEDICINE

## 2024-09-02 PROCEDURE — 99284 EMERGENCY DEPT VISIT MOD MDM: CPT | Mod: 25 | Performed by: EMERGENCY MEDICINE

## 2024-09-02 PROCEDURE — 96360 HYDRATION IV INFUSION INIT: CPT | Performed by: EMERGENCY MEDICINE

## 2024-09-02 PROCEDURE — 80053 COMPREHEN METABOLIC PANEL: CPT | Performed by: EMERGENCY MEDICINE

## 2024-09-02 PROCEDURE — 36415 COLL VENOUS BLD VENIPUNCTURE: CPT | Performed by: EMERGENCY MEDICINE

## 2024-09-02 PROCEDURE — 81003 URINALYSIS AUTO W/O SCOPE: CPT | Performed by: EMERGENCY MEDICINE

## 2024-09-02 PROCEDURE — 85025 COMPLETE CBC W/AUTO DIFF WBC: CPT | Performed by: EMERGENCY MEDICINE

## 2024-09-02 RX ADMIN — SODIUM CHLORIDE 500 ML: 9 INJECTION, SOLUTION INTRAVENOUS at 12:24

## 2024-09-02 ASSESSMENT — ACTIVITIES OF DAILY LIVING (ADL)
ADLS_ACUITY_SCORE: 35

## 2024-09-02 ASSESSMENT — COLUMBIA-SUICIDE SEVERITY RATING SCALE - C-SSRS
6. HAVE YOU EVER DONE ANYTHING, STARTED TO DO ANYTHING, OR PREPARED TO DO ANYTHING TO END YOUR LIFE?: NO
1. IN THE PAST MONTH, HAVE YOU WISHED YOU WERE DEAD OR WISHED YOU COULD GO TO SLEEP AND NOT WAKE UP?: NO
2. HAVE YOU ACTUALLY HAD ANY THOUGHTS OF KILLING YOURSELF IN THE PAST MONTH?: NO

## 2024-09-02 NOTE — ED PROVIDER NOTES
"  History     Chief Complaint   Patient presents with    Back Pain     Recently started antibiotics for left arm wound, saw cardiology this week and started lasix. Now with flank pain, \"maybe related to kidneys?\"     HPI  Lalito Weathers is a 53 year old male with past medical history significant for long-term COPD obstructive sleep apnea oxycodone for pain caution hyperlipidemia gout who presents emergency department complaining of bilateral flank/back pain.  Patient states symptoms have been present for the last few days.  He took Lasix for several days last week due to some swelling in his legs.  Also has been on antibiotics for left fourth forearm wound and    Allergies:  Allergies   Allergen Reactions    Food Hives     mangos    Vasquez Flavor     Statin Drugs [Statins]     Zantac [Ranitidine]        Problem List:    Patient Active Problem List    Diagnosis Date Noted    Strain of left biceps muscle, initial encounter 05/14/2024     Priority: Medium    COPD (chronic obstructive pulmonary disease) (H) 01/18/2024     Priority: Medium    Benign prostatic hyperplasia with weak urinary stream 06/26/2023     Priority: Medium    GRACY (obstructive sleep apnea) - severe (AHI 87) 05/02/2023     Priority: Medium     Sleep Study [facility] 12/13/16 (302#) - AHI 87, RDI 87 (primarily supine sleep). Low O2 44%. TCM ranged from 42 to 54 mmHg. PAP titrated to 20/13      Hyperlipidemia 05/01/2023     Priority: Medium    Obesity (BMI 40) with comorbidity (H) 08/09/2019     Priority: Medium    Vitamin D deficiency 03/28/2017     Priority: Medium    Palpitations 10/02/2008     Priority: Medium    PVCs (premature ventricular contractions) 10/02/2008     Priority: Medium    Essential hypertension 01/14/2005     Priority: Medium    History of colonic polyps 05/01/2023     Priority: Low     colonoscopy  2019       Gout 05/01/2023     Priority: Low    Chronic prostatitis 05/01/2023     Priority: Low        Past Medical History:    Past " Medical History:   Diagnosis Date    Hypertension     Obese     Sleep apnea     Tubular adenoma of colon 06/28/2019       Past Surgical History:    Past Surgical History:   Procedure Laterality Date    APPENDECTOMY  12/30/2011    COLONOSCOPY N/A 06/21/2019    Procedure: COLONOSCOPY, WITH POLYPECTOMY AND BIOPSY;  Surgeon: Herman Champagne MD;  Location: [facility]    ESOPHAGOSCOPY, GASTROSCOPY, DUODENOSCOPY (EGD), COMBINED N/A 10/29/2021    Procedure: ESOPHAGOGASTRODUODENOSCOPY, WITH BIOPSY;  Surgeon: Jakub Wray DO;  Location: [facility]    ESOPHAGOSCOPY, GASTROSCOPY, DUODENOSCOPY (EGD), COMBINED N/A 1/8/2024    Procedure: ESOPHAGOGASTRODUODENOSCOPY, WITH BIOPSY;  Surgeon: Jimbo Aguirre MD;  Location: [facility]    ORTHOPEDIC SURGERY  05/10/2016    EXCISION LEFT UPPER INNER THIGH SOFT TISSUE MASS 5/10/16   MD NATE       Family History:    Family History   Problem Relation Age of Onset    Unknown/Adopted Father        Social History:  Marital Status:   [2]  Social History     Tobacco Use    Smoking status: Former     Passive exposure: Never    Smokeless tobacco: Former   Vaping Use    Vaping status: Never Used   Substance Use Topics    Alcohol use: Yes     Comment: social    Drug use: No        Medications:    allopurinol (ZYLOPRIM) 100 MG tablet  amLODIPine (NORVASC) 2.5 MG tablet  aspirin 81 MG EC tablet  cefuroxime (CEFTIN) 500 MG tablet  cholecalciferol (VITAMIN D3) 5000 units TABS tablet  clopidogrel (PLAVIX) 75 MG tablet  fish oil-omega-3 fatty acids 1000 MG capsule  furosemide (LASIX) 20 MG tablet  lisinopril (ZESTRIL) 40 MG tablet  metoprolol succinate ER (TOPROL XL) 50 MG 24 hr tablet  multivitamin w/minerals (THERA-VIT-M) tablet  nitroGLYcerin (NITROSTAT) 0.4 MG sublingual tablet  rosuvastatin (CRESTOR) 40 MG tablet  solifenacin (VESICARE) 10 MG tablet  tamsulosin (FLOMAX) 0.4 MG capsule  triamterene-HCTZ (DYAZIDE) 37.5-25 MG capsule  vitamin C (ASCORBIC ACID) 1000 MG TABS          Review of  "Systems    Physical Exam   BP: (!) 198/120  Pulse: 68  Temp: 98  F (36.7  C)  Resp: 20  Height: 175.3 cm (5' 9\")  Weight: 129.7 kg (286 lb)  SpO2: 98 %      Physical Exam  Vitals and nursing note reviewed.   Constitutional:       General: He is not in acute distress.     Appearance: Normal appearance. He is not ill-appearing, toxic-appearing or diaphoretic.   HENT:      Head: Normocephalic and atraumatic.      Nose: Nose normal.      Mouth/Throat:      Mouth: Mucous membranes are moist.      Pharynx: Oropharynx is clear.   Eyes:      Conjunctiva/sclera: Conjunctivae normal.   Cardiovascular:      Rate and Rhythm: Normal rate and regular rhythm.      Pulses: Normal pulses.      Heart sounds: Normal heart sounds. No murmur heard.  Pulmonary:      Effort: Pulmonary effort is normal.      Breath sounds: No stridor. No wheezing or rhonchi.   Abdominal:      General: Abdomen is flat. There is no distension.      Palpations: Abdomen is soft.      Tenderness: There is no abdominal tenderness.      Comments: There is mild bilateral CVA tenderness without erythema edema present.   Musculoskeletal:      Cervical back: Normal range of motion.      Comments: There is tenderness to palpation of the bilateral paraspinous muscles of the lumbar spine and laterally.  No midline tenderness no erythema edema present.  Moving all extremities well no significant swelling of legs no calf pain.  There is no rash present.   Skin:     General: Skin is warm and dry.      Findings: No rash.   Neurological:      General: No focal deficit present.      Mental Status: He is alert and oriented to person, place, and time.      Motor: No weakness.      Coordination: Coordination normal.   Psychiatric:         Mood and Affect: Mood normal.         ED Course        Procedures              Critical Care time:  none               Results for orders placed or performed during the hospital encounter of 09/02/24 (from the past 24 hour(s))   CBC with " platelets, differential    Narrative    The following orders were created for panel order CBC with platelets, differential.  Procedure                               Abnormality         Status                     ---------                               -----------         ------                     CBC with platelets and d...[[id number]]                      Final result                 Please view results for these tests on the individual orders.   CBC with platelets and differential   Result Value Ref Range    WBC Count 8.1 4.0 - 11.0 10e3/uL    RBC Count 4.58 4.40 - 5.90 10e6/uL    Hemoglobin 13.8 13.3 - 17.7 g/dL    Hematocrit 40.1 40.0 - 53.0 %    MCV 88 78 - 100 fL    MCH 30.1 26.5 - 33.0 pg    MCHC 34.4 31.5 - 36.5 g/dL    RDW 12.6 10.0 - 15.0 %    Platelet Count 226 150 - 450 10e3/uL    % Neutrophils 67 %    % Lymphocytes 23 %    % Monocytes 7 %    % Eosinophils 1 %    % Basophils 1 %    % Immature Granulocytes 1 %    NRBCs per 100 WBC 0 <1 /100    Absolute Neutrophils 5.4 1.6 - 8.3 10e3/uL    Absolute Lymphocytes 1.9 0.8 - 5.3 10e3/uL    Absolute Monocytes 0.6 0.0 - 1.3 10e3/uL    Absolute Eosinophils 0.1 0.0 - 0.7 10e3/uL    Absolute Basophils 0.1 0.0 - 0.2 10e3/uL    Absolute Immature Granulocytes 0.1 <=0.4 10e3/uL    Absolute NRBCs 0.0 10e3/uL       Medications   sodium chloride 0.9% BOLUS 500 mL (500 mLs Intravenous $New Bag 9/2/24 1228)       Assessments & Plan (with Medical Decision Making) records were reviewed including past medical history medications and allergies.  Cardiology office visit from 8/19/2024 was reviewed.  Family medicine visit from 6/4/2024 was reviewed.  Labs were obtained.  I dependently reviewed and interpreted labs.  Patient was given a fluid bolus.  Due to patient's flank pain renal ultrasound was obtained.  CBC was unremarkable.  Comprehensive metabolic panel without significant abnormality.  Urine analysis was unremarkable..  Ultrasound revealed no abnormality of the kidneys.  I  feel this is more likely back pain of musculoskeletal in nature.  Patient should return if symptoms worsen or new symptoms develop including worsening pain decreased urine output focal numbness weakness in extremity or bowel or bladder dysfunction.  He feels comfortable being discharged at this time.     I have reviewed the nursing notes.    I have reviewed the findings, diagnosis, plan and need for follow up with the patient.             Discharge Medication List as of 9/2/2024  3:42 PM          Final diagnoses:   Bilateral flank pain       9/2/2024   [facility] EMERGENCY DEPT       Herman Sharma MD  09/03/24 [id number]

## 2024-09-02 NOTE — DISCHARGE INSTRUCTIONS
Symptoms worsen or new symptoms develop.  Follow-up with your primary care next available.  Drink plenty of fluids take ibuprofen or Tylenol for pain.  No obvious evidence of kidney injury or other abnormalities noted and I believe this is more likely musculoskeletal or neurogenic in nature.  Try heat and possible Salonpas patches stretching or other activities.  If any abdominal pain vomiting fevers decreased urine output or worsening pain please return for recheck.

## 2024-09-02 NOTE — ED TRIAGE NOTES
"  Recently started antibiotics for left arm wound, saw cardiology this week and started lasix. Now with flank pain, \"maybe related to kidneys?\"  Increased rouvastin recently as well. Taking plavix as well.      Triage Assessment (Adult)       Row Name 09/02/24 120          Triage Assessment    Airway WDL WDL        Respiratory WDL    Respiratory WDL WDL        Skin Circulation/Temperature WDL    Skin Circulation/Temperature WDL WDL        Cardiac WDL    Cardiac WDL WDL        Peripheral/Neurovascular WDL    Peripheral Neurovascular WDL WDL        Cognitive/Neuro/Behavioral WDL    Cognitive/Neuro/Behavioral WDL WDL                     "

## 2024-09-04 ENCOUNTER — PATIENT OUTREACH (OUTPATIENT)
Dept: CARE COORDINATION | Facility: CLINIC | Age: 54
End: 2024-09-04
Payer: COMMERCIAL

## 2024-09-04 RX ORDER — ALLOPURINOL 100 MG/1
TABLET ORAL
Qty: 60 TABLET | Refills: 0 | Status: SHIPPED | OUTPATIENT
Start: 2024-09-04 | End: 2024-09-19

## 2024-09-04 NOTE — TELEPHONE ENCOUNTER
Has appointment scheduled for 9/19/24.      Requested Prescriptions   Pending Prescriptions Disp Refills    allopurinol (ZYLOPRIM) 100 MG tablet [Pharmacy Med Name: ALLOPURINOL 100MG TABS] 60 tablet 0     Sig: TAKE TWO TABLETS BY MOUTH ONCE DAILY----NEED LABS AND APPOINTMENT FOR FURTHER REFILLS       Gout Agents Protocol Failed - 9/2/2024  5:08 AM        Failed - CBC on file in past 12 months     Recent Labs   Lab Test 09/02/24  1313   WBC 8.1   RBC 4.58   HGB 13.8   HCT 40.1                    Failed - ALT on file in past 12 months     Recent Labs   Lab Test 09/02/24  1313   ALT 57             Failed - Has Uric Acid on file in past 12 months and value is less than 6     Recent Labs   Lab Test 09/07/22  0848   URIC 8.9*     If level is 6mg/dL or greater, ok to refill one time and refer to provider.           Passed - Medication is active on med list        Passed - Medication indicated for associated diagnosis     One of the following medications: colchicine is prescribed for one or more of the following conditions:     Amyloidosis   ulcer of mouth   Bechet's syndrome   Pericarditis   Peyronie's disease, psoriasis          Passed - Has GFR on file in past 12 months and most recent value is normal        Passed - Recent (12 mo) or future (90 days) visit within the authorizing provider's specialty     The patient must have completed an in-person or virtual visit within the past 12 months or has a future visit scheduled within the next 90 days with the authorizing provider s specialty.  Urgent care and e-visits do not quality as an office visit for this protocol.          Passed - Patient is age 18 or older     Refill protocol is for patient's aged 18 years and older

## 2024-09-19 ENCOUNTER — OFFICE VISIT (OUTPATIENT)
Dept: FAMILY MEDICINE | Facility: CLINIC | Age: 54
End: 2024-09-19
Payer: COMMERCIAL

## 2024-09-19 VITALS
BODY MASS INDEX: 41.52 KG/M2 | OXYGEN SATURATION: 95 % | RESPIRATION RATE: 20 BRPM | HEIGHT: 70 IN | DIASTOLIC BLOOD PRESSURE: 80 MMHG | WEIGHT: 290 LBS | HEART RATE: 95 BPM | SYSTOLIC BLOOD PRESSURE: 120 MMHG | TEMPERATURE: 98 F

## 2024-09-19 DIAGNOSIS — E11.8 TYPE 2 DIABETES MELLITUS WITH UNSPECIFIED COMPLICATIONS (H): ICD-10-CM

## 2024-09-19 DIAGNOSIS — M10.9 ACUTE GOUT OF LEFT ANKLE, UNSPECIFIED CAUSE: Primary | ICD-10-CM

## 2024-09-19 DIAGNOSIS — E66.01 MORBID OBESITY (H): ICD-10-CM

## 2024-09-19 LAB
EST. AVERAGE GLUCOSE BLD GHB EST-MCNC: 120 MG/DL
HBA1C MFR BLD: 5.8 % (ref 0–5.6)

## 2024-09-19 PROCEDURE — 36415 COLL VENOUS BLD VENIPUNCTURE: CPT | Performed by: NURSE PRACTITIONER

## 2024-09-19 PROCEDURE — 83036 HEMOGLOBIN GLYCOSYLATED A1C: CPT | Performed by: NURSE PRACTITIONER

## 2024-09-19 PROCEDURE — 99214 OFFICE O/P EST MOD 30 MIN: CPT | Performed by: NURSE PRACTITIONER

## 2024-09-19 RX ORDER — ALLOPURINOL 100 MG/1
200 TABLET ORAL DAILY
Qty: 180 TABLET | Refills: 3 | Status: SHIPPED | OUTPATIENT
Start: 2024-09-19

## 2024-09-19 ASSESSMENT — PAIN SCALES - GENERAL: PAINLEVEL: NO PAIN (0)

## 2024-09-19 NOTE — PROGRESS NOTES
"  Assessment & Plan     (M10.9) Acute gout of left ankle, unspecified cause  (primary encounter diagnosis)  Comment: stable   Plan: allopurinol (ZYLOPRIM) 100 MG tablet      (E11.8) Type 2 diabetes mellitus with unspecified complications (H)  Comment:  Controlled no change in treatment plan   Plan:     (E66.01) Morbid obesity (H)  Comment: Reviewed diet and exercise  Plan: HEMOGLOBIN A1C          MED REC REQUIRED  Post Medication Reconciliation Status:  Discharge medications reconciled, continue medications without change    See Patient Instructions    Sridhar Muller is a 54 year old, presenting for the following health issues:  Hypertension and ER F/U      9/19/2024     8:37 AM   Additional Questions   Roomed by Makayla     [facility]       Hypertension Follow-up    Do you check your blood pressure regularly outside of the clinic? No   Are you following a low salt diet? Yes        ED/UC Followup:    Facility:  [place]  Date of visit: 9/02/2024  Reason for visit: flank pain  Current Status: doing well      Review of Systems  Constitutional, HEENT, cardiovascular, pulmonary, gi and gu systems are negative, except as otherwise noted.      Objective    /80 (BP Location: Right arm)   Pulse 95   Temp 98  F (36.7  C) (Tympanic)   Resp 20   Ht 1.765 m (5' 9.5\")   Wt 131.5 kg (290 lb)   SpO2 95%   BMI 42.21 kg/m    Body mass index is 42.21 kg/m .  Physical Exam   GENERAL: alert and no distress  EYES: Eyes grossly normal to inspection, PERRL and conjunctivae and sclerae normal  HENT: ear canals and TM's normal, nose and mouth without ulcers or lesions  NECK: no adenopathy, no asymmetry, masses, or scars  RESP: lungs clear to auscultation - no rales, rhonchi or wheezes  CV: regular rate and rhythm, normal S1 S2, no S3 or S4, no murmur, click or rub, no peripheral edema  ABDOMEN: soft, nontender, no hepatosplenomegaly, no masses and bowel sounds normal  MS: no gross musculoskeletal defects noted, no edema  SKIN: no " suspicious lesions or rashes  NEURO: Normal strength and tone, mentation intact and speech normal  PSYCH: mentation appears normal, affect normal/bright    Results for orders placed or performed in visit on 09/19/24   HEMOGLOBIN A1C     Status: Abnormal   Result Value Ref Range    Estimated Average Glucose 120 (H) <117 mg/dL    Hemoglobin A1C 5.8 (H) 0.0 - 5.6 %           Signed Electronically by: TERESA Romero CNP

## 2024-12-22 ENCOUNTER — HEALTH MAINTENANCE LETTER (OUTPATIENT)
Age: 54
End: 2024-12-22

## 2024-12-27 ENCOUNTER — ANCILLARY PROCEDURE (OUTPATIENT)
Dept: GENERAL RADIOLOGY | Facility: CLINIC | Age: 54
End: 2024-12-27
Attending: NURSE PRACTITIONER
Payer: COMMERCIAL

## 2024-12-27 DIAGNOSIS — R05.1 ACUTE COUGH: ICD-10-CM

## 2024-12-27 PROCEDURE — 71046 X-RAY EXAM CHEST 2 VIEWS: CPT | Mod: TC | Performed by: RADIOLOGY

## 2025-01-28 NOTE — CONFIDENTIAL NOTE
UROLOGY FOLLOW-UP NOTE          Chief Complaint:   Today I had the pleasure of seeing . Lalito Weathers in follow-up for a chief complaint of LUTS.          Interval Update   Lalito Weathers is a very pleasant 54 year old male with a history of COPD, GARCY, HLD, HTN, and gout.      Brief History: . Lalito Weathers has followed with urology for urinary frequency and urgency. He was on tamsulosin 0.4 mg and oxybutynin XR 5 mg daily for weak stream and urinary frequency. He did not find oxybutynin and was switched to solifenacin 5 mg daily instead. This was later increased to 10 mg daily.      He underwent cystoscopy on 9/28/2023 with Dr. Gore, which did not note an obstructing prostate. He was recommended to continue pelvic floor PT, which he found helpful.      Today's notes: He is doing well. He reports some difficulty initiating his urinary stream, especially when standing. He denies any bothersome urinary frequency and urgency. He denies dysuria and gross hematuria.          Physical Exam:   Patient is a 54 year old male evaluated via video visit.       Labs and Pathology:    I personally reviewed all applicable laboratory data and went over findings with patient  Significant for:    CBC RESULTS:  Recent Labs   Lab Test 09/02/24  1313 07/20/23  0735 08/31/22  0904 06/09/22  1955   WBC 8.1 6.3 12.9* 8.0   HGB 13.8 13.1* 12.6* 13.2*    174 232 233        BMP RESULTS:  Recent Labs   Lab Test 09/02/24  1313 12/14/23  0923 07/20/23  0735 09/22/22  1335 03/24/21  1614 03/04/21  1749 08/05/20  0746 10/23/19  0839 08/02/19  0000    140 137 140   < > 141 139 136  --    POTASSIUM 3.9 3.9 4.3 4.1   < > 3.8 4.0 3.8 4.1   CHLORIDE 103 103 103 100   < > 107 107 104  --    CO2 25 25 25 28   < > 27 29 27  --    ANIONGAP 10 12 9 12   < > 7 3 5  --    GLC 86 95 119* 85   < > 103* 92 93 200*   BUN 21.3* 18.4 17.0 18.2   < > 20 18 24  --    CR 0.96 1.09 1.16 1.13   < > 1.09 0.95 1.00 1.04   GFRESTIMATED >90 80  75 77   < > 77 >90 87 >60   GFRESTBLACK  --   --   --   --   --  90 >90 >90 >60   HERMELINDA 9.4 9.7 10.1* 9.7   < > 9.2 9.7 9.2  --     < > = values in this interval not displayed.       UA RESULTS:   Recent Labs   Lab Test 09/02/24  1324 04/05/23  1136 08/31/22  0904 04/12/22  1200   SG 1.005 <=1.005 1.020 1.010   URINEPH 6.0 6.0 7.0 7.0   NITRITE Negative Negative Negative Negative   RBCU <1 None Seen  --  None Seen   WBCU 0 None Seen  --  None Seen       PSA RESULTS  Prostate Specific Antigen Screen   Date Value Ref Range Status   02/27/2023 0.48 0.00 - 3.50 ng/mL Final   01/04/2022 0.59 0.00 - 4.00 ug/L Final                Assessment/Plan   54 year old male seen in follow up for LUTS. He was on tamsulosin 0.4 mg and oxybutynin XR 5 mg daily for weak stream and urinary frequency. He did not find oxybutynin and was switched to solifenacin 5 mg daily instead. This was later increased to 10 mg daily.      He underwent cystoscopy on 9/28/2023 with Dr. Gore, which did not note an obstructing prostate. He was recommended to continue pelvic floor PT, which he found helpful.      He reports some difficulty initiating his urinary stream, especially when standing. He denies any bothersome urinary frequency and urgency. He will continue with his current regimen for now.     Plan:  Continue tamsulosin 0.8 mg and solifenacin 5 mg daily.   PSA with next lab visit.   Follow up in one year, sooner if concerns.           Past Medical History:     Past Medical History:   Diagnosis Date    Hypertension     Obese     Sleep apnea     Tubular adenoma of colon 06/28/2019            Past Surgical History:     Past Surgical History:   Procedure Laterality Date    APPENDECTOMY  12/30/2011    COLONOSCOPY N/A 06/21/2019    Procedure: COLONOSCOPY, WITH POLYPECTOMY AND BIOPSY;  Surgeon: Herman Champagne MD;  Location: [facility]    ESOPHAGOSCOPY, GASTROSCOPY, DUODENOSCOPY (EGD), COMBINED N/A 10/29/2021    Procedure: ESOPHAGOGASTRODUODENOSCOPY, WITH  BIOPSY;  Surgeon: Jakub Wray DO;  Location: [facility]    ESOPHAGOSCOPY, GASTROSCOPY, DUODENOSCOPY (EGD), COMBINED N/A 1/8/2024    Procedure: ESOPHAGOGASTRODUODENOSCOPY, WITH BIOPSY;  Surgeon: Jimbo Aguirre MD;  Location: [facility]    ORTHOPEDIC SURGERY  05/10/2016    EXCISION LEFT UPPER INNER THIGH SOFT TISSUE MASS 5/10/16   MD NATE            Medications     Current Outpatient Medications   Medication Sig Dispense Refill    allopurinol (ZYLOPRIM) 100 MG tablet Take 2 tablets (200 mg) by mouth daily. 180 tablet 3    amLODIPine (NORVASC) 2.5 MG tablet Take 1 tablet (2.5 mg) by mouth daily 90 tablet 3    aspirin 81 MG EC tablet       cholecalciferol (VITAMIN D3) 5000 units TABS tablet Take by mouth daily      clopidogrel (PLAVIX) 75 MG tablet Take 1 tablet (75 mg) by mouth daily 90 tablet 2    fish oil-omega-3 fatty acids 1000 MG capsule Take 1 g by mouth daily      furosemide (LASIX) 20 MG tablet Take 1 tablet (20 mg) by mouth as needed (for swelling) 30 tablet 5    guaiFENesin-codeine (ROBITUSSIN AC) 100-10 MG/5ML solution Take 5-10 mLs by mouth every 4 hours as needed for cough. 118 mL 0    lisinopril (ZESTRIL) 40 MG tablet Take 1 tablet (40 mg) by mouth daily 90 tablet 3    metoprolol succinate ER (TOPROL XL) 50 MG 24 hr tablet Take 1 tablet (50 mg) by mouth daily 90 tablet 3    multivitamin w/minerals (THERA-VIT-M) tablet Take 1 tablet by mouth daily      nitroGLYcerin (NITROSTAT) 0.4 MG sublingual tablet Place 0.4 mg under the tongue (Patient not taking: Reported on 12/27/2024)      rosuvastatin (CRESTOR) 40 MG tablet Take 1 tablet (40 mg) by mouth daily 90 tablet 3    solifenacin (VESICARE) 10 MG tablet Take 1 tablet (10 mg) by mouth daily. 90 tablet 1    tamsulosin (FLOMAX) 0.4 MG capsule Take 2 capsules (0.8 mg) by mouth daily 180 capsule 3    triamterene-HCTZ (DYAZIDE) 37.5-25 MG capsule Take 2 capsules by mouth daily 180 capsule 3    vitamin C (ASCORBIC ACID) 1000 MG TABS Take 1,000 mg by mouth  daily       No current facility-administered medications for this visit.            Family History:     Family History   Problem Relation Age of Onset    Unknown/Adopted Father             Social History:     Social History     Socioeconomic History    Marital status:      Spouse name: Not on file    Number of children: Not on file    Years of education: Not on file    Highest education level: Not on file   Occupational History    Occupation: [employer]   Tobacco Use    Smoking status: Former     Passive exposure: Never    Smokeless tobacco: Former   Vaping Use    Vaping status: Never Used   Substance and Sexual Activity    Alcohol use: Yes     Comment: social    Drug use: No    Sexual activity: Yes     Partners: Female   Other Topics Concern    Not on file   Social History Narrative    Not on file     Social Drivers of Health     Financial Resource Strain: Low Risk  (1/18/2024)    Financial Resource Strain     Within the past 12 months, have you or your family members you live with been unable to get utilities (heat, electricity) when it was really needed?: No   Food Insecurity: Low Risk  (1/18/2024)    Food Insecurity     Within the past 12 months, did you worry that your food would run out before you got money to buy more?: No     Within the past 12 months, did the food you bought just not last and you didn t have money to get more?: No   Transportation Needs: Low Risk  (1/18/2024)    Transportation Needs     Within the past 12 months, has lack of transportation kept you from medical appointments, getting your medicines, non-medical meetings or appointments, work, or from getting things that you need?: No   Physical Activity: Not on file   Stress: Not on file   Social Connections: Unknown (5/28/2024)    Received [facility]    Social Connections     Frequency of Communication with Friends and Family: Not on file   Interpersonal  Safety: Low Risk  (8/30/2024)    Interpersonal Safety     Do you feel physically and emotionally safe where you currently live?: Yes     Within the past 12 months, have you been hit, slapped, kicked or otherwise physically hurt by someone?: No     Within the past 12 months, have you been humiliated or emotionally abused in other ways by your partner or ex-partner?: No   Housing Stability: Low Risk  (1/18/2024)    Housing Stability     Do you have housing? : Yes     Are you worried about losing your housing?: No            Allergies:   Food, [place] flavoring agent (non-screening), Statin drugs [statins], and Zantac [ranitidine]         Review of Systems:  From intake questionnaire   Negative 14 system review except as noted on HPI, nurse's note.        STACY PORTILLO PA-C  Department of Urology

## 2025-01-29 ENCOUNTER — VIRTUAL VISIT (OUTPATIENT)
Dept: UROLOGY | Facility: CLINIC | Age: 55
End: 2025-01-29
Payer: COMMERCIAL

## 2025-01-29 DIAGNOSIS — R35.0 URINARY FREQUENCY: ICD-10-CM

## 2025-01-29 DIAGNOSIS — Z12.5 SCREENING FOR PROSTATE CANCER: Primary | ICD-10-CM

## 2025-01-29 RX ORDER — SOLIFENACIN SUCCINATE 10 MG/1
10 TABLET, FILM COATED ORAL DAILY
Qty: 90 TABLET | Refills: 3 | Status: SHIPPED | OUTPATIENT
Start: 2025-01-29

## 2025-01-29 RX ORDER — TAMSULOSIN HYDROCHLORIDE 0.4 MG/1
0.8 CAPSULE ORAL DAILY
Qty: 180 CAPSULE | Refills: 3 | Status: SHIPPED | OUTPATIENT
Start: 2025-01-29

## 2025-01-29 NOTE — PROGRESS NOTES
Lalito Weathers is a 54 year old year old who is being evaluated via a billable video visit.      How would you like to obtain your AVS? MyChart  If the video visit is dropped, the invitation should be resent by: Send to e-mail at: [email or web address]  Will anyone else be joining your video visit? No  Lalito Weathers is a 54 year old who is being evaluated via telephone visit.       Distant Location (provider location):  Off-site    Phone call duration: 4 minutes    Visit changed to telephone due to audio issues.

## 2025-01-30 ENCOUNTER — TELEPHONE (OUTPATIENT)
Dept: CARDIOLOGY | Facility: CLINIC | Age: 55
End: 2025-01-30
Payer: COMMERCIAL

## 2025-01-30 ENCOUNTER — TELEPHONE (OUTPATIENT)
Dept: GASTROENTEROLOGY | Facility: CLINIC | Age: 55
End: 2025-01-30
Payer: COMMERCIAL

## 2025-01-30 NOTE — TELEPHONE ENCOUNTER
[place] Community Measures Blood Pressure guideline reviewed.  Patients recent blood pressure is outside of guideline parameters.  Called pt to review, no answer.  Left voicemail message asking patient to check their blood pressure using a home blood pressure cuff or by going to a [facility].  Patient instructed to then call [phone] ([facility]) and leave a message with their name, date of birth, and blood pressure reading that was completed within the last 24 hours and where it was completed.  Will await call back for further review.

## 2025-01-30 NOTE — TELEPHONE ENCOUNTER
Endoscopy Scheduling Screen      What insurance is in the chart?  Other:  [facility]    Ordering/Referring Provider: Lizet Berry APRN CNP    (If ordering provider performs procedure, schedule with ordering provider unless otherwise instructed. )    BMI: There is no height or weight on file to calculate BMI.     Sedation Ordered  general anesthesia.   BMI<= 45 45 < BMI <= 48 48 < BMI < = 50  BMI > 50   No Restrictions No MG ASC  No ESSC  Deal ASC with exceptions Hospital Only OR Only       Do you have a history of malignant hyperthermia?  NO    (Females) Are you currently pregnant?   NO     Are you currently on dialysis?   NO    Do you need assistance transferring?   NO    BMI: There is no height or weight on file to calculate BMI.     Is patients BMI > 50?  NO    BMI > 40?  Yes (Extended Prep)    Do you have a diagnosis of diabetes?  NO    Do you take an Oral or Injectable medication for weight loss or diabetes (excluding insulin)?  NO    Do you take the medication Naltrexone?  NO    Do you take blood thinners?  Yes, you must contact your prescribing provider for directions on holding or bridging with a different medication.  PLAVIX  Prep   Are you currently have chronic kidney disease?  NO    Do you have a diagnosis of cystic fibrosis (CF)?  NO    On a regular basis do you go 3 -5 days between bowel movements?  NO    Preferred Pharmacy:    [facility] [address] [place] [id number]  Phone: [phone] Fax: [phone]    [facility] - [place], [place] - [address]  [address] [facility] [address]  Phone: [phone] Fax: [phone]      Final Scheduling Details     Procedure scheduled  Colonoscopy    Surgeon:  Nils      Date of procedure:  4/11/25     Location  [facility] - Per order.    What is your communication preference for Instructions and/or Bowel Prep?   [facility]    Patient Reminders:    You  will receive a call from a Nurse to review instructions and health history.  This assessment must be completed prior to your procedure.  Failure to complete the Nurse assessment may result in the procedure being cancelled.       On the day of your procedure, please designate an adult(s) who can drive you home stay with you for the next 24 hours. The medicines used in the exam will make you sleepy. You will not be able to drive.       You cannot take public transportation, ride share services, or non-medical taxi service without a responsible caregiver.  Medical transport services are allowed with the requirement that a responsible caregiver will receive you at your destination.  We require that drivers and caregivers are confirmed prior to your procedure.

## 2025-02-02 ENCOUNTER — HOSPITAL ENCOUNTER (EMERGENCY)
Facility: CLINIC | Age: 55
Discharge: HOME | End: 2025-02-02
Attending: EMERGENCY MEDICINE | Admitting: EMERGENCY MEDICINE
Payer: COMMERCIAL

## 2025-02-02 VITALS
SYSTOLIC BLOOD PRESSURE: 148 MMHG | RESPIRATION RATE: 16 BRPM | HEART RATE: 81 BPM | WEIGHT: 290 LBS | BODY MASS INDEX: 42.95 KG/M2 | OXYGEN SATURATION: 96 % | DIASTOLIC BLOOD PRESSURE: 81 MMHG | TEMPERATURE: 98.2 F | HEIGHT: 69 IN

## 2025-02-02 DIAGNOSIS — R10.9 ABDOMINAL PAIN, UNSPECIFIED ABDOMINAL LOCATION: ICD-10-CM

## 2025-02-02 LAB
ALBUMIN SERPL BCG-MCNC: 4.5 G/DL (ref 3.5–5.2)
ALP SERPL-CCNC: 80 U/L (ref 40–150)
ALT SERPL W P-5'-P-CCNC: 75 U/L (ref 0–70)
ANION GAP SERPL CALCULATED.3IONS-SCNC: 11 MMOL/L (ref 7–15)
AST SERPL W P-5'-P-CCNC: 44 U/L (ref 0–45)
ATRIAL RATE - MUSE: 94 BPM
BASOPHILS # BLD AUTO: 0.1 10E3/UL (ref 0–0.2)
BASOPHILS NFR BLD AUTO: 0 %
BILIRUB SERPL-MCNC: 0.3 MG/DL
BUN SERPL-MCNC: 21.3 MG/DL (ref 6–20)
CALCIUM SERPL-MCNC: 9.9 MG/DL (ref 8.8–10.4)
CHLORIDE SERPL-SCNC: 103 MMOL/L (ref 98–107)
CREAT SERPL-MCNC: 0.99 MG/DL (ref 0.67–1.17)
DIASTOLIC BLOOD PRESSURE - MUSE: NORMAL MMHG
EGFRCR SERPLBLD CKD-EPI 2021: 89 ML/MIN/1.73M2
EOSINOPHIL # BLD AUTO: 0 10E3/UL (ref 0–0.7)
EOSINOPHIL NFR BLD AUTO: 0 %
ERYTHROCYTE [DISTWIDTH] IN BLOOD BY AUTOMATED COUNT: 12.8 % (ref 10–15)
GLUCOSE SERPL-MCNC: 108 MG/DL (ref 70–99)
HCO3 SERPL-SCNC: 25 MMOL/L (ref 22–29)
HCT VFR BLD AUTO: 40.2 % (ref 40–53)
HGB BLD-MCNC: 13.4 G/DL (ref 13.3–17.7)
IMM GRANULOCYTES # BLD: 0.1 10E3/UL
IMM GRANULOCYTES NFR BLD: 0 %
INTERPRETATION ECG - MUSE: NORMAL
LACTATE SERPL-SCNC: 1.4 MMOL/L (ref 0.7–2)
LYMPHOCYTES # BLD AUTO: 1.8 10E3/UL (ref 0.8–5.3)
LYMPHOCYTES NFR BLD AUTO: 15 %
MCH RBC QN AUTO: 29.9 PG (ref 26.5–33)
MCHC RBC AUTO-ENTMCNC: 33.3 G/DL (ref 31.5–36.5)
MCV RBC AUTO: 90 FL (ref 78–100)
MONOCYTES # BLD AUTO: 1 10E3/UL (ref 0–1.3)
MONOCYTES NFR BLD AUTO: 9 %
NEUTROPHILS # BLD AUTO: 8.9 10E3/UL (ref 1.6–8.3)
NEUTROPHILS NFR BLD AUTO: 75 %
NRBC # BLD AUTO: 0 10E3/UL
NRBC BLD AUTO-RTO: 0 /100
P AXIS - MUSE: 48 DEGREES
PLATELET # BLD AUTO: 211 10E3/UL (ref 150–450)
POTASSIUM SERPL-SCNC: 4.2 MMOL/L (ref 3.4–5.3)
PR INTERVAL - MUSE: 152 MS
PROT SERPL-MCNC: 7.1 G/DL (ref 6.4–8.3)
QRS DURATION - MUSE: 94 MS
QT - MUSE: 364 MS
QTC - MUSE: 455 MS
R AXIS - MUSE: -7 DEGREES
RBC # BLD AUTO: 4.48 10E6/UL (ref 4.4–5.9)
SODIUM SERPL-SCNC: 139 MMOL/L (ref 135–145)
SYSTOLIC BLOOD PRESSURE - MUSE: NORMAL MMHG
T AXIS - MUSE: 32 DEGREES
TROPONIN T SERPL HS-MCNC: 15 NG/L
TROPONIN T SERPL HS-MCNC: 15 NG/L
VENTRICULAR RATE- MUSE: 94 BPM
WBC # BLD AUTO: 11.8 10E3/UL (ref 4–11)

## 2025-02-02 PROCEDURE — 85004 AUTOMATED DIFF WBC COUNT: CPT | Performed by: EMERGENCY MEDICINE

## 2025-02-02 PROCEDURE — 85041 AUTOMATED RBC COUNT: CPT | Performed by: EMERGENCY MEDICINE

## 2025-02-02 PROCEDURE — 84155 ASSAY OF PROTEIN SERUM: CPT | Performed by: EMERGENCY MEDICINE

## 2025-02-02 PROCEDURE — 82040 ASSAY OF SERUM ALBUMIN: CPT | Performed by: EMERGENCY MEDICINE

## 2025-02-02 PROCEDURE — 99284 EMERGENCY DEPT VISIT MOD MDM: CPT | Performed by: EMERGENCY MEDICINE

## 2025-02-02 PROCEDURE — 36415 COLL VENOUS BLD VENIPUNCTURE: CPT | Performed by: EMERGENCY MEDICINE

## 2025-02-02 PROCEDURE — 93005 ELECTROCARDIOGRAM TRACING: CPT | Performed by: EMERGENCY MEDICINE

## 2025-02-02 PROCEDURE — 83605 ASSAY OF LACTIC ACID: CPT | Performed by: EMERGENCY MEDICINE

## 2025-02-02 PROCEDURE — 93010 ELECTROCARDIOGRAM REPORT: CPT | Performed by: EMERGENCY MEDICINE

## 2025-02-02 PROCEDURE — 84484 ASSAY OF TROPONIN QUANT: CPT | Performed by: EMERGENCY MEDICINE

## 2025-02-02 PROCEDURE — 82247 BILIRUBIN TOTAL: CPT | Performed by: EMERGENCY MEDICINE

## 2025-02-02 RX ORDER — METRONIDAZOLE 500 MG/1
500 TABLET ORAL 3 TIMES DAILY
Qty: 21 TABLET | Refills: 0 | Status: SHIPPED | OUTPATIENT
Start: 2025-02-02 | End: 2025-02-09

## 2025-02-02 RX ORDER — SULFAMETHOXAZOLE AND TRIMETHOPRIM 800; 160 MG/1; MG/1
1 TABLET ORAL 2 TIMES DAILY
Qty: 20 TABLET | Refills: 0 | Status: SHIPPED | OUTPATIENT
Start: 2025-02-02 | End: 2025-02-12

## 2025-02-02 ASSESSMENT — COLUMBIA-SUICIDE SEVERITY RATING SCALE - C-SSRS
2. HAVE YOU ACTUALLY HAD ANY THOUGHTS OF KILLING YOURSELF IN THE PAST MONTH?: NO
1. IN THE PAST MONTH, HAVE YOU WISHED YOU WERE DEAD OR WISHED YOU COULD GO TO SLEEP AND NOT WAKE UP?: NO
6. HAVE YOU EVER DONE ANYTHING, STARTED TO DO ANYTHING, OR PREPARED TO DO ANYTHING TO END YOUR LIFE?: NO

## 2025-02-02 ASSESSMENT — ACTIVITIES OF DAILY LIVING (ADL)
ADLS_ACUITY_SCORE: 41

## 2025-02-02 NOTE — ED PROVIDER NOTES
BP (!) 160/82   Pulse 100   SpO2 98%     Lalito Weathers is a 54-year-old male presenting with complaints of diverticulitis flare.  Patient requesting antibiotics for his generalized lower abdominal pain.  Given patient's history, I recommended he/she be evaluated in the emergency department.  We discussed that he/she will likely require further testing and/or treatment beyond the capabilities of the current urgent care setting including labs and potential imaging.  Patient expresses understanding of this and agreement with the plan.  I have explained what could happen if they choose to not have the treatment/transfer.        Imani Chowdhury PA-C  02/02/25 1203

## 2025-02-02 NOTE — ED PROVIDER NOTES
"[facility]  Emergency Department Visit Note    PATIENT:  Lalito Weathers     54 year old     male      [id number]    Chief complaint:  Chief Complaint   Patient presents with    Abdominal Pain        History of present illness:  Patient is a 54 year old male with a medical history significant for PVCs, colonic polyps, BPH, GRACY, hyperlipidemia, hypertension, type 2 diabetes, past history of admissions for unstable angina, history for diverticulitis most recent episode about 1 year ago presenting for evaluation of abdominal pain that feels very reminiscent to his past episodes of diverticulitis.  Pain has been present since Friday, 3 days ago, and has been gradually progressive in nature.  He notes that last week he did a detox where he was eating detox soup and also drinking juices and he thinks that that precipitated his symptoms.  He was doing this in an effort to lose weight.  No vomiting or diarrhea.  No blood in his stool.  No testicular concerns.  No chest pain, shortness of breath, bladder or bowel changes, fevers or chills.  He has some soreness across the center of his abdomen wrapping around the left side.  He notes that this is exactly how it felt last time he had an episode.  He has had multiple episodes of diverticulitis.  These have resolved in the past with antibiotics.  He notes that his symptoms did start about 4 days ago so he already gave it some time and thinks that probably antibiotics are indicated considering his symptoms are persistently worsening.  Is having normal bowel movements.  No pain with bowel movements    Review of Systems:  As in HPI above    BP (!) 160/89   Pulse 95   Temp 98.2  F (36.8  C) (Oral)   Resp 16   Ht 1.753 m (5' 9\")   Wt 131.5 kg (290 lb)   SpO2 96%   BMI 42.83 kg/m      Physical Exam  Constitutional: laying in hospital bed, alert, oriented, in no apparent distress, conversant, and answering questions appropriately  HEENT: normocephalic, " atraumatic, pupils 3mm, equal, round, and reactive to light, sclerae anicteric, extraocular motions intact, and moist mucous membranes  Neck: able to fully range  Cardiovascular: regular rate and rhythm  Pulmonary: breathing comfortably on room air and lungs clear to auscultation bilaterally  Abdominal: Obese, soft, tenderness along the center of the abdomen wrapping around the left side, no flank tenderness, no suprapubic or bilateral lower quadrant abdominal tenderness to palpation  Genitourinary: deferred  Extremities/MSK: no peripheral edema and no cyanosis   Skin: warm, dry and non-diaphoretic  Neurologic: moves all four extremities spontaneously and GCS 15  Psychiatric: calm, appropriate      MDM:  Patient is a 54 year old male with above history presenting for evaluation of lower abdominal pain has been present for the past 3 to 4 days, worsening in nature, reminiscent of his past diverticular episodes.    Vitals reassuring and within normal limits. Exam notable for soft abdomen, reassuring exam aside from some mild abdominal discomfort with palpation.    Patient is nontoxic-appearing, conversant, resting comfortably.  Differential diagnosis includes but is not limited to diverticulitis, appendicitis, constipation, malignancy, ACS, electrolyte abnormalities, gastroenteritis.     Basic labs.  Considering patient has had a history of diverticulitis and it feels identical in nature, no imaging indicated at this time.  His belly is soft and he is nontoxic-appearing with stable vital signs.  If he has any lab abnormalities, will consider additional workup.  Also considering his age and body habitus as well as risk factors plan for an EKG.  Patient is agreeable with this plan    ECG:  - Ventricular rate 94 bpm, regular  - DE, QRS, QT intervals normal  - Axis normal  - no ST segment or T wave changes concerning for acute ischemia  - Comparison to prior ECGs: no changes  - My independent interpretation: overall  reassuring in this context    Trop leak stable at 15.    Labs are reassuring.  Lactic acid is negative    Plan to discharge patient home with treatment for diverticulitis including antibiotics and return precautions.  Patient expresses verbal understanding.  All questions answered and discharged in stable condition  Encounter Diagnoses:  Final diagnoses:   Abdominal pain, unspecified abdominal location       Final disposition: discharge    Aleja Panchal DO  EM Physician  [facility]       Aleja Panchal DO  02/02/25 5071

## 2025-02-02 NOTE — ED TRIAGE NOTES
Abdominal pain worsening since onset 1/31/25   pt reports hx of diverticulitis   Writer and provider spoke with pt about treatment and diagnostic options in ED vs. UC. Pt agreed to be evaluated in the ED.

## 2025-02-02 NOTE — DISCHARGE INSTRUCTIONS
You are seen in the ER today with abdominal pain.  You likely have diverticulitis considering her history of different diverticulitis and similar symptoms at this time.  Please take the prescribed antibiotics as written and follow-up with your primary care doctor in a week.  Please come back if you have any changing or worsening symptoms we can get a scan of your belly.  If you at any point stop pooping, have severe abdominal pain, blood in your stool, please be seen immediately.

## 2025-02-02 NOTE — ED TRIAGE NOTES
Presents with lower abdominal pain starting on Friday.   Pt states he has a history of diverticulitis and it feels just like that again.   No vomiting, no fevers.        Triage Assessment (Adult)       Row Name 02/02/25 1214          Triage Assessment    Airway WDL WDL        Respiratory WDL    Respiratory WDL WDL        Skin Circulation/Temperature WDL    Skin Circulation/Temperature WDL WDL        Cardiac WDL    Cardiac WDL WDL        Peripheral/Neurovascular WDL    Peripheral Neurovascular WDL WDL        Cognitive/Neuro/Behavioral WDL    Cognitive/Neuro/Behavioral WDL WDL

## 2025-02-03 ENCOUNTER — MYC MEDICAL ADVICE (OUTPATIENT)
Dept: CARDIOLOGY | Facility: CLINIC | Age: 55
End: 2025-02-03
Payer: COMMERCIAL

## 2025-02-03 ENCOUNTER — MYC MEDICAL ADVICE (OUTPATIENT)
Dept: FAMILY MEDICINE | Facility: CLINIC | Age: 55
End: 2025-02-03
Payer: COMMERCIAL

## 2025-02-03 DIAGNOSIS — I10 ESSENTIAL HYPERTENSION: ICD-10-CM

## 2025-02-03 NOTE — TELEPHONE ENCOUNTER
Pt inquiring about his lab results from the ER.  Pt is not scheduled for an ED follow up.  ED visit was 2/2/25

## 2025-02-04 RX ORDER — AMLODIPINE BESYLATE 5 MG/1
5 TABLET ORAL DAILY
Qty: 30 TABLET | Refills: 2 | Status: SHIPPED | OUTPATIENT
Start: 2025-02-04

## 2025-02-04 NOTE — TELEPHONE ENCOUNTER
Noted MyChart encounter. Med list updated and message returned to pt with update that Rx sent to preferred pharmacy listed on file. RUPA

## 2025-02-10 DIAGNOSIS — I25.83 CORONARY ARTERY DISEASE DUE TO LIPID RICH PLAQUE: ICD-10-CM

## 2025-02-10 DIAGNOSIS — I25.10 CORONARY ARTERY DISEASE DUE TO LIPID RICH PLAQUE: ICD-10-CM

## 2025-02-10 RX ORDER — FUROSEMIDE 20 MG/1
TABLET ORAL
Qty: 30 TABLET | Refills: 5 | Status: SHIPPED | OUTPATIENT
Start: 2025-02-10

## 2025-03-06 ENCOUNTER — TELEPHONE (OUTPATIENT)
Dept: FAMILY MEDICINE | Facility: CLINIC | Age: 55
End: 2025-03-06
Payer: COMMERCIAL

## 2025-03-06 NOTE — TELEPHONE ENCOUNTER
Patient Quality Outreach    Patient is due for the following:   Hypertension -  Hypertension follow-up visit  Physical Preventive Adult Physical    Action(s) Taken:   Schedule a Adult Preventative    Type of outreach:    Phone, left message for patient/parent to call back.    Questions for provider review:    None           Makayla Aleman, CMA

## 2025-03-14 RX ORDER — BISACODYL 5 MG
TABLET, DELAYED RELEASE (ENTERIC COATED) ORAL
Qty: 4 TABLET | Refills: 0 | Status: SHIPPED | OUTPATIENT
Start: 2025-03-14 | End: 2025-06-10

## 2025-03-19 ENCOUNTER — MYC MEDICAL ADVICE (OUTPATIENT)
Dept: FAMILY MEDICINE | Facility: CLINIC | Age: 55
End: 2025-03-19
Payer: COMMERCIAL

## 2025-03-23 ENCOUNTER — HEALTH MAINTENANCE LETTER (OUTPATIENT)
Age: 55
End: 2025-03-23

## 2025-04-07 ENCOUNTER — MYC MEDICAL ADVICE (OUTPATIENT)
Dept: FAMILY MEDICINE | Facility: CLINIC | Age: 55
End: 2025-04-07
Payer: COMMERCIAL

## 2025-04-08 NOTE — TELEPHONE ENCOUNTER
Reviewed with patient informed him we should follow recommendations from the colonoscopy discontinuing Plavix can very but based on this would recommend him discontinuing Plavix for 7 days patient in agreement we will schedule his colonoscopy

## 2025-04-15 ENCOUNTER — OFFICE VISIT (OUTPATIENT)
Dept: CARDIOLOGY | Facility: CLINIC | Age: 55
End: 2025-04-15
Payer: COMMERCIAL

## 2025-04-15 VITALS
DIASTOLIC BLOOD PRESSURE: 76 MMHG | SYSTOLIC BLOOD PRESSURE: 144 MMHG | RESPIRATION RATE: 18 BRPM | BODY MASS INDEX: 43.99 KG/M2 | HEART RATE: 75 BPM | WEIGHT: 297 LBS | HEIGHT: 69 IN

## 2025-04-15 DIAGNOSIS — I10 ESSENTIAL HYPERTENSION: ICD-10-CM

## 2025-04-15 DIAGNOSIS — I25.10 CORONARY ARTERY DISEASE DUE TO LIPID RICH PLAQUE: ICD-10-CM

## 2025-04-15 DIAGNOSIS — I25.83 CORONARY ARTERY DISEASE DUE TO LIPID RICH PLAQUE: ICD-10-CM

## 2025-04-15 LAB — LDLC SERPL DIRECT ASSAY-MCNC: 55 MG/DL

## 2025-04-15 PROCEDURE — 3078F DIAST BP <80 MM HG: CPT | Performed by: INTERNAL MEDICINE

## 2025-04-15 PROCEDURE — 99214 OFFICE O/P EST MOD 30 MIN: CPT | Performed by: INTERNAL MEDICINE

## 2025-04-15 PROCEDURE — 83721 ASSAY OF BLOOD LIPOPROTEIN: CPT | Performed by: INTERNAL MEDICINE

## 2025-04-15 PROCEDURE — G2211 COMPLEX E/M VISIT ADD ON: HCPCS | Performed by: INTERNAL MEDICINE

## 2025-04-15 PROCEDURE — 3077F SYST BP >= 140 MM HG: CPT | Performed by: INTERNAL MEDICINE

## 2025-04-15 PROCEDURE — 36415 COLL VENOUS BLD VENIPUNCTURE: CPT | Performed by: INTERNAL MEDICINE

## 2025-04-15 RX ORDER — METOPROLOL SUCCINATE 100 MG/1
100 TABLET, EXTENDED RELEASE ORAL DAILY
Qty: 90 TABLET | Refills: 3 | Status: SHIPPED | OUTPATIENT
Start: 2025-04-15

## 2025-04-15 RX ORDER — AMLODIPINE BESYLATE 5 MG/1
5 TABLET ORAL DAILY
Qty: 90 TABLET | Refills: 3 | Status: SHIPPED | OUTPATIENT
Start: 2025-04-15

## 2025-04-15 NOTE — PROGRESS NOTES
HEART CARE ENCOUNTER CONSULTATON YUKI KELLY [facility]  [phone]      Assessment/Recommendations   Assessment:  Coronary disease with recent PCI of distal circumflex with mild residual mid LAD disease at [facility] 5/24/2024.  No anginal complaints  2.   Dyslipidemia: Lipids well-controlled.  Due for repeat fasting lipids  3.   Prediabetes  4.   Hypertension: poorly controlled  5.  Morbid obesity  6.  PVCs: Currently not symptomatic  7.  Mild carotid disease     Plan:  1.  May take lasix as needed   2.  Continue dual antiplatelet therapy with aspirin Plavix for 1 year.  May stop Plavix at the end of next month and continue on aspirin 81 mg indefinitely  3.  Continue on high-dose statin therapy.  Check direct LDL today  4.  Increase Toprol-XL to 100 mg daily for better blood pressure control  Follow-up in 1 year       History of Present Illness/Subjective    HPI: Lalito Weathers is a 54 year old male with history of hypertension, diabetes mellitus type 2, dyslipidemia, mild carotid disease, symptomatic PVCs, coronary disease with recent PCI of distal circumflex with mild residual mid LAD disease at [facility] 5/24/2024 who I am seeing today in follow up. He has been doing well since I last saw him.  He continues to exercise regularly.  He goes to the gym 5 days a week and does a mile and a half on the treadmill and strength training.  He feels great with activity.  No cardiac complaints today.    The longitudinal plan of care for the diagnosis(es)/condition(s) as documented were addressed during this visit. Due to the added complexity in care, I will continue to support Renzo in the subsequent management and with ongoing continuity of care.      Recent Echocardiogram Results: 5/25/24   Visually Estimated EF: 55-60%    Normal left ventricular size and ejection fraction. Visually estimated ejection fraction is   55-60%. Mild left ventricular hypertrophy.    Normal right ventricular size  "and function.    Mild left atrial enlargement.    No significant valvular heart disease noted.    Ascending aorta dilated within the upper limits of normal.         Recent Coronary Angiogram Results: 5/24/24      [facility]   Cardiac Catheterization Report     Name:  ZULEYKA WATSON Event Date: 5/24/2024 17:30   Miloian ID #: [id number]    Encounter #: [id number]   Accession #: [id number]   FADUMO #: [id number] Diagnostic Physician: MARK CARSON   [facility] [place]   Interventional Physician: MARK CARSON   [facility] [place]   Referring Physician:   Lizet Berry YOB: 1970   Gender: Male   Age: 53     Summary/Conclusions   PRESENTATION / INDICATIONS       DIAGNOSTIC SUMMARY   ? The LMCA is free of significant disease.   ? 30% stenosis in the Mid LAD   ? 100% stenosis in the Distal Circumflex   ? The RCA is free of significant disease.       INTERVENTION   ? Successful  1mm x 15mm Balloon,  2mm x 15mm Balloon,  2.25mm x 30mm Drug Eluting Stent,  2.25mm x 30mm Balloon,  2.25mm x 15mm Balloon, and  2.5mm x 15mm Balloon to Distal Circumflex, post stenosis 0%       RECOMMENDATIONS & PLAN   Plavix for 1 year      The longitudinal plan of care for the diagnosis(es)/condition(s) as documented were addressed during this visit. Due to the added complexity in care, I will continue to support Renzo in the subsequent management and with ongoing continuity of care.          Physical Examination  Review of Systems   Vitals: BP (!) 144/76 (BP Location: Right arm, Patient Position: Sitting, Cuff Size: Adult Large)   Pulse 75   Resp 18   Ht 1.753 m (5' 9\")   Wt 134.7 kg (297 lb)   BMI 43.86 kg/m    BMI= Body mass index is 43.86 kg/m .  Wt Readings from Last 3 Encounters:   04/15/25 134.7 kg (297 lb)   02/02/25 131.5 kg (290 lb)   12/27/24 132.5 kg (292 lb)       General Appearance:   no distress, normal body habitus   ENT/Mouth: " membranes moist, no oral lesions or bleeding gums.      EYES:  no scleral icterus, normal conjunctivae   Neck: no carotid bruits or thyromegaly   Chest/Lungs:   lungs are clear to auscultation   Cardiovascular:   Regular. Normal first and second heart sounds with no murmur no edema bilaterally        Extremities: no cyanosis or clubbing   Skin: no xanthelasma, warm.    Neurologic: normal  bilateral, no tremors     Psychiatric: alert and oriented x3, calm        Please refer above for cardiac ROS details.        Medical History  Surgical History Family History Social History   Past Medical History:   Diagnosis Date    Hypertension     Obese     Sleep apnea     Tubular adenoma of colon 06/28/2019     Past Surgical History:   Procedure Laterality Date    APPENDECTOMY  12/30/2011    COLONOSCOPY N/A 06/21/2019    Procedure: COLONOSCOPY, WITH POLYPECTOMY AND BIOPSY;  Surgeon: Herman Champagne MD;  Location: [facility]    ESOPHAGOSCOPY, GASTROSCOPY, DUODENOSCOPY (EGD), COMBINED N/A 10/29/2021    Procedure: ESOPHAGOGASTRODUODENOSCOPY, WITH BIOPSY;  Surgeon: Jakub Wray DO;  Location: [facility]    ESOPHAGOSCOPY, GASTROSCOPY, DUODENOSCOPY (EGD), COMBINED N/A 1/8/2024    Procedure: ESOPHAGOGASTRODUODENOSCOPY, WITH BIOPSY;  Surgeon: Jimbo Aguirre MD;  Location: [facility]    ORTHOPEDIC SURGERY  05/10/2016    EXCISION LEFT UPPER INNER THIGH SOFT TISSUE MASS 5/10/16   MD NATE     Family History   Problem Relation Age of Onset    Unknown/Adopted Father         Social History     Socioeconomic History    Marital status:      Spouse name: Not on file    Number of children: Not on file    Years of education: Not on file    Highest education level: Not on file   Occupational History    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   Tobacco Use    Smoking status: Former     Passive exposure: Never    Smokeless tobacco: Former   Vaping Use    Vaping status: Never Used   Substance and Sexual Activity    Alcohol  use: Yes     Comment: social    Drug use: No    Sexual activity: Yes     Partners: Female   Other Topics Concern    Not on file   Social History Narrative    Not on file     Social Drivers of Health     Financial Resource Strain: Low Risk  (1/18/2024)    Financial Resource Strain     Within the past 12 months, have you or your family members you live with been unable to get utilities (heat, electricity) when it was really needed?: No   Food Insecurity: Low Risk  (1/18/2024)    Food Insecurity     Within the past 12 months, did you worry that your food would run out before you got money to buy more?: No     Within the past 12 months, did the food you bought just not last and you didn t have money to get more?: No   Transportation Needs: Low Risk  (1/18/2024)    Transportation Needs     Within the past 12 months, has lack of transportation kept you from medical appointments, getting your medicines, non-medical meetings or appointments, work, or from getting things that you need?: No   Physical Activity: Not on file   Stress: Not on file   Social Connections: Unknown (5/28/2024)    Received from [facility] new test company [facility]    Social Connections     Frequency of Communication with Friends and Family: Not on file   Interpersonal Safety: Low Risk  (8/30/2024)    Interpersonal Safety     Do you feel physically and emotionally safe where you currently live?: Yes     Within the past 12 months, have you been hit, slapped, kicked or otherwise physically hurt by someone?: No     Within the past 12 months, have you been humiliated or emotionally abused in other ways by your partner or ex-partner?: No   Housing Stability: Low Risk  (1/18/2024)    Housing Stability     Do you have housing? : Yes     Are you worried about losing your housing?: No           Medications  Allergies   Current Outpatient Medications   Medication Sig Dispense Refill    allopurinol (ZYLOPRIM) 100 MG tablet Take 2 tablets (200 mg) by mouth  daily. 180 tablet 3    amLODIPine (NORVASC) 5 MG tablet Take 1 tablet (5 mg) by mouth daily. 30 tablet 2    aspirin 81 MG EC tablet       bisacodyl (DULCOLAX) 5 MG EC tablet 2 days prior to procedure, take 2 tablets at 4 pm. 1 day prior to procedure, take 2 tablets at 4 pm. For additional instructions refer to your colonoscopy prep instructions. 4 tablet 0    cholecalciferol (VITAMIN D3) 5000 units TABS tablet Take by mouth daily      clopidogrel (PLAVIX) 75 MG tablet Take 1 tablet (75 mg) by mouth daily 90 tablet 2    fish oil-omega-3 fatty acids 1000 MG capsule Take 1 g by mouth daily      furosemide (LASIX) 20 MG tablet TAKE ONE TABLET (20 MG) BY MOUTH AS NEEDED (FOR SWELLING) 30 tablet 5    lisinopril (ZESTRIL) 40 MG tablet Take 1 tablet (40 mg) by mouth daily 90 tablet 3    metoprolol succinate ER (TOPROL XL) 50 MG 24 hr tablet Take 1 tablet (50 mg) by mouth daily 90 tablet 3    multivitamin w/minerals (THERA-VIT-M) tablet Take 1 tablet by mouth daily      nitroGLYcerin (NITROSTAT) 0.4 MG sublingual tablet Place 0.4 mg under the tongue.      polyethylene glycol (GOLYTELY) 236 g suspension 2 days prior at 5pm, mix and drink half of a jug of Golytely. Drink an 8 oz. glass of Golytely every 15 minutes until half of the jug is gone. Place remainder of Golytely in the refrigerator. 1 day prior at 5 pm, drink the 2nd half of a jug of Golytely bowel prep. 6 hours before your check-in time, drink an 8 oz. glass of Golytely every 15 minutes until half of the 2nd jug of Golytely is gone. Discard remainder of second jug. 8000 mL 0    rosuvastatin (CRESTOR) 40 MG tablet Take 1 tablet (40 mg) by mouth daily 90 tablet 3    solifenacin (VESICARE) 10 MG tablet Take 1 tablet (10 mg) by mouth daily. 90 tablet 3    tamsulosin (FLOMAX) 0.4 MG capsule Take 2 capsules (0.8 mg) by mouth daily. 180 capsule 3    triamterene-HCTZ (DYAZIDE) 37.5-25 MG capsule Take 2 capsules by mouth daily 180 capsule 3    vitamin C (ASCORBIC ACID) 1000  "MG TABS Take 1,000 mg by mouth daily         Allergies   Allergen Reactions    Food Hives     mangos    [place] Flavoring Agent (Non-Screening)     Statin Drugs [Statins]     Zantac [Ranitidine]           Lab Results    Chemistry/lipid CBC Cardiac Enzymes/BNP/TSH/INR   Recent Labs   Lab Test 12/14/23  0923   CHOL 118   HDL 40   LDL 55   TRIG 116     Recent Labs   Lab Test 12/14/23  0923 09/22/22  1335 10/14/21  1022   LDL 55 93 57     Recent Labs   Lab Test 02/02/25  1244      POTASSIUM 4.2   CHLORIDE 103   CO2 25   *   BUN 21.3*   CR 0.99   GFRESTIMATED 89   HERMELINDA 9.9     Recent Labs   Lab Test 02/02/25  1244 09/02/24  1313 12/14/23  0923   CR 0.99 0.96 1.09     Recent Labs   Lab Test 09/19/24  0923 04/15/24  0919 12/14/23  0923   A1C 5.8* 5.9* 5.7*          Recent Labs   Lab Test 02/02/25  1244   WBC 11.8*   HGB 13.4   HCT 40.2   MCV 90        Recent Labs   Lab Test 02/02/25  1244 09/02/24  1313 07/20/23  0735   HGB 13.4 13.8 13.1*    No results for input(s): \"TROPONINI\" in the last 77497 hours.  Recent Labs   Lab Test 06/09/22  1955   NTBNPI 19     Recent Labs   Lab Test 07/04/19  1152   TSH 0.99     No results for input(s): \"INR\" in the last 34019 hours.     Shanda Wilkes MD                                      "

## 2025-04-15 NOTE — LETTER
4/15/2025    Lizet Berry, APRN CNP  [address] [facility] [address]    RE: Lalito Weathers       Dear Colleague,     I had the pleasure of seeing Laliot Weathers in the [facility].    HEART CARE ENCOUNTER CONSULTATON YUKI KELLY [facility]  [phone]      Assessment/Recommendations   Assessment:  Coronary disease with recent PCI of distal circumflex with mild residual mid LAD disease at [facility] 5/24/2024.  No anginal complaints  2.   Dyslipidemia: Lipids well-controlled.  Due for repeat fasting lipids  3.   Prediabetes  4.   Hypertension: poorly controlled  5.  Morbid obesity  6.  PVCs: Currently not symptomatic  7.  Mild carotid disease     Plan:  1.  May take lasix as needed   2.  Continue dual antiplatelet therapy with aspirin Plavix for 1 year.  May stop Plavix at the end of next month and continue on aspirin 81 mg indefinitely  3.  Continue on high-dose statin therapy.  Check direct LDL today  4.  Increase Toprol-XL to 100 mg daily for better blood pressure control  Follow-up in 1 year       History of Present Illness/Subjective    HPI: Lalito Weathers is a 54 year old male with history of hypertension, diabetes mellitus type 2, dyslipidemia, mild carotid disease, symptomatic PVCs, coronary disease with recent PCI of distal circumflex with mild residual mid LAD disease at [facility] 5/24/2024 who I am seeing today in follow up. He has been doing well since I last saw him.  He continues to exercise regularly.  He goes to the gym 5 days a week and does a mile and a half on the treadmill and strength training.  He feels great with activity.  No cardiac complaints today.    The longitudinal plan of care for the diagnosis(es)/condition(s) as documented were addressed during this visit. Due to the added complexity in care, I will continue to support Renzo in the subsequent management and with ongoing continuity of care.      Recent Echocardiogram Results:  "5/25/24   Visually Estimated EF: 55-60%    Normal left ventricular size and ejection fraction. Visually estimated ejection fraction is   55-60%. Mild left ventricular hypertrophy.    Normal right ventricular size and function.    Mild left atrial enlargement.    No significant valvular heart disease noted.    Ascending aorta dilated within the upper limits of normal.         Recent Coronary Angiogram Results: 5/24/24      [facility]   Cardiac Catheterization Report     Name:  ZULEYKA WATSON Event Date: 5/24/2024 17:30   Excellian ID #: [id number]    Encounter #: [id number]   Accession #: [id number]   FADUMO #: [id number] Diagnostic Physician: MARK CARSON   [facility] [place]   Interventional Physician: MARK CARSON   [facility] [place]   Referring Physician:   Lizet Berry YOB: 1970   Gender: Male   Age: 53     Summary/Conclusions   PRESENTATION / INDICATIONS       DIAGNOSTIC SUMMARY   ? The LMCA is free of significant disease.   ? 30% stenosis in the Mid LAD   ? 100% stenosis in the Distal Circumflex   ? The RCA is free of significant disease.       INTERVENTION   ? Successful  1mm x 15mm Balloon,  2mm x 15mm Balloon,  2.25mm x 30mm Drug Eluting Stent,  2.25mm x 30mm Balloon,  2.25mm x 15mm Balloon, and  2.5mm x 15mm Balloon to Distal Circumflex, post stenosis 0%       RECOMMENDATIONS & PLAN   Plavix for 1 year      The longitudinal plan of care for the diagnosis(es)/condition(s) as documented were addressed during this visit. Due to the added complexity in care, I will continue to support Renzo in the subsequent management and with ongoing continuity of care.          Physical Examination  Review of Systems   Vitals: BP (!) 144/76 (BP Location: Right arm, Patient Position: Sitting, Cuff Size: Adult Large)   Pulse 75   Resp 18   Ht 1.753 m (5' 9\")   Wt 134.7 kg (297 lb)   BMI 43.86 kg/m    BMI= Body mass index is " 43.86 kg/m .  Wt Readings from Last 3 Encounters:   04/15/25 134.7 kg (297 lb)   02/02/25 131.5 kg (290 lb)   12/27/24 132.5 kg (292 lb)       General Appearance:   no distress, normal body habitus   ENT/Mouth: membranes moist, no oral lesions or bleeding gums.      EYES:  no scleral icterus, normal conjunctivae   Neck: no carotid bruits or thyromegaly   Chest/Lungs:   lungs are clear to auscultation   Cardiovascular:   Regular. Normal first and second heart sounds with no murmur no edema bilaterally        Extremities: no cyanosis or clubbing   Skin: no xanthelasma, warm.    Neurologic: normal  bilateral, no tremors     Psychiatric: alert and oriented x3, calm        Please refer above for cardiac ROS details.        Medical History  Surgical History Family History Social History   Past Medical History:   Diagnosis Date     Hypertension      Obese      Sleep apnea      Tubular adenoma of colon 06/28/2019     Past Surgical History:   Procedure Laterality Date     APPENDECTOMY  12/30/2011     COLONOSCOPY N/A 06/21/2019    Procedure: COLONOSCOPY, WITH POLYPECTOMY AND BIOPSY;  Surgeon: Herman Champagne MD;  Location: [facility]     ESOPHAGOSCOPY, GASTROSCOPY, DUODENOSCOPY (EGD), COMBINED N/A 10/29/2021    Procedure: ESOPHAGOGASTRODUODENOSCOPY, WITH BIOPSY;  Surgeon: Jakub Wray DO;  Location: [facility]     ESOPHAGOSCOPY, GASTROSCOPY, DUODENOSCOPY (EGD), COMBINED N/A 1/8/2024    Procedure: ESOPHAGOGASTRODUODENOSCOPY, WITH BIOPSY;  Surgeon: Jimbo Aguirre MD;  Location: [facility]     ORTHOPEDIC SURGERY  05/10/2016    EXCISION LEFT UPPER INNER THIGH SOFT TISSUE MASS 5/10/16   MD NATE     Family History   Problem Relation Age of Onset     Unknown/Adopted Father         Social History     Socioeconomic History     Marital status:      Spouse name: Not on file     Number of children: Not on file     Years of education: Not on file     Highest education level: Not on file   Occupational History     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   Tobacco Use     Smoking status: Former     Passive exposure: Never     Smokeless tobacco: Former   Vaping Use     Vaping status: Never Used   Substance and Sexual Activity     Alcohol use: Yes     Comment: social     Drug use: No     Sexual activity: Yes     Partners: Female   Other Topics Concern     Not on file   Social History Narrative     Not on file     Social Drivers of Health     Financial Resource Strain: Low Risk  (1/18/2024)    Financial Resource Strain      Within the past 12 months, have you or your family members you live with been unable to get utilities (heat, electricity) when it was really needed?: No   Food Insecurity: Low Risk  (1/18/2024)    Food Insecurity      Within the past 12 months, did you worry that your food would run out before you got money to buy more?: No      Within the past 12 months, did the food you bought just not last and you didn t have money to get more?: No   Transportation Needs: Low Risk  (1/18/2024)    Transportation Needs      Within the past 12 months, has lack of transportation kept you from medical appointments, getting your medicines, non-medical meetings or appointments, work, or from getting things that you need?: No   Physical Activity: Not on file   Stress: Not on file   Social Connections: Unknown (5/28/2024)    Received from [facility]    Social Connections      Frequency of Communication with Friends and Family: Not on file   Interpersonal Safety: Low Risk  (8/30/2024)    Interpersonal Safety      Do you feel physically and emotionally safe where you currently live?: Yes      Within the past 12 months, have you been hit, slapped, kicked or otherwise physically hurt by someone?: No      Within the past 12 months, have you been humiliated or emotionally abused in other ways by your partner or ex-partner?: No   Housing Stability: Low Risk  (1/18/2024)    Housing Stability      Do  you have housing? : Yes      Are you worried about losing your housing?: No           Medications  Allergies   Current Outpatient Medications   Medication Sig Dispense Refill     allopurinol (ZYLOPRIM) 100 MG tablet Take 2 tablets (200 mg) by mouth daily. 180 tablet 3     amLODIPine (NORVASC) 5 MG tablet Take 1 tablet (5 mg) by mouth daily. 30 tablet 2     aspirin 81 MG EC tablet        bisacodyl (DULCOLAX) 5 MG EC tablet 2 days prior to procedure, take 2 tablets at 4 pm. 1 day prior to procedure, take 2 tablets at 4 pm. For additional instructions refer to your colonoscopy prep instructions. 4 tablet 0     cholecalciferol (VITAMIN D3) 5000 units TABS tablet Take by mouth daily       clopidogrel (PLAVIX) 75 MG tablet Take 1 tablet (75 mg) by mouth daily 90 tablet 2     fish oil-omega-3 fatty acids 1000 MG capsule Take 1 g by mouth daily       furosemide (LASIX) 20 MG tablet TAKE ONE TABLET (20 MG) BY MOUTH AS NEEDED (FOR SWELLING) 30 tablet 5     lisinopril (ZESTRIL) 40 MG tablet Take 1 tablet (40 mg) by mouth daily 90 tablet 3     metoprolol succinate ER (TOPROL XL) 50 MG 24 hr tablet Take 1 tablet (50 mg) by mouth daily 90 tablet 3     multivitamin w/minerals (THERA-VIT-M) tablet Take 1 tablet by mouth daily       nitroGLYcerin (NITROSTAT) 0.4 MG sublingual tablet Place 0.4 mg under the tongue.       polyethylene glycol (GOLYTELY) 236 g suspension 2 days prior at 5pm, mix and drink half of a jug of Golytely. Drink an 8 oz. glass of Golytely every 15 minutes until half of the jug is gone. Place remainder of Golytely in the refrigerator. 1 day prior at 5 pm, drink the 2nd half of a jug of Golytely bowel prep. 6 hours before your check-in time, drink an 8 oz. glass of Golytely every 15 minutes until half of the 2nd jug of Golytely is gone. Discard remainder of second jug. 8000 mL 0     rosuvastatin (CRESTOR) 40 MG tablet Take 1 tablet (40 mg) by mouth daily 90 tablet 3     solifenacin (VESICARE) 10 MG tablet Take 1  "tablet (10 mg) by mouth daily. 90 tablet 3     tamsulosin (FLOMAX) 0.4 MG capsule Take 2 capsules (0.8 mg) by mouth daily. 180 capsule 3     triamterene-HCTZ (DYAZIDE) 37.5-25 MG capsule Take 2 capsules by mouth daily 180 capsule 3     vitamin C (ASCORBIC ACID) 1000 MG TABS Take 1,000 mg by mouth daily         Allergies   Allergen Reactions     Food Hives     mangos     [place] Flavoring Agent (Non-Screening)      Statin Drugs [Statins]      Zantac [Ranitidine]           Lab Results    Chemistry/lipid CBC Cardiac Enzymes/BNP/TSH/INR   Recent Labs   Lab Test 12/14/23  0923   CHOL 118   HDL 40   LDL 55   TRIG 116     Recent Labs   Lab Test 12/14/23  0923 09/22/22  1335 10/14/21  1022   LDL 55 93 57     Recent Labs   Lab Test 02/02/25  1244      POTASSIUM 4.2   CHLORIDE 103   CO2 25   *   BUN 21.3*   CR 0.99   GFRESTIMATED 89   HERMELINDA 9.9     Recent Labs   Lab Test 02/02/25  1244 09/02/24  1313 12/14/23  0923   CR 0.99 0.96 1.09     Recent Labs   Lab Test 09/19/24  0923 04/15/24  0919 12/14/23  0923   A1C 5.8* 5.9* 5.7*          Recent Labs   Lab Test 02/02/25  1244   WBC 11.8*   HGB 13.4   HCT 40.2   MCV 90        Recent Labs   Lab Test 02/02/25  1244 09/02/24  1313 07/20/23  0735   HGB 13.4 13.8 13.1*    No results for input(s): \"TROPONINI\" in the last 21149 hours.  Recent Labs   Lab Test 06/09/22  1955   NTBNPI 19     Recent Labs   Lab Test 07/04/19  1152   TSH 0.99     No results for input(s): \"INR\" in the last 53190 hours.     Shanda Wilkes MD                                        Thank you for allowing me to participate in the care of your patient.      Sincerely,     Shanda Wilkes MD     [facility]  cc:   Shanda Wilkes MD  [address]  [address]  [place] [address]      "

## 2025-04-15 NOTE — PATIENT INSTRUCTIONS
Lalito LETICIA Weathers,     It was a pleasure to see you in the office today. My recommendations for you include:   1. Stop plavix 5/24/25 and continue aspirin 81 mg daily  2. Increase toprol XL to 100 mg daily. May take two tabs of the 50 mg.      Please do not hesitate to call the [facility] clinic with any questions or concerns at [phone].    Sincerely,     Shanda Wilkes MD

## 2025-04-17 ENCOUNTER — TELEPHONE (OUTPATIENT)
Dept: FAMILY MEDICINE | Facility: CLINIC | Age: 55
End: 2025-04-17
Payer: COMMERCIAL

## 2025-04-17 NOTE — TELEPHONE ENCOUNTER
Patient Quality Outreach    Patient is due for the following:   Diabetes -  A1C, Eye Exam, Microalbumin, BP Check, and Diabetic Follow-Up Visit    Action(s) Taken:   Schedule a Adult Preventative    Type of outreach:    Sent [employer] message.    Questions for provider review:    None         Makayla Aleman CMA  Chart routed to None.

## 2025-05-22 ENCOUNTER — MYC MEDICAL ADVICE (OUTPATIENT)
Dept: CARDIOLOGY | Facility: CLINIC | Age: 55
End: 2025-05-22
Payer: COMMERCIAL

## 2025-05-28 NOTE — TELEPHONE ENCOUNTER
[place] Community Measures Blood Pressure guideline reviewed.  Patients recent blood pressure is outside of guideline parameters.  Called pt to review, no answer.  Left voicemail message asking patient to check their blood pressure using a home blood pressure cuff or by going to a [facility].  Patient instructed to then call [phone] ([facility]) and leave a message with their name, date of birth, and blood pressure reading that was completed within the last 24 hours and where it was completed.  Will await call back for further review.

## 2025-06-09 DIAGNOSIS — I25.10 CORONARY ARTERY DISEASE DUE TO LIPID RICH PLAQUE: ICD-10-CM

## 2025-06-09 DIAGNOSIS — I10 ESSENTIAL HYPERTENSION: ICD-10-CM

## 2025-06-09 DIAGNOSIS — I25.83 CORONARY ARTERY DISEASE DUE TO LIPID RICH PLAQUE: ICD-10-CM

## 2025-06-10 ENCOUNTER — ANCILLARY PROCEDURE (OUTPATIENT)
Dept: GENERAL RADIOLOGY | Facility: CLINIC | Age: 55
End: 2025-06-10
Attending: NURSE PRACTITIONER
Payer: COMMERCIAL

## 2025-06-10 ENCOUNTER — RESULTS FOLLOW-UP (OUTPATIENT)
Dept: UROLOGY | Facility: CLINIC | Age: 55
End: 2025-06-10

## 2025-06-10 ENCOUNTER — OFFICE VISIT (OUTPATIENT)
Dept: FAMILY MEDICINE | Facility: CLINIC | Age: 55
End: 2025-06-10
Payer: COMMERCIAL

## 2025-06-10 VITALS
SYSTOLIC BLOOD PRESSURE: 120 MMHG | WEIGHT: 295 LBS | TEMPERATURE: 97.5 F | HEIGHT: 69 IN | HEART RATE: 69 BPM | RESPIRATION RATE: 20 BRPM | OXYGEN SATURATION: 98 % | DIASTOLIC BLOOD PRESSURE: 78 MMHG | BODY MASS INDEX: 43.69 KG/M2

## 2025-06-10 DIAGNOSIS — Z12.11 SCREEN FOR COLON CANCER: ICD-10-CM

## 2025-06-10 DIAGNOSIS — E78.41 ELEVATED LIPOPROTEIN(A): ICD-10-CM

## 2025-06-10 DIAGNOSIS — M10.9 GOUT, UNSPECIFIED CAUSE, UNSPECIFIED CHRONICITY, UNSPECIFIED SITE: ICD-10-CM

## 2025-06-10 DIAGNOSIS — E66.01 MORBID OBESITY (H): ICD-10-CM

## 2025-06-10 DIAGNOSIS — E11.8 TYPE 2 DIABETES MELLITUS WITH UNSPECIFIED COMPLICATIONS (H): ICD-10-CM

## 2025-06-10 DIAGNOSIS — M25.562 ACUTE PAIN OF LEFT KNEE: ICD-10-CM

## 2025-06-10 DIAGNOSIS — Z12.5 SCREENING FOR PROSTATE CANCER: ICD-10-CM

## 2025-06-10 DIAGNOSIS — Z13.6 SCREENING FOR CARDIOVASCULAR CONDITION: ICD-10-CM

## 2025-06-10 DIAGNOSIS — M17.0 BILATERAL PRIMARY OSTEOARTHRITIS OF KNEE: ICD-10-CM

## 2025-06-10 DIAGNOSIS — M17.0 BILATERAL PRIMARY OSTEOARTHRITIS OF KNEE: Primary | ICD-10-CM

## 2025-06-10 DIAGNOSIS — M10.9 GOUT, UNSPECIFIED CAUSE, UNSPECIFIED CHRONICITY, UNSPECIFIED SITE: Primary | ICD-10-CM

## 2025-06-10 LAB
CHOLEST SERPL-MCNC: 111 MG/DL
EST. AVERAGE GLUCOSE BLD GHB EST-MCNC: 143 MG/DL
FASTING STATUS PATIENT QL REPORTED: NO
HBA1C MFR BLD: 6.6 % (ref 0–5.6)
HDLC SERPL-MCNC: 31 MG/DL
LDLC SERPL CALC-MCNC: 43 MG/DL
NONHDLC SERPL-MCNC: 80 MG/DL
PSA SERPL DL<=0.01 NG/ML-MCNC: 0.22 NG/ML (ref 0–3.5)
TRIGL SERPL-MCNC: 185 MG/DL
URATE SERPL-MCNC: 6.2 MG/DL (ref 3.4–7)

## 2025-06-10 PROCEDURE — 99214 OFFICE O/P EST MOD 30 MIN: CPT | Performed by: NURSE PRACTITIONER

## 2025-06-10 PROCEDURE — 83036 HEMOGLOBIN GLYCOSYLATED A1C: CPT | Performed by: NURSE PRACTITIONER

## 2025-06-10 PROCEDURE — 84550 ASSAY OF BLOOD/URIC ACID: CPT | Performed by: NURSE PRACTITIONER

## 2025-06-10 PROCEDURE — 1125F AMNT PAIN NOTED PAIN PRSNT: CPT | Performed by: NURSE PRACTITIONER

## 2025-06-10 PROCEDURE — 3044F HG A1C LEVEL LT 7.0%: CPT | Performed by: NURSE PRACTITIONER

## 2025-06-10 PROCEDURE — 3074F SYST BP LT 130 MM HG: CPT | Performed by: NURSE PRACTITIONER

## 2025-06-10 PROCEDURE — G0103 PSA SCREENING: HCPCS | Performed by: NURSE PRACTITIONER

## 2025-06-10 PROCEDURE — G2211 COMPLEX E/M VISIT ADD ON: HCPCS | Performed by: NURSE PRACTITIONER

## 2025-06-10 PROCEDURE — 73560 X-RAY EXAM OF KNEE 1 OR 2: CPT | Mod: TC | Performed by: RADIOLOGY

## 2025-06-10 PROCEDURE — 36415 COLL VENOUS BLD VENIPUNCTURE: CPT | Performed by: NURSE PRACTITIONER

## 2025-06-10 PROCEDURE — 3078F DIAST BP <80 MM HG: CPT | Performed by: NURSE PRACTITIONER

## 2025-06-10 PROCEDURE — 80061 LIPID PANEL: CPT | Performed by: NURSE PRACTITIONER

## 2025-06-10 PROCEDURE — 3048F LDL-C <100 MG/DL: CPT | Performed by: NURSE PRACTITIONER

## 2025-06-10 ASSESSMENT — PAIN SCALES - GENERAL: PAINLEVEL_OUTOF10: SEVERE PAIN (7)

## 2025-06-10 NOTE — PROGRESS NOTES
"  {PROVIDER CHARTING PREFERENCE:851628}    Subjective   Renzo is a 54 year old, presenting for the following health issues:  Knee Pain        6/10/2025     8:26 AM   Additional Questions   Roomed by Makayla     Knee Pain    History of Present Illness       Reason for visit:  Knees are warng out  Symptom onset:  More than a month  Symptoms include:  Knee pain  Symptom intensity:  Moderate  Symptom progression:  Worsening  Had these symptoms before:  Yes  Has tried/received treatment for these symptoms:  No  What makes it worse:  Climbing stairs  What makes it better:  Walking or resting them   He is taking medications regularly.            Review of Systems  Constitutional, HEENT, cardiovascular, pulmonary, gi and gu systems are negative, except as otherwise noted.      Objective    /78   Pulse 69   Temp 97.5  F (36.4  C) (Tympanic)   Resp 20   Ht 1.753 m (5' 9\")   Wt 133.8 kg (295 lb)   SpO2 98%   BMI 43.56 kg/m    Body mass index is 43.56 kg/m .  Physical Exam   GENERAL: alert and no distress  EYES: Eyes grossly normal to inspection, PERRL and conjunctivae and sclerae normal  HENT: ear canals and TM's normal, nose and mouth without ulcers or lesions  NECK: no adenopathy, no asymmetry, masses, or scars  RESP: lungs clear to auscultation - no rales, rhonchi or wheezes  CV: regular rate and rhythm, normal S1 S2, no S3 or S4, no murmur, click or rub, no peripheral edema  MS: no gross musculoskeletal defects noted, no edema  SKIN: no suspicious lesions or rashes  NEURO: Normal strength and tone, mentation intact and speech normal  PSYCH: mentation appears normal, affect normal/bright    No results found for any visits on 06/10/25.        Signed Electronically by: TERESA Romero CNP  {Email feedback regarding this note to [email or web address]   :[id number]}  " "issues:  Knee Pain        6/10/2025     8:26 AM   Additional Questions   Roomed by Makayla     Knee Pain    History of Present Illness       Reason for visit:  Knees are warng out  Symptom onset:  More than a month  Symptoms include:  Knee pain  Symptom intensity:  Moderate  Symptom progression:  Worsening  Had these symptoms before:  Yes  Has tried/received treatment for these symptoms:  No  What makes it worse:  Climbing stairs  What makes it better:  Walking or resting them   He is taking medications regularly.            Review of Systems  Constitutional, HEENT, cardiovascular, pulmonary, gi and gu systems are negative, except as otherwise noted.      Objective    /78   Pulse 69   Temp 97.5  F (36.4  C) (Tympanic)   Resp 20   Ht 1.753 m (5' 9\")   Wt 133.8 kg (295 lb)   SpO2 98%   BMI 43.56 kg/m    Body mass index is 43.56 kg/m .  Physical Exam   GENERAL: alert and no distress  EYES: Eyes grossly normal to inspection, PERRL and conjunctivae and sclerae normal  HENT: ear canals and TM's normal, nose and mouth without ulcers or lesions  NECK: no adenopathy, no asymmetry, masses, or scars  RESP: lungs clear to auscultation - no rales, rhonchi or wheezes  CV: regular rate and rhythm, normal S1 S2, no S3 or S4, no murmur, click or rub, no peripheral edema  MS: no gross musculoskeletal defects noted, no edema  SKIN: no suspicious lesions or rashes  NEURO: Normal strength and tone, mentation intact and speech normal  PSYCH: mentation appears normal, affect normal/bright      Inspection:       AP/lateral alignment normal      Non-tender: patellar facets, MCL, LCL, lateral joint line, medial joint line, IT band, posterior knee   Active Range of Motion: all normal  Strength: quad  5/5, Hamstrings  5/5, Gastroc  5/5, Tibialis anterior  5/5, Permeals  5/5 and core strength  5/5 hip abductors and other core muscles   Special tests: normal Valgus stress test, normal Varus, negative Lachman's test, negative pivot " shift, negative posterior drawer, no posterolateral corner signs, negative Joshua's, no apprehension with lateral stress of the patella.    Results for orders placed or performed in visit on 06/10/25   Prostate Specific Antigen Screen     Status: Normal   Result Value Ref Range    Prostate Specific Antigen Screen 0.22 0.00 - 3.50 ng/mL    Narrative    This result is obtained using the Roche Elecsys total PSA method on the nohemy e601 immunoassay analyzer, which is an ultrasensitive method. Results obtained with different assay methods or kits cannot be used interchangeably.  This test is intended for initial prostate cancer screening. PSA values exceeding the age-specific limits are suspicious for prostate disease, but additional testing, such as prostate biopsy, is needed to diagnose prostate pathology. The American Cancer Society recommends annual examination with digital rectal examination and serum PSA beginning at age 50 and for men with a life expectancy of at least 10 years after detection of prostate cancer. For men in high-risk groups, such as  Americans or men with a first-degree relative diagnosed at a younger age, testing should begin at a younger age. It is generally recommended that information be provided to patients about the benefits and limitations of testing and treatment so they can make informed decisions.   Uric acid     Status: Normal   Result Value Ref Range    Uric Acid 6.2 3.4 - 7.0 mg/dL   Lipid panel reflex to direct LDL Non-fasting     Status: Abnormal   Result Value Ref Range    Cholesterol 111 <200 mg/dL    Triglycerides 185 (H) <150 mg/dL    Direct Measure HDL 31 (L) >=40 mg/dL    LDL Cholesterol Calculated 43 <100 mg/dL    Non HDL Cholesterol 80 <130 mg/dL    Patient Fasting > 8hrs? No     Narrative    Cholesterol  Desirable: < 200 mg/dL  Borderline High: 200 - 239 mg/dL  High: >= 240 mg/dL    Triglycerides  Normal: < 150 mg/dL  Borderline High: 150 - 199 mg/dL  High: 200-499  mg/dL  Very High: >= 500 mg/dL    Direct Measure HDL  Female: >= 50 mg/dL   Male: >= 40 mg/dL    LDL Cholesterol  Desirable: < 100 mg/dL  Above Desirable: 100 - 129 mg/dL   Borderline High: 130 - 159 mg/dL   High:  160 - 189 mg/dL   Very High: >= 190 mg/dL    Non HDL Cholesterol  Desirable: < 130 mg/dL  Above Desirable: 130 - 159 mg/dL  Borderline High: 160 - 189 mg/dL  High: 190 - 219 mg/dL  Very High: >= 220 mg/dL   HEMOGLOBIN A1C     Status: Abnormal   Result Value Ref Range    Estimated Average Glucose 143 (H) <117 mg/dL    Hemoglobin A1C 6.6 (H) 0.0 - 5.6 %           Signed Electronically by: TERESA Romero CNP

## 2025-06-11 ENCOUNTER — PATIENT OUTREACH (OUTPATIENT)
Dept: CARE COORDINATION | Facility: CLINIC | Age: 55
End: 2025-06-11
Payer: COMMERCIAL

## 2025-06-12 RX ORDER — CLOPIDOGREL BISULFATE 75 MG/1
75 TABLET ORAL DAILY
Qty: 90 TABLET | Refills: 2 | OUTPATIENT
Start: 2025-06-12

## 2025-06-15 ENCOUNTER — HEALTH MAINTENANCE LETTER (OUTPATIENT)
Age: 55
End: 2025-06-15

## 2025-06-16 ENCOUNTER — PATIENT OUTREACH (OUTPATIENT)
Dept: CARE COORDINATION | Facility: CLINIC | Age: 55
End: 2025-06-16
Payer: COMMERCIAL

## 2025-06-18 ENCOUNTER — TRANSFERRED RECORDS (OUTPATIENT)
Dept: HEALTH INFORMATION MANAGEMENT | Facility: CLINIC | Age: 55
End: 2025-06-18
Payer: COMMERCIAL

## 2025-06-18 ENCOUNTER — PATIENT OUTREACH (OUTPATIENT)
Dept: CARE COORDINATION | Facility: CLINIC | Age: 55
End: 2025-06-18
Payer: COMMERCIAL

## 2025-06-20 ENCOUNTER — HOSPITAL ENCOUNTER (OUTPATIENT)
Facility: CLINIC | Age: 55
Discharge: HOME | End: 2025-06-20
Attending: SURGERY | Admitting: SURGERY
Payer: COMMERCIAL

## 2025-06-20 VITALS
SYSTOLIC BLOOD PRESSURE: 128 MMHG | TEMPERATURE: 98.1 F | OXYGEN SATURATION: 95 % | HEART RATE: 76 BPM | DIASTOLIC BLOOD PRESSURE: 80 MMHG | RESPIRATION RATE: 14 BRPM | WEIGHT: 295 LBS | BODY MASS INDEX: 43.69 KG/M2 | HEIGHT: 69 IN

## 2025-06-20 DIAGNOSIS — Z12.11 SPECIAL SCREENING FOR MALIGNANT NEOPLASMS, COLON: Primary | ICD-10-CM

## 2025-06-20 LAB — COLONOSCOPY: NORMAL

## 2025-06-20 RX ORDER — NALOXONE HYDROCHLORIDE 0.4 MG/ML
0.1 INJECTION, SOLUTION INTRAMUSCULAR; INTRAVENOUS; SUBCUTANEOUS
Status: DISCONTINUED | OUTPATIENT
Start: 2025-06-20 | End: 2025-06-20 | Stop reason: HOSPADM

## 2025-06-20 RX ORDER — SODIUM CHLORIDE, SODIUM LACTATE, POTASSIUM CHLORIDE, CALCIUM CHLORIDE 600; 310; 30; 20 MG/100ML; MG/100ML; MG/100ML; MG/100ML
INJECTION, SOLUTION INTRAVENOUS CONTINUOUS
Status: DISCONTINUED | OUTPATIENT
Start: 2025-06-20 | End: 2025-06-20 | Stop reason: HOSPADM

## 2025-06-20 RX ORDER — LIDOCAINE 40 MG/G
CREAM TOPICAL
Status: DISCONTINUED | OUTPATIENT
Start: 2025-06-20 | End: 2025-06-20 | Stop reason: HOSPADM

## 2025-06-20 RX ORDER — ONDANSETRON 2 MG/ML
4 INJECTION INTRAMUSCULAR; INTRAVENOUS EVERY 30 MIN PRN
Status: DISCONTINUED | OUTPATIENT
Start: 2025-06-20 | End: 2025-06-20 | Stop reason: HOSPADM

## 2025-06-20 RX ORDER — ALBUTEROL SULFATE 0.83 MG/ML
2.5 SOLUTION RESPIRATORY (INHALATION) EVERY 4 HOURS PRN
Status: DISCONTINUED | OUTPATIENT
Start: 2025-06-20 | End: 2025-06-20 | Stop reason: HOSPADM

## 2025-06-20 RX ORDER — ONDANSETRON 4 MG/1
4 TABLET, ORALLY DISINTEGRATING ORAL EVERY 30 MIN PRN
Status: DISCONTINUED | OUTPATIENT
Start: 2025-06-20 | End: 2025-06-20 | Stop reason: HOSPADM

## 2025-06-20 ASSESSMENT — ACTIVITIES OF DAILY LIVING (ADL)
ADLS_ACUITY_SCORE: 41

## 2025-06-20 NOTE — H&P
Formerly [facility]    Pre-Endoscopy History and Physical     Lalito Weathers MRN# [id number]   YOB: 1970 Age: 54 year old     Date of Procedure: 6/20/2025  Primary care provider: Lizet Berry  Type of Endoscopy: Colonoscopy with possible biopsy, possible polypectomy  Reason for Procedure: Screening  Type of Anesthesia Anticipated: MAC    HPI:    Lalito is a 54 year old male who will be undergoing the above procedure.  Screening colonoscopy. First one. Adopted; family hx unknown. No AC. ASA II.    A history and physical has been performed. The patient's medications and allergies have been reviewed. The risks and benefits of the procedure and the sedation options and risks were discussed with the patient.  All questions were answered and informed consent was obtained.      He denies a personal or family history of anesthesia complications or bleeding disorders.     Patient Active Problem List   Diagnosis    Essential hypertension    Palpitations    PVCs (premature ventricular contractions)    Vitamin D deficiency    Obesity (BMI 40) with comorbidity (H)    History of colonic polyps    Hyperlipidemia    Gout    Chronic prostatitis    GRACY (obstructive sleep apnea) - severe (AHI 87)    Benign prostatic hyperplasia with weak urinary stream    COPD (chronic obstructive pulmonary disease) (H)    Strain of left biceps muscle, initial encounter        Past Medical History:   Diagnosis Date    Diabetes (H)     Pre but under control    Hypertension     Obese     Sleep apnea     Tubular adenoma of colon 06/28/2019        Past Surgical History:   Procedure Laterality Date    APPENDECTOMY  12/30/2011    COLONOSCOPY N/A 06/21/2019    Procedure: COLONOSCOPY, WITH POLYPECTOMY AND BIOPSY;  Surgeon: Herman Champagne MD;  Location: [facility]    ESOPHAGOSCOPY, GASTROSCOPY, DUODENOSCOPY (EGD), COMBINED N/A 10/29/2021    Procedure: ESOPHAGOGASTRODUODENOSCOPY, WITH BIOPSY;  Surgeon: Jakub Wray DO;   Location: [facility]    ESOPHAGOSCOPY, GASTROSCOPY, DUODENOSCOPY (EGD), COMBINED N/A 1/8/2024    Procedure: ESOPHAGOGASTRODUODENOSCOPY, WITH BIOPSY;  Surgeon: Jimbo Aguirre MD;  Location: [facility]    ORTHOPEDIC SURGERY  05/10/2016    EXCISION LEFT UPPER INNER THIGH SOFT TISSUE MASS 5/10/16   MD NATE       Social History     Tobacco Use    Smoking status: Former     Passive exposure: Never    Smokeless tobacco: Former   Substance Use Topics    Alcohol use: Yes     Comment: social       Family History   Problem Relation Age of Onset    Unknown/Adopted Father     Unknown/Adopted Mother        Prior to Admission medications    Medication Sig Start Date End Date Taking? Authorizing Provider   allopurinol (ZYLOPRIM) 100 MG tablet Take 2 tablets (200 mg) by mouth daily. 9/19/24  Yes Lizet Berry APRN CNP   amLODIPine (NORVASC) 5 MG tablet Take 1 tablet (5 mg) by mouth daily. 4/15/25  Yes Shanda Wilkes MD   aspirin 81 MG EC tablet  10/2/08  Yes Reported, Patient   cholecalciferol (VITAMIN D3) 5000 units TABS tablet Take by mouth daily   Yes Reported, Patient   fish oil-omega-3 fatty acids 1000 MG capsule Take 1 g by mouth daily   Yes Reported, Patient   lisinopril (ZESTRIL) 40 MG tablet Take 1 tablet (40 mg) by mouth daily 8/19/24  Yes Shanda Wilkes MD   metoprolol succinate ER (TOPROL XL) 100 MG 24 hr tablet Take 1 tablet (100 mg) by mouth daily. 4/15/25  Yes Shanda Wilkes MD   multivitamin w/minerals (THERA-VIT-M) tablet Take 1 tablet by mouth daily   Yes Reported, Patient   polyethylene glycol (GOLYTELY) 236 g suspension 2 days prior at 5pm, mix and drink half of a jug of Golytely. Drink an 8 oz. glass of Golytely every 15 minutes until half of the jug is gone. Place remainder of Golytely in the refrigerator. 1 day prior at 5 pm, drink the 2nd half of a jug of Golytely bowel prep. 6 hours before your check-in time, drink an 8 oz. glass of Golytely every 15 minutes until half of the 2nd jug of Golytely is  "gone. Discard remainder of second jug.  Patient not taking: Reported on 6/10/2025 3/14/25  Yes Jakub Wray,    rosuvastatin (CRESTOR) 40 MG tablet Take 1 tablet (40 mg) by mouth daily 8/19/24  Yes Shanda Wilkes MD   solifenacin (VESICARE) 10 MG tablet Take 1 tablet (10 mg) by mouth daily. 1/29/25  Yes Cheli Barlow PA-C   tamsulosin (FLOMAX) 0.4 MG capsule Take 2 capsules (0.8 mg) by mouth daily. 1/29/25  Yes Cheli Barlow PA-C   triamterene-HCTZ (DYAZIDE) 37.5-25 MG capsule Take 2 capsules by mouth daily 8/19/24  Yes Shanda Wilkes MD   vitamin C (ASCORBIC ACID) 1000 MG TABS Take 1,000 mg by mouth daily   Yes Reported, Patient   nitroGLYcerin (NITROSTAT) 0.4 MG sublingual tablet Place 0.4 mg under the tongue.  Patient not taking: Reported on 6/10/2025 5/25/24   Reported, Patient       Allergies   Allergen Reactions    Food Hives     mangos    Vasquez Flavoring Agent (Non-Screening)     Statin Drugs [Statins]     Zantac [Ranitidine]         REVIEW OF SYSTEMS:   5 point ROS negative except as noted above in HPI, including Gen., Resp., CV, GI &  system review.    PHYSICAL EXAM:   BP (!) 191/96 (BP Location: Right arm)   Pulse 60   Temp 98  F (36.7  C) (Oral)   Resp 20   Ht 1.753 m (5' 9\")   Wt 133.8 kg (295 lb)   BMI 43.56 kg/m   Estimated body mass index is 43.56 kg/m  as calculated from the following:    Height as of this encounter: 1.753 m (5' 9\").    Weight as of this encounter: 133.8 kg (295 lb).   Constitutional: Awake, alert, no acute distress.  Eyes: No scleral icterus.  Conjunctiva are without injection.  ENMT: Mucous membranes moist, dentition and gums are intact.   Neck: Soft, supple, trachea midline.    Endocrine: n/a   Lymphatic: There is no cervical, submandibularadenopathy.  Respiratory: normal effort   Cardiovascular: S1, S2  Abdomen: Non-distended, non-tender,  No masses,  Musculoskeletal: No spinal or CVA tenderness. Full range of motion in the upper and lower " extremities.    Skin: No skin rashes or lesions to inspection.  No petechia.    Neurologic: alerted and oriented 3x  Psychiatric: The patient's affect is not blunted and mood is appropriate.  DIAGNOSTICS:    Not indicated    IMPRESSION   ASA Class 2 - Mild systemic disease    PLAN:   Plan for Colonoscopy with possible biopsy, possible polypectomy. We discussed the risks, benefits and alternatives and the patient wished to proceed.  Patient is cleared for the above procedure.    The above has been forwarded to the attending physician. Any changes will be mentioned in their attestation.    Juliana Quiñones MD PGY-2  General Surgery Resident

## 2025-06-23 ENCOUNTER — RESULTS FOLLOW-UP (OUTPATIENT)
Dept: SURGERY | Facility: CLINIC | Age: 55
End: 2025-06-23
Payer: COMMERCIAL

## 2025-06-23 PROCEDURE — 88305 TISSUE EXAM BY PATHOLOGIST: CPT | Mod: 26 | Performed by: PATHOLOGY

## 2025-08-09 DIAGNOSIS — I10 ESSENTIAL HYPERTENSION: ICD-10-CM

## 2025-08-11 RX ORDER — LISINOPRIL 40 MG/1
40 TABLET ORAL DAILY
Qty: 90 TABLET | Refills: 2 | Status: SHIPPED | OUTPATIENT
Start: 2025-08-11

## 2025-08-13 ENCOUNTER — OFFICE VISIT (OUTPATIENT)
Dept: URGENT CARE | Facility: URGENT CARE | Age: 55
End: 2025-08-13
Payer: COMMERCIAL

## 2025-08-13 VITALS
WEIGHT: 295 LBS | DIASTOLIC BLOOD PRESSURE: 105 MMHG | TEMPERATURE: 97.8 F | HEART RATE: 74 BPM | BODY MASS INDEX: 43.69 KG/M2 | OXYGEN SATURATION: 97 % | SYSTOLIC BLOOD PRESSURE: 191 MMHG | HEIGHT: 69 IN

## 2025-08-13 DIAGNOSIS — R07.0 THROAT PAIN: ICD-10-CM

## 2025-08-13 DIAGNOSIS — J03.90 TONSILLITIS: Primary | ICD-10-CM

## 2025-08-13 DIAGNOSIS — I10 ESSENTIAL HYPERTENSION: Chronic | ICD-10-CM

## 2025-08-13 LAB
DEPRECATED S PYO AG THROAT QL EIA: NEGATIVE
S PYO DNA THROAT QL NAA+PROBE: NOT DETECTED

## 2025-08-13 PROCEDURE — 3080F DIAST BP >= 90 MM HG: CPT | Performed by: PHYSICIAN ASSISTANT

## 2025-08-13 PROCEDURE — 99214 OFFICE O/P EST MOD 30 MIN: CPT | Performed by: PHYSICIAN ASSISTANT

## 2025-08-13 PROCEDURE — 87651 STREP A DNA AMP PROBE: CPT | Performed by: PHYSICIAN ASSISTANT

## 2025-08-13 PROCEDURE — 3077F SYST BP >= 140 MM HG: CPT | Performed by: PHYSICIAN ASSISTANT

## 2025-08-13 RX ORDER — AZITHROMYCIN 250 MG/1
TABLET, FILM COATED ORAL
Qty: 6 TABLET | Refills: 0 | Status: SHIPPED | OUTPATIENT
Start: 2025-08-13 | End: 2025-08-18

## 2025-08-13 ASSESSMENT — ENCOUNTER SYMPTOMS
SORE THROAT: 1
PALPITATIONS: 0
COUGH: 0
MYALGIAS: 0
ALLERGIC/IMMUNOLOGIC NEGATIVE: 1
CARDIOVASCULAR NEGATIVE: 1
HEADACHES: 1
RESPIRATORY NEGATIVE: 1
SHORTNESS OF BREATH: 0
CHILLS: 0
ABDOMINAL PAIN: 0
FEVER: 0
HEMATURIA: 0
DYSURIA: 0
GASTROINTESTINAL NEGATIVE: 1
VOMITING: 0
FREQUENCY: 0
NECK PAIN: 1
NAUSEA: 0
CHEST TIGHTNESS: 0
DIARRHEA: 0
WHEEZING: 0
CONSTITUTIONAL NEGATIVE: 1

## 2025-08-19 ENCOUNTER — TELEPHONE (OUTPATIENT)
Dept: FAMILY MEDICINE | Facility: CLINIC | Age: 55
End: 2025-08-19
Payer: COMMERCIAL

## 2025-08-19 DIAGNOSIS — I10 ESSENTIAL HYPERTENSION: ICD-10-CM

## 2025-08-19 RX ORDER — TRIAMTERENE AND HYDROCHLOROTHIAZIDE 37.5; 25 MG/1; MG/1
2 CAPSULE ORAL DAILY
Qty: 180 CAPSULE | Refills: 3 | Status: SHIPPED | OUTPATIENT
Start: 2025-08-19

## 2025-08-26 ENCOUNTER — OFFICE VISIT (OUTPATIENT)
Dept: FAMILY MEDICINE | Facility: CLINIC | Age: 55
End: 2025-08-26
Payer: COMMERCIAL

## 2025-08-26 ENCOUNTER — ANCILLARY PROCEDURE (OUTPATIENT)
Dept: GENERAL RADIOLOGY | Facility: CLINIC | Age: 55
End: 2025-08-26
Attending: NURSE PRACTITIONER
Payer: COMMERCIAL

## 2025-08-26 DIAGNOSIS — R05.1 ACUTE COUGH: ICD-10-CM

## 2025-08-26 PROCEDURE — 71046 X-RAY EXAM CHEST 2 VIEWS: CPT | Mod: TC | Performed by: INTERNAL MEDICINE

## 2025-08-26 ASSESSMENT — PAIN SCALES - GENERAL: PAINLEVEL_OUTOF10: NO PAIN (0)

## (undated) DEVICE — STRAP POSITIONING 60X31" BODY KNEE KBS 01

## (undated) DEVICE — ENDO FORCEP ENDOJAW BIOPSY 2.8MMX230CM FB-220U

## (undated) DEVICE — PREP CHLORAPREP 26ML TINTED ORANGE  260815

## (undated) DEVICE — ENDO SNARE EXACTO COLD 9MM LOOP 2.4MMX230CM 00711115

## (undated) RX ORDER — GLYCOPYRROLATE 0.2 MG/ML
INJECTION, SOLUTION INTRAMUSCULAR; INTRAVENOUS
Status: DISPENSED
Start: 2025-06-22

## (undated) RX ORDER — LIDOCAINE HYDROCHLORIDE 10 MG/ML
INJECTION, SOLUTION EPIDURAL; INFILTRATION; INTRACAUDAL; PERINEURAL
Status: DISPENSED
Start: 2019-06-21

## (undated) RX ORDER — PROPOFOL 10 MG/ML
INJECTION, EMULSION INTRAVENOUS
Status: DISPENSED
Start: 2024-01-08

## (undated) RX ORDER — PROPOFOL 10 MG/ML
INJECTION, EMULSION INTRAVENOUS
Status: DISPENSED
Start: 2025-06-22